# Patient Record
Sex: MALE | Race: BLACK OR AFRICAN AMERICAN | NOT HISPANIC OR LATINO | Employment: OTHER | URBAN - METROPOLITAN AREA
[De-identification: names, ages, dates, MRNs, and addresses within clinical notes are randomized per-mention and may not be internally consistent; named-entity substitution may affect disease eponyms.]

---

## 2017-03-29 ENCOUNTER — OFFICE VISIT (OUTPATIENT)
Dept: FAMILY MEDICINE | Facility: CLINIC | Age: 67
End: 2017-03-29
Payer: COMMERCIAL

## 2017-03-29 ENCOUNTER — LAB VISIT (OUTPATIENT)
Dept: LAB | Facility: HOSPITAL | Age: 67
End: 2017-03-29
Attending: INTERNAL MEDICINE
Payer: COMMERCIAL

## 2017-03-29 VITALS
TEMPERATURE: 98 F | HEIGHT: 70 IN | SYSTOLIC BLOOD PRESSURE: 104 MMHG | DIASTOLIC BLOOD PRESSURE: 71 MMHG | HEART RATE: 57 BPM | OXYGEN SATURATION: 96 % | WEIGHT: 214.94 LBS | BODY MASS INDEX: 30.77 KG/M2

## 2017-03-29 DIAGNOSIS — D50.9 MICROCYTIC ANEMIA: ICD-10-CM

## 2017-03-29 DIAGNOSIS — G47.26 SHIFT WORK SLEEP DISORDER: Primary | Chronic | ICD-10-CM

## 2017-03-29 DIAGNOSIS — N40.0 BENIGN NON-NODULAR PROSTATIC HYPERPLASIA WITHOUT LOWER URINARY TRACT SYMPTOMS: Chronic | ICD-10-CM

## 2017-03-29 DIAGNOSIS — I10 ESSENTIAL HYPERTENSION: Chronic | ICD-10-CM

## 2017-03-29 DIAGNOSIS — Z12.5 SCREENING FOR PROSTATE CANCER: ICD-10-CM

## 2017-03-29 DIAGNOSIS — E78.5 HYPERLIPIDEMIA, UNSPECIFIED HYPERLIPIDEMIA TYPE: Chronic | ICD-10-CM

## 2017-03-29 DIAGNOSIS — N52.9 ERECTILE DYSFUNCTION, UNSPECIFIED ERECTILE DYSFUNCTION TYPE: ICD-10-CM

## 2017-03-29 DIAGNOSIS — G25.81 RESTLESS LEG SYNDROME: ICD-10-CM

## 2017-03-29 LAB
ALBUMIN SERPL BCP-MCNC: 3.6 G/DL
ALP SERPL-CCNC: 39 U/L
ALT SERPL W/O P-5'-P-CCNC: 26 U/L
ANION GAP SERPL CALC-SCNC: 8 MMOL/L
AST SERPL-CCNC: 25 U/L
BASOPHILS # BLD AUTO: 0.02 K/UL
BASOPHILS NFR BLD: 0.3 %
BILIRUB SERPL-MCNC: 0.7 MG/DL
BUN SERPL-MCNC: 14 MG/DL
CALCIUM SERPL-MCNC: 9.3 MG/DL
CHLORIDE SERPL-SCNC: 107 MMOL/L
CHOLEST/HDLC SERPL: 6.2 {RATIO}
CO2 SERPL-SCNC: 27 MMOL/L
CREAT SERPL-MCNC: 1.1 MG/DL
DIFFERENTIAL METHOD: ABNORMAL
EOSINOPHIL # BLD AUTO: 0.1 K/UL
EOSINOPHIL NFR BLD: 0.9 %
ERYTHROCYTE [DISTWIDTH] IN BLOOD BY AUTOMATED COUNT: 14.3 %
EST. GFR  (AFRICAN AMERICAN): >60 ML/MIN/1.73 M^2
EST. GFR  (NON AFRICAN AMERICAN): >60 ML/MIN/1.73 M^2
GLUCOSE SERPL-MCNC: 99 MG/DL
HCT VFR BLD AUTO: 42.6 %
HDL/CHOLESTEROL RATIO: 16.2 %
HDLC SERPL-MCNC: 210 MG/DL
HDLC SERPL-MCNC: 34 MG/DL
HGB BLD-MCNC: 13.4 G/DL
LDLC SERPL CALC-MCNC: 144.8 MG/DL
LYMPHOCYTES # BLD AUTO: 2.3 K/UL
LYMPHOCYTES NFR BLD: 35 %
MCH RBC QN AUTO: 24.3 PG
MCHC RBC AUTO-ENTMCNC: 31.5 %
MCV RBC AUTO: 77 FL
MONOCYTES # BLD AUTO: 0.8 K/UL
MONOCYTES NFR BLD: 12.9 %
NEUTROPHILS # BLD AUTO: 3.3 K/UL
NEUTROPHILS NFR BLD: 50.6 %
NONHDLC SERPL-MCNC: 176 MG/DL
PLATELET # BLD AUTO: 278 K/UL
PMV BLD AUTO: 9.1 FL
POTASSIUM SERPL-SCNC: 3.9 MMOL/L
PROT SERPL-MCNC: 7.3 G/DL
RBC # BLD AUTO: 5.52 M/UL
SODIUM SERPL-SCNC: 142 MMOL/L
TRIGL SERPL-MCNC: 156 MG/DL
WBC # BLD AUTO: 6.45 K/UL

## 2017-03-29 PROCEDURE — 99214 OFFICE O/P EST MOD 30 MIN: CPT | Mod: S$GLB,,, | Performed by: INTERNAL MEDICINE

## 2017-03-29 PROCEDURE — 80061 LIPID PANEL: CPT

## 2017-03-29 PROCEDURE — 83020 HEMOGLOBIN ELECTROPHORESIS: CPT

## 2017-03-29 PROCEDURE — 36415 COLL VENOUS BLD VENIPUNCTURE: CPT | Mod: PO

## 2017-03-29 PROCEDURE — 99999 PR PBB SHADOW E&M-EST. PATIENT-LVL III: CPT | Mod: PBBFAC,,, | Performed by: INTERNAL MEDICINE

## 2017-03-29 PROCEDURE — 3078F DIAST BP <80 MM HG: CPT | Mod: S$GLB,,, | Performed by: INTERNAL MEDICINE

## 2017-03-29 PROCEDURE — 85025 COMPLETE CBC W/AUTO DIFF WBC: CPT

## 2017-03-29 PROCEDURE — 80053 COMPREHEN METABOLIC PANEL: CPT

## 2017-03-29 PROCEDURE — 84153 ASSAY OF PSA TOTAL: CPT

## 2017-03-29 PROCEDURE — 3074F SYST BP LT 130 MM HG: CPT | Mod: S$GLB,,, | Performed by: INTERNAL MEDICINE

## 2017-03-29 PROCEDURE — 1160F RVW MEDS BY RX/DR IN RCRD: CPT | Mod: S$GLB,,, | Performed by: INTERNAL MEDICINE

## 2017-03-29 RX ORDER — SILDENAFIL 50 MG/1
50 TABLET, FILM COATED ORAL DAILY PRN
Qty: 4 TABLET | Refills: 11 | Status: SHIPPED | OUTPATIENT
Start: 2017-03-29 | End: 2018-08-02

## 2017-03-29 RX ORDER — NIACIN 500 MG/1
500 TABLET, EXTENDED RELEASE ORAL DAILY
Qty: 30 TABLET | Refills: 11 | Status: SHIPPED | OUTPATIENT
Start: 2017-03-29 | End: 2017-07-06 | Stop reason: SDUPTHER

## 2017-03-29 RX ORDER — HYDROCHLOROTHIAZIDE 12.5 MG/1
12.5 CAPSULE ORAL DAILY
Qty: 30 CAPSULE | Refills: 11 | Status: SHIPPED | OUTPATIENT
Start: 2017-03-29 | End: 2018-02-01 | Stop reason: SINTOL

## 2017-03-29 RX ORDER — TRAZODONE HYDROCHLORIDE 50 MG/1
50 TABLET ORAL NIGHTLY PRN
Qty: 30 TABLET | Refills: 11 | Status: CANCELLED | OUTPATIENT
Start: 2017-03-29 | End: 2018-03-29

## 2017-03-29 RX ORDER — LISINOPRIL 20 MG/1
20 TABLET ORAL DAILY
Qty: 30 TABLET | Refills: 11 | Status: SHIPPED | OUTPATIENT
Start: 2017-03-29 | End: 2018-04-02 | Stop reason: SDUPTHER

## 2017-03-29 RX ORDER — AMOXICILLIN 500 MG/1
CAPSULE ORAL
Refills: 0 | COMMUNITY
Start: 2017-03-18 | End: 2017-09-12

## 2017-03-29 RX ORDER — ROPINIROLE 0.25 MG/1
0.25 TABLET, FILM COATED ORAL NIGHTLY
Qty: 30 TABLET | Refills: 5 | Status: CANCELLED | OUTPATIENT
Start: 2017-03-29 | End: 2017-09-25

## 2017-03-29 RX ORDER — RAMELTEON 8 MG/1
8 TABLET ORAL DAILY
Qty: 30 TABLET | Refills: 5 | Status: SHIPPED | OUTPATIENT
Start: 2017-03-29 | End: 2017-04-05

## 2017-03-29 RX ORDER — HYDROCODONE BITARTRATE AND ACETAMINOPHEN 5; 325 MG/1; MG/1
TABLET ORAL
Refills: 0 | COMMUNITY
Start: 2017-03-18 | End: 2017-03-29

## 2017-03-29 RX ORDER — METRONIDAZOLE 250 MG/1
TABLET ORAL
Refills: 0 | COMMUNITY
Start: 2017-03-18 | End: 2017-03-29

## 2017-03-29 NOTE — MR AVS SNAPSHOT
[facility]  [address] [facility]  [place] [address]  Phone: [phone]  Fax: [phone]                  Sharee Day Jr.   3/29/2017 10:40 AM   Office Visit    Description:  Male : 1950   Provider:  Navin Charlton MD   Department:  [facility] - Family Medicine           Reason for Visit     Dizziness     Insomnia     Constipation           Diagnoses this Visit        Comments    Hyperlipidemia, unspecified hyperlipidemia type    -  Primary     Essential hypertension         Shift work sleep disorder         Restless leg syndrome         Erectile dysfunction, unspecified erectile dysfunction type         Microcytic anemia         Screening for prostate cancer         Benign non-nodular prostatic hyperplasia without lower urinary tract symptoms                To Do List           Goals (5 Years of Data)              Today    16    Blood Pressure < 140/90   104/71  104/71  104/71    Related Problems    HTN (hypertension)       These Medications        Disp Refills Start End    hydrochlorothiazide (MICROZIDE) 12.5 mg capsule 30 capsule 11 3/29/2017     Take 1 capsule (12.5 mg total) by mouth once daily. - Oral    Pharmacy: [facility] [address] [place] [address] EXPY AT [facility] Ph #: [phone]       lisinopril (PRINIVIL,ZESTRIL) 20 MG tablet 30 tablet 11 3/29/2017 3/29/2018    Take 1 tablet (20 mg total) by mouth once daily. - Oral    Pharmacy: [facility] [address] [place] AT [facility] Ph #: [phone]       niacin (SLO-NIACIN) 500 mg tablet 30 tablet 11 3/29/2017 3/29/2018    Take 1 tablet (500 mg total) by mouth once daily. - Oral    Pharmacy: [facility] [address] EX[place] AT [facility] Ph #: [phone]       sildenafil (VIAGRA) 50 MG tablet 4 tablet 11 3/29/2017     Take 1 tablet (50 mg total) by mouth daily as needed for  Erectile Dysfunction. - Oral    Pharmacy: [facility] [address] [place] [address] [address] [place] [address] EXPY AT [facility] Ph #: [phone]       ramelteon (ROZEREM) 8 mg tablet 30 tablet 5 3/29/2017     Take 1 tablet (8 mg total) by mouth once daily. - Oral    Pharmacy: [facility] [address] [place] [address] [address] [facility] [address] AT [facility] Ph #: [phone]         [facility]     [facility] Care [facility] - 24/7 Assistance  Registered nurses in the [facility] provide clinical advisement, health education, appointment booking, and other advisory services.  Call for this free service at [phone].             Medications           Message regarding Medications     Verify the changes and/or additions to your medication regime listed below are the same as discussed with your clinician today.  If any of these changes or additions are incorrect, please notify your healthcare provider.        START taking these NEW medications        Refills    ramelteon (ROZEREM) 8 mg tablet 5    Sig: Take 1 tablet (8 mg total) by mouth once daily.    Class: Normal    Route: Oral      CHANGE how you are taking these medications     Start Taking Instead of    hydrochlorothiazide (MICROZIDE) 12.5 mg capsule hydrochlorothiazide (MICROZIDE) 12.5 mg capsule    Dosage:  Take 1 capsule (12.5 mg total) by mouth once daily. Dosage:  TAKE ONE CAPSULE BY MOUTH ONCE DAILY    Reason for Change:  Reorder       STOP taking these medications     ropinirole (REQUIP) 0.25 MG tablet Take 1 tablet (0.25 mg total) by mouth every evening.    trazodone (DESYREL) 50 MG tablet Take 1 tablet (50 mg total) by mouth nightly as needed for Insomnia.    hydrOXYzine pamoate (VISTARIL) 25 MG Cap Take 1-2 capsules (25-50 mg total) by mouth nightly as needed.    metronidazole (FLAGYL) 250 MG tablet TK 1 T PO  TID    hydrocodone-acetaminophen 5-325mg (NORCO) 5-325 mg per tablet TK 1 T PO  Q 4 TO 6 H PRN          "  Verify that the below list of medications is an accurate representation of the medications you are currently taking.  If none reported, the list may be blank. If incorrect, please contact your healthcare provider. Carry this list with you in case of emergency.           Current Medications     hydrochlorothiazide (MICROZIDE) 12.5 mg capsule Take 1 capsule (12.5 mg total) by mouth once daily.    lisinopril (PRINIVIL,ZESTRIL) 20 MG tablet Take 1 tablet (20 mg total) by mouth once daily.    niacin (SLO-NIACIN) 500 mg tablet Take 1 tablet (500 mg total) by mouth once daily.    sildenafil (VIAGRA) 50 MG tablet Take 1 tablet (50 mg total) by mouth daily as needed for Erectile Dysfunction.    amoxicillin (AMOXIL) 500 MG capsule TK ONE C PO  TID    ramelteon (ROZEREM) 8 mg tablet Take 1 tablet (8 mg total) by mouth once daily.           Clinical Reference Information           Your Vitals Were     BP Pulse Temp Height Weight SpO2    104/71 57 98.3 °F (36.8 °C) (Oral) 5' 10" (1.778 m) 97.5 kg (214 lb 15.2 oz) 96%    BMI                30.84 kg/m2          Blood Pressure          Most Recent Value    BP  104/71      Allergies as of 3/29/2017     Sulfamethoxazole-trimethoprim    Lipitor [Atorvastatin]      Immunizations Administered on Date of Encounter - 3/29/2017     None      Orders Placed During Today's Visit     Future Labs/Procedures Expected by Expires    CBC auto differential  3/29/2017 3/29/2018    Comprehensive metabolic panel  3/29/2017 3/29/2018    Hemoglobin Electrophoresis,Hgb A2 Vishnu.  3/29/2017 3/29/2018    Lipid panel  3/29/2017 3/29/2018    PSA, Screening  3/29/2017 3/29/2018      [facility] Sign-Up     Activating your [facility] account is as easy as 1-2-3!     1) Visit [email or web address], select Sign Up Now, enter this activation code and your date of birth, then select Next.  [email or web address]  Expires: 5/13/2017 11:16 AM      2) Create a username and password to use when you visit [facility] in the future and " "select a security question in case you lose your password and select Next.    3) Enter your e-mail address and click Sign Up!    Additional Information  If you have questions, please e-mail [email or web address] or call [phone] to talk to our [facility] staff. Remember, [facility] is NOT to be used for urgent needs. For medical emergencies, dial 911.         Instructions    Get the book "Say Iggy to Insomnia"       Smoking Cessation     If you would like to quit smoking:   You may be eligible for free services if you are a [place] resident and started smoking cigarettes before September 1, 1988.  Call the [employer]oking Cessation [employer] (RUST) toll free at (729) 722-9698 or (541) 250-5401.   Call 5-049-QUIT-NOW if you do not meet the above criteria.            Language Assistance Services     ATTENTION: Language assistance services are available, free of charge. Please call [phone].      ATENCIÓN: Si habla español, tiene a mata disposición servicios gratuitos de asistencia lingüística. Llame al [phone].     CHÚ Ý: N?u b?n nói Ti?ng Vi?t, có các d?ch v? h? tr? ngôn ng? mi?n phí dành cho b?n. G?i s? [phone].         [facility] complies with applicable Federal civil rights laws and does not discriminate on the basis of race, color, national origin, age, disability, or sex.        "

## 2017-03-29 NOTE — PROGRESS NOTES
"Assessment & Plan  Shift work sleep disorder-trazodone ineffective, hydroxyzine with side effects.  Trial of ramelteon.  If ineffective or just too expensive, we'll try Ambien low dose.  Risks of Ambien use discussed with the patient.  He's tried all over-the-counter antihistamines and melatonin without relief.  I also gave him the name of the book "say good night insomnia" to read about, for self directed cognitive behavioral modification.  -     ramelteon (ROZEREM) 8 mg tablet; Take 1 tablet (8 mg total) by mouth once daily.  Dispense: 30 tablet; Refill: 5    Essential hypertension-stable, refilled HCTZ and lisinopril.  -     hydrochlorothiazide (MICROZIDE) 12.5 mg capsule; Take 1 capsule (12.5 mg total) by mouth once daily.  Dispense: 30 capsule; Refill: 11  -     lisinopril (PRINIVIL,ZESTRIL) 20 MG tablet; Take 1 tablet (20 mg total) by mouth once daily.  Dispense: 30 tablet; Refill: 11    Hyperlipidemia, unspecified hyperlipidemia type -intolerant of statins in the past.  Fasting today, refilled niacin, check CMP and lipid profile.  -     niacin (SLO-NIACIN) 500 mg tablet; Take 1 tablet (500 mg total) by mouth once daily.  Dispense: 30 tablet; Refill: 11  -     Comprehensive metabolic panel; Future; Expected date: 3/29/17  -     Lipid panel; Future; Expected date: 3/29/17    Restless leg syndrome-did not find Requip helpful.  Discontinued.    Erectile dysfunction, unspecified erectile dysfunction type-refilled sildenafil.  -     sildenafil (VIAGRA) 50 MG tablet; Take 1 tablet (50 mg total) by mouth daily as needed for Erectile Dysfunction.  Dispense: 4 tablet; Refill: 11    Microcytic anemia-does not recall family history of thalassemia or sickle trait.  Normal iron stores.  I suspect hemoglobinopathy.  Check hemoglobin electrophoresis.  -     CBC auto differential; Future; Expected date: 3/29/17  -     Hemoglobin Electrophoresis,Hgb A2 Vishnu.; Future; Expected date: 3/29/17    Screening for prostate " "cancer  Benign non-nodular prostatic hyperplasia without lower urinary tract symptoms-check PSA.  He is monitored by urology.  Recalls having had prostate biopsy and MRI.  -     PSA, Screening; Future; Expected date: 3/29/17    Other orders  -     Cancel: ropinirole (REQUIP) 0.25 MG tablet; Take 1 tablet (0.25 mg total) by mouth every evening.  Dispense: 30 tablet; Refill: 5  -     Cancel: trazodone (DESYREL) 50 MG tablet; Take 1 tablet (50 mg total) by mouth nightly as needed for Insomnia.  Dispense: 30 tablet; Refill: 11        Medications Discontinued During This Encounter   Medication Reason    ropinirole (REQUIP) 0.25 MG tablet Patient no longer taking       Follow-up: No Follow-up on file.If all normal OV 6 months f/u on lipid, HTN, insomnia      =================================================================      Chief Complaint   Patient presents with    Dizziness    Insomnia    Constipation       CHERRY Tolliver is a 66 y.o. male, last appointment with this clinic was Visit date not found.    Former pt of Dr. Manuel  Insomnia; trial of trazodone; Sleep shift syndrome - hard for him to fall asleep at night.  Last night went to bed 9 PM and tossed and turned until 4 AM.  Days he works - he gets up at 4 AM. Works 12 hour shifts alternating day and nights.  Has been doing this x 40 years.   EtOH - rare. no caffeine.  Does smoke. Has had SE of vistaril; trazodone without relief.  Prior to bedtime - does not read, watch TV.  Typically after night shift - comes home, undresses, and goes to bed, and falls asleep after 1 hour.  Sleep duration 4-5 hours.  But working day shift - coming home - can't sleep.  Tried all OTC meds including melatonin, numerous antihistamines, without relief.  Hydroxyzine gave him a "popping" sensation in the head.  Had dental implant - giving him problems - was rx'd amoxicillin.  Could be contributor.  RLS; trial of Requip - did not find it helpful - stopped.  Microcytic anemia; normal " iron stores.   Check electrophoresis today.  No known fam hx of hemoglobinopathy.  Noncardiac chest pain.  Seen by cardiology.  7/13/2016 nu med stress test neg for ischemia  7/13/2016 TTE LVEF 55-60; no diastolic dysfunction  HTN, HCTZ and lisinopril - good BP today.  No outside BP checks.  Hyperlipidemia - statins SE of myalgias. On niacin.  Smoker. precontemplative stage of quitting.    Patient Care Team:  Navin Charlton MD as PCP - General (Internal Medicine)    Patient Active Problem List    Diagnosis Date Noted    Restless leg syndrome 08/02/2016    Hyperlipidemia     Hypertension      7/13/2016 nu med stress test neg for ischemia  7/13/2016 TTE LVEF 55-60; no diastolic dysfunction      Insomnia 11/20/2013       PAST MEDICAL HISTORY:  Past Medical History:   Diagnosis Date    Hyperlipidemia     Hypertension        PAST SURGICAL HISTORY:  Past Surgical History:   Procedure Laterality Date    MOUTH SURGERY  01/2016     Family History   Problem Relation Age of Onset    Breast cancer Mother     Hypertension Sister     Hypertension Brother     Hypertension Brother     Hypertension Sister     Melanoma Neg Hx     Psoriasis Neg Hx     Lupus Neg Hx          SOCIAL HISTORY:  Social History     Social History    Marital status: Single     Spouse name: N/A    Number of children: N/A    Years of education: N/A     Occupational History    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  Review of patient's allergies indicates:   Allergen Reactions    Sulfamethoxazole-trimethoprim Other (See Comments)     Muscle pain    Lipitor  [atorvastatin] Other (See Comments)     Muscle cramps     Current Outpatient Prescriptions   Medication Sig Dispense Refill    hydrochlorothiazide (MICROZIDE) 12.5 mg capsule TAKE ONE CAPSULE BY MOUTH ONCE DAILY 30 capsule 5    lisinopril (PRINIVIL,ZESTRIL) 20 MG tablet Take 1 tablet (20 mg total) by mouth once daily. 30 tablet 11    niacin (SLO-NIACIN) 500 mg tablet Take 1 tablet (500 mg total) by mouth once daily. 30 tablet 11    niacin (SLO-NIACIN) 500 mg tablet TAKE 1 TABLET BY MOUTH EVERY DAY 30 tablet 2    sildenafil (VIAGRA) 50 MG tablet Take 1 tablet (50 mg total) by mouth daily as needed for Erectile Dysfunction. 4 tablet 11    amoxicillin (AMOXIL) 500 MG capsule TK ONE C PO  TID  0    hydrocodone-acetaminophen 5-325mg (NORCO) 5-325 mg per tablet TK 1 T PO  Q 4 TO 6 H PRN  0    hydrOXYzine pamoate (VISTARIL) 25 MG Cap Take 1-2 capsules (25-50 mg total) by mouth nightly as needed. 60 capsule 11    metronidazole (FLAGYL) 250 MG tablet TK 1 T PO  TID  0    trazodone (DESYREL) 50 MG tablet Take 1 tablet (50 mg total) by mouth nightly as needed for Insomnia. 30 tablet 11     No current facility-administered medications for this visit.        Review of Systems   Constitutional: Negative for fever, malaise/fatigue and weight loss.   HENT: Negative for congestion.    Eyes: Negative for blurred vision and pain.   Respiratory: Negative for shortness of breath and wheezing.    Cardiovascular: Negative for chest pain, palpitations and leg swelling.   Gastrointestinal: Negative for abdominal pain, blood in stool, constipation, diarrhea and heartburn.   Genitourinary: Negative for dysuria, hematuria and urgency.   Musculoskeletal: Negative for joint pain.   Neurological: Negative for tingling, focal weakness, weakness and headaches.   Psychiatric/Behavioral: Negative for depression. The patient is not nervous/anxious.         HPI       Physical Exam   Vitals:    03/29/17 1047   BP: 104/71   Pulse: (!) 57  "  Temp: 98.3 °F (36.8 °C)   TempSrc: Oral   SpO2: 96%   Weight: 97.5 kg (214 lb 15.2 oz)   Height: 5' 10" (1.778 m)    Body mass index is 30.84 kg/(m^2).  Weight: 97.5 kg (214 lb 15.2 oz)   Height: 5' 10" (177.8 cm)     Physical Exam   Constitutional: He is oriented to person, place, and time. He appears well-developed and well-nourished. No distress.   Eyes: EOM are normal.   Cardiovascular: Normal rate, regular rhythm and normal heart sounds.    No murmur heard.  Pulmonary/Chest: Effort normal and breath sounds normal.   Musculoskeletal: Normal range of motion.   Neurological: He is alert and oriented to person, place, and time. Coordination normal.   Skin: Skin is warm and dry.   Psychiatric: He has a normal mood and affect. His behavior is normal. Thought content normal.     "

## 2017-03-30 LAB — COMPLEXED PSA SERPL-MCNC: 4.1 NG/ML

## 2017-03-31 LAB
HGB A2 MFR BLD HPLC: 6.4 %
HGB FRACT BLD ELPH-IMP: ABNORMAL
HGB FRACT BLD ELPH-IMP: ABNORMAL

## 2017-04-05 ENCOUNTER — TELEPHONE (OUTPATIENT)
Dept: FAMILY MEDICINE | Facility: CLINIC | Age: 67
End: 2017-04-05

## 2017-04-05 DIAGNOSIS — G47.26 SHIFT WORK SLEEP DISORDER: Primary | Chronic | ICD-10-CM

## 2017-04-05 RX ORDER — ZOLPIDEM TARTRATE 5 MG/1
5 TABLET ORAL NIGHTLY PRN
Qty: 30 TABLET | Refills: 0 | Status: SHIPPED | OUTPATIENT
Start: 2017-04-05 | End: 2017-05-31 | Stop reason: SDUPTHER

## 2017-04-05 NOTE — TELEPHONE ENCOUNTER
Spoke w/patient, requesting a different medication (Ambien), states Rozerem is not working. Please advise.

## 2017-04-05 NOTE — TELEPHONE ENCOUNTER
----- Message from Neda Sla sent at 4/4/2017  2:59 PM CDT -----  Contact: Self  Pt calling to speak to nurse regarding health. Please call [phone]

## 2017-04-05 NOTE — TELEPHONE ENCOUNTER
----- Message from Bernabe Bowman sent at 4/5/2017  9:31 AM CDT -----  Contact: Self/[phone]  Patient states that the medication:  ramelteon (ROZEREM) 8 mg tablet isn't working. He's requesting another medication. Thank you.

## 2017-04-11 ENCOUNTER — TELEPHONE (OUTPATIENT)
Dept: FAMILY MEDICINE | Facility: CLINIC | Age: 67
End: 2017-04-11

## 2017-04-11 PROBLEM — D56.3 BETA THALASSEMIA MINOR: Chronic | Status: ACTIVE | Noted: 2017-04-11

## 2017-04-11 NOTE — TELEPHONE ENCOUNTER
"Left VM on listed cell phone for him to call back.    PSA mild elevated, I believe he is seen by urology with Ricardo Morales but need to confirm this.  If so, will send copy of labs - I believe it's Dr. 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  It's been there all his life and will not cause problems.  But it also means that he does not need iron supplement.    Await call back  "

## 2017-04-11 NOTE — PROGRESS NOTES
PSA elevated.  He reported he is monitored by Urology - I believe urology with REED Morales but need to confirm.  He recalls hx of prostate bx and MRI.  Lipid not ideal but intolerant of statins.  On niaspan.  Hb electrophoresis shows beta thal minor.  CBC shows mild stable anemia.  Left message on pt's listed cell phone to call back - need to confirm he continues to see urology.  No change in regimen.

## 2017-04-11 NOTE — TELEPHONE ENCOUNTER
Spoke with pt notified him of results.  He still sees Dr. Kearns.  Had prostate MRI this year.    Discussed findings of beta thalassemia.    He's still having issues with insomnia - ambien helpful for initiation but still issues with sleep maintenance.  I discussed with him I'm reluctant to increase the dose.  Advised him to try OTC Sea Bands as I've had anecdotal success with this.  He also mentioned CBT as something he might be interested in if that doesn't work.

## 2017-05-31 DIAGNOSIS — G47.26 SHIFT WORK SLEEP DISORDER: Chronic | ICD-10-CM

## 2017-05-31 RX ORDER — ZOLPIDEM TARTRATE 5 MG/1
5 TABLET ORAL NIGHTLY PRN
Qty: 30 TABLET | Refills: 0 | Status: SHIPPED | OUTPATIENT
Start: 2017-05-31 | End: 2017-06-19

## 2017-05-31 NOTE — TELEPHONE ENCOUNTER
----- Message from Angelique Lazo sent at 5/30/2017  4:35 PM CDT -----  Contact: Self   Patient need a refill. Please call patient  at [phone]      zolpidem (AMBIEN) 5 MG Tab

## 2017-06-01 ENCOUNTER — TELEPHONE (OUTPATIENT)
Dept: FAMILY MEDICINE | Facility: CLINIC | Age: 67
End: 2017-06-01

## 2017-06-01 NOTE — TELEPHONE ENCOUNTER
Patients insurance does not cover Zolpidem 5mg.. Do you want to change the medication or due you want me to start a Prior Auth??

## 2017-06-19 DIAGNOSIS — G47.26 SHIFT WORK SLEEP DISORDER: Chronic | ICD-10-CM

## 2017-06-19 RX ORDER — ZOLPIDEM TARTRATE 6.25 MG/1
6.25 TABLET, FILM COATED, EXTENDED RELEASE ORAL NIGHTLY PRN
Qty: 30 TABLET | Refills: 1 | Status: SHIPPED | OUTPATIENT
Start: 2017-06-19 | End: 2017-06-30 | Stop reason: SDUPTHER

## 2017-06-19 RX ORDER — ZOLPIDEM TARTRATE 5 MG/1
5 TABLET ORAL NIGHTLY PRN
Qty: 30 TABLET | Refills: 0 | Status: CANCELLED | OUTPATIENT
Start: 2017-06-19 | End: 2017-12-18

## 2017-06-19 NOTE — TELEPHONE ENCOUNTER
----- Message from Geraldo Michele sent at 6/19/2017  9:29 AM CDT -----  Contact: self  Refill:    zolpidem (AMBIEN) 5 MG Tab   pt is asking for the time release brand to keep him asleep.  Pt is also asking for PA because ins is not paying for medication    Please call pt at .    Thanks

## 2017-06-19 NOTE — TELEPHONE ENCOUNTER
i sent in rx for ambien CR 6.25 mg.  I sent it to [facility] b/c they will try to get the prior authorization.  If authorized, he may need to get it from [facility] or have [facility] transfer the rx to his local pharmacy.

## 2017-06-30 DIAGNOSIS — G47.26 SHIFT WORK SLEEP DISORDER: Chronic | ICD-10-CM

## 2017-06-30 RX ORDER — ZOLPIDEM TARTRATE 6.25 MG/1
6.25 TABLET, FILM COATED, EXTENDED RELEASE ORAL NIGHTLY PRN
Qty: 30 TABLET | Refills: 1 | Status: SHIPPED | OUTPATIENT
Start: 2017-06-30 | End: 2017-08-30 | Stop reason: SDUPTHER

## 2017-06-30 NOTE — TELEPHONE ENCOUNTER
----- Message from Kemi Rubin sent at 6/30/2017  9:57 AM CDT -----  Contact: [phone]  Pt states the pharmacy doesn't have the refill for zolpidem (AMBIEN CR) 6.25 MG CR tablet please send to [facility] [id number] [place] [address] [address] [facility] [address] AT [facility]

## 2017-07-06 DIAGNOSIS — E78.5 HYPERLIPIDEMIA, UNSPECIFIED HYPERLIPIDEMIA TYPE: Chronic | ICD-10-CM

## 2017-07-07 RX ORDER — NIACIN 500 MG/1
500 TABLET, EXTENDED RELEASE ORAL DAILY
Qty: 30 TABLET | Refills: 11 | Status: SHIPPED | OUTPATIENT
Start: 2017-07-07 | End: 2017-08-30 | Stop reason: SDUPTHER

## 2017-08-30 ENCOUNTER — LAB VISIT (OUTPATIENT)
Dept: LAB | Facility: HOSPITAL | Age: 67
End: 2017-08-30
Attending: INTERNAL MEDICINE
Payer: COMMERCIAL

## 2017-08-30 ENCOUNTER — OFFICE VISIT (OUTPATIENT)
Dept: FAMILY MEDICINE | Facility: CLINIC | Age: 67
End: 2017-08-30
Payer: COMMERCIAL

## 2017-08-30 ENCOUNTER — TELEPHONE (OUTPATIENT)
Dept: FAMILY MEDICINE | Facility: CLINIC | Age: 67
End: 2017-08-30

## 2017-08-30 VITALS
RESPIRATION RATE: 18 BRPM | HEIGHT: 70 IN | WEIGHT: 219.56 LBS | HEART RATE: 71 BPM | TEMPERATURE: 98 F | BODY MASS INDEX: 31.43 KG/M2 | DIASTOLIC BLOOD PRESSURE: 81 MMHG | SYSTOLIC BLOOD PRESSURE: 123 MMHG | OXYGEN SATURATION: 97 %

## 2017-08-30 DIAGNOSIS — E55.9 VITAMIN D DEFICIENCY: ICD-10-CM

## 2017-08-30 DIAGNOSIS — F17.200 SMOKER: ICD-10-CM

## 2017-08-30 DIAGNOSIS — E78.5 HYPERLIPIDEMIA, UNSPECIFIED HYPERLIPIDEMIA TYPE: Chronic | ICD-10-CM

## 2017-08-30 DIAGNOSIS — C61 PROSTATE CANCER: Primary | ICD-10-CM

## 2017-08-30 DIAGNOSIS — Z23 NEED FOR PROPHYLACTIC VACCINATION AGAINST STREPTOCOCCUS PNEUMONIAE (PNEUMOCOCCUS): ICD-10-CM

## 2017-08-30 DIAGNOSIS — G47.26 SHIFT WORK SLEEP DISORDER: Chronic | ICD-10-CM

## 2017-08-30 DIAGNOSIS — C61 PROSTATE CANCER: ICD-10-CM

## 2017-08-30 LAB — 25(OH)D3+25(OH)D2 SERPL-MCNC: 18 NG/ML

## 2017-08-30 PROCEDURE — 99214 OFFICE O/P EST MOD 30 MIN: CPT | Mod: 25,S$GLB,, | Performed by: INTERNAL MEDICINE

## 2017-08-30 PROCEDURE — 90670 PCV13 VACCINE IM: CPT | Mod: S$GLB,,, | Performed by: INTERNAL MEDICINE

## 2017-08-30 PROCEDURE — 3074F SYST BP LT 130 MM HG: CPT | Mod: S$GLB,,, | Performed by: INTERNAL MEDICINE

## 2017-08-30 PROCEDURE — 84153 ASSAY OF PSA TOTAL: CPT

## 2017-08-30 PROCEDURE — 3079F DIAST BP 80-89 MM HG: CPT | Mod: S$GLB,,, | Performed by: INTERNAL MEDICINE

## 2017-08-30 PROCEDURE — 82306 VITAMIN D 25 HYDROXY: CPT

## 2017-08-30 PROCEDURE — 36415 COLL VENOUS BLD VENIPUNCTURE: CPT | Mod: PO

## 2017-08-30 PROCEDURE — 90471 IMMUNIZATION ADMIN: CPT | Mod: S$GLB,,, | Performed by: INTERNAL MEDICINE

## 2017-08-30 PROCEDURE — 99999 PR PBB SHADOW E&M-EST. PATIENT-LVL IV: CPT | Mod: PBBFAC,,, | Performed by: INTERNAL MEDICINE

## 2017-08-30 PROCEDURE — 3008F BODY MASS INDEX DOCD: CPT | Mod: S$GLB,,, | Performed by: INTERNAL MEDICINE

## 2017-08-30 RX ORDER — NIACIN 500 MG/1
500 TABLET, EXTENDED RELEASE ORAL DAILY
Qty: 90 TABLET | Refills: 3 | Status: SHIPPED | OUTPATIENT
Start: 2017-08-30 | End: 2018-08-02 | Stop reason: ALTCHOICE

## 2017-08-30 RX ORDER — ZOLPIDEM TARTRATE 6.25 MG/1
6.25 TABLET, FILM COATED, EXTENDED RELEASE ORAL NIGHTLY PRN
Qty: 30 TABLET | Refills: 2 | Status: SHIPPED | OUTPATIENT
Start: 2017-08-30 | End: 2018-01-02

## 2017-08-30 NOTE — TELEPHONE ENCOUNTER
----- Message from Venecia Charlton sent at 8/30/2017  8:21 AM CDT -----  Contact: self  Patient is requesting to speak to Dr Charlton himself . He was recently diagnosed with prostate cancer .     [phone]      LL

## 2017-08-30 NOTE — PROGRESS NOTES
Prevnar-13 vaccine administered, bhumi well. Instructed to wait 15mins for observation, no reaction noted @ time of discharge.

## 2017-08-30 NOTE — TELEPHONE ENCOUNTER
Patient scheduled 08/30/17 4:00pm appt to follow-up with Dr. Charlton, he's newly diagnosed w/prostate cancer.

## 2017-08-30 NOTE — PROGRESS NOTES
This note was created by combination of typed  and Dragon dictation.  Transcription errors may be present.  If there are any questions, please contact me.    Assessment & Plan  Prostate cancer-long discussion with the patient about risks and benefits of prostatectomy and radiation treatment.  I would ultimately defer to urology as this is their area of expertise.  I did discuss with him that in my experience, XRT has less long term SE but ultimately would not contradict urologist's recommendations.  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  Recommended the CBT-I joce that is free.  -     zolpidem (AMBIEN CR) 6.25 MG CR tablet; Take 1 tablet (6.25 mg total) by mouth nightly as needed for Insomnia.  Dispense: 30 tablet; Refill: 2    Need for prophylactic vaccination against Streptococcus pneumoniae (pneumococcus)  -     Pneumococcal Conjugate Vaccine (13 Valent) (IM)    Smoker  -     Ambulatory referral to Smoking Cessation Program    Vitamin D deficiency  -     Vitamin D; Future; Expected date: 08/30/2017        Medications Discontinued During This Encounter   Medication Reason    zolpidem (AMBIEN CR) 6.25 MG CR tablet Reorder       Follow-up: No Follow-up on file.      =================================================================      Chief Complaint   Patient presents with    Prostate Problem       CHERRY Tolliver is a 67 y.o. male, last appointment with this clinic was 3/29/2017.    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  Hydroxyzine with SE. Trazodone ineffective.  Rozerem didn't help. Ambien.  RLS; trial of Requip - did not find it helpful - stopped.  Beta thal minor.  Noncardiac chest pain.  Seen by cardiology.  7/13/2016 nu med stress test neg for ischemia  7/13/2016 TTE LVEF 55-60; no diastolic dysfunction  HTN, HCTZ and lisinopril - good BP today.  No outside BP checks.  Hyperlipidemia - statins SE of myalgias. On  niacin.  Smoker. precontemplative stage of quitting.  New dx of prostate CA, he saw his urologist, underwent biopsies, 2 cores were +3+4 = 7 [place]'s.  He had a discussion with his urologist and his options were mainly prostatectomy versus ideation therapy and he is coming in asking my opinion.  He recalls having had MRI of the prostate done early this year which was normal.  Had previously had a prostate biopsy which was negative.  He is requesting refills on his Ambien CR.  He had previously expressed interest in cognitive behavioral therapy and I have guided him towards a phone joce for this.  He was also diagnosed with vitamin D deficiency.  Wonders if that has any bearing on this.  He continues to smoke and is interested in smoking cessation classes now.    Patient Care Team:  Navin Charlton MD as PCP - General (Internal Medicine)  Alfredo Kearns MD as Consulting Physician (Urology)    Patient Active Problem List    Diagnosis Date Noted    Beta thalassemia minor 04/11/2017    Benign non-nodular prostatic hyperplasia without lower urinary tract symptoms 03/29/2017     Hx of negative prostate Bx and hx of MRI prostate      Restless leg syndrome 08/02/2016    Hyperlipidemia     Hypertension      7/13/2016 nu med stress test neg for ischemia  7/13/2016 TTE LVEF 55-60; no diastolic dysfunction      Shift work sleep disorder 11/20/2013       PAST MEDICAL HISTORY:  Past Medical History:   Diagnosis Date    Hyperlipidemia     Hypertension        PAST SURGICAL HISTORY:  Past Surgical History:   Procedure Laterality Date    MOUTH SURGERY  01/2016       SOCIAL HISTORY:  Social History     Social History    Marital status: Single     Spouse name: N/A    Number of children: N/A    Years of education: N/A     Occupational History    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; 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  Review of patient's allergies indicates:   Allergen Reactions    Sulfamethoxazole-trimethoprim Other (See Comments)     Muscle pain    Lipitor [atorvastatin] Other (See Comments)     Muscle cramps     Current Outpatient Prescriptions   Medication Sig Dispense Refill    hydrochlorothiazide (MICROZIDE) 12.5 mg capsule Take 1 capsule (12.5 mg total) by mouth once daily. 30 capsule 11    lisinopril (PRINIVIL,ZESTRIL) 20 MG tablet Take 1 tablet (20 mg total) by mouth once daily. 30 tablet 11    niacin (SLO-NIACIN) 500 mg tablet Take 1 tablet (500 mg total) by mouth once daily. 90 tablet 3    zolpidem (AMBIEN CR) 6.25 MG CR tablet Take 1 tablet (6.25 mg total) by mouth nightly as needed for Insomnia. 30 tablet 1    amoxicillin (AMOXIL) 500 MG capsule TK ONE C PO  TID  0    sildenafil (VIAGRA) 50 MG tablet Take 1 tablet (50 mg total) by mouth daily as needed for Erectile Dysfunction. 4 tablet 11     No current facility-administered medications for this visit.        Review of Systems   Constitutional: Negative for chills and fever.   Cardiovascular: Negative for chest pain.   Genitourinary: Negative for dysuria, flank pain, frequency, hematuria and urgency.   Psychiatric/Behavioral: The patient has insomnia.        Physical Exam   Vitals:    08/30/17 1611   BP: 123/81   Pulse: 71   Resp: 18   Temp: 98.1 °F (36.7 °C)   TempSrc: Oral   SpO2: 97%   Weight: 99.6 kg (219 lb 9.3 oz)   Height: 5' 10" (1.778 m)    Body mass index is 31.51 kg/m².  Weight: 99.6 kg (219 lb 9.3 oz)   Height: 5' 10" (177.8 cm)     Physical Exam   Constitutional: He is oriented to person, place, and time. He appears well-developed and well-nourished. No distress. "   Neurological: He is alert and oriented to person, place, and time.   Psychiatric: He has a normal mood and affect. His behavior is normal. Thought content normal.

## 2017-08-31 LAB
PROSTATE SPECIFIC ANTIGEN, TOTAL: 12.7 NG/ML
PSA FREE MFR SERPL: 11.97 %
PSA FREE SERPL-MCNC: 1.52 NG/ML

## 2017-09-01 ENCOUNTER — TELEPHONE (OUTPATIENT)
Dept: FAMILY MEDICINE | Facility: CLINIC | Age: 67
End: 2017-09-01

## 2017-09-01 NOTE — TELEPHONE ENCOUNTER
----- Message from Cecily Connor sent at 9/1/2017 11:20 AM CDT -----  Contact: self  Patient called stating that he can't get into portal. Please contact him with test results. Please contact him at [phone].    Thanks!

## 2017-09-12 ENCOUNTER — OFFICE VISIT (OUTPATIENT)
Dept: UROLOGY | Facility: CLINIC | Age: 67
End: 2017-09-12
Payer: COMMERCIAL

## 2017-09-12 ENCOUNTER — TELEPHONE (OUTPATIENT)
Dept: UROLOGY | Facility: CLINIC | Age: 67
End: 2017-09-12

## 2017-09-12 VITALS
HEART RATE: 60 BPM | HEIGHT: 70 IN | WEIGHT: 213 LBS | RESPIRATION RATE: 15 BRPM | SYSTOLIC BLOOD PRESSURE: 130 MMHG | BODY MASS INDEX: 30.49 KG/M2 | DIASTOLIC BLOOD PRESSURE: 80 MMHG

## 2017-09-12 DIAGNOSIS — D56.3 BETA THALASSEMIA MINOR: Chronic | ICD-10-CM

## 2017-09-12 DIAGNOSIS — C61 PROSTATE CANCER: Primary | ICD-10-CM

## 2017-09-12 PROCEDURE — 3075F SYST BP GE 130 - 139MM HG: CPT | Mod: S$GLB,,, | Performed by: UROLOGY

## 2017-09-12 PROCEDURE — 99999 PR PBB SHADOW E&M-EST. PATIENT-LVL III: CPT | Mod: PBBFAC,,, | Performed by: UROLOGY

## 2017-09-12 PROCEDURE — 3079F DIAST BP 80-89 MM HG: CPT | Mod: S$GLB,,, | Performed by: UROLOGY

## 2017-09-12 PROCEDURE — 99205 OFFICE O/P NEW HI 60 MIN: CPT | Mod: S$GLB,,, | Performed by: UROLOGY

## 2017-09-12 PROCEDURE — 3008F BODY MASS INDEX DOCD: CPT | Mod: S$GLB,,, | Performed by: UROLOGY

## 2017-09-12 NOTE — PROGRESS NOTES
Clinic Note  9/12/2017      Subjective:         Chief Complaint:   HPI  Sharee Day Jr. is a 67 y.o. male recently diagnosed with prostate cancer. Has one sheet of path report with other clinical information on it. No other records.  Had negative biopsy 2 years prior. Patient had MRI in January, reportedly normal. Here with his sister and brother.  Works as . [employer].    Stage T1C  PSAi- 5.35  Vol:  Biopsy (8/15/2017, Dr. Kearns)- [place] 4+3 right middle 11%; [place] 3+4 left base 1%. Unable to do core count with information on report but likely ,34%.  GWENDOLYN score- 4  Intermediate risk disease.      Lab Results   Component Value Date    PSA 4.1 (H) 03/29/2017    PSA 3.3 11/20/2013    PSATOTAL 12.7 (H) 08/30/2017    PSAFREE 1.52 (H) 08/30/2017    PSAFREEPCT 11.97 08/30/2017      Past Medical History:   Diagnosis Date    Hyperlipidemia     Hypertension      Family History   Problem Relation Age of Onset    Breast cancer Mother     Hypertension Sister     Hypertension Brother     Hypertension Brother     Hypertension Sister     Melanoma Neg Hx     Psoriasis Neg Hx     Lupus Neg Hx      Social History     Social History    Marital status: Single     Spouse name: N/A    Number of children: N/A    Years of education: N/A     Occupational History    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; 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; 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 [employer]     Social History Main Topics    Smoking status: Current Every Day Smoker     Types: Cigarettes    Smokeless tobacco: Former User     Quit date: 12/20/2012    Alcohol use No    Drug use: No    Sexual activity: Not on file     Other Topics Concern    Not on file     Social History Narrative    No narrative on file     Past Surgical History:   Procedure Laterality Date    MOUTH SURGERY  01/2016     Patient Active Problem List   Diagnosis    Shift work sleep disorder    Hyperlipidemia    Hypertension    Restless leg syndrome    Benign non-nodular  "prostatic hyperplasia without lower urinary tract symptoms    Beta thalassemia minor    Prostate cancer    Smoker    Vitamin D deficiency     Review of Systems   Genitourinary: Negative for dysuria, hematuria and nocturia.         Objective:      /80   Pulse 60   Resp 15   Ht 5' 10" (1.778 m)   Wt 96.6 kg (213 lb)   BMI 30.56 kg/m²   Estimated body mass index is 30.56 kg/m² as calculated from the following:    Height as of this encounter: 5' 10" (1.778 m).    Weight as of this encounter: 96.6 kg (213 lb).  Physical Exam   Constitutional: He is oriented to person, place, and time. He appears well-developed and well-nourished. No distress.   HENT:   Head: Atraumatic.   Neck: No tracheal deviation present.   Cardiovascular: Normal rate.    Pulmonary/Chest: Effort normal. No respiratory distress. He has no wheezes.   Abdominal: Soft. Bowel sounds are normal. He exhibits no distension and no mass. There is no tenderness. There is no rebound and no guarding.   Neurological: He is alert and oriented to person, place, and time.   Skin: Skin is warm and dry. He is not diaphoretic.     Psychiatric: He has a normal mood and affect. His behavior is normal. Judgment and thought content normal.         Assessment and Plan:           Problem List Items Addressed This Visit     Beta thalassemia minor (Chronic)    Prostate cancer - Primary    Overview     2 cores positive kary 3+4=7 on biopsy 8/15/2017           Other Visit Diagnoses    None.         Follow up:   Today's visit was spent almost entirely on counseling. We reviewed his diagnosis, stage, grade, risk group, and prognosis. We discussed [place] and GWENDOLYN risk stratification We reviewed the [facility] nomogram. We discussed the concept of low risk, moderate risk, and high risk disease. We discussed the different treatment options including active surveillance (as well as the surveillance protocol), prostate brachytherapy, IMRT, IGRT, cryotherapy, HIFU " and both open and robotic prostatectomy.We also discussed the advantages, disadvantages, risks and benefits, as well as complications of each option. Regarding radiation therapy we discussed treatment planning, the different techniques, short and long term complications. These included radiation cystitis, radiation proctitis, and impotence. We discussed success, failure, and salvage therapeutic options. We discussed surgical therapy in depth including preoperative preparation, surgical technique (including bladder neck and nerve-sparing techniques), postoperative recuperation and recovery, and short and long term complications including UTI, bleeding, blood clots,catheter dislodgement, etc. We discussed the risks of reoperation, incontinence, impotence, and recurrence. We discussed preop and postop Kegels, post op penile rehab, and treatment options for incontinence and impotence. We discussed rates of cancer free survival and recurrence, as well as salvage therapeutic options. We discussed the possible  indications for adjuvant radiation therapy. I answered questions and addressed concerns.   My nurse will instruct the patient on Kegel exercises today and give them the Kegel exercise instruction sheet.   Need outside MRI images.  The patient will meet with our  Shae today to find a date for surgery and begin preparation for surgery. Will also receive our handout on NPO guidelines and preop hydration. Patient will also receive information and education on preoperative skin preparation and postop wound care.   I spent over 45 minutes with the patient. Over 50% of the visit was spent in counseling.  Letter to Dr. Charlton. Given card with office, cell, and email if they have any additional questions.     Ron Caputo

## 2017-09-12 NOTE — LETTER
September 12, 2017        Navin Charlton MD  [address]  [place] [address]             [facility]  [address]  [place] [address]  Phone: [phone]   Patient: Sharee Day Jr.   MR Number: [id number]   YOB: 1950   Date of Visit: 9/12/2017       Dear Dr. Charlton:    Thank you for referring Sharee Day to me for evaluation. Attached you will find relevant portions of my assessment and plan of care.    If you have questions, please do not hesitate to call me. I look forward to following Sharee Day along with you.    Sincerely,      Ron Caputo MD            CC  No Recipients    Enclosure

## 2017-09-13 ENCOUNTER — TELEPHONE (OUTPATIENT)
Dept: UROLOGY | Facility: CLINIC | Age: 67
End: 2017-09-13

## 2017-09-13 NOTE — TELEPHONE ENCOUNTER
Patient dropped of his MRI of his prostate and wanted  to review it and call him back. I advised patient that I will forward  his message.

## 2017-09-19 ENCOUNTER — TELEPHONE (OUTPATIENT)
Dept: UROLOGY | Facility: CLINIC | Age: 67
End: 2017-09-19

## 2017-09-19 DIAGNOSIS — C61 PROSTATE CANCER: Primary | ICD-10-CM

## 2017-09-19 NOTE — TELEPHONE ENCOUNTER
I spoke with patient, who stated he wanted to know what the radiologist read on his mri that was brought in, and wants to know if he can get another mri or bonescan to see if he has cancer anywhere else in his body. I advised patient that I will send message to . Patient agreed.

## 2017-09-27 DIAGNOSIS — Z01.818 PREOP TESTING: Primary | ICD-10-CM

## 2017-09-28 ENCOUNTER — HOSPITAL ENCOUNTER (OUTPATIENT)
Dept: RADIOLOGY | Facility: HOSPITAL | Age: 67
Discharge: HOME | End: 2017-09-28
Attending: UROLOGY
Payer: COMMERCIAL

## 2017-09-28 ENCOUNTER — OFFICE VISIT (OUTPATIENT)
Dept: UROLOGY | Facility: CLINIC | Age: 67
End: 2017-09-28
Payer: COMMERCIAL

## 2017-09-28 ENCOUNTER — TELEPHONE (OUTPATIENT)
Dept: FAMILY MEDICINE | Facility: CLINIC | Age: 67
End: 2017-09-28

## 2017-09-28 VITALS
HEIGHT: 70 IN | BODY MASS INDEX: 30.92 KG/M2 | HEART RATE: 67 BPM | TEMPERATURE: 97 F | WEIGHT: 216 LBS | DIASTOLIC BLOOD PRESSURE: 77 MMHG | SYSTOLIC BLOOD PRESSURE: 120 MMHG

## 2017-09-28 DIAGNOSIS — C61 PROSTATE CANCER: ICD-10-CM

## 2017-09-28 DIAGNOSIS — M62.58 PELVIC FLOOR MUSCLE WASTING IN MALE: Primary | ICD-10-CM

## 2017-09-28 PROBLEM — M62.89 PELVIC FLOOR DYSFUNCTION: Status: ACTIVE | Noted: 2017-09-28

## 2017-09-28 LAB
CREAT SERPL-MCNC: 1.1 MG/DL (ref 0.5–1.4)
SAMPLE: NORMAL

## 2017-09-28 PROCEDURE — 25500020 PHARM REV CODE 255: Performed by: UROLOGY

## 2017-09-28 PROCEDURE — 72197 MRI PELVIS W/O & W/DYE: CPT | Mod: 26,,, | Performed by: RADIOLOGY

## 2017-09-28 PROCEDURE — 3078F DIAST BP <80 MM HG: CPT | Mod: S$GLB,,, | Performed by: UROLOGY

## 2017-09-28 PROCEDURE — 72197 MRI PELVIS W/O & W/DYE: CPT | Mod: TC

## 2017-09-28 PROCEDURE — 99213 OFFICE O/P EST LOW 20 MIN: CPT | Mod: S$GLB,,, | Performed by: UROLOGY

## 2017-09-28 PROCEDURE — A9585 GADOBUTROL INJECTION: HCPCS | Performed by: UROLOGY

## 2017-09-28 PROCEDURE — 99999 PR PBB SHADOW E&M-EST. PATIENT-LVL IV: CPT | Mod: PBBFAC,,, | Performed by: UROLOGY

## 2017-09-28 PROCEDURE — 3008F BODY MASS INDEX DOCD: CPT | Mod: S$GLB,,, | Performed by: UROLOGY

## 2017-09-28 PROCEDURE — 3074F SYST BP LT 130 MM HG: CPT | Mod: S$GLB,,, | Performed by: UROLOGY

## 2017-09-28 RX ORDER — GADOBUTROL 604.72 MG/ML
10 INJECTION INTRAVENOUS
Status: COMPLETED | OUTPATIENT
Start: 2017-09-28 | End: 2017-09-28

## 2017-09-28 RX ADMIN — GADOBUTROL 10 ML: 604.72 INJECTION INTRAVENOUS at 01:09

## 2017-09-28 NOTE — PROGRESS NOTES
Clinic Note  9/28/2017      Subjective:         Chief Complaint:   HPI  Sharee Day Jr. is a 67 y.o. male recently diagnosed with prostate cancer. Has one sheet of path report with other clinical information on it. No other records.  Had negative biopsy 2 years prior. Patient had MRI in January, reportedly normal. Here with his sister and brother.  Works as . [employer].     Stage T1C  PSAi- 5.35  Vol:  Biopsy (8/15/2017, Dr. Kearns)- [place] 4+3 right middle 11%; [place] 3+4 left base 1%. Unable to do core count with information on report but likely ,34%.  GWENDOLYN score- 4  Intermediate risk disease.      Lab Results   Component Value Date    PSA 4.1 (H) 03/29/2017    PSA 3.3 11/20/2013    PSATOTAL 12.7 (H) 08/30/2017    PSAFREE 1.52 (H) 08/30/2017    PSAFREEPCT 11.97 08/30/2017      Past Medical History:   Diagnosis Date    Hyperlipidemia     Hypertension      Family History   Problem Relation Age of Onset    Breast cancer Mother     Hypertension Sister     Hypertension Brother     Hypertension Brother     Hypertension Sister     Melanoma Neg Hx     Psoriasis Neg Hx     Lupus Neg Hx      Social History     Social History    Marital status: Single     Spouse name: N/A    Number of children: N/A    Years of education: N/A     Occupational History    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; 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; 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 [employer]     Social History Main Topics    Smoking status: Current Every Day Smoker     Types: Cigarettes    Smokeless tobacco: Former User     Quit date: 12/20/2012    Alcohol use No    Drug use: No    Sexual activity: Not on file     Other Topics Concern    Not on file     Social History Narrative    No narrative on file     Past Surgical History:   Procedure Laterality Date    MOUTH SURGERY  01/2016     Patient Active Problem List   Diagnosis    Shift work sleep disorder    Hyperlipidemia    Hypertension    Restless leg syndrome    Benign non-nodular  "prostatic hyperplasia without lower urinary tract symptoms    Beta thalassemia minor    Prostate cancer    Smoker    Vitamin D deficiency     Review of Systems      Objective:      /77   Pulse 67   Temp 97.2 °F (36.2 °C)   Ht 5' 10" (1.778 m)   Wt 98 kg (216 lb)   BMI 30.99 kg/m²   Estimated body mass index is 30.99 kg/m² as calculated from the following:    Height as of this encounter: 5' 10" (1.778 m).    Weight as of this encounter: 98 kg (216 lb).  Physical Exam      Assessment and Plan:           Problem List Items Addressed This Visit     Prostate cancer - Primary    Overview     2 cores positive kary 3+4=7 on biopsy 8/15/2017           Other Visit Diagnoses    None.         Follow up:   Today's visit was spent almost entirely on counseling. We reviewed his diagnosis, stage, grade, risk group, and prognosis. We discussed [place] and GWENDOLYN risk stratification We reviewed the [facility] nomogram. We discussed the concept of low risk, moderate risk, and high risk disease. We discussed the different treatment options including active surveillance (as well as the surveillance protocol), prostate brachytherapy, IMRT, IGRT, cryotherapy, HIFU and both open and robotic prostatectomy.We also discussed the advantages, disadvantages, risks and benefits, as well as complications of each option. Regarding radiation therapy we discussed treatment planning, the different techniques, short and long term complications. These included radiation cystitis, radiation proctitis, and impotence. We discussed success, failure, and salvage therapeutic options. We discussed surgical therapy in depth including preoperative preparation, surgical technique (including bladder neck and nerve-sparing techniques), postoperative recuperation and recovery, and short and long term complications including UTI, bleeding, blood clots,catheter dislodgement, etc. We discussed the risks of reoperation, incontinence, impotence, and " recurrence. We discussed preop and postop Kegels, post op penile rehab, and treatment options for incontinence and impotence. We discussed rates of cancer free survival and recurrence, as well as salvage therapeutic options. We discussed the possible  indications for adjuvant radiation therapy. I answered questions and addressed concerns.   Struggling with Kegels. Will have him see Janeth preop for pelvic floor dysfunction.  Janeth will see him October 6th at 1300.  Surgery scheduled for October 9th.    Ron Caputo

## 2017-09-28 NOTE — TELEPHONE ENCOUNTER
----- Message from Edwina Long RN sent at 9/27/2017  8:50 AM CDT -----  Pt scheduled for robotic assisted laparoscopic prostatectomy by Dr Caputo 10/9. Requesting medical clearance please. We have scheduled labs/ ekg for 10/3     D. Ethan RN BC  Pre-op anesthesia

## 2017-09-28 NOTE — TELEPHONE ENCOUNTER
Spoke with pt - no chest pain, no dyspnea, no pedal edema, no palpitations.  Had stress testing done 2016 negative for ischemia.  May proceed to surgery without further cardiac testing.      Has upcoming preop.  Pt will keep appt with urology - he's interested in reviewing his options again including possibility of non-surgical options.

## 2017-10-09 ENCOUNTER — TELEPHONE (OUTPATIENT)
Dept: FAMILY MEDICINE | Facility: CLINIC | Age: 67
End: 2017-10-09

## 2017-10-09 NOTE — TELEPHONE ENCOUNTER
Left VM on listed # asking him to call back.  Need more details - is he trying to get FMLA for upcoming surgery?  A few days off? Need details on duration of time off and if his work requires any specific paperwork like FMLA or doctor's note or what.

## 2017-10-09 NOTE — TELEPHONE ENCOUNTER
----- Message from Rama Lazo sent at 9/29/2017  8:27 AM CDT -----  Patient is requesting to speak with nurse. He needs to discuss getting an excuse for work. He just lost a son and diagnosed with cancer. Please call at [phone] thank you!

## 2017-10-10 NOTE — TELEPHONE ENCOUNTER
Patient states he will make an appointment to discuss what he need in terms of days off. 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

## 2017-10-16 ENCOUNTER — TELEPHONE (OUTPATIENT)
Dept: FAMILY MEDICINE | Facility: CLINIC | Age: 67
End: 2017-10-16

## 2017-10-16 NOTE — TELEPHONE ENCOUNTER
----- Message from Neda Sal sent at 10/16/2017  3:36 PM CDT -----  Contact: Self  Pt states he faxed some paper work to office. Please call pt to confirm that nurse received paper work. Please call [phone]

## 2017-10-16 NOTE — TELEPHONE ENCOUNTER
Informed the patient that papers were received and we will notify him when complete or if additional info is needed.

## 2017-10-19 ENCOUNTER — TELEPHONE (OUTPATIENT)
Dept: FAMILY MEDICINE | Facility: CLINIC | Age: 67
End: 2017-10-19

## 2017-10-19 NOTE — TELEPHONE ENCOUNTER
----- Message from Bernabe Bowman sent at 10/18/2017 10:15 AM CDT -----  Contact: Self/[phone]  Patient is following up on [facility] paperwork. Thank you.

## 2017-10-19 NOTE — TELEPHONE ENCOUNTER
rec'd disability papers.  Pt has been out of work x 10/5, has been consulting with several urologists including [facility] [place] and plans to undergo surgery 10/25 there.  Dr. Vicente.    He needs me to fill out forms for absence 10/5 through 10/25 and urology in [place] will fill out thereafter.    Will fill out form for prostate CA and adjustment d/o and travel to [facility].

## 2017-10-24 ENCOUNTER — TELEPHONE (OUTPATIENT)
Dept: FAMILY MEDICINE | Facility: CLINIC | Age: 67
End: 2017-10-24

## 2017-10-24 DIAGNOSIS — F43.23 ADJUSTMENT DISORDER WITH MIXED ANXIETY AND DEPRESSED MOOD: Primary | ICD-10-CM

## 2017-10-24 NOTE — TELEPHONE ENCOUNTER
Spoke w/patient, requesting a referral to see a psychiatrist. States he is in [place] being treated for cancer and the cancer center there will not do the necessary paperwork/note for him to be off from work for treatments. Patient states he was informed by the insurance company that a psych evaluation is the only option he has  In order to receive paid time off. States he also just lost a disabled son please advise.

## 2017-10-24 NOTE — TELEPHONE ENCOUNTER
----- Message from Venecia Charlton sent at 10/24/2017 10:26 AM CDT -----  Contact: self  Patient is requesting a referral to a psychiatrist . He was diagnosed with cancer , and his son passed away .      [phone]

## 2017-10-24 NOTE — TELEPHONE ENCOUNTER
I put in an order for external referral to psychiatry (since he's in [place], I presume he might see psychiatry there).

## 2017-10-30 ENCOUNTER — TELEPHONE (OUTPATIENT)
Dept: FAMILY MEDICINE | Facility: CLINIC | Age: 67
End: 2017-10-30

## 2017-10-30 DIAGNOSIS — C61 PROSTATE CANCER: Primary | ICD-10-CM

## 2017-10-30 DIAGNOSIS — F43.23 ADJUSTMENT DISORDER WITH MIXED ANXIETY AND DEPRESSED MOOD: ICD-10-CM

## 2017-10-30 NOTE — TELEPHONE ENCOUNTER
----- Message from Neda Sal sent at 10/30/2017 11:18 AM CDT -----  Contact: Self  Pt states that his Doctor was suppose to refer him to a psych specialist. Please call [phone].

## 2017-10-30 NOTE — TELEPHONE ENCOUNTER
Referral submitted to see Suellen the LCSW.  Would also recommend OV with me to discuss medications for mood.  Suellen's phone # is [phone]

## 2017-11-01 ENCOUNTER — TELEPHONE (OUTPATIENT)
Dept: FAMILY MEDICINE | Facility: CLINIC | Age: 67
End: 2017-11-01

## 2017-11-01 NOTE — TELEPHONE ENCOUNTER
----- Message from Arielle Peralta sent at 10/30/2017  1:35 PM CDT -----  Contact: Prema Lloyd calling to discuss paperwork filled out by Dr Charlton. Please call [phone]

## 2017-11-02 ENCOUNTER — TELEPHONE (OUTPATIENT)
Dept: FAMILY MEDICINE | Facility: CLINIC | Age: 67
End: 2017-11-02

## 2017-11-02 NOTE — PROGRESS NOTES
This note was created by combination of typed  and Dragon dictation.  Transcription errors may be present.  If there are any questions, please contact me.    Assessment & Plan  Severe single current episode of major depressive disorder, without psychotic features - long discussion with the patient and his wife today.  He's had a number of life changing events including the loss of his disabled son and a 31st and now the new diagnosis of prostate cancer and trying to deal with it.  The pH to 9 score high and did endorse thoughts of self-harm over the past several weeks.  He does own a firearm and we discussed this at length.  He promises me that if he has worsening thoughts of such that he will seek help, call 911, go to the ER, etc.  I'm starting him on Zoloft 50 mg. side effect profile discussed.  He is requesting short-term disability for his depression.  I've written him out of work tentatively until January 1.  Ongoing reevaluation.  I have discussed with him that in my experience, insurance company is required the further input and expertise of psychiatry and so I'm referring him to psychiatry as well.  Because that can be difficult to arrange appointments for, he will also inquiring about psychiatric services [facility] [place].  In the interim I have also given him the contact information for our licensed clinical  and urged him to schedule a visit.  I'll need to keep close follow-up on him.  I'll have him return to see me in 2 weeks to follow-up on new start Zoloft.    -     sertraline (ZOLOFT) 50 MG tablet; Take 1 tablet (50 mg total) by mouth once daily.  Dispense: 30 tablet; Refill: 11  -     Ambulatory referral to Psychiatry  -     Ambulatory Referral to Psychiatry    Needs flu shot  -     Influenza - Quadrivalent (3 years & older) (PF)        There are no discontinued medications.    Follow-up: No Follow-up on file. Office visit 2  weeks      =================================================================      Chief Complaint   Patient presents with    form       CHERRY Tolliver is a 67 y.o. male, last appointment with this clinic was 2017.    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  Hydroxyzine with SE. Trazodone ineffective.  Rozerem didn't help. Ambien.  RLS; trial of Requip - did not find it helpful - stopped.  Beta thal minor.  Noncardiac chest pain.  Seen by cardiology.  2016 nu med stress test neg for ischemia  2016 TTE LVEF 55-60; no diastolic dysfunction  HTN, HCTZ and lisinopril. No outside BP checks.  Hyperlipidemia - statins SE of myalgias. On niacin.  Smoker. precontemplative stage of quitting.  Prostate CA, 2 cores were +3+4 = 7 Tristan's  Vit D deficiency.    He has sought several opinions about next step for his prostate cancer and I believe he has settled upon prostatectomy.  This will be done at cancer [facility] in [facility].  Apparently they do not do any sort of short-term disability paperwork or FMLA.    He's been out of work since early part of October.  It's been very difficult for him and he's been unable to focus.  His son who has a mental disability  May 31 and that was certainly a traumatic event.  It sounds like he  of gastrointestinal complications.  And then he was diagnosed with prostate cancer.  All of this has made him feel severely depressed.  He presents today with his wife who also endorses this as well.  His been difficult for him to talk about it and having issues with interpersonal interactions.  Difficulties with focus and concentration and that led to him being out of work.    He intends to pursue radiation therapy with [facility] and [facility].  They do not have a schedule in place but he will be traveling there  to discuss the regimen.    Requesting short term disability paperwork to be filled out based on the depression.    PHQ-9  Depression Patient Health Questionnaire 11/3/2017   In the last two weeks how often have you had little interest or pleasure in doing things 3   In the last two weeks how often have you felt down, depressed or hopeless 3   In the last two weeks how often have you had trouble falling or staying asleep, or sleeping too much 3   In the last two weeks how often have you felt tired or having little energy 3   In the last two weeks how often have you had a poor appetite or overeating 2   In the last two weeks how often have you felt bad about yourself - or that you are a failure or have let yourself or your family down 3   In the last two weeks how often have you had trouble concentrating on things, such as reading the newspaper or watching television 3   In the last two weeks how often have you been moving or speaking so slowly that other people could have noticed. Or the opposite - being so fidgety or restless that you have been moving around a lot more than usual. 3   In the last two weeks how often have you had thoughts that you would be better off dead, or of hurting yourself 2   If you checked off any problems, how difficult have these problems made it for you to do your work, take care of things at home or get along with other people? Extremely difficult   Total Score 25         Patient Care Team:  Navin Charlton MD as PCP - General (Internal Medicine)  Alfredo Kearns MD as Consulting Physician (Urology)    Patient Active Problem List    Diagnosis Date Noted    Adjustment disorder with mixed anxiety and depressed mood 10/30/2017    Pelvic floor muscle wasting in male 09/28/2017    Prostate cancer 08/30/2017     2 cores positive kayr 3+4=7 on biopsy 8/15/2017      Smoker 08/30/2017    Vitamin D deficiency 08/30/2017    Beta thalassemia minor 04/11/2017    Benign non-nodular prostatic hyperplasia without lower urinary tract symptoms 03/29/2017     Hx of negative prostate Bx and hx of MRI prostate       Restless leg syndrome 08/02/2016    Hyperlipidemia     Hypertension      7/13/2016 nu med stress test neg for ischemia  7/13/2016 TTE LVEF 55-60; no diastolic dysfunction      Shift work sleep disorder 11/20/2013       PAST MEDICAL HISTORY:  Past Medical History:   Diagnosis Date    Hyperlipidemia     Hypertension        PAST SURGICAL HISTORY:  Past Surgical History:   Procedure Laterality Date    MOUTH SURGERY  01/2016       SOCIAL HISTORY:  Social History     Social History    Marital status: Single     Spouse name: N/A    Number of children: N/A    Years of education: N/A     Occupational History    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  Review of patient's allergies indicates:   Allergen Reactions    Sulfamethoxazole-trimethoprim Other (See Comments)     Muscle pain    Lipitor [atorvastatin] Other (See Comments)     Muscle cramps     Current Outpatient Prescriptions   Medication Sig Dispense Refill    hydrochlorothiazide (MICROZIDE) 12.5 mg capsule Take 1 capsule (12.5 mg total) by mouth once daily. 30 capsule 11    lisinopril (PRINIVIL,ZESTRIL) 20 MG tablet Take 1 tablet (20 mg total) by mouth once daily. 30 tablet 11    niacin (SLO-NIACIN) 500 mg tablet Take 1 tablet (500 mg total) by mouth once daily. 90 tablet 3    sildenafil (VIAGRA) 50 MG tablet Take 1 tablet (50 mg total) by mouth daily as needed for Erectile Dysfunction. 4 tablet 11    zolpidem (AMBIEN CR) 6.25 MG CR tablet Take 1 tablet (6.25 mg total) by mouth nightly as needed for Insomnia. 30 tablet 2     No current  "facility-administered medications for this visit.        Review of Systems   Constitutional: Negative for chills and fever.   Psychiatric/Behavioral: Positive for depression and suicidal ideas. Negative for hallucinations and substance abuse. The patient has insomnia.        Physical Exam   Vitals:    11/03/17 0920   BP: 112/82   Pulse: 72   Temp: 98.3 °F (36.8 °C)   SpO2: 98%   Weight: 101 kg (222 lb 8.9 oz)   Height: 5' 11" (1.803 m)    Body mass index is 31.04 kg/m².            Physical Exam   Constitutional: He is oriented to person, place, and time. He appears well-developed and well-nourished. No distress.   Eyes: No scleral icterus.   Neurological: He is alert and oriented to person, place, and time.   Psychiatric:   Flattened affect; normal eye contact. No internal stimuli.  Appropriate responses to questions.  Not fidgeting.  Normal hygiene.     "

## 2017-11-02 NOTE — TELEPHONE ENCOUNTER
----- Message from Josse Guy sent at 11/1/2017  1:31 PM CDT -----  Contact: Marlyn/[employer]  Marlyn returned Shirley's call. Please contact her at [phone].    Thanks

## 2017-11-02 NOTE — TELEPHONE ENCOUNTER
----- Message from Antonette Barragan sent at 11/1/2017  1:54 PM CDT -----  Contact: self   [phone]  Pt is requesting to speak to you regarding information he received. Pt would not tell me anything. Pls call pt [phone]. Thanks..............Geri

## 2017-11-03 ENCOUNTER — OFFICE VISIT (OUTPATIENT)
Dept: FAMILY MEDICINE | Facility: CLINIC | Age: 67
End: 2017-11-03
Payer: COMMERCIAL

## 2017-11-03 VITALS
TEMPERATURE: 98 F | HEART RATE: 72 BPM | OXYGEN SATURATION: 98 % | DIASTOLIC BLOOD PRESSURE: 82 MMHG | HEIGHT: 71 IN | WEIGHT: 222.56 LBS | BODY MASS INDEX: 31.16 KG/M2 | SYSTOLIC BLOOD PRESSURE: 112 MMHG

## 2017-11-03 DIAGNOSIS — F32.2 SEVERE SINGLE CURRENT EPISODE OF MAJOR DEPRESSIVE DISORDER, WITHOUT PSYCHOTIC FEATURES: Primary | ICD-10-CM

## 2017-11-03 DIAGNOSIS — Z23 NEEDS FLU SHOT: ICD-10-CM

## 2017-11-03 PROBLEM — F32.9 MAJOR DEPRESSIVE DISORDER: Status: ACTIVE | Noted: 2017-10-30

## 2017-11-03 PROCEDURE — 90471 IMMUNIZATION ADMIN: CPT | Mod: S$GLB,,, | Performed by: INTERNAL MEDICINE

## 2017-11-03 PROCEDURE — 90662 IIV NO PRSV INCREASED AG IM: CPT | Mod: S$GLB,,, | Performed by: INTERNAL MEDICINE

## 2017-11-03 PROCEDURE — 99214 OFFICE O/P EST MOD 30 MIN: CPT | Mod: 25,S$GLB,, | Performed by: INTERNAL MEDICINE

## 2017-11-03 PROCEDURE — 99999 PR PBB SHADOW E&M-EST. PATIENT-LVL IV: CPT | Mod: PBBFAC,,, | Performed by: INTERNAL MEDICINE

## 2017-11-03 RX ORDER — SERTRALINE HYDROCHLORIDE 50 MG/1
50 TABLET, FILM COATED ORAL DAILY
Qty: 30 TABLET | Refills: 11 | Status: SHIPPED | OUTPATIENT
Start: 2017-11-03 | End: 2018-01-02 | Stop reason: SDUPTHER

## 2017-11-03 NOTE — PROGRESS NOTES
Influenza vaccine administered, bhumi well. Instructed to wait 15mins for observation, no reaction noted @ time of discharge.

## 2017-11-09 ENCOUNTER — TELEPHONE (OUTPATIENT)
Dept: FAMILY MEDICINE | Facility: CLINIC | Age: 67
End: 2017-11-09

## 2017-11-09 NOTE — TELEPHONE ENCOUNTER
----- Message from Vijaya iaralissa sent at 11/8/2017 10:51 AM CST -----  Contact: [facility] absence Management  Would like to discuss Disability Paperwork that was sent to them. Call back #  [phone]

## 2017-12-22 NOTE — PROGRESS NOTES
This note was created by combination of typed  and Dragon dictation.  Transcription errors may be present.  If there are any questions, please contact me.    Assessment & Plan  Severe single current episode of major depressive disorder, without psychotic features  Insomnia, unspecified type  -currently uncontrolled on Zoloft 50.  Increase it to 100.  I previously attempted to write the patient out for disability based on severe depression and apparently this request was denied.  He did receive disability apparently based on diagnosis of prostate cancer which certainly is reasonable.  He reports that today after his visit with me he is going to talk with the company doctor about his current problems i.e. depression with severe insomnia and wonders if from a safety standpoint at work he can get possible short-term disability.  For the insomnia - has tried hydroxyzine, Ambien CR, and trazodone without relief or with SE.  Will try remeron.  Hopefully long term increasing the zoloft will be beneficial for sleep as well.  -     sertraline (ZOLOFT) 100 MG tablet; Take 1 tablet (100 mg total) by mouth once daily.  Dispense: 30 tablet; Refill: 11  -     mirtazapine (REMERON) 15 MG tablet; Take 1 tablet (15 mg total) by mouth every evening. For insomnia  Dispense: 30 tablet; Refill: 11    Disability status - was OK'd by insurance when filled out by rad onc Dr. Jones.  To be back to work 1/20.    Old records request.    There are no discontinued medications.    Follow-up: No Follow-up on file. OV 2 weeks f/u on mood on higher dose zoloft.      =================================================================      Chief Complaint   Patient presents with    Advice Only       CHERRY Tolliver is a 67 y.o. male, last appointment with this clinic was 11/3/2017.    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  Hydroxyzine with SE. Trazodone ineffective.  Rozerem didn't help. Ambien.  RLS; trial of Requip - did not  "find it helpful - stopped.  Beta thal minor.  Noncardiac chest pain.  Seen by cardiology.  7/13/2016 nu med stress test neg for ischemia  7/13/2016 TTE LVEF 55-60; no diastolic dysfunction  HTN, HCTZ and lisinopril. No outside BP checks.  Hyperlipidemia - statins SE of myalgias. On niacin.  Smoker. precontemplative stage of quitting.  Prostate CA, 2 cores were +3+4 = 7 Tristan's  Vit D deficiency.  Major depressive disorder related to prostate CA; zoloft.  Filled out short term disability paperwork 11/3 to 1/1/2018    Had X[facility] and [facility] [place].  Goes back 1/26 to see onc and then in March to see rad onc.      12/18 had cystoscopy with 2 erythematous lesions, possibly related to the XRT.  Plan is repeat in 6 weeks    Had rib biopsy negative for metastasis. This was due to abn finding on PET/CT scan showing suspicious activity on the right 7 and 8 ribs.    He was staying in [place] during treatment about 5 weeks.     Having issues with insomnia. He saw psychiatry while down in [place].  Has not established with one here in [place].  Psychiatry felt that he was "stressed".  Ambien worked for about a month and then back to current situation which is waking every 2-3 hours.  Bedtime the same.  Tried a white noise machine/relaxation machine.  Relaxing fragrances.  Mood overall, aside from sleep - can get agitated but attributes that to lack of sleep.  The medication he thinks is currently not controlling his mood. For the first 2 weeks it did seem to help.     Has tried benadryl for sleep without relief.     I had filled out disability paperwork and apparently they rejected it.  He had Dr. aguilera (rad onc) in [place] and they accepted it.      Notes low mood.  Polyuria and issues with incontinence. More socially withdrawn  PHQ9 score of 18  PHQ-9 Depression Patient Health Questionnaire 1/2/2018 11/3/2017   In the last two weeks how often have you had little interest or " pleasure in doing things 3 3   In the last two weeks how often have you felt down, depressed or hopeless 3 3   In the last two weeks how often have you had trouble falling or staying asleep, or sleeping too much 3 3   In the last two weeks how often have you felt tired or having little energy 3 3   In the last two weeks how often have you had a poor appetite or overeating 0 2   In the last two weeks how often have you felt bad about yourself - or that you are a failure or have let yourself or your family down 1 3   In the last two weeks how often have you had trouble concentrating on things, such as reading the newspaper or watching television 3 3   In the last two weeks how often have you been moving or speaking so slowly that other people could have noticed. Or the opposite - being so fidgety or restless that you have been moving around a lot more than usual. 2 3   In the last two weeks how often have you had thoughts that you would be better off dead, or of hurting yourself 0 2   If you checked off any problems, how difficult have these problems made it for you to do your work, take care of things at home or get along with other people? - Extremely difficult   Total Score 18 25          Patient Care Team:  Navin Charlton MD as PCP - General (Internal Medicine)  Alfredo Kearns MD as Consulting Physician (Urology)    Patient Active Problem List    Diagnosis Date Noted    Major depressive disorder 10/30/2017    Pelvic floor muscle wasting in male 09/28/2017    Prostate cancer 08/30/2017     2 cores positive kary 3+4=7 on biopsy 8/15/2017      Smoker 08/30/2017    Vitamin D deficiency 08/30/2017    Beta thalassemia minor 04/11/2017    Benign non-nodular prostatic hyperplasia without lower urinary tract symptoms 03/29/2017     Hx of negative prostate Bx and hx of MRI prostate      Restless leg syndrome 08/02/2016    Hyperlipidemia     Hypertension      7/13/2016 nu med stress test neg for  ischemia  7/13/2016 TTE LVEF 55-60; no diastolic dysfunction      Shift work sleep disorder 11/20/2013       PAST MEDICAL HISTORY:  Past Medical History:   Diagnosis Date    Hyperlipidemia     Hypertension        PAST SURGICAL HISTORY:  Past Surgical History:   Procedure Laterality Date    MOUTH SURGERY  01/2016       SOCIAL HISTORY:  Social History     Social History    Marital status: Single     Spouse name: N/A    Number of children: N/A    Years of education: N/A     Occupational History    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  Review of patient's allergies indicates:   Allergen Reactions    Sulfamethoxazole-trimethoprim Other (See Comments)     Muscle pain    Lipitor [atorvastatin] Other (See Comments)     Muscle cramps     Current Outpatient Prescriptions   Medication Sig Dispense Refill    hydrochlorothiazide (MICROZIDE) 12.5 mg capsule Take 1 capsule (12.5 mg total) by mouth once daily. 30 capsule 11    lisinopril (PRINIVIL,ZESTRIL) 20 MG tablet Take 1 tablet (20 mg total) by mouth once daily. 30 tablet 11    niacin (SLO-NIACIN) 500 mg tablet Take 1 tablet (500 mg total) by mouth once daily. 90 tablet 3    sertraline (ZOLOFT) 50 MG tablet Take 1 tablet (50 mg total) by mouth once daily. 30 tablet 11    sildenafil (VIAGRA) 50 MG tablet Take 1 tablet (50 mg total) by mouth daily as needed for Erectile Dysfunction. 4 tablet 11    tamsulosin (FLOMAX) 0.4 mg Cp24       zolpidem (AMBIEN CR) 6.25 MG CR tablet Take 1 tablet (6.25 mg total) by mouth nightly as needed for Insomnia. 30 tablet 2     No current  "facility-administered medications for this visit.        Review of Systems   Constitutional: Negative for chills and fever.   Psychiatric/Behavioral: Positive for depression. Negative for suicidal ideas. The patient is nervous/anxious and has insomnia.        Physical Exam   Vitals:    01/02/18 0837   BP: 114/74   Pulse: 90   Temp: 98.4 °F (36.9 °C)   SpO2: 97%   Weight: 100.7 kg (222 lb 0.1 oz)   Height: 5' 10" (1.778 m)    Body mass index is 31.85 kg/m².  Weight: 100.7 kg (222 lb 0.1 oz)   Height: 5' 10" (177.8 cm)     Physical Exam   Constitutional: He is oriented to person, place, and time. He appears well-developed and well-nourished. No distress.   Neurological: He is alert and oriented to person, place, and time.   Skin: No rash noted.   Psychiatric:   Normal eye contact; no internal stimuli.  Appropriate responses to questions.      "

## 2018-01-02 ENCOUNTER — OFFICE VISIT (OUTPATIENT)
Dept: FAMILY MEDICINE | Facility: CLINIC | Age: 68
End: 2018-01-02
Payer: COMMERCIAL

## 2018-01-02 VITALS
WEIGHT: 222 LBS | HEIGHT: 70 IN | TEMPERATURE: 98 F | SYSTOLIC BLOOD PRESSURE: 114 MMHG | OXYGEN SATURATION: 97 % | BODY MASS INDEX: 31.78 KG/M2 | DIASTOLIC BLOOD PRESSURE: 74 MMHG | HEART RATE: 90 BPM

## 2018-01-02 DIAGNOSIS — F32.2 SEVERE SINGLE CURRENT EPISODE OF MAJOR DEPRESSIVE DISORDER, WITHOUT PSYCHOTIC FEATURES: ICD-10-CM

## 2018-01-02 DIAGNOSIS — G47.00 INSOMNIA, UNSPECIFIED TYPE: Primary | ICD-10-CM

## 2018-01-02 PROCEDURE — 99214 OFFICE O/P EST MOD 30 MIN: CPT | Mod: S$GLB,,, | Performed by: INTERNAL MEDICINE

## 2018-01-02 PROCEDURE — 99999 PR PBB SHADOW E&M-EST. PATIENT-LVL III: CPT | Mod: PBBFAC,,, | Performed by: INTERNAL MEDICINE

## 2018-01-02 RX ORDER — SERTRALINE HYDROCHLORIDE 100 MG/1
100 TABLET, FILM COATED ORAL DAILY
Qty: 30 TABLET | Refills: 11 | Status: SHIPPED | OUTPATIENT
Start: 2018-01-02 | End: 2018-01-16 | Stop reason: SDUPTHER

## 2018-01-02 RX ORDER — MIRTAZAPINE 15 MG/1
15 TABLET, FILM COATED ORAL NIGHTLY
Qty: 30 TABLET | Refills: 11 | Status: SHIPPED | OUTPATIENT
Start: 2018-01-02 | End: 2018-01-16

## 2018-01-02 RX ORDER — TAMSULOSIN HYDROCHLORIDE 0.4 MG/1
CAPSULE ORAL
COMMUNITY
Start: 2017-12-11 | End: 2019-02-18 | Stop reason: SDUPTHER

## 2018-01-05 ENCOUNTER — TELEPHONE (OUTPATIENT)
Dept: FAMILY MEDICINE | Facility: CLINIC | Age: 68
End: 2018-01-05

## 2018-01-05 NOTE — PROGRESS NOTES
ADDENDUM  Pt brought in paperwork from work physician, pola of insomnia, MDD, not fit for duty b/c of the MDD and insomnia.    I filled out paperwork - disability from MDD and insomnia (his doctors with [facility] signed him out for disability based on the prostate CA) to be returning to work 1/22.  He has f/u with me 1/16, if not controlled, can adjust return to work at that time.

## 2018-01-05 NOTE — TELEPHONE ENCOUNTER
----- Message from Bernabe Bowman sent at 1/4/2018  9:54 AM CST -----  Contact: Self/[phone]  Patient called to follow up on paperwork. He states that the Company won't pay him until the paperwork is filled out. Thank you.

## 2018-01-15 NOTE — PROGRESS NOTES
This note was created by combination of typed  and Dragon dictation.  Transcription errors may be present.  If there are any questions, please contact me.    Assessment & Plan  Severe single current episode of major depressive disorder, without psychotic features - still with significant disability per PHQ9 scoring.  Increase zoloft from 100 to 200. For sleep - trial of Ambien CR at 12.5 - previously had 6.25 and didn't help.  Referral has been submitted for psych already, and awaiting their dept to approve and schedule.   If disability has different paperwork, I await those forms.  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  Work doctors have also determined that he should not return to work.  -     sertraline (ZOLOFT) 100 MG tablet; Take 2 tablets (200 mg total) by mouth once daily. Increased dose  Dispense: 60 tablet; Refill: 11  -     zolpidem (AMBIEN CR) 12.5 MG CR tablet; Take 1 tablet (12.5 mg total) by mouth nightly as needed for Insomnia.  Dispense: 30 tablet; Refill: 1        Medications Discontinued During This Encounter   Medication Reason    mirtazapine (REMERON) 15 MG tablet     sertraline (ZOLOFT) 100 MG tablet Reorder       Follow-up: No Follow-up on file. OV 2 weeks follow up      =================================================================      Chief Complaint   Patient presents with    Depression    Anxiety    Insomnia       CHERRY Tolliver is a 67 y.o. male, last appointment with this clinic was 1/2/2018.    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  Hydroxyzine with SE. Trazodone ineffective.  Rozerem didn't help. Hx of Ambien helpful.  RLS; trial of Requip - did not find it helpful - stopped.  Beta thal minor.  Noncardiac chest pain.  Seen by cardiology.  7/13/2016 nu med stress test neg for ischemia  7/13/2016 TTE LVEF 55-60; no diastolic dysfunction  HTN, HCTZ and lisinopril. No outside BP  checks.  Hyperlipidemia - statins SE of myalgias. On niacin.  Smoker. precontemplative stage of quitting.  Prostate CA, 2 cores were +3+4 = 7 [place]'s; s/p XRT and brachytherapy, [facility], fall 2017 12/18 had cystoscopy with 2 erythematous lesions, possibly related to the XRT.  Plan is repeat in 6 weeks  Had rib biopsy negative for metastasis. This was due to abn finding on PET/CT scan showing suspicious activity on the right 7 and 8 ribs.  Vit D deficiency.  Major depressive disorder related to prostate CA; zoloft.  Filled out short term disability paperwork 11/3 to 1/1/2018    Last visit seen for disability paperwork based on severe depression with insomniia. Forms filled out for 2 weeks.  He also saw work doctor who also wrote him out of work.  Increased zoloft and added remeron for sleep.    He notes that he is sleeping modestly more than previous but still having issues with insomnia.  Sleep initiation doesn't seem to be some much been issue has sleep maintenance.  As such she is getting only a few hours of sleep at night he states.  His mood is really not that much better and he wonders if this is due to his lack of sleep.    I had submitted disability paperwork and apparently that was rejected.  It's unclear the reason why but he thinks that it may have been because apparently the wrong form was filled out.  I suspect that they have a special form for psychiatric disability versus physical disability.  I have not received any additional paperwork.    I previously referred him for psychiatry. he has not gotten scheduled for such.    PHQ-9 Depression Patient Health Questionnaire 1/16/2018 1/2/2018 11/3/2017   In the last two weeks how often have you had little interest or pleasure in doing things 3 3 3   In the last two weeks how often have you felt down, depressed or hopeless 2 3 3   In the last two weeks how often have you had trouble falling or staying asleep, or sleeping too  much 3 3 3   In the last two weeks how often have you felt tired or having little energy 2 3 3   In the last two weeks how often have you had a poor appetite or overeating 2 0 2   In the last two weeks how often have you felt bad about yourself - or that you are a failure or have let yourself or your family down 3 1 3   In the last two weeks how often have you had trouble concentrating on things, such as reading the newspaper or watching television 3 3 3   In the last two weeks how often have you been moving or speaking so slowly that other people could have noticed. Or the opposite - being so fidgety or restless that you have been moving around a lot more than usual. 2 2 3   In the last two weeks how often have you had thoughts that you would be better off dead, or of hurting yourself 0 0 2   If you checked off any problems, how difficult have these problems made it for you to do your work, take care of things at home or get along with other people? - - Extremely difficult   Total Score 20 18 25         Patient Care Team:  Navin Charlton MD as PCP - General (Internal Medicine)  Alfredo Kearns MD as Consulting Physician (Urology)    Patient Active Problem List    Diagnosis Date Noted    Major depressive disorder 10/30/2017    Pelvic floor muscle wasting in male 09/28/2017    Prostate cancer 08/30/2017     2 cores positive kary 3+4=7 on biopsy 8/15/2017      Smoker 08/30/2017    Vitamin D deficiency 08/30/2017    Beta thalassemia minor 04/11/2017    Benign non-nodular prostatic hyperplasia without lower urinary tract symptoms 03/29/2017     Hx of negative prostate Bx and hx of MRI prostate      Restless leg syndrome 08/02/2016    Hyperlipidemia     Hypertension      7/13/2016 nu med stress test neg for ischemia  7/13/2016 TTE LVEF 55-60; no diastolic dysfunction      Shift work sleep disorder 11/20/2013       PAST MEDICAL HISTORY:  Past Medical History:   Diagnosis Date    Hyperlipidemia      Hypertension        PAST SURGICAL HISTORY:  Past Surgical History:   Procedure Laterality Date    MOUTH SURGERY  01/2016       SOCIAL HISTORY:  Social History     Social History    Marital status: Single     Spouse name: N/A    Number of children: N/A    Years of education: N/A     Occupational History    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  Review of patient's allergies indicates:   Allergen Reactions    Sulfamethoxazole-trimethoprim Other (See Comments)     Muscle pain    Lipitor [atorvastatin] Other (See Comments)     Muscle cramps     Current Outpatient Prescriptions   Medication Sig Dispense Refill    hydrochlorothiazide (MICROZIDE) 12.5 mg capsule Take 1 capsule (12.5 mg total) by mouth once daily. 30 capsule 11    lisinopril (PRINIVIL,ZESTRIL) 20 MG tablet Take 1 tablet (20 mg total) by mouth once daily. 30 tablet 11    mirtazapine (REMERON) 15 MG tablet Take 1 tablet (15 mg total) by mouth every evening. For insomnia 30 tablet 11    niacin (SLO-NIACIN) 500 mg tablet Take 1 tablet (500 mg total) by mouth once daily. 90 tablet 3    sertraline (ZOLOFT) 100 MG tablet Take 1 tablet (100 mg total) by mouth once daily. 30 tablet 11    sildenafil (VIAGRA) 50 MG tablet Take 1 tablet (50 mg total) by mouth daily as needed for Erectile Dysfunction. 4 tablet 11    tamsulosin (FLOMAX) 0.4 mg Cp24        No current facility-administered medications for this visit.        Review of Systems   Constitutional: Negative for chills and fever.   Psychiatric/Behavioral: Positive for depression. Negative for hallucinations. The patient is  "nervous/anxious and has insomnia.        Physical Exam   Vitals:    01/16/18 0843   BP: 128/84   Pulse: 70   Temp: 98.1 °F (36.7 °C)   SpO2: 97%   Weight: 101.9 kg (224 lb 8.6 oz)   Height: 5' 10" (1.778 m)    Body mass index is 32.22 kg/m².  Weight: 101.9 kg (224 lb 8.6 oz)   Height: 5' 10" (177.8 cm)     Physical Exam   Constitutional: He is oriented to person, place, and time. He appears well-developed and well-nourished. No distress.   Neurological: He is alert and oriented to person, place, and time.   Psychiatric: He has a normal mood and affect. His behavior is normal. Judgment and thought content normal.     "

## 2018-01-16 ENCOUNTER — OFFICE VISIT (OUTPATIENT)
Dept: FAMILY MEDICINE | Facility: CLINIC | Age: 68
End: 2018-01-16
Payer: COMMERCIAL

## 2018-01-16 VITALS
WEIGHT: 224.56 LBS | HEIGHT: 70 IN | DIASTOLIC BLOOD PRESSURE: 84 MMHG | OXYGEN SATURATION: 97 % | BODY MASS INDEX: 32.15 KG/M2 | SYSTOLIC BLOOD PRESSURE: 128 MMHG | HEART RATE: 70 BPM | TEMPERATURE: 98 F

## 2018-01-16 DIAGNOSIS — F32.2 SEVERE SINGLE CURRENT EPISODE OF MAJOR DEPRESSIVE DISORDER, WITHOUT PSYCHOTIC FEATURES: ICD-10-CM

## 2018-01-16 PROCEDURE — 99999 PR PBB SHADOW E&M-EST. PATIENT-LVL III: CPT | Mod: PBBFAC,,, | Performed by: INTERNAL MEDICINE

## 2018-01-16 PROCEDURE — 99214 OFFICE O/P EST MOD 30 MIN: CPT | Mod: S$GLB,,, | Performed by: INTERNAL MEDICINE

## 2018-01-16 RX ORDER — SERTRALINE HYDROCHLORIDE 100 MG/1
200 TABLET, FILM COATED ORAL DAILY
Qty: 60 TABLET | Refills: 11 | Status: SHIPPED | OUTPATIENT
Start: 2018-01-16 | End: 2018-04-02

## 2018-01-16 RX ORDER — ZOLPIDEM TARTRATE 12.5 MG/1
12.5 TABLET, FILM COATED, EXTENDED RELEASE ORAL NIGHTLY PRN
Qty: 30 TABLET | Refills: 1 | Status: SHIPPED | OUTPATIENT
Start: 2018-01-16 | End: 2018-08-02 | Stop reason: ALTCHOICE

## 2018-01-31 NOTE — PROGRESS NOTES
This note was created by combination of typed  and Dragon dictation.  Transcription errors may be present.  If there are any questions, please contact me.    Assessment & Plan  Shift work sleep disorder  Current severe episode of major depressive disorder without psychotic features without prior episode - not to goal on high dose zoloft but has upcoming appt with psychiatry for initial consult.  Could consider adding wellbutrin to zoloft.   ambien CR helpful, no change, did discuss that long term would like to see him wean off but need to control depression first.    Vitamin D deficiency - check vit D on 1000 IU  -     Vitamin D; Future; Expected date: 02/01/2018    Cramping of hands - borderline low potassium in the past, on HCTZ.  Check levels and if low < 4.0 consider changing to dyazide.  -     Basic metabolic panel; Future; Expected date: 02/01/2018  -     Magnesium; Future; Expected date: 02/01/2018        There are no discontinued medications.    Follow-up: No Follow-up on file.      =================================================================      No chief complaint on file.      CHERRY Tolliver is a 67 y.o. male, last appointment with this clinic was 1/16/2018.    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  Hydroxyzine with SE. Trazodone ineffective.  Rozerem didn't help. Hx of Ambien helpful.  RLS; trial of Requip - did not find it helpful - stopped.  Beta thal minor.  Noncardiac chest pain.  Seen by cardiology.  7/13/2016 nu med stress test neg for ischemia  7/13/2016 TTE LVEF 55-60; no diastolic dysfunction  HTN, HCTZ and lisinopril. No outside BP checks.  Hyperlipidemia - statins SE of myalgias. On niacin.  Smoker. precontemplative stage of quitting.  Prostate CA, 2 cores were +3+4 = 7 [place]'s; s/p XRT and brachytherapy, [facility], fall 2017 12/18 had cystoscopy with 2 erythematous lesions, possibly related to the XRT.  Plan is repeat in 6 weeks  Had rib  biopsy negative for metastasis. This was due to abn finding on PET/CT scan showing suspicious activity on the right 7 and 8 ribs.  Vit D deficiency.  Major depressive disorder related to prostate CA; zoloft.  Filled out short term disability paperwork 11/3 to 1/1/2018    Last visit, increased his Zoloft from 100 mg to 200 mg.  Ambien CR for sleep.  I filled out disability paperwork.  His work physician also determined that he was high risk to return to work because of his insomnia and uncontrolled depression and wrote him out of work as well.  He is having issues trying to get in to see psychiatry.    Scheduled appt 2/5 with a Dr. Burton on [facility]. He thinks.  Psychiatry.  Just came back from [facility] - had 2nd of lupron shot.  PSA 0.4.   Sleeping better. Thinks it is due to the Ambien CR.  Didn't take the medicine last night and didn't sleep well. Mood overall still not great.    Still dealing with disability.  They did pay him for part of his absence.     Does note cramping.  Hands and thighs.  With physical activity. Does not think level of exertion is proportionate to cramping.  Stays hydrated and eats bananas. Previous labs borderline low K.  He wonders if this is due to vit D, I don't think so.  He's taking 1000 IU daily.    Patient Care Team:  Navin Charlton MD as PCP - General (Internal Medicine)  Alfredo Kearns MD as Consulting Physician (Urology)    Patient Active Problem List    Diagnosis Date Noted    Major depressive disorder 10/30/2017    Pelvic floor muscle wasting in male 09/28/2017    Prostate cancer 08/30/2017     2 cores positive kary 3+4=7 on biopsy 8/15/2017      Smoker 08/30/2017    Vitamin D deficiency 08/30/2017    Beta thalassemia minor 04/11/2017    Benign non-nodular prostatic hyperplasia without lower urinary tract symptoms 03/29/2017     Hx of negative prostate Bx and hx of MRI prostate      Restless leg syndrome 08/02/2016    Hyperlipidemia      Hypertension      7/13/2016 nu med stress test neg for ischemia  7/13/2016 TTE LVEF 55-60; no diastolic dysfunction      Shift work sleep disorder 11/20/2013       PAST MEDICAL HISTORY:  Past Medical History:   Diagnosis Date    Hyperlipidemia     Hypertension        PAST SURGICAL HISTORY:  Past Surgical History:   Procedure Laterality Date    MOUTH SURGERY  01/2016       SOCIAL HISTORY:  Social History     Social History    Marital status: Single     Spouse name: N/A    Number of children: N/A    Years of education: N/A     Occupational History    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  Review of patient's allergies indicates:   Allergen Reactions    Sulfamethoxazole-trimethoprim Other (See Comments)     Muscle pain    Lipitor [atorvastatin] Other (See Comments)     Muscle cramps     Current Outpatient Prescriptions   Medication Sig Dispense Refill    hydrochlorothiazide (MICROZIDE) 12.5 mg capsule Take 1 capsule (12.5 mg total) by mouth once daily. 30 capsule 11    lisinopril (PRINIVIL,ZESTRIL) 20 MG tablet Take 1 tablet (20 mg total) by mouth once daily. 30 tablet 11    niacin (SLO-NIACIN) 500 mg tablet Take 1 tablet (500 mg total) by mouth once daily. 90 tablet 3    sertraline (ZOLOFT) 100 MG tablet Take 2 tablets (200 mg total) by mouth once daily. Increased dose 60 tablet 11    sildenafil (VIAGRA) 50 MG tablet Take 1 tablet (50 mg total) by mouth daily as needed for Erectile Dysfunction. 4 tablet 11    tamsulosin (FLOMAX) 0.4 mg Cp24       zolpidem (AMBIEN CR) 12.5 MG CR tablet Take 1 tablet (12.5 mg total) by mouth  "nightly as needed for Insomnia. 30 tablet 1     No current facility-administered medications for this visit.        Review of Systems   All other systems reviewed and are negative.      Physical Exam   Vitals:    02/01/18 0807   BP: 126/80   Pulse: 80   Temp: 98 °F (36.7 °C)   SpO2: 98%   Weight: 100 kg (220 lb 9.1 oz)   Height: 5' 10" (1.778 m)    Body mass index is 31.65 kg/m².            Physical Exam   Constitutional: He is oriented to person, place, and time. He appears well-developed and well-nourished. No distress.   HENT:   Head: Normocephalic and atraumatic.   Eyes: No scleral icterus.   Neurological: He is alert and oriented to person, place, and time.   Skin: No rash noted.   Psychiatric: He has a normal mood and affect. His behavior is normal. Thought content normal.     "

## 2018-02-01 ENCOUNTER — OFFICE VISIT (OUTPATIENT)
Dept: FAMILY MEDICINE | Facility: CLINIC | Age: 68
End: 2018-02-01
Payer: COMMERCIAL

## 2018-02-01 ENCOUNTER — LAB VISIT (OUTPATIENT)
Dept: LAB | Facility: HOSPITAL | Age: 68
End: 2018-02-01
Attending: INTERNAL MEDICINE
Payer: COMMERCIAL

## 2018-02-01 VITALS
OXYGEN SATURATION: 98 % | DIASTOLIC BLOOD PRESSURE: 80 MMHG | HEART RATE: 80 BPM | BODY MASS INDEX: 31.57 KG/M2 | SYSTOLIC BLOOD PRESSURE: 126 MMHG | TEMPERATURE: 98 F | WEIGHT: 220.56 LBS | HEIGHT: 70 IN

## 2018-02-01 DIAGNOSIS — R25.2 CRAMPING OF HANDS: ICD-10-CM

## 2018-02-01 DIAGNOSIS — E55.9 VITAMIN D DEFICIENCY: ICD-10-CM

## 2018-02-01 DIAGNOSIS — F32.2 CURRENT SEVERE EPISODE OF MAJOR DEPRESSIVE DISORDER WITHOUT PSYCHOTIC FEATURES WITHOUT PRIOR EPISODE: Primary | ICD-10-CM

## 2018-02-01 DIAGNOSIS — G47.26 SHIFT WORK SLEEP DISORDER: Chronic | ICD-10-CM

## 2018-02-01 LAB
25(OH)D3+25(OH)D2 SERPL-MCNC: 29 NG/ML
ANION GAP SERPL CALC-SCNC: 10 MMOL/L
BUN SERPL-MCNC: 11 MG/DL
CALCIUM SERPL-MCNC: 9.4 MG/DL
CHLORIDE SERPL-SCNC: 104 MMOL/L
CO2 SERPL-SCNC: 27 MMOL/L
CREAT SERPL-MCNC: 1 MG/DL
EST. GFR  (AFRICAN AMERICAN): >60 ML/MIN/1.73 M^2
EST. GFR  (NON AFRICAN AMERICAN): >60 ML/MIN/1.73 M^2
GLUCOSE SERPL-MCNC: 146 MG/DL
MAGNESIUM SERPL-MCNC: 2.3 MG/DL
POTASSIUM SERPL-SCNC: 3.4 MMOL/L
SODIUM SERPL-SCNC: 141 MMOL/L

## 2018-02-01 PROCEDURE — 82306 VITAMIN D 25 HYDROXY: CPT

## 2018-02-01 PROCEDURE — 83735 ASSAY OF MAGNESIUM: CPT

## 2018-02-01 PROCEDURE — 80048 BASIC METABOLIC PNL TOTAL CA: CPT

## 2018-02-01 PROCEDURE — 99214 OFFICE O/P EST MOD 30 MIN: CPT | Mod: S$GLB,,, | Performed by: INTERNAL MEDICINE

## 2018-02-01 PROCEDURE — 36415 COLL VENOUS BLD VENIPUNCTURE: CPT | Mod: PO

## 2018-02-01 PROCEDURE — 3008F BODY MASS INDEX DOCD: CPT | Mod: S$GLB,,, | Performed by: INTERNAL MEDICINE

## 2018-02-01 PROCEDURE — 99999 PR PBB SHADOW E&M-EST. PATIENT-LVL III: CPT | Mod: PBBFAC,,, | Performed by: INTERNAL MEDICINE

## 2018-02-01 NOTE — PROGRESS NOTES
BMP with low K, stop the HCTZ. If necessary can titrate up the lisinopril.  Vit D OK on 1000 IU daily. Results to pt via [facility]

## 2018-02-07 ENCOUNTER — TELEPHONE (OUTPATIENT)
Dept: FAMILY MEDICINE | Facility: CLINIC | Age: 68
End: 2018-02-07

## 2018-02-07 NOTE — TELEPHONE ENCOUNTER
Please call pt - I don't think he read the [facility] message - his potassium was low and I think this is from the HCTZ,.  Stop the HCTZ,  Stay on all other meds including the lisinopril for BP.

## 2018-03-16 DIAGNOSIS — Z13.6 SCREENING FOR AAA (AORTIC ABDOMINAL ANEURYSM): ICD-10-CM

## 2018-03-28 ENCOUNTER — TELEPHONE (OUTPATIENT)
Dept: FAMILY MEDICINE | Facility: CLINIC | Age: 68
End: 2018-03-28

## 2018-03-28 NOTE — TELEPHONE ENCOUNTER
Spoke w/[employer] , need to verify patient's OV from 1/1/18 to 2/28/18 and if PCP is awae that patient has been out of work during this time for disability benefits. Please advise.

## 2018-03-28 NOTE — TELEPHONE ENCOUNTER
I was aware that he was out of work 1/1 through 2/5 - that was when he was supposed to see psychiatry in consultation.  I have not had contact with him since his office visit with me 2/1/2018.

## 2018-04-02 ENCOUNTER — OFFICE VISIT (OUTPATIENT)
Dept: FAMILY MEDICINE | Facility: CLINIC | Age: 68
End: 2018-04-02
Payer: COMMERCIAL

## 2018-04-02 VITALS
BODY MASS INDEX: 31.14 KG/M2 | WEIGHT: 222.44 LBS | TEMPERATURE: 98 F | HEART RATE: 72 BPM | HEIGHT: 71 IN | DIASTOLIC BLOOD PRESSURE: 72 MMHG | SYSTOLIC BLOOD PRESSURE: 115 MMHG | OXYGEN SATURATION: 97 %

## 2018-04-02 DIAGNOSIS — I10 ESSENTIAL HYPERTENSION: Chronic | ICD-10-CM

## 2018-04-02 DIAGNOSIS — E87.6 DIURETIC-INDUCED HYPOKALEMIA: ICD-10-CM

## 2018-04-02 DIAGNOSIS — L60.0 INGROWN TOENAIL OF RIGHT FOOT WITH INFECTION: ICD-10-CM

## 2018-04-02 DIAGNOSIS — F32.2 CURRENT SEVERE EPISODE OF MAJOR DEPRESSIVE DISORDER WITHOUT PSYCHOTIC FEATURES WITHOUT PRIOR EPISODE: Primary | ICD-10-CM

## 2018-04-02 DIAGNOSIS — T50.2X5A DIURETIC-INDUCED HYPOKALEMIA: ICD-10-CM

## 2018-04-02 PROCEDURE — 99214 OFFICE O/P EST MOD 30 MIN: CPT | Mod: S$GLB,,, | Performed by: INTERNAL MEDICINE

## 2018-04-02 PROCEDURE — 3074F SYST BP LT 130 MM HG: CPT | Mod: CPTII,S$GLB,, | Performed by: INTERNAL MEDICINE

## 2018-04-02 PROCEDURE — 99999 PR PBB SHADOW E&M-EST. PATIENT-LVL III: CPT | Mod: PBBFAC,,, | Performed by: INTERNAL MEDICINE

## 2018-04-02 PROCEDURE — 3078F DIAST BP <80 MM HG: CPT | Mod: CPTII,S$GLB,, | Performed by: INTERNAL MEDICINE

## 2018-04-02 RX ORDER — DULOXETIN HYDROCHLORIDE 60 MG/1
CAPSULE, DELAYED RELEASE ORAL
COMMUNITY
Start: 2018-03-01 | End: 2020-08-24 | Stop reason: DRUGHIGH

## 2018-04-02 RX ORDER — DULOXETIN HYDROCHLORIDE 30 MG/1
CAPSULE, DELAYED RELEASE ORAL
COMMUNITY
Start: 2018-02-05 | End: 2018-04-02 | Stop reason: ALTCHOICE

## 2018-04-02 RX ORDER — HYDROCHLOROTHIAZIDE 12.5 MG/1
CAPSULE ORAL
COMMUNITY
Start: 2018-03-13 | End: 2018-06-06 | Stop reason: SDUPTHER

## 2018-04-02 RX ORDER — POTASSIUM CHLORIDE 20 MEQ/1
20 TABLET, EXTENDED RELEASE ORAL 2 TIMES DAILY
Qty: 60 TABLET | Refills: 5 | Status: SHIPPED | OUTPATIENT
Start: 2018-04-02 | End: 2018-12-12 | Stop reason: SDUPTHER

## 2018-04-02 RX ORDER — LORAZEPAM 1 MG/1
2 TABLET ORAL NIGHTLY
COMMUNITY
Start: 2018-03-13 | End: 2022-05-10

## 2018-04-02 RX ORDER — LISINOPRIL 20 MG/1
20 TABLET ORAL DAILY
Qty: 90 TABLET | Refills: 3 | Status: SHIPPED | OUTPATIENT
Start: 2018-04-02 | End: 2019-04-11 | Stop reason: SDUPTHER

## 2018-04-02 NOTE — PROGRESS NOTES
This note was created by combination of typed  and Dragon dictation.  Transcription errors may be present.  If there are any questions, please contact me.    Assessment & Plan:   Current severe episode of major depressive disorder without psychotic features without prior episode - still out of work, managed by Dr. Coleman.  His insurance disability needs to verify that he was written out of work since 1/2/2018 for MDD not just for prostate CA.    Spoke with Aaliyah with [employer] claims, [phone]. Confirmed that he was under my care for depression and sleep shift disorder/insomnia and that I had indeed written him out of work based on this.    Essential hypertension - stable, refilled lisinopril.  Did not tolerate trial off of HCTZ (return of edema).   -     lisinopril (PRINIVIL,ZESTRIL) 20 MG tablet; Take 1 tablet (20 mg total) by mouth once daily.  Dispense: 90 tablet; Refill: 3    Diuretic-induced hypokalemia - start K Dur 20 BID.  Repeat BMP and Mg in 2 weeks on med.  -     potassium chloride SA (K-DUR,KLOR-CON) 20 MEQ tablet; Take 1 tablet (20 mEq total) by mouth 2 (two) times daily.  Dispense: 60 tablet; Refill: 5  -     Basic metabolic panel; Future; Expected date: 04/16/2018  -     Magnesium; Future; Expected date: 04/16/2018    Ingrown toenail of right foot without infection - no pain no paronychia.  Recommended soaking daily and raising the edge with cotton ball or dental floss or similar.  Hold on podiatry consult.       Medications Discontinued During This Encounter   Medication Reason    sertraline (ZOLOFT) 100 MG tablet Therapy not effective    DULoxetine (CYMBALTA) 30 MG capsule Therapy completed    lisinopril (PRINIVIL,ZESTRIL) 20 MG tablet Reorder     Modified Medications    Modified Medication Previous Medication    LISINOPRIL (PRINIVIL,ZESTRIL) 20 MG TABLET lisinopril (PRINIVIL,ZESTRIL) 20 MG tablet       Take 1 tablet (20 mg total) by mouth once daily.    Take 1 tablet (20 mg total) by  mouth once daily.     New Prescriptions    POTASSIUM CHLORIDE SA (K-DUR,KLOR-CON) 20 MEQ TABLET    Take 1 tablet (20 mEq total) by mouth 2 (two) times daily.       Follow Up: No Follow-up on file.        Subjective:     Chief Complaint   Patient presents with    paperwork       HPI  Sharee is a 67 y.o. male, last appointment with this clinic was 2/1/2018.    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  Hydroxyzine with SE. Trazodone ineffective.  Rozerem didn't help. Hx of Ambien helpful.  RLS; trial of Requip - did not find it helpful - stopped.  Beta thal minor.  Noncardiac chest pain.  Seen by cardiology.  7/13/2016 nu med stress test neg for ischemia  7/13/2016 TTE LVEF 55-60; no diastolic dysfunction  HTN, HCTZ and lisinopril. No outside BP checks.  Hyperlipidemia - statins SE of myalgias. On niacin.  Smoker. precontemplative stage of quitting.  Prostate CA, 2 cores were +3+4 = 7 [place]; s/p XRT and brachytherapy, [facility], fall 2017 12/18 had cystoscopy with 2 erythematous lesions, possibly related to the XRT.  Plan is repeat in 6 weeks  Had rib biopsy negative for metastasis. This was due to abn finding on PET/CT scan showing suspicious activity on the right 7 and 8 ribs.  Vit D deficiency.  Major depressive disorder related to prostate CA; zoloft.  Filled out short term disability paperwork 11/3 to 1/1/2018    Last seen early February.  For the depression - was going to see psych. On zoloft.  Ambien CR for sleep shift work disorder.   Cramping of hands - K was low on HCTZ. Stop the HCTZ.  Vit D was good on 1000    Is seeing Dr. Adeel Coleman with psychiatry. Is being treated with ativan and another rx for mood. Cymbalta possibly.  Is taking 60 mg he thinks.  Still taking Ambien CR, more PRN.  Does not think he is taking the zoloft.    He had stopped the HCTZ but restarted b/c of swelling.    His insurance called our office. Apparently he was out of work from 1/1/2018 through  2/28.  I last saw him 2/1.  I was aware that he was out of work from 1/1 through 2/1 but was unclear about his ALIZE after that as he was going to see psychiatry. Needs clearance 1/2 through 2/4.     Still not back to work yet. Went back to cancer treatment center last week - stable however urology had seen erythematous lesion on cystoscopy.      Needs supportive documentation from 1/2/18 ongoing that he was on disability due to mental condition not physical condition. See documentation 1/2/2018.    Notes he's getting ingrown toenails, big toe. Painless. Right great toe.     Patient Care Team:  Navin Charlton MD as PCP - General (Internal Medicine)  Alfredo Kearns MD as Consulting Physician (Urology)  Adeel Coleman MD as Consulting Physician (Psychiatry)    Patient Active Problem List    Diagnosis Date Noted    Major depressive disorder 10/30/2017    Pelvic floor muscle wasting in male 09/28/2017    Prostate cancer 08/30/2017     2 cores positive kary 3+4=7 on biopsy 8/15/2017      Smoker 08/30/2017    Vitamin D deficiency 08/30/2017    Beta thalassemia minor 04/11/2017    Benign non-nodular prostatic hyperplasia without lower urinary tract symptoms 03/29/2017     Hx of negative prostate Bx and hx of MRI prostate      Restless leg syndrome 08/02/2016    Hyperlipidemia     Hypertension      7/13/2016 nu med stress test neg for ischemia  7/13/2016 TTE LVEF 55-60; no diastolic dysfunction      Shift work sleep disorder 11/20/2013       PAST MEDICAL HISTORY:  Past Medical History:   Diagnosis Date    Hyperlipidemia     Hypertension        PAST SURGICAL HISTORY:  Past Surgical History:   Procedure Laterality Date    MOUTH SURGERY  01/2016       SOCIAL HISTORY:  Social History     Social History    Marital status: Single     Spouse name: N/A    Number of children: N/A    Years of education: N/A     Occupational History    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; 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  Review of patient's allergies indicates:   Allergen Reactions    Sulfamethoxazole-trimethoprim Other (See Comments)     Muscle pain    Lipitor [atorvastatin] Other (See Comments)     Muscle cramps     Current Outpatient Prescriptions   Medication Sig Dispense Refill    DULoxetine (CYMBALTA) 30 MG capsule       DULoxetine (CYMBALTA) 60 MG capsule       hydroCHLOROthiazide (MICROZIDE) 12.5 mg capsule       niacin (SLO-NIACIN) 500 mg tablet Take 1 tablet (500 mg total) by mouth once daily. 90 tablet 3    sildenafil (VIAGRA) 50 MG tablet Take 1 tablet (50 mg total) by mouth daily as needed for Erectile Dysfunction. 4 tablet 11    tamsulosin (FLOMAX) 0.4 mg Cp24       lisinopril (PRINIVIL,ZESTRIL) 20 MG tablet Take 1 tablet (20 mg total) by mouth once daily. 30 tablet 11    LORazepam (ATIVAN) 1 MG tablet       sertraline (ZOLOFT) 100 MG tablet Take 2 tablets (200 mg total) by mouth once daily. Increased dose 60 tablet 11    zolpidem (AMBIEN CR) 12.5 MG CR tablet Take 1 tablet (12.5 mg total) by mouth nightly as needed for Insomnia. 30 tablet 1     No current facility-administered medications for this visit.        Review of Systems   Constitutional: Negative for chills and fever.   Respiratory: Negative for shortness of breath.    Cardiovascular: Negative for chest pain and palpitations.       Objective:   Physical Exam   Vitals:    04/02/18 1027   BP: 115/72   Pulse: 72   Temp: 97.6 °F (36.4 °C)   TempSrc: Oral   SpO2: 97%   Weight: 100.9 kg (222 lb 7.1 oz)   Height: 5' 11" (1.803 m)    Body mass index is 31.02 kg/m².  Weight: 100.9 kg (222 lb 7.1 oz)   Height: 5' 11" " (180.3 cm)     Physical Exam   Constitutional: He is oriented to person, place, and time. He appears well-developed and well-nourished. No distress.   Musculoskeletal: He exhibits no edema.   Neurological: He is alert and oriented to person, place, and time.   Skin: No rash noted.   Right great toenail medial edge ingrown but no erythema, tenderness, fluctuance.   Psychiatric: He has a normal mood and affect. His behavior is normal. Thought content normal.

## 2018-04-02 NOTE — TELEPHONE ENCOUNTER
----- Message from Navin Charlton MD sent at 4/2/2018  1:06 PM CDT -----  Regarding: please call pt - i spoke with aaliyah at [facility] and clarified  I spoke with Aaliyah at [facility], clarified his disability claim - I supported it from 1/2 through last OV with me based on depression.

## 2018-04-09 ENCOUNTER — OFFICE VISIT (OUTPATIENT)
Dept: FAMILY MEDICINE | Facility: CLINIC | Age: 68
End: 2018-04-09
Payer: COMMERCIAL

## 2018-04-09 ENCOUNTER — HOSPITAL ENCOUNTER (OUTPATIENT)
Dept: RADIOLOGY | Facility: HOSPITAL | Age: 68
Discharge: HOME | End: 2018-04-09
Attending: FAMILY MEDICINE
Payer: COMMERCIAL

## 2018-04-09 ENCOUNTER — TELEPHONE (OUTPATIENT)
Dept: FAMILY MEDICINE | Facility: CLINIC | Age: 68
End: 2018-04-09

## 2018-04-09 ENCOUNTER — HOSPITAL ENCOUNTER (OUTPATIENT)
Dept: RADIOLOGY | Facility: HOSPITAL | Age: 68
Discharge: HOME | End: 2018-04-09
Attending: INTERNAL MEDICINE
Payer: COMMERCIAL

## 2018-04-09 VITALS
HEART RATE: 75 BPM | WEIGHT: 220.25 LBS | TEMPERATURE: 98 F | BODY MASS INDEX: 30.83 KG/M2 | OXYGEN SATURATION: 99 % | HEIGHT: 71 IN | SYSTOLIC BLOOD PRESSURE: 110 MMHG | DIASTOLIC BLOOD PRESSURE: 82 MMHG

## 2018-04-09 DIAGNOSIS — M79.604 BILATERAL LEG PAIN: ICD-10-CM

## 2018-04-09 DIAGNOSIS — I73.9 CLAUDICATION: ICD-10-CM

## 2018-04-09 DIAGNOSIS — I10 ESSENTIAL HYPERTENSION: Chronic | ICD-10-CM

## 2018-04-09 DIAGNOSIS — I10 ESSENTIAL HYPERTENSION: ICD-10-CM

## 2018-04-09 DIAGNOSIS — M79.604 BILATERAL LEG PAIN: Primary | ICD-10-CM

## 2018-04-09 DIAGNOSIS — Z13.6 SCREENING FOR AAA (AORTIC ABDOMINAL ANEURYSM): ICD-10-CM

## 2018-04-09 DIAGNOSIS — M79.605 BILATERAL LEG PAIN: ICD-10-CM

## 2018-04-09 DIAGNOSIS — Z13.6 SCREENING FOR AAA (ABDOMINAL AORTIC ANEURYSM): Primary | ICD-10-CM

## 2018-04-09 DIAGNOSIS — M79.605 BILATERAL LEG PAIN: Primary | ICD-10-CM

## 2018-04-09 DIAGNOSIS — E78.5 HYPERLIPIDEMIA, UNSPECIFIED HYPERLIPIDEMIA TYPE: ICD-10-CM

## 2018-04-09 DIAGNOSIS — Z13.6 SCREENING FOR AAA (ABDOMINAL AORTIC ANEURYSM): ICD-10-CM

## 2018-04-09 PROCEDURE — 93970 EXTREMITY STUDY: CPT | Mod: 26,,, | Performed by: RADIOLOGY

## 2018-04-09 PROCEDURE — 76775 US EXAM ABDO BACK WALL LIM: CPT | Mod: 26,,, | Performed by: RADIOLOGY

## 2018-04-09 PROCEDURE — 76775 US EXAM ABDO BACK WALL LIM: CPT | Mod: TC

## 2018-04-09 PROCEDURE — 3079F DIAST BP 80-89 MM HG: CPT | Mod: CPTII,S$GLB,, | Performed by: FAMILY MEDICINE

## 2018-04-09 PROCEDURE — 93970 EXTREMITY STUDY: CPT | Mod: TC

## 2018-04-09 PROCEDURE — 99999 PR PBB SHADOW E&M-EST. PATIENT-LVL III: CPT | Mod: PBBFAC,,, | Performed by: FAMILY MEDICINE

## 2018-04-09 PROCEDURE — 99214 OFFICE O/P EST MOD 30 MIN: CPT | Mod: S$GLB,,, | Performed by: FAMILY MEDICINE

## 2018-04-09 PROCEDURE — 3074F SYST BP LT 130 MM HG: CPT | Mod: CPTII,S$GLB,, | Performed by: FAMILY MEDICINE

## 2018-04-09 RX ORDER — LISINOPRIL 20 MG/1
TABLET ORAL
Qty: 30 TABLET | Refills: 5 | Status: SHIPPED | OUTPATIENT
Start: 2018-04-09 | End: 2018-06-06 | Stop reason: SDUPTHER

## 2018-04-09 RX ORDER — DULOXETIN HYDROCHLORIDE 30 MG/1
CAPSULE, DELAYED RELEASE ORAL
COMMUNITY
Start: 2018-04-05 | End: 2019-05-20

## 2018-04-09 RX ORDER — HYDROCHLOROTHIAZIDE 12.5 MG/1
CAPSULE ORAL
Qty: 30 CAPSULE | Refills: 5 | Status: SHIPPED | OUTPATIENT
Start: 2018-04-09 | End: 2018-10-04 | Stop reason: SDUPTHER

## 2018-04-09 NOTE — TELEPHONE ENCOUNTER
-Please inform pt that there was no evidence of blood clot on the Ultrasound per the radiologist.  Advised pt to look out for phone call for referral to set up appt with Vascular specialist as discussed during OV earlier today, and to call us back with any concerns.

## 2018-04-09 NOTE — PROGRESS NOTES
" Office Visit    Patient Name: Sharee Day Jr.    : 1950  MRN: [id number]      Assessment/Plan:  Sharee Day Jr. is a 67 y.o. male who presents today for :    Bilateral leg pain  -     US Lower Extremity Veins Bilateral; Future; Expected date: 2018  -     Ambulatory referral to Vascular Surgery  Claudication  -chronic issue for him, normal exam today  -given patient's increased anxiety from his online research, will obtain US and have pt see vascular per his preference.   -Advised patient to call clinic if pt has any concerns.  ER precautions provided, especially if patient develops worsening pain, or patient develops systemic Sx such as F/C/malaise/N/V. Pt voiced understanding    Hyperlipidemia, unspecified hyperlipidemia type  Essential hypertension  - continue current medications   - ?advised DASH diet, portion control, regular cardiovascular exercises          Follow-up for worsening Sx. Urgent care/ED precautions provided.     This note was created by combination of typed  and Dragon dictation.  Transcription errors may be present.  If there are any questions, please contact me.        ----------------------------------------------------------------------------------------------------------------------      HPI:  Sharee is a 67 y.o. male who presents today for:    Leg Pain        This patient has multiple medical diagnoses as noted below.    This patient is new to me - patient last saw PCP last week  for routine exam.    Patient is here today for chronic b/l Leg Pain  that has been ongoing for the past 2 years - mostly in bilateral thighs and "deep" in his legs. States that the pain is crampy and achy in nature, comes and goes about once a every few weeks the past year, and can last for a whole week - he has had two episodes this past week - last time was two days ago while he was walking. He reports having discussed this issue with his PCP and has had his HTN meds adjusted, suspecting " that it was a potassium abnormality, but pt states that those adjustments have not relieved the pain. Pain is worse with activities, walking for a long period of time. He does have a history of controlled HTN and HLD, for which he takes Niacin due to intolerance to statin.  He has Googled online is very concerned that he may have a serious blood vessel disease, would like to get referral to specialist.      Additional ROS    No F/C/wt changes/fatigue  No dysphagia/sore throat/rhinorrhea  No CP/SOB/palpitations/swelling  No cough/wheezing/SOB  No nausea/vomiting/abd pain/no diarrhea, no constipation, blood in stool  No muscle aches/joint pain   No rashes  No weakness/HA/tingling/numbness  No anxiety/depression        Patient Care Team:  Navin Charlton MD as PCP - General (Internal Medicine)  Alfredo Kearns MD as Consulting Physician (Urology)  Adeel Coleman MD as Consulting Physician (Psychiatry)        Patient Active Problem List   Diagnosis    Shift work sleep disorder    Hyperlipidemia    Hypertension    Restless leg syndrome    Benign non-nodular prostatic hyperplasia without lower urinary tract symptoms    Beta thalassemia minor    Prostate cancer    Smoker    Vitamin D deficiency    Pelvic floor muscle wasting in male    Major depressive disorder       Current Medications  Medications reviewed and updated.       Current Outpatient Prescriptions:     DULoxetine (CYMBALTA) 30 MG capsule, , Disp: , Rfl:     DULoxetine (CYMBALTA) 60 MG capsule, , Disp: , Rfl:     hydroCHLOROthiazide (MICROZIDE) 12.5 mg capsule, , Disp: , Rfl:     hydroCHLOROthiazide (MICROZIDE) 12.5 mg capsule, TAKE 1 CAPSULE(12.5 MG) BY MOUTH EVERY DAY, Disp: 30 capsule, Rfl: 5    lisinopril (PRINIVIL,ZESTRIL) 20 MG tablet, Take 1 tablet (20 mg total) by mouth once daily., Disp: 90 tablet, Rfl: 3    lisinopril (PRINIVIL,ZESTRIL) 20 MG tablet, TAKE 1 TABLET(20 MG) BY MOUTH EVERY DAY, Disp: 30 tablet, Rfl: 5    LORazepam (ATIVAN)  "1 MG tablet, , Disp: , Rfl:     niacin (SLO-NIACIN) 500 mg tablet, Take 1 tablet (500 mg total) by mouth once daily., Disp: 90 tablet, Rfl: 3    potassium chloride SA (K-DUR,KLOR-CON) 20 MEQ tablet, Take 1 tablet (20 mEq total) by mouth 2 (two) times daily., Disp: 60 tablet, Rfl: 5    sildenafil (VIAGRA) 50 MG tablet, Take 1 tablet (50 mg total) by mouth daily as needed for Erectile Dysfunction., Disp: 4 tablet, Rfl: 11    tamsulosin (FLOMAX) 0.4 mg Cp24, , Disp: , Rfl:     zolpidem (AMBIEN CR) 12.5 MG CR tablet, Take 1 tablet (12.5 mg total) by mouth nightly as needed for Insomnia., Disp: 30 tablet, Rfl: 1    Past Surgical History:   Procedure Laterality Date    MOUTH SURGERY  01/2016       Family History   Problem Relation Age of Onset    Breast cancer Mother     Hypertension Sister     Hypertension Brother     Hypertension Brother     Hypertension Sister     Mental retardation Son     Melanoma Neg Hx     Psoriasis Neg Hx     Lupus Neg Hx        Social History     Social History    Marital status: Single     Spouse name: N/A    Number of children: N/A    Years of education: N/A     Occupational History    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; 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; 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 [employer]     Social History Main Topics    Smoking status: Current Every Day Smoker     Types: Cigarettes    Smokeless tobacco: Never Used    Alcohol use No    Drug use: No    Sexual activity: Yes     Other Topics Concern    Not on file     Social History Narrative    No narrative on file           Allergies   Review of patient's allergies indicates:   Allergen Reactions    Statins-hmg-coa reductase inhibitors      myalgias    Sulfamethoxazole-trimethoprim Other (See Comments)     Muscle pain    Lipitor [atorvastatin] Other (See Comments)     Muscle cramps             Review of Systems  See HPI      Physical Exam  /82   Pulse 75   Temp 98.3 °F (36.8 °C) (Oral)   Ht 5' 11" (1.803 m)   Wt 99.9 kg (220 lb 3.8 oz)  "  SpO2 99%   BMI 30.72 kg/m²     GEN: NAD, well developed, pleasant, well nourished  HEENT: NCAT, PERRLA, EOMI, sclera clear, anicteric, O/P clear, MMM with no lesions  NECK: normal, supple with midline trachea, no LAD, no thyromegaly  LUNGS: CTAB, no w/r/r, no increased work of breathing   HEART: RRR, normal S1 and S2, no m/r/g, no edema  ABD: s/nt/nd, NABS  SKIN: normal turgor, no rashes  PSYCH: AOx3, appropriate mood and affect  MSK: warm/well perfused, normal ROM in all 4 extremities, no c/c/e. Normal 2+ DP/PT pulse on exam. No leg swelling, no mass palpated      Labs  Lab Results   Component Value Date    HGBA1C 5.4 06/16/2016     Lab Results   Component Value Date     02/01/2018    K 3.4 (L) 02/01/2018     02/01/2018    CO2 27 02/01/2018    BUN 11 02/01/2018    CREATININE 1.0 02/01/2018    CALCIUM 9.4 02/01/2018    ANIONGAP 10 02/01/2018    ESTGFRAFRICA >60.0 02/01/2018    EGFRNONAA >60.0 02/01/2018     Lab Results   Component Value Date    CHOL 210 (H) 03/29/2017    CHOL 196 06/16/2016    CHOL 211 (H) 01/17/2012     Lab Results   Component Value Date    HDL 34 (L) 03/29/2017    HDL 37 (L) 06/16/2016    HDL 39 (L) 01/17/2012     Lab Results   Component Value Date    LDLCALC 144.8 03/29/2017    LDLCALC 136.2 06/16/2016    LDLCALC 154.0 01/17/2012     Lab Results   Component Value Date    TRIG 156 (H) 03/29/2017    TRIG 114 06/16/2016    TRIG 88 01/17/2012     Lab Results   Component Value Date    CHOLHDL 16.2 (L) 03/29/2017    CHOLHDL 18.9 (L) 06/16/2016    CHOLHDL 18.5 (L) 01/17/2012     Last set of blood work has been reviewed as noted above.      Health Maintenance  Health Maintenance       Date Due Completion Date    Abdominal Aortic Aneurysm Screening 07/03/2017 ---    Lipid Panel 03/29/2018 3/29/2017    Pneumococcal (65+) (2 of 2 - PPSV23) 08/30/2018 8/30/2017    Colonoscopy 11/04/2021 11/4/2011    TETANUS VACCINE 06/16/2026 6/16/2016

## 2018-04-10 ENCOUNTER — HOSPITAL ENCOUNTER (OUTPATIENT)
Dept: CARDIOLOGY | Facility: HOSPITAL | Age: 68
Discharge: HOME | End: 2018-04-10
Attending: SURGERY
Payer: COMMERCIAL

## 2018-04-10 DIAGNOSIS — I73.9 PVD (PERIPHERAL VASCULAR DISEASE): ICD-10-CM

## 2018-04-10 DIAGNOSIS — I73.9 PVD (PERIPHERAL VASCULAR DISEASE): Primary | ICD-10-CM

## 2018-04-10 PROCEDURE — 93922 UPR/L XTREMITY ART 2 LEVELS: CPT

## 2018-04-10 PROCEDURE — 93922 UPR/L XTREMITY ART 2 LEVELS: CPT | Mod: 26,,, | Performed by: SURGERY

## 2018-04-13 LAB — VASCULAR ANKLE BRACHIAL INDEX (ABI) RIGHT: 1.13 (ref 0.9–1.2)

## 2018-04-16 ENCOUNTER — LAB VISIT (OUTPATIENT)
Dept: LAB | Facility: HOSPITAL | Age: 68
End: 2018-04-16
Attending: INTERNAL MEDICINE
Payer: COMMERCIAL

## 2018-04-16 ENCOUNTER — OFFICE VISIT (OUTPATIENT)
Dept: VASCULAR SURGERY | Facility: CLINIC | Age: 68
End: 2018-04-16
Payer: COMMERCIAL

## 2018-04-16 VITALS
WEIGHT: 221.56 LBS | BODY MASS INDEX: 31.72 KG/M2 | SYSTOLIC BLOOD PRESSURE: 124 MMHG | HEIGHT: 70 IN | DIASTOLIC BLOOD PRESSURE: 80 MMHG | HEART RATE: 68 BPM

## 2018-04-16 DIAGNOSIS — T50.2X5A DIURETIC-INDUCED HYPOKALEMIA: ICD-10-CM

## 2018-04-16 DIAGNOSIS — E87.6 DIURETIC-INDUCED HYPOKALEMIA: ICD-10-CM

## 2018-04-16 DIAGNOSIS — I73.9 ASYMPTOMATIC PVD (PERIPHERAL VASCULAR DISEASE): Primary | ICD-10-CM

## 2018-04-16 LAB
ANION GAP SERPL CALC-SCNC: 7 MMOL/L
BUN SERPL-MCNC: 14 MG/DL
CALCIUM SERPL-MCNC: 9.2 MG/DL
CHLORIDE SERPL-SCNC: 104 MMOL/L
CO2 SERPL-SCNC: 29 MMOL/L
CREAT SERPL-MCNC: 0.9 MG/DL
EST. GFR  (AFRICAN AMERICAN): >60 ML/MIN/1.73 M^2
EST. GFR  (NON AFRICAN AMERICAN): >60 ML/MIN/1.73 M^2
GLUCOSE SERPL-MCNC: 126 MG/DL
MAGNESIUM SERPL-MCNC: 2 MG/DL
POTASSIUM SERPL-SCNC: 3.8 MMOL/L
SODIUM SERPL-SCNC: 140 MMOL/L

## 2018-04-16 PROCEDURE — 80048 BASIC METABOLIC PNL TOTAL CA: CPT

## 2018-04-16 PROCEDURE — 99999 PR PBB SHADOW E&M-EST. PATIENT-LVL III: CPT | Mod: PBBFAC,,, | Performed by: SURGERY

## 2018-04-16 PROCEDURE — 36415 COLL VENOUS BLD VENIPUNCTURE: CPT | Mod: PO

## 2018-04-16 PROCEDURE — 99244 OFF/OP CNSLTJ NEW/EST MOD 40: CPT | Mod: S$GLB,,, | Performed by: SURGERY

## 2018-04-16 PROCEDURE — 83735 ASSAY OF MAGNESIUM: CPT

## 2018-04-16 NOTE — LETTER
April 16, 2018      Chance Zuniga MD  [address] [facility]  [place] [address]           [facility]  [address] [facility] [address]  [place] [address]  Phone: [phone]  Fax: [phone]          Patient: Sharee Day Jr.   MR Number: [id number]   YOB: 1950   Date of Visit: 4/16/2018       Dear Dr. Chance Maxwell Dair:    Thank you for referring Sharee Day to me for evaluation. I do not think his cramping is related to his lower extremity perfusion, which was within normal limits on his vascular studies.      Could you order a repeat AAA ultrasound in 3 years for routine surveillance and please refer back to me if he develops claudication or his aortic diameter increases?    Please let me know if there is anything else that I can do to help you.    Attached you will find relevant portions of my assessment and plan of care.    If you have questions, please do not hesitate to call me. I look forward to following Sharee Day along with you.    Sincerely,    Boubacar Jaramillo MD    Enclosure  CC:  No Recipients    If you would like to receive this communication electronically, please contact [email or web address] or [phone] to request more information on [place] [employer] access.    For providers and/or their staff who would like to refer a patient to [facility], please contact us through our one-stop-shop provider referral line, [facility], at [phone].    If you feel you have received this communication in error or would no longer like to receive these types of communications, please e-mail [email or web address]

## 2018-04-16 NOTE — PATIENT INSTRUCTIONS
Smoking and Peripheral Arterial Disease (PAD)  Smoking is the greatest single danger to the health of your arteries. It puts you at higher risk for peripheral arterial disease (PAD). PAD is a disease of the arteries in the legs. If you have PAD, its likely that other parts of your body are diseased, too. That puts you at high risk for heart attack or stroke. Read on to learn how smoking can lead to PAD and affect your health.  How can smoking lead to PAD?  Smoking causes swelling and redness (inflammation) that leads to plaque forming. Plaque is a waxy material made up of cholesterol and other particles. It can build up in your artery walls. When there is too much plaque, your arteries can become narrowed and restrict blood flow. This then raises your risk for PAD and blood clots. It also worsens other risk factors, such as high blood pressure and high cholesterol. These are things that make you more likely to have artery disease.  What happens if you dont quit smoking?  · You have 2 to 4 times the risk of dying from a heart attack or stroke as a nonsmoker.  · You have a greater risk of getting severe PAD, pain in your legs when walking (claudication), dead body tissue due to lack of blood flow (gangrene), or having a leg or foot amputated.  · You are at greater risk for abdominal aortic aneurysm (AAA). This is a bulge in the aorta, a major artery. It can burst suddenly and be fatal.  What happens if you quit smoking?  · Your risk for heart attack and stroke drops as soon as you quit smoking.  · After 1 year of not smoking, your risk for heart attack falls by 50%.  · In 5 to 15 years after you quit, your risk for heart attack or stroke is the same as someone who never smoked.  · Your risk for amputation and other complications of PAD is reduced.  · Your risk of developing AAA decreases.     For more information  · Smokefree.gov/epzm-ym-sj-expert  · National [employer] [place] Smoking Quitline:3-290-94O-QUIT  (1-626.645.5275)      Date Last Reviewed: 6/1/2016  © 8109-0293 [employer]. [address] [place] [address], [place] [address]. All rights reserved. This information is not intended as a substitute for professional medical care. Always follow your healthcare professional's instructions.        Understanding Peripheral Arterial Disease    Peripheral arteries deliver oxygen-rich blood to the tissues outside the heart. As you age, your arteries become stiffer and thicker. In addition, risk factors, such as smoking and high cholesterol, can damage the artery lining. This allows a buildup of fat and other materials (plaque) to form within the artery walls. The buildup of plaque narrows the space inside the artery and sometimes blocks blood flow. Peripheral arterial disease (PAD) happens when blood flow through the arteries is reduced because of plaque buildup. It often happens in the legs and feet, but can also happen elsewhere in the body. If this buildup happens in the a large artery in the neck (carotid artery), it can be a major contributor to stroke.  A healthy artery  An artery is a muscular tube that carries oxgen rich blood and nutrients from the heart to the rest of the body. It has a smooth lining and flexible walls that allow blood to pass freely. When active, muscles need more oxygen. This increases blood flow. Healthy arteries can adapt to meet this need.  A damaged artery    PAD begins when the lining of an artery is damaged. This is often because of risk factors, such as smoking, older age, or diabetes. Plaque then starts to form within the artery wall. At this stage, blood flows normally, so youre not likely to have symptoms.  A narrowed artery    If plaque continues to build up, the space inside the artery narrows. The artery walls become less able to expand. The artery still provides enough blood and oxygen to your muscles during rest. But when youre active, the increased demand for blood cant  be met. As a result, your leg may cramp or ache when you walk.  A blocked artery    An artery can become blocked by plaque or by a blood clot lodged in a narrowed section. When this happens, oxygen cant reach the muscle below the blockage. Then you may feel pain when lying down or when you are not active (rest pain). This type of pain is especially common at night when youre lying flat. In time, the affected tissue can die. This can lead to the loss of a toe or foot.  Date Last Reviewed: 5/1/2016 © 2000-2017 [employer]. [address] [place] [address]. All rights reserved. This information is not intended as a substitute for professional medical care. Always follow your healthcare professional's instructions.        A Walking Program for Peripheral Arterial Disease (PAD)  Peripheral arterial disease (PAD) is a condition where the arteries in the legs are severely damaged. When you have PAD, walking can be painful. So you may start to walk less. Walking less makes your leg muscles weaker. It then becomes harder and more painful to walk. A walking program can break this cycle. This sheet gives you tips on how to get started.     Walking farther without pain  Exercise strengthens leg muscles that are out of shape. It also trains these muscles to work with less oxygen. This helps your leg muscles work better even with reduced blood flow to your legs. When you have PAD, a walking program can be helpful. Your program can:  · Let you walk longer and farther without claudication. This is an ache in your legs during exercise that goes away with rest.  · Let you do more and be more active  · Add to your overall health and well-being  · Help you control your blood sugar and blood pressure  · Help you become healthier with no risk and at little or no cost  Getting started  Your local hospital, vascular center, or cardiac rehab center may have a special walking program for people with PAD. If so, this  is your best option. But if you cant find a program, or its not covered by insurance, you can still walk on your own. Follow these steps at each session:  · Step 1. Start at a pace that lets you walk for 5 to 10 minutes before you start to feel claudication. This feeling is unpleasant, but it doesnt hurt you. Keep going until the pain makes you feel that you need to stop.  · Step 2. Stop and rest for 3 to 5 minutes, just long enough for the pain to go away. You can rest standing or sitting. (Some people like to bring along a cane or a lightweight folding chair.)  · Step 3. Again walk at a pace that lets you walk for 5 to 10 minutes more before you feel pain. This may be slower than your starting pace in step 1. Then rest again.  · Step 4. Repeat this process until youve walked for 45 minutes. This should be about 60 to 80 minutes total, including resting time. You may not be able to do a full 45 minutes at first. Do as much as you can, and increase your time as you improve.  Making the most of your program  · Walk at least 3 times a week. Take no more than 2 days off between sessions. If you stop walking, even for a week or two, you can lose the health benefits of your program.  · Find a good place to walk. A treadmill or a track may be better at first. That way, you wont run the risk of going too far and finding that you cant walk back. Be sure to have a place to walk indoors in bad weather, such as a gym or a mall.  · Wear shoes with sturdy, flexible soles. The heel should fit without slipping. You should have room to wiggle your toes.  · Keep track of how long you walk. A pedometer will show your daily progress. It can also show how much farther you can walk as time goes by.  · Ask a friend to keep you company. You may enjoy walking with someone else. Or you may want to make your walking sessions a time to relax by yourself.  · Make it fun. Listen to music while you walk and rest. Walk in a favorite park.  Get to know the people at the gym, or the folks that you pass on your route. While you rest, you can window-shop, read, or feed the birds. Do whatever will help you enjoy your exercise sessions.  Date Last Reviewed: 6/1/2016 © 2000-2017 [employer]. [address] [place] [address]. All rights reserved. This information is not intended as a substitute for professional medical care. Always follow your healthcare professional's instructions.        Low-Salt Diet  This diet removes foods that are high in salt. It also limits the amount of salt you use when cooking. It is most often used for people with high blood pressure, edema (fluid retention), and kidney, liver, or heart disease.  Table salt contains the mineral sodium. Your body needs sodium to work normally. But too much sodium can make your health problems worse. Your healthcare provider is recommending a low-salt (also called low-sodium) diet for you. Your total daily allowance of salt is 1,500 to 2,300 milligrams (mg). It is less than 1 teaspoon of table salt. This means you can have only about 500 to 700 mg of sodium at each meal. People with certain health problems should limit salt intake to the lower end of the recommended range.    When you cook, dont add much salt. If you can cook without using salt, even better. Dont add salt to your food at the table.  When shopping, read food labels. Salt is often called sodium on the label. Choose foods that are salt-free, low salt, or very low salt. Note that foods with reduced salt may not lower your salt intake enough.    Beans, potatoes, and pasta  Ok: Dry beans, split peas, lentils, potatoes, rice, macaroni, pasta, spaghetti without added salt  Avoid: Potato chips, tortilla chips, and similar products  Breads and cereals  Ok: Low-sodium breads, rolls, cereals, and cakes; low-salt crackers, matzo crackers  Avoid: Salted crackers, pretzels, popcorn, [place]ian toast, pancakes,  muffins  Dairy  Ok: Milk, chocolate milk, hot chocolate mix, low-salt cheeses, and yogurt  Avoid: Processed cheese and cheese spreads; Roquefort, Camembert, and cottage cheese; buttermilk, instant breakfast drink  Desserts  Ok: Ice cream, frozen yogurt, juice bars, gelatin, cookies and pies, sugar, honey, jelly, hard candy  Avoid: Most pies, cakes and cookies prepared or processed with salt; instant pudding  Drinks  Ok: Tea, coffee, fizzy (carbonated) drinks, juices  Avoid: Flavored coffees, electrolyte replacement drinks, sports drinks  Meats  Ok: All fresh meat, fish, poultry, low-salt tuna, eggs, egg substitute  Avoid: Smoked, pickled, brine-cured, or salted meats and fish. This includes vila, chipped beef, corned beef, hot dogs, deli meats, ham, kosher meats, salt pork, sausage, canned tuna, salted codfish, smoked salmon, herring, sardines, or anchovies.  Seasonings and spices  Ok: Most seasonings are okay. Good substitutes for salt include: fresh herb blends, hot sauce, lemon, garlic, schwarz, vinegar, dry mustard, parsley, cilantro, horseradish, tomato paste, regular margarine, mayonnaise, unsalted butter, cream cheese, vegetable oil, cream, low-salt salad dressing and gravy.  Avoid: Regular ketchup, relishes, pickles, soy sauce, teriyaki sauce, Worcestershire sauce, BBQ sauce, tartar sauce, meat tenderizer, chili sauce, regular gravy, regular salad dressing, salted butter  Soups  Ok: Low-salt soups and broths made with allowed foods  Avoid: Bouillon cubes, soups with smoked or salted meats, regular soup and broth  Vegetables  Ok: Most vegetables are okay; also low-salt tomato and vegetable juices  Avoid: Sauerkraut and other brine-soaked vegetables; pickles and other pickled vegetables; tomato juice, olives  Date Last Reviewed: 8/1/2016  © 2223-1213 [employer]. [address] [place] [address], [place] [address]. All rights reserved. This information is not intended as a substitute for professional  medical care. Always follow your healthcare professional's instructions.        Low-Salt Choices  Eating salt (sodium) can make your body retain too much water. Excess water makes your heart work harder. Canned, packaged, and frozen foods are easy to prepare, but they are often high in sodium. Here are some ideas for low-salt foods you can easily prepare yourself.    For breakfast  · Fruit or 100% fruit juice  · Whole-wheat bread or an English muffin. Compare sodium content on labels.  · Low-fat milk or yogurt  · Unsalted eggs  · Shredded wheat  · Corn tortillas  · Unsalted steamed rice  · Regular (not instant) hot cereal, made without salt  Stay away from:  · Sausage, vila, and ham  · Flour tortillas  · Packaged muffins, pancakes, and biscuits  · Instant hot cereals  · Cottage cheese  For lunch and dinner  · Fresh fish, chicken, turkey, or meat--baked, broiled, or roasted without salt  · Dry beans, cooked without salt  · Tofu, stir-fried without salt  · Unsalted fresh fruit and vegetables, or frozen or canned fruit and vegetables with no added salt  Stay away from:  · Lunch or deli meat that is cured or smoked  · Cheese  · Tomato juice and catsup  · Canned vegetables, soups, and fish not labeled as no-salt-added or reduced sodium  · Packaged gravies and sauces  · Olives, pickles, and relish  · Bottled salad dressings  For snacks and desserts  · Yogurt  · Unsalted, air popped popcorn  · Unsalted nuts or seeds  Stay away from:  · Pies and cakes  · Packaged dessert mixes  · Pizza  · Canned and packaged puddings  · Pretzels, chips, crackers, and nuts--unless the label says unsalted  Date Last Reviewed: 6/17/2015  © 4774-0993 [employer]. [address] [place] [address], [place] [address]. All rights reserved. This information is not intended as a substitute for professional medical care. Always follow your healthcare professional's instructions.        Tips for Using Less Salt    Most people with heart problems  need to eat less salt (sodium). Reducing the amount of salt you eat may help control your blood pressure. The higher your blood pressure, the greater your risk for heart disease, stroke, blindness, and kidney problems.  At the store  · Make low-salt choices by reading labels carefully. Look for the total amount of sodium per serving.  · Use more fresh food. Buy more fruits and vegetables. Select lean meats, fish, and poultry.  · Use fewer frozen, canned, and packaged foods which often contain a lot of sodium.  · Use plain frozen vegetables without sauces or toppings. These products are often low- or no-sodium.  · Opt for reduced-sodium or no-salt-added versions of canned vegetables and soups.  In the kitchen  · Don't add salt to food when you're cooking. Season with flavorings such as onion, garlic, pepper, salt-free herbal blends, and lemon or lime juice.  · Use a cookbook containing low-salt recipes. It can give you ideas for tasty meals that are healthy for your heart.  · Sprinkle salt-free herbal blends on vegetables and meat.  · Drain and rinse canned foods, such as canned beans and vegetables, before cooking or eating.  Eating out  · Tell the  you're on a low-salt diet. Ask questions about the menu.  · Order fish, chicken, and meat broiled, baked, poached, or grilled without salt, butter, or breading.  · Use lemon, pepper, and salt-free herb mixes to add flavor.  · Choose plain steamed rice, boiled noodles, and baked or boiled potatoes. Top potatoes with chives and a little sour cream.     Beware! Salt goes by many other names. Limit foods with these words listed as ingredients: salt, sodium, soy sauce, baking soda, baking powder, MSG, monosodium, Na (the chemical symbol for sodium). Some antacids are also high in salt.   Date Last Reviewed: 6/19/2015  © 5534-1658 [employer]. [address] [place] [address], [place] [address]. All rights reserved. This information is not intended as a substitute  for professional medical care. Always follow your healthcare professional's instructions.

## 2018-04-16 NOTE — PROGRESS NOTES
Boubacar Jaramillo MD VI                       [facility]                         04/16/2018    HPI:  Sharee Day Jr. is a 67 y.o. male with   Patient Active Problem List   Diagnosis    Shift work sleep disorder    Hyperlipidemia    Hypertension    Restless leg syndrome    Benign non-nodular prostatic hyperplasia without lower urinary tract symptoms    Beta thalassemia minor    Prostate cancer    Smoker    Vitamin D deficiency    Pelvic floor muscle wasting in male    Major depressive disorder    being managed by PCP and specialists who is here today for evaluation of BLE pain.  Patient states location is bilateral thigh and calf occurring for 2 years.  Occurs at rest, mainly at night waking him up with bilateral medial thigh pain.  Associated signs and symptoms include edema.  Quality is aching and severity is 9/10.  Symptoms began 2 years ago.  Alleviating factors include massage.  Worsening factors include high Na diet.  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    no MI  no Stroke  Tobacco use: 3 cig/ day    Past Medical History:   Diagnosis Date    Hyperlipidemia     Hypertension      Past Surgical History:   Procedure Laterality Date    MOUTH SURGERY  01/2016     Family History   Problem Relation Age of Onset    Breast cancer Mother     Hypertension Sister     Hypertension Brother     Hypertension Brother     Hypertension Sister     Mental retardation Son     Melanoma Neg Hx     Psoriasis Neg Hx     Lupus Neg Hx      Social History     Social History    Marital status: Single     Spouse name: N/A    Number of children: N/A    Years of education: N/A     Occupational History    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; 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; 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 [employer]     Social History Main Topics    Smoking status: Current Every  Day Smoker     Types: Cigarettes    Smokeless tobacco: Never Used    Alcohol use No    Drug use: No    Sexual activity: Yes     Other Topics Concern    Not on file     Social History Narrative    No narrative on file       Current Outpatient Prescriptions:     DULoxetine (CYMBALTA) 30 MG capsule, , Disp: , Rfl:     DULoxetine (CYMBALTA) 60 MG capsule, , Disp: , Rfl:     hydroCHLOROthiazide (MICROZIDE) 12.5 mg capsule, , Disp: , Rfl:     hydroCHLOROthiazide (MICROZIDE) 12.5 mg capsule, TAKE 1 CAPSULE(12.5 MG) BY MOUTH EVERY DAY, Disp: 30 capsule, Rfl: 5    lisinopril (PRINIVIL,ZESTRIL) 20 MG tablet, Take 1 tablet (20 mg total) by mouth once daily., Disp: 90 tablet, Rfl: 3    lisinopril (PRINIVIL,ZESTRIL) 20 MG tablet, TAKE 1 TABLET(20 MG) BY MOUTH EVERY DAY, Disp: 30 tablet, Rfl: 5    LORazepam (ATIVAN) 1 MG tablet, , Disp: , Rfl:     niacin (SLO-NIACIN) 500 mg tablet, Take 1 tablet (500 mg total) by mouth once daily., Disp: 90 tablet, Rfl: 3    potassium chloride SA (K-DUR,KLOR-CON) 20 MEQ tablet, Take 1 tablet (20 mEq total) by mouth 2 (two) times daily., Disp: 60 tablet, Rfl: 5    sildenafil (VIAGRA) 50 MG tablet, Take 1 tablet (50 mg total) by mouth daily as needed for Erectile Dysfunction., Disp: 4 tablet, Rfl: 11    tamsulosin (FLOMAX) 0.4 mg Cp24, , Disp: , Rfl:     zolpidem (AMBIEN CR) 12.5 MG CR tablet, Take 1 tablet (12.5 mg total) by mouth nightly as needed for Insomnia., Disp: 30 tablet, Rfl: 1    REVIEW OF SYSTEMS:  General: No fevers or chills; ENT: No sore throat; Allergy and Immunology: no persistent infections; Hematological and Lymphatic: No history of bleeding or easy bruising; Endocrine: negative; Respiratory: no cough, shortness of breath, or wheezing; Cardiovascular: no chest pain or dyspnea on exertion; Gastrointestinal: no abdominal pain/back, change in bowel habits, or bloody stools; Genito-Urinary: no dysuria, trouble voiding, or hematuria; Musculoskeletal: negative;  Neurological: no TIA or stroke symptoms; Psychiatric: no nervousness, anxiety or depression.    PHYSICAL EXAM:   Right Arm BP - Sittin/90 (18 1017)  Left Arm BP - Sittin/80 (18 1017)  Pulse: 68         General appearance:  Alert, well-appearing, and in no distress.  Oriented to person, place, and time                    Neurological: Normal speech, no focal findings noted; CN II - XII grossly intact. RLE with sensation to light touch, LLE with sensation to light touch.            Musculoskeletal: Digits/nail without cyanosis/clubbing.  Strength 5/5 BLE.                    Neck: Supple, no significant adenopathy, no carotid bruit can be auscultated                  Chest:  Clear to auscultation, no wheezes, rales or rhonchi, symmetric air entry. No use of accessory muscles               Cardiac: Normal rate and regular rhythm, S1 and S2 normal            Abdomen: Soft, nontender, nondistended, no masses or organomegaly, no hernia     No rebound tenderness noted; bowel sounds normal     No groin adenopathy      Extremities:   2+ R femoral pulse, 2+ L femoral pulse     1+ R popliteal pulse, 1+ L popliteal pulse     2+ R PT pulse, 2+ L PT pulse     2+ R DP pulse, 2+ L DP pulse     no RLE edema, no LLE edema    Skin: RLE without tissue loss; LLE without tissue loss    LAB RESULTS:  No results found for: CBC  No results found for: LABPROT, INR  Lab Results   Component Value Date     2018    K 3.4 (L) 2018     2018    CO2 27 2018     (H) 2018    BUN 11 2018    CREATININE 1.0 2018    CALCIUM 9.4 2018    ANIONGAP 10 2018    EGFRNONAA >60.0 2018     Lab Results   Component Value Date    WBC 6.45 2017    RBC 5.52 2017    HGB 13.4 (L) 2017    HCT 42.6 2017    MCV 77 (L) 2017    MCH 24.3 (L) 2017    MCHC 31.5 (L) 2017    RDW 14.3 2017     2017    MPV 9.1 (L)  03/29/2017    GRAN 3.3 03/29/2017    GRAN 50.6 03/29/2017    LYMPH 2.3 03/29/2017    LYMPH 35.0 03/29/2017    MONO 0.8 03/29/2017    MONO 12.9 03/29/2017    EOS 0.1 03/29/2017    BASO 0.02 03/29/2017    EOSINOPHIL 0.9 03/29/2017    BASOPHIL 0.3 03/29/2017    DIFFMETHOD Automated 03/29/2017     .  Lab Results   Component Value Date    HGBA1C 5.4 06/16/2016       IMAGING:  All pertinent imaging has been reviewed and interpreted independently.    AILYN/TBI with mild LLE arterial occlusive disease.    IMP/PLAN:  67 y.o. male with   Patient Active Problem List   Diagnosis    Shift work sleep disorder    Hyperlipidemia    Hypertension    Restless leg syndrome    Benign non-nodular prostatic hyperplasia without lower urinary tract symptoms    Beta thalassemia minor    Prostate cancer    Smoker    Vitamin D deficiency    Pelvic floor muscle wasting in male    Major depressive disorder    being managed by PCP and specialists who is here today for evaluation of BLE pain.    -Bilateral leg cramps to inner thighs mainly at night do not appear to be related to lower extremity perfusion, no claudication / rest pain / tissue loss - agree with optimization of electrolytes and evaluation of possible etiology related to medications.  No vascular surgical intervention indicated at this time  -Pt would benefit from repeat abdominal US for AAA surveillance in 3 years  -Discussed with pt natural history of AAA and PVD  -I discussed smoking cessation at length with this patient, including treatment options of nicotine replacement therapy and management of the addiction with assistance by [facility].  The patient states understanding that continued smoking will increase the chances of stenosis progression and other cardiovascular morbidity.  -RTC prn or sooner if PAD symptoms develop     I spent 30 minutes evaluating this patient and greater than 50% of the time was spent counseling, coordinator care and  discussing the plan of care.  All questions were answered and patient stated understanding with agreement with the above treatment plan.    Boubacar Jaramillo MD [facility]  Vascular and Endovascular Surgery

## 2018-06-05 NOTE — PROGRESS NOTES
This note was created by combination of typed  and Dragon dictation.  Transcription errors may be present.  If there are any questions, please contact me.    Assessment & Plan:   Allergic rhinitis due to other allergic trigger, unspecified seasonality  -I suspect this may be related to sinuses.  Viral versus rhinosinusitis.  Trial of over-the-counter Flonase.  If no improvement at all after we can call and I would try Augmentin or similar at that time.  if the sinuses improve and his sleep improved but he continues to have the sensation of head pounding/ dizziness at that point I would have him see Neurology.  So for now defer on Neurology referral.  -     fluticasone (FLONASE) 50 mcg/actuation nasal spray; 1 spray (50 mcg total) by Each Nare route once daily.  Dispense: 16 g; Refill: 1    Myalgia due to statin-I would consider trial of Zetia in the future.    Snoring-he is agreeable for home sleep study. if negative he will need in lab sleep study.  -     Home Sleep Studies; Future    Medications Discontinued During This Encounter   Medication Reason    hydroCHLOROthiazide (MICROZIDE) 12.5 mg capsule Duplicate Order    lisinopril (PRINIVIL,ZESTRIL) 20 MG tablet Duplicate Order     Modified Medications    No medications on file     New Prescriptions    No medications on file       Follow Up: No Follow-up on file.        Subjective:     Chief Complaint   Patient presents with    Headache    Dizziness       CHERRY Tolliver is a 67 y.o. male, last appointment with this clinic was 4/9/2018.    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  Hydroxyzine with SE. Trazodone ineffective.  Rozerem didn't help. Hx of Ambien helpful.  RLS; trial of Requip - did not find it helpful - stopped.  Beta thal minor.  Noncardiac chest pain.  Seen by cardiology.  7/13/2016 nu med stress test neg for ischemia  7/13/2016 TTE LVEF 55-60; no diastolic dysfunction  HTN, HCTZ and lisinopril. No outside BP checks.  Hyperlipidemia  - statins SE of myalgias. On niacin.  Smoker. precontemplative stage of quitting.  Prostate CA, 2 cores were +3+4 = 7 Tristan's; s/p XRT and brachytherapy, [facility], fall 2017.  lupron shots as well to finish smmer 2018.  12/18 had cystoscopy with 2 erythematous lesions, possibly related to the XRT.  Plan is repeat in 6 weeks  Had rib biopsy negative for metastasis. This was due to abn finding on PET/CT scan showing suspicious activity on the right 7 and 8 ribs.  Vit D deficiency.  Major depressive disorder related to prostate CA; zoloft.  Filled out short term disability paperwork 11/3 to 1/1/2018. Psychiatry Dr. Coleman. BZD and SNRI?   4/9/2018 AAA screening negative.  Vascular surgery recommended repeat screening 2021  Mild PAD  4/10/2018 LE dopplers: 1. Right lower extremity pressures and waveforms indicate no hemodynamically significant arterial occlusive disease.  2. Left lower extremity pressures and waveforms indicate mild arterial occlusive disease.    He saw my colleague for complaints of bilateral leg pain.  Seen by vascular surgery for leg cramping, felt that this was not due to perfusion dysfunction. LE US no DVT.  Head recommended repeat abdominal ultrasound for AAA surveillance in 3 years.    On review, he has taken Niaspan long standing.  History of statin induced myalgias.  As far as he can recall never Zetia.    His legs overall have improved.  They are no longer an issue for him now.    His complaint today is headaches and dizziness.  Has been going on for about 8 days by his estimate.  She is still has issues with sleeping.  She the pain is typically in the frontal area bilaterally. During the course of the day it will resolve but it takes several hours.  He still feels a headache today here in the office for example and it is almost noon.  No chest congestion. No fevers or chills.  Does have some sinus congestion.    He has snoring and possibly witnessed apnea has never  had a sleep evaluation.    Along with this he feels like he has a popping sensation in the ears and a sensation that he can hear his heartbeat.  In retrospect he thinks that this sensation has occurred for over a year.  More pronounced when he is fatigued and not sleeping well.  He feels like this makes him feel dizzy.  He is interested in seeing a neurologist.    4/16/18 K borderline 3.8    Patient Care Team:  Navin Charlton MD as PCP - General (Internal Medicine)  Alfredo Kearns MD as Consulting Physician (Urology)  Adeel Coleman MD as Consulting Physician (Psychiatry)    Patient Active Problem List    Diagnosis Date Noted    Major depressive disorder 10/30/2017    Pelvic floor muscle wasting in male 09/28/2017    Prostate cancer 08/30/2017     2 cores positive kary 3+4=7 on biopsy 8/15/2017      Smoker 08/30/2017    Vitamin D deficiency 08/30/2017    Beta thalassemia minor 04/11/2017    Benign non-nodular prostatic hyperplasia without lower urinary tract symptoms 03/29/2017     Hx of negative prostate Bx and hx of MRI prostate      Restless leg syndrome 08/02/2016    Pure hypercholesterolemia     Essential hypertension      7/13/2016 nu med stress test neg for ischemia  7/13/2016 TTE LVEF 55-60; no diastolic dysfunction      Shift work sleep disorder 11/20/2013       PAST MEDICAL HISTORY:  Past Medical History:   Diagnosis Date    Hyperlipidemia     Hypertension        PAST SURGICAL HISTORY:  Past Surgical History:   Procedure Laterality Date    MOUTH SURGERY  01/2016       SOCIAL HISTORY:  Social History     Social History    Marital status: Single     Spouse name: N/A    Number of children: N/A    Years of education: N/A     Occupational History    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; 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  Review of patient's allergies indicates:   Allergen Reactions    Statins-hmg-coa reductase inhibitors      myalgias    Sulfamethoxazole-trimethoprim Other (See Comments)     Muscle pain    Lipitor [atorvastatin] Other (See Comments)     Muscle cramps     Current Outpatient Prescriptions   Medication Sig Dispense Refill    DULoxetine (CYMBALTA) 30 MG capsule       DULoxetine (CYMBALTA) 60 MG capsule       hydroCHLOROthiazide (MICROZIDE) 12.5 mg capsule TAKE 1 CAPSULE(12.5 MG) BY MOUTH EVERY DAY 30 capsule 5    lisinopril (PRINIVIL,ZESTRIL) 20 MG tablet Take 1 tablet (20 mg total) by mouth once daily. 90 tablet 3    LORazepam (ATIVAN) 1 MG tablet       niacin (SLO-NIACIN) 500 mg tablet Take 1 tablet (500 mg total) by mouth once daily. 90 tablet 3    potassium chloride SA (K-DUR,KLOR-CON) 20 MEQ tablet Take 1 tablet (20 mEq total) by mouth 2 (two) times daily. 60 tablet 5    sildenafil (VIAGRA) 50 MG tablet Take 1 tablet (50 mg total) by mouth daily as needed for Erectile Dysfunction. 4 tablet 11    tamsulosin (FLOMAX) 0.4 mg Cp24       zolpidem (AMBIEN CR) 12.5 MG CR tablet Take 1 tablet (12.5 mg total) by mouth nightly as needed for Insomnia. 30 tablet 1     No current facility-administered medications for this visit.        Review of Systems   Constitutional: Negative for chills and fever.   Eyes: Negative for blurred vision and double vision.   Respiratory: Negative for cough and shortness of breath.    Cardiovascular: Negative for chest pain.   Neurological: Positive for dizziness and headaches. Negative for sensory change, speech change and focal weakness.       Objective:   Physical Exam   Vitals:    06/06/18 1110   BP: 122/76   Pulse: 60   Temp: 98.3 °F (36.8 °C)   SpO2: 97%   Weight: 102.7 kg (226 lb 4.8 oz)   Height: 5' 10" " "(1.778 m)    Body mass index is 32.47 kg/m².  Weight: 102.7 kg (226 lb 4.8 oz)   Height: 5' 10" (177.8 cm)     Physical Exam   Constitutional: He is oriented to person, place, and time. He appears well-developed and well-nourished.   HENT:   TMs grey/clear bilaterally.  OP no erythema no exudates   Eyes: EOM are normal.   Neck: Neck supple.   Cardiovascular: Normal rate, regular rhythm and normal heart sounds.    Carotids no bruits bilaterally   Pulmonary/Chest: Effort normal and breath sounds normal. He has no wheezes.   Lymphadenopathy:     He has no cervical adenopathy.   Neurological: He is alert and oriented to person, place, and time.   Skin: Skin is warm and dry.   Psychiatric: He has a normal mood and affect. His behavior is normal.     "

## 2018-06-06 ENCOUNTER — OFFICE VISIT (OUTPATIENT)
Dept: FAMILY MEDICINE | Facility: CLINIC | Age: 68
End: 2018-06-06
Payer: COMMERCIAL

## 2018-06-06 VITALS
SYSTOLIC BLOOD PRESSURE: 122 MMHG | OXYGEN SATURATION: 97 % | HEART RATE: 60 BPM | TEMPERATURE: 98 F | BODY MASS INDEX: 32.4 KG/M2 | WEIGHT: 226.31 LBS | DIASTOLIC BLOOD PRESSURE: 76 MMHG | HEIGHT: 70 IN

## 2018-06-06 DIAGNOSIS — T46.6X5A MYALGIA DUE TO STATIN: ICD-10-CM

## 2018-06-06 DIAGNOSIS — J30.89 ALLERGIC RHINITIS DUE TO OTHER ALLERGIC TRIGGER, UNSPECIFIED SEASONALITY: Primary | ICD-10-CM

## 2018-06-06 DIAGNOSIS — R06.83 SNORING: ICD-10-CM

## 2018-06-06 DIAGNOSIS — M79.10 MYALGIA DUE TO STATIN: ICD-10-CM

## 2018-06-06 PROCEDURE — 99999 PR PBB SHADOW E&M-EST. PATIENT-LVL III: CPT | Mod: PBBFAC,,, | Performed by: INTERNAL MEDICINE

## 2018-06-06 PROCEDURE — 3078F DIAST BP <80 MM HG: CPT | Mod: CPTII,S$GLB,, | Performed by: INTERNAL MEDICINE

## 2018-06-06 PROCEDURE — 3074F SYST BP LT 130 MM HG: CPT | Mod: CPTII,S$GLB,, | Performed by: INTERNAL MEDICINE

## 2018-06-06 PROCEDURE — 3008F BODY MASS INDEX DOCD: CPT | Mod: CPTII,S$GLB,, | Performed by: INTERNAL MEDICINE

## 2018-06-06 PROCEDURE — 99214 OFFICE O/P EST MOD 30 MIN: CPT | Mod: S$GLB,,, | Performed by: INTERNAL MEDICINE

## 2018-06-06 RX ORDER — FLUTICASONE PROPIONATE 50 MCG
1 SPRAY, SUSPENSION (ML) NASAL DAILY
Qty: 16 G | Refills: 1 | Status: SHIPPED | OUTPATIENT
Start: 2018-06-06 | End: 2018-08-02

## 2018-06-15 ENCOUNTER — TELEPHONE (OUTPATIENT)
Dept: SLEEP MEDICINE | Facility: HOSPITAL | Age: 68
End: 2018-06-15

## 2018-06-19 ENCOUNTER — TELEPHONE (OUTPATIENT)
Dept: SLEEP MEDICINE | Facility: HOSPITAL | Age: 68
End: 2018-06-19

## 2018-07-10 ENCOUNTER — TELEPHONE (OUTPATIENT)
Dept: SLEEP MEDICINE | Facility: HOSPITAL | Age: 68
End: 2018-07-10

## 2018-07-12 ENCOUNTER — HOSPITAL ENCOUNTER (OUTPATIENT)
Dept: SLEEP MEDICINE | Facility: HOSPITAL | Age: 68
Discharge: HOME | End: 2018-07-12
Attending: INTERNAL MEDICINE
Payer: MEDICARE

## 2018-07-12 DIAGNOSIS — R06.83 SNORING: ICD-10-CM

## 2018-07-12 PROCEDURE — 95800 SLP STDY UNATTENDED: CPT

## 2018-07-12 PROCEDURE — 95800 SLP STDY UNATTENDED: CPT | Mod: 26,,, | Performed by: INTERNAL MEDICINE

## 2018-07-12 PROCEDURE — 95800 PR SLEEP STUDY, UNATTENDED, RECORD HEART RATE/O2 SAT/RESP ANAL/SLEEP TIME: ICD-10-PCS | Mod: 26,,, | Performed by: INTERNAL MEDICINE

## 2018-07-12 NOTE — PROGRESS NOTES
The patient ID was verified. He  was instructed on how to turn the Home Sleep Testing device on and off, how to apply the sensors. He  was encouraged to sleep on supine position and must have 6 hours of sleep. The patient was instructed not to get the device wet and return it to a  at the hospital. All questions were answered prior to patient leaving. He  was provided the  after visit summary.

## 2018-07-27 DIAGNOSIS — G47.33 OBSTRUCTIVE SLEEP APNEA: Primary | ICD-10-CM

## 2018-07-27 NOTE — PROGRESS NOTES
Sleep study with severe DAKOTA AHI 74. Accompanied by severe desat.   TREATMENT CONSIDERATIONS: Consider nasal continuous positive airway pressure (CPAP) as the initial treatment  choice for severe obstructive sleep apnea. Consider an attended in-lab CPAP titration study, given the possibility of co-morbid  sleep related hypoventilation.    Results to pt via my [facility].  Referral for in-lab CPAP titration.

## 2018-08-01 NOTE — PROGRESS NOTES
This note was created by combination of typed  and Dragon dictation.  Transcription errors may be present.  If there are any questions, please contact me.    Assessment & Plan:   Obstructive sleep apnea severe with AHI 64 on study 7/2018  -in lab CPAP titration already ordered.  Contact me if he has not been scheduled.    History of colonic polyps; 2011 colonoscopy normal  -due for repeat colonoscopy.  Last colonoscopy 2011 was normal.  Personal history of colon polyps.  Thinks he has had 3 in his lifetime.  -     Case request GI: COLONOSCOPY    Statin myopathy  Pure hypercholesterolemia  -stop the Niaspan.  Trial of Zetia.  -     ezetimibe (ZETIA) 10 mg tablet; Take 1 tablet (10 mg total) by mouth once daily.  Dispense: 30 tablet; Refill: 5        Medications Discontinued During This Encounter   Medication Reason    sildenafil (VIAGRA) 50 MG tablet     fluticasone (FLONASE) 50 mcg/actuation nasal spray     zolpidem (AMBIEN CR) 12.5 MG CR tablet Therapy completed    niacin (SLO-NIACIN) 500 mg tablet Therapy completed     Modified Medications    No medications on file     New Prescriptions    EZETIMIBE (ZETIA) 10 MG TABLET    Take 1 tablet (10 mg total) by mouth once daily.       Follow Up: No Follow-up on file.  Fasting follow up in 4 months.        Subjective:     Chief Complaint   Patient presents with    Results       HPI  Sharee is a 68 y.o. male, last appointment with this clinic was 6/6/2018.    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  Hydroxyzine with SE. Trazodone ineffective.  Rozerem didn't help. Hx of Ambien helpful.  RLS; trial of Requip - did not find it helpful - stopped.  Beta thal minor.  Noncardiac chest pain.  Seen by cardiology.  7/13/2016 nu med stress test neg for ischemia  7/13/2016 TTE LVEF 55-60; no diastolic dysfunction  HTN, HCTZ and lisinopril. No outside BP checks.  Hyperlipidemia - statins SE of myalgias. On niacin.  Smoker. precontemplative stage of  quitting.  Prostate CA, 2 cores were +3+4 = 7 Tristan's; s/p XRT and brachytherapy, [facility], fall 2017.  lupron shots as well to finish smmer 2018.  12/18 had cystoscopy with 2 erythematous lesions, possibly related to the XRT.  Plan is repeat in 6 weeks  Had rib biopsy negative for metastasis. This was due to abn finding on PET/CT scan showing suspicious activity on the right 7 and 8 ribs.  Vit D deficiency.  Major depressive disorder related to prostate CA; zoloft.  Filled out short term disability paperwork 11/3 to 1/1/2018. Psychiatry Dr. Coleman. BZD and SNRI?   4/9/2018 AAA screening negative.  Vascular surgery recommended repeat screening 2021  Mild PAD  4/10/2018 LE dopplers: 1. Right lower extremity pressures and waveforms indicate no hemodynamically significant arterial occlusive disease.  2. Left lower extremity pressures and waveforms indicate mild arterial occlusive disease.  DAKOTA on sleep study  Hx of colon polyps.  2011 colonoscopy normal repeat 3 years for personal hx    He is here to review his sleep study results.  I sent them to him on line but he did not review it online.  It does show sleep apnea with severe AHI and recommendation was in lab titration given that there could be hypoventilation.    [facility] [place] - had lupron shot, cystoscopy, scans and was told he was doing well and urology doesn't want to see him for a year but will have to return for routine f/u with oncology for lupron    Not currently taking the ambien.  Is taking ativan and melatonin.  Dr. Coleman.  Ativan 2 mg at night.     He is taking Niaspan.  Has never taken Zetia to his knowledge.  I would change him over to Zetia.  He is agreeable for this.    Patient Care Team:  Navin Charlton MD as PCP - General (Internal Medicine)  Alfredo Kearns MD as Consulting Physician (Urology)  Adeel Coleman MD as Consulting Physician (Psychiatry)    Patient Active  Problem List    Diagnosis Date Noted    Obstructive sleep apnea severe with AHI 64 on study 7/2018 07/27/2018     Very severe sleep disordered  breathing (AHI=64) is noted based on a 4% hypopnea desaturation criteria. The patient slept supine 64.2% of the night based  on valid recording time of 6.43 hours. The apneas/hypopneas are accompanied by severe oxygen desaturation (percent time  below 90% SpO2: 15.9%, Min SpO2: 69.6%). The average desaturation across all sleep disordered breathing events is 5.7%.  Snoring occurs for 45.4% (30 dB) of the study, 17.3% is extremely loud. The mean pulse rate is 63 BPM, with frequent pulse  rate variability (52 events with >= 6 BPM increase/decrease per hour).  TREATMENT CONSIDERATIONS: Consider nasal continuous positive airway pressure (CPAP) as the initial treatment  choice for severe obstructive sleep apnea. Consider an attended in-lab CPAP titration study, given the possibility of co-morbid  sleep related hypoventilation.  DISEASE MANAGEMENT CONSIDERATIONS: Insomnia is a symptom that can be associated with untreated DAKOTA.  Sleeping pills may increase the severity of untreated DAKOTA and their use should be reevaluated once the DAKOTA is treated.      Snoring     Myalgia due to statin 06/06/2018    Major depressive disorder 10/30/2017    Pelvic floor muscle wasting in male 09/28/2017    Prostate cancer 08/30/2017     2 cores positive kary 3+4=7 on biopsy 8/15/2017      Smoker 08/30/2017    Vitamin D deficiency 08/30/2017    Beta thalassemia minor 04/11/2017    Benign non-nodular prostatic hyperplasia without lower urinary tract symptoms 03/29/2017     Hx of negative prostate Bx and hx of MRI prostate      Restless leg syndrome 08/02/2016    Pure hypercholesterolemia     Essential hypertension      7/13/2016 nu med stress test neg for ischemia  7/13/2016 TTE LVEF 55-60; no diastolic dysfunction      Shift work sleep disorder 11/20/2013       PAST MEDICAL  HISTORY:  Past Medical History:   Diagnosis Date    Hyperlipidemia     Hypertension        PAST SURGICAL HISTORY:  Past Surgical History:   Procedure Laterality Date    MOUTH SURGERY  01/2016       SOCIAL HISTORY:  Social History     Social History    Marital status: Single     Spouse name: N/A    Number of children: N/A    Years of education: N/A     Occupational History    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  Review of patient's allergies indicates:   Allergen Reactions    Statins-hmg-coa reductase inhibitors      myalgias    Sulfamethoxazole-trimethoprim Other (See Comments)     Muscle pain    Lipitor [atorvastatin] Other (See Comments)     Muscle cramps     Current Outpatient Prescriptions   Medication Sig Dispense Refill    DULoxetine (CYMBALTA) 30 MG capsule       DULoxetine (CYMBALTA) 60 MG capsule       hydroCHLOROthiazide (MICROZIDE) 12.5 mg capsule TAKE 1 CAPSULE(12.5 MG) BY MOUTH EVERY DAY 30 capsule 5    LEVITRA 20 mg tablet   4    lisinopril (PRINIVIL,ZESTRIL) 20 MG tablet Take 1 tablet (20 mg total) by mouth once daily. 90 tablet 3    LORazepam (ATIVAN) 1 MG tablet       niacin (SLO-NIACIN) 500 mg tablet Take 1 tablet (500 mg total) by mouth once daily. 90 tablet 3    potassium chloride SA (K-DUR,KLOR-CON) 20 MEQ tablet Take 1 tablet (20 mEq total) by mouth 2 (two) times daily. 60 tablet 5    tamsulosin (FLOMAX) 0.4 mg Cp24       zolpidem (AMBIEN CR) 12.5 MG CR tablet Take 1 tablet (12.5 mg total) by mouth nightly as needed for Insomnia. 30 tablet 1     No current facility-administered medications for this  "visit.        Review of Systems   Constitutional: Negative for chills and fever.   Respiratory: Negative for shortness of breath.    Cardiovascular: Negative for chest pain and palpitations.       Objective:   Physical Exam   Vitals:    08/02/18 1607   BP: 120/76   Pulse: 74   SpO2: 98%   Weight: 104 kg (229 lb 4.5 oz)   Height: 5' 10" (1.778 m)    Body mass index is 32.9 kg/m².  Weight: 104 kg (229 lb 4.5 oz)   Height: 5' 10" (177.8 cm)     Physical Exam   Constitutional: He is oriented to person, place, and time. He appears well-developed and well-nourished. No distress.   Eyes: No scleral icterus.   Musculoskeletal: Normal range of motion.   Neurological: He is alert and oriented to person, place, and time.   Skin: Skin is warm and dry. No erythema. No pallor.   Psychiatric: He has a normal mood and affect. His behavior is normal. Thought content normal.     "

## 2018-08-02 ENCOUNTER — OFFICE VISIT (OUTPATIENT)
Dept: FAMILY MEDICINE | Facility: CLINIC | Age: 68
End: 2018-08-02
Payer: COMMERCIAL

## 2018-08-02 VITALS
OXYGEN SATURATION: 98 % | DIASTOLIC BLOOD PRESSURE: 76 MMHG | WEIGHT: 229.25 LBS | BODY MASS INDEX: 32.82 KG/M2 | SYSTOLIC BLOOD PRESSURE: 120 MMHG | HEART RATE: 74 BPM | HEIGHT: 70 IN

## 2018-08-02 DIAGNOSIS — Z86.010 HISTORY OF COLONIC POLYPS: ICD-10-CM

## 2018-08-02 DIAGNOSIS — E78.00 PURE HYPERCHOLESTEROLEMIA: ICD-10-CM

## 2018-08-02 DIAGNOSIS — G47.33 OBSTRUCTIVE SLEEP APNEA: Primary | ICD-10-CM

## 2018-08-02 DIAGNOSIS — G72.0 STATIN MYOPATHY: ICD-10-CM

## 2018-08-02 DIAGNOSIS — T46.6X5A STATIN MYOPATHY: ICD-10-CM

## 2018-08-02 PROBLEM — Z86.0100 HISTORY OF COLONIC POLYPS: Status: ACTIVE | Noted: 2018-08-02

## 2018-08-02 PROCEDURE — 99999 PR PBB SHADOW E&M-EST. PATIENT-LVL III: CPT | Mod: PBBFAC,,, | Performed by: INTERNAL MEDICINE

## 2018-08-02 PROCEDURE — 99214 OFFICE O/P EST MOD 30 MIN: CPT | Mod: S$GLB,,, | Performed by: INTERNAL MEDICINE

## 2018-08-02 PROCEDURE — 3078F DIAST BP <80 MM HG: CPT | Mod: CPTII,S$GLB,, | Performed by: INTERNAL MEDICINE

## 2018-08-02 PROCEDURE — 3074F SYST BP LT 130 MM HG: CPT | Mod: CPTII,S$GLB,, | Performed by: INTERNAL MEDICINE

## 2018-08-02 RX ORDER — VARDENAFIL HYDROCHLORIDE 20 MG/1
TABLET, FILM COATED ORAL
Refills: 4 | COMMUNITY
Start: 2018-07-31 | End: 2018-12-03 | Stop reason: ALTCHOICE

## 2018-08-02 RX ORDER — EZETIMIBE 10 MG/1
10 TABLET ORAL DAILY
Qty: 30 TABLET | Refills: 5 | Status: SHIPPED | OUTPATIENT
Start: 2018-08-02 | End: 2019-04-24 | Stop reason: SDUPTHER

## 2018-08-14 ENCOUNTER — TELEPHONE (OUTPATIENT)
Dept: SLEEP MEDICINE | Facility: CLINIC | Age: 68
End: 2018-08-14

## 2018-08-18 ENCOUNTER — HOSPITAL ENCOUNTER (OUTPATIENT)
Dept: SLEEP MEDICINE | Facility: OTHER | Age: 68
Discharge: HOME | End: 2018-08-18
Attending: INTERNAL MEDICINE
Payer: MEDICARE

## 2018-08-18 DIAGNOSIS — G47.33 OBSTRUCTIVE SLEEP APNEA: ICD-10-CM

## 2018-08-18 PROCEDURE — 95811 POLYSOM 6/>YRS CPAP 4/> PARM: CPT | Mod: 26,,, | Performed by: INTERNAL MEDICINE

## 2018-08-18 PROCEDURE — 95811 POLYSOM 6/>YRS CPAP 4/> PARM: CPT

## 2018-08-19 NOTE — PROGRESS NOTES
"Sharee Day to [facility] for an overnight sleep study on 08/18/2018.  Pt education, setup and cpap explanation given prior to study.       Cpap started with heated humidity, cflex,chinstrap and a large Easylife nasal mask.  Pressures observed from 4 to 12 cm H2O.  ? optimal setting . Occasional mouthleak noted.       0130  Pt request to try a full mask to aid with occasional mouthleak.   Large Simplus full mask used with pressures of 8 to 15  cm H2O.  Supine REM observed.   ? optimal 15 cm.      EKG- Lead 2 appears with rare pac and pvc.   Low saturation observed 92%.         In AM- Pt reports: "The fullmask was easier".  Pt reports "better " sleep with cpap.         Post sudy information given in AM.  "

## 2018-08-20 ENCOUNTER — CLINICAL SUPPORT (OUTPATIENT)
Dept: SMOKING CESSATION | Facility: CLINIC | Age: 68
End: 2018-08-20
Payer: COMMERCIAL

## 2018-08-20 DIAGNOSIS — F17.200 NICOTINE DEPENDENCE: Primary | ICD-10-CM

## 2018-08-20 PROCEDURE — 99404 PREV MED CNSL INDIV APPRX 60: CPT | Mod: S$GLB,,,

## 2018-08-20 PROCEDURE — 99999 PR PBB SHADOW E&M-EST. PATIENT-LVL I: CPT | Mod: PBBFAC,,,

## 2018-08-20 RX ORDER — IBUPROFEN 200 MG
1 TABLET ORAL DAILY
Qty: 14 PATCH | Refills: 0 | Status: SHIPPED | OUTPATIENT
Start: 2018-08-20 | End: 2018-09-20

## 2018-08-20 NOTE — Clinical Note
Patient presents for intake smoking 4-10 cigarettes per day, after assessment and discussion recommend the 14mg nicotine patch daily, recommend an abrupt quit, discussed strategies to help cut back and to help break the routine and habit associated with smoking, intake handout provided to the patient and discussed, all prescribed NRT discussed in depth as well as tobacco cessation medication s/e as well as usage discussed, contact card provided to the patient. Will continue to follow every two weeks while in the program.

## 2018-08-28 ENCOUNTER — ANESTHESIA (OUTPATIENT)
Dept: ENDOSCOPY | Facility: HOSPITAL | Age: 68
End: 2018-08-28
Payer: MEDICARE

## 2018-08-28 ENCOUNTER — ANESTHESIA EVENT (OUTPATIENT)
Dept: ENDOSCOPY | Facility: HOSPITAL | Age: 68
End: 2018-08-28
Payer: MEDICARE

## 2018-08-28 ENCOUNTER — HOSPITAL ENCOUNTER (OUTPATIENT)
Facility: HOSPITAL | Age: 68
Discharge: HOME | End: 2018-08-28
Attending: INTERNAL MEDICINE | Admitting: INTERNAL MEDICINE
Payer: MEDICARE

## 2018-08-28 VITALS
SYSTOLIC BLOOD PRESSURE: 104 MMHG | HEART RATE: 68 BPM | RESPIRATION RATE: 18 BRPM | DIASTOLIC BLOOD PRESSURE: 64 MMHG | HEIGHT: 71 IN | WEIGHT: 227 LBS | BODY MASS INDEX: 31.78 KG/M2 | TEMPERATURE: 98 F | OXYGEN SATURATION: 98 %

## 2018-08-28 DIAGNOSIS — Z86.010 HISTORY OF COLONIC POLYPS: Primary | ICD-10-CM

## 2018-08-28 DIAGNOSIS — Z12.11 SCREEN FOR COLON CANCER: ICD-10-CM

## 2018-08-28 PROCEDURE — 37000009 HC ANESTHESIA EA ADD 15 MINS: Performed by: INTERNAL MEDICINE

## 2018-08-28 PROCEDURE — 25000003 PHARM REV CODE 250: Performed by: ANESTHESIOLOGY

## 2018-08-28 PROCEDURE — D9220A PRA ANESTHESIA: Mod: PT,CRNA,, | Performed by: NURSE ANESTHETIST, CERTIFIED REGISTERED

## 2018-08-28 PROCEDURE — 63600175 PHARM REV CODE 636 W HCPCS: Performed by: NURSE ANESTHETIST, CERTIFIED REGISTERED

## 2018-08-28 PROCEDURE — 27201012 HC FORCEPS, HOT/COLD, DISP: Performed by: INTERNAL MEDICINE

## 2018-08-28 PROCEDURE — 45385 COLONOSCOPY W/LESION REMOVAL: CPT | Performed by: INTERNAL MEDICINE

## 2018-08-28 PROCEDURE — D9220A PRA ANESTHESIA: Mod: PT,ANES,, | Performed by: ANESTHESIOLOGY

## 2018-08-28 PROCEDURE — 88305 TISSUE EXAM BY PATHOLOGIST: CPT | Mod: 59 | Performed by: PATHOLOGY

## 2018-08-28 PROCEDURE — 88305 TISSUE EXAM BY PATHOLOGIST: CPT | Mod: 26,,, | Performed by: PATHOLOGY

## 2018-08-28 PROCEDURE — 37000008 HC ANESTHESIA 1ST 15 MINUTES: Performed by: INTERNAL MEDICINE

## 2018-08-28 PROCEDURE — 27201089 HC SNARE, DISP (ANY): Performed by: INTERNAL MEDICINE

## 2018-08-28 PROCEDURE — 45380 COLONOSCOPY AND BIOPSY: CPT | Performed by: INTERNAL MEDICINE

## 2018-08-28 RX ORDER — PROPOFOL 10 MG/ML
VIAL (ML) INTRAVENOUS
Status: COMPLETED
Start: 2018-08-28 | End: 2018-08-28

## 2018-08-28 RX ORDER — LIDOCAINE HCL/PF 100 MG/5ML
SYRINGE (ML) INTRAVENOUS
Status: COMPLETED
Start: 2018-08-28 | End: 2018-08-28

## 2018-08-28 RX ORDER — SODIUM CHLORIDE 9 MG/ML
INJECTION, SOLUTION INTRAVENOUS CONTINUOUS
Status: DISCONTINUED | OUTPATIENT
Start: 2018-08-28 | End: 2018-08-28 | Stop reason: HOSPADM

## 2018-08-28 RX ORDER — PROPOFOL 10 MG/ML
VIAL (ML) INTRAVENOUS
Status: DISCONTINUED | OUTPATIENT
Start: 2018-08-28 | End: 2018-08-28

## 2018-08-28 RX ORDER — LIDOCAINE HCL/PF 100 MG/5ML
SYRINGE (ML) INTRAVENOUS
Status: DISCONTINUED | OUTPATIENT
Start: 2018-08-28 | End: 2018-08-28

## 2018-08-28 RX ADMIN — PROPOFOL 20 MG: 10 INJECTION, EMULSION INTRAVENOUS at 10:08

## 2018-08-28 RX ADMIN — PROPOFOL 100 MG: 10 INJECTION, EMULSION INTRAVENOUS at 10:08

## 2018-08-28 RX ADMIN — LIDOCAINE HYDROCHLORIDE 80 MG: 20 INJECTION, SOLUTION INTRAVENOUS at 10:08

## 2018-08-28 RX ADMIN — SODIUM CHLORIDE: 0.9 INJECTION, SOLUTION INTRAVENOUS at 09:08

## 2018-08-28 RX ADMIN — PROPOFOL 40 MG: 10 INJECTION, EMULSION INTRAVENOUS at 10:08

## 2018-08-28 RX ADMIN — PROPOFOL 30 MG: 10 INJECTION, EMULSION INTRAVENOUS at 10:08

## 2018-08-28 NOTE — TRANSFER OF CARE
"Anesthesia Transfer of Care Note    Patient: Sharee Day Jr.    Procedure(s) Performed: Procedure(s) (LRB):  COLONOSCOPY (N/A)    Patient location: GI    Anesthesia Type: general    Transport from OR: Transported from OR on room air with adequate spontaneous ventilation    Post pain: adequate analgesia    Post assessment: no apparent anesthetic complications and tolerated procedure well    Post vital signs: stable    Level of consciousness: awake, alert and oriented    Nausea/Vomiting: no nausea/vomiting    Complications: none    Transfer of care protocol was followed      Last vitals:   Visit Vitals  BP (!) 92/54 (BP Location: Left arm, Patient Position: Lying)   Pulse 68   Temp 36.6 °C (97.9 °F) (Oral)   Resp 14   Ht 5' 11" (1.803 m)   Wt 103 kg (227 lb)   SpO2 98%   BMI 31.66 kg/m²     "

## 2018-08-28 NOTE — ANESTHESIA PREPROCEDURE EVALUATION
08/28/2018  Sharee Day Jr. is a 68 y.o., male.    Anesthesia Evaluation    I have reviewed the Patient Summary Reports.    I have reviewed the Nursing Notes.   I have reviewed the Medications.     Review of Systems  Anesthesia Hx:  No problems with previous Anesthesia Denies Hx of Anesthetic complications  Neg history of prior surgery. Denies Family Hx of Anesthesia complications.   Denies Personal Hx of Anesthesia complications.   Social:  No Alcohol Use, Smoker    Hematology/Oncology:  Hematology Normal   Oncology Normal     EENT/Dental:EENT/Dental Normal   Cardiovascular:   Exercise tolerance: good Hypertension hyperlipidemia    Pulmonary:   Sleep Apnea    Renal/:  Renal/ Normal     Hepatic/GI:  Hepatic/GI Normal    Musculoskeletal:  Musculoskeletal Normal    Neurological:   Neuromuscular Disease,    Endocrine:  Endocrine Normal    Dermatological:  Skin Normal    Psych:   Psychiatric History anxiety depression          Physical Exam  General:  Well nourished    Airway/Jaw/Neck:  Airway Findings: Mouth Opening: Normal General Airway Assessment: Adult  Mallampati: II  TM Distance: Normal, at least 6 cm         Dental:  DENTAL FINDINGS: Normal   Chest/Lungs:  Chest/Lungs Clear    Heart/Vascular:  Heart Findings: Normal Heart murmur: negative       Mental Status:  Mental Status Findings:  Cooperative, Alert and Oriented         Anesthesia Plan  Type of Anesthesia, risks & benefits discussed:  Anesthesia Type:  spinal, general  Patient's Preference:   Intra-op Monitoring Plan:   Intra-op Monitoring Plan Comments:   Post Op Pain Control Plan:   Post Op Pain Control Plan Comments:   Induction:   IV  Beta Blocker:  Patient is not currently on a Beta-Blocker (No further documentation required).       Informed Consent: Patient understands risks and agrees with Anesthesia plan.  Questions answered. Anesthesia  consent signed with patient.  ASA Score: 2     Day of Surgery Review of History & Physical:            Ready For Surgery From Anesthesia Perspective.

## 2018-08-28 NOTE — H&P
"Chief Complaint:  "I need a colonoscopy."    HPI:  The patient is a 68 year old man presenting for a surveillance colonoscopy.  He had polyps in the past, but he had a normal colonoscopy in 2011. The patient denies any abdominal pain, weight loss, nausea, emesis, diarrhea, constipation, melena, or hematochezia.  The patient also denies a family history of colon cancer.    Past Medical History:   Diagnosis Date    Hyperlipidemia     Hypertension      Past Surgical History:   Procedure Laterality Date    MOUTH SURGERY  01/2016     Family History   Problem Relation Age of Onset    Breast cancer Mother     Hypertension Sister     Hypertension Brother     Hypertension Brother     Hypertension Sister     Mental retardation Son     Melanoma Neg Hx     Psoriasis Neg Hx     Lupus Neg Hx      Social History     Socioeconomic History    Marital status: Single     Spouse name: Not on file    Number of children: Not on file    Years of education: Not on file    Highest education level: Not on file   Social Needs    Financial resource strain: Not on file    Food insecurity - worry: Not on file    Food insecurity - inability: Not on file    Transportation needs - medical: Not on file    Transportation needs - non-medical: Not on file   Occupational History    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; 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; 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     Employer: [employer]   Tobacco Use    Smoking status: Current Every Day Smoker     Types: Cigarettes    Smokeless tobacco: Never Used   Substance and Sexual Activity    Alcohol use: No    Drug use: No    Sexual activity: Yes   Other Topics Concern    Not on file   Social History Narrative    Not on file        lidocaine (cardiac)        propofol         ROS:  No chest pain or dyspnea.  No dysuria.  No heartburn or dysphagia.  Otherwise as stated above.  Ten other systems negative.    Vitals:    08/28/18 0935   BP: 119/77   BP Location: Left arm   Patient Position: " "Lying   Pulse: 62   Resp: 18   Temp: 98 °F (36.7 °C)   TempSrc: Oral   SpO2: 95%   Weight: 103 kg (227 lb)   Height: 5' 11" (1.803 m)     P.E.:  GEN: A x O x 3, NAD  SKIN: No jaundice  HEENT: EOMI, PERRL, anicteric sclera  CV: RRR, no M/R/G  Chest: CTA B  Abdomen: soft, NTND, normoactive BS  Ext: No C/C/E.  2+ dorsalis pedis pulses B  Neuro: No asterixes or tremors.  CN II-XII intact  Musculoskeletal: 5/5 strength bilaterally    Labs:  Lab Results   Component Value Date    WBC 6.45 03/29/2017    HGB 13.4 (L) 03/29/2017    HCT 42.6 03/29/2017    MCV 77 (L) 03/29/2017     03/29/2017     CMP  Sodium   Date Value Ref Range Status   04/16/2018 140 136 - 145 mmol/L Final     Potassium   Date Value Ref Range Status   04/16/2018 3.8 3.5 - 5.1 mmol/L Final     Chloride   Date Value Ref Range Status   04/16/2018 104 95 - 110 mmol/L Final     CO2   Date Value Ref Range Status   04/16/2018 29 23 - 29 mmol/L Final     Glucose   Date Value Ref Range Status   04/16/2018 126 (H) 70 - 110 mg/dL Final     BUN, Bld   Date Value Ref Range Status   04/16/2018 14 8 - 23 mg/dL Final     Creatinine   Date Value Ref Range Status   04/16/2018 0.9 0.5 - 1.4 mg/dL Final     Calcium   Date Value Ref Range Status   04/16/2018 9.2 8.7 - 10.5 mg/dL Final     Total Protein   Date Value Ref Range Status   03/29/2017 7.3 6.0 - 8.4 g/dL Final     Albumin   Date Value Ref Range Status   03/29/2017 3.6 3.5 - 5.2 g/dL Final     Total Bilirubin   Date Value Ref Range Status   03/29/2017 0.7 0.1 - 1.0 mg/dL Final     Comment:     For infants and newborns, interpretation of results should be based  on gestational age, weight and in agreement with clinical  observations.  Premature Infant recommended reference ranges:  Up to 24 hours.............<8.0 mg/dL  Up to 48 hours............<12.0 mg/dL  3-5 days..................<15.0 mg/dL  6-29 days.................<15.0 mg/dL       Alkaline Phosphatase   Date Value Ref Range Status   03/29/2017 39 (L) 55 - " 135 U/L Final     AST   Date Value Ref Range Status   03/29/2017 25 10 - 40 U/L Final     ALT   Date Value Ref Range Status   03/29/2017 26 10 - 44 U/L Final     Anion Gap   Date Value Ref Range Status   04/16/2018 7 (L) 8 - 16 mmol/L Final     eGFR if    Date Value Ref Range Status   04/16/2018 >60.0 >60 mL/min/1.73 m^2 Final     eGFR if non    Date Value Ref Range Status   04/16/2018 >60.0 >60 mL/min/1.73 m^2 Final     Comment:     Calculation used to obtain the estimated glomerular filtration  rate (eGFR) is the CKD-EPI equation.          No results for input(s): PT, INR, APTT in the last 24 hours.    A/P:  The patient is a 68 year old man presenting for a colonoscopy.  1.  Colonoscopy - he can undergo a colonoscopy.  I have explained the risks, benefits, and alternatives of the procedure in detail.  The patient voices understanding and all questions have been answered.  The patient agrees to proceed as planned.

## 2018-08-28 NOTE — DISCHARGE SUMMARY
[facility]  Discharge Summary      Admit Date: 8/28/2018    Discharge Date and Time:  08/28/2018 10:51 AM    Attending Physician: Davis Hernandez MD     Reason for Admission: Surveillance colonoscopy    Procedures Performed: Procedure(s) (LRB):  COLONOSCOPY (N/A)    Hospital Course (synopsis of major diagnoses, care, treatment, and services provided during the course of the hospital stay): Outpatient colonoscopy     Consults: none    Significant Diagnostic Studies: Colonoscopy    Final Diagnoses:    Principal Problem: <principal problem not specified>   Secondary Diagnoses: Colonoscopy    Discharged Condition: good    Disposition: Home or Self Care    Follow Up/Patient Instructions: Follow-up with referring physician             Resume previous diet and activity.    Medications:  Reconciled Home Medications:      Medication List      ASK your doctor about these medications    * DULoxetine 60 MG capsule  Commonly known as:  CYMBALTA     * DULoxetine 30 MG capsule  Commonly known as:  CYMBALTA     ezetimibe 10 mg tablet  Commonly known as:  ZETIA  Take 1 tablet (10 mg total) by mouth once daily.     hydroCHLOROthiazide 12.5 mg capsule  Commonly known as:  MICROZIDE  TAKE 1 CAPSULE(12.5 MG) BY MOUTH EVERY DAY     LEVITRA 20 MG tablet  Generic drug:  vardenafil     lisinopril 20 MG tablet  Commonly known as:  PRINIVIL,ZESTRIL  Take 1 tablet (20 mg total) by mouth once daily.     LORazepam 1 MG tablet  Commonly known as:  ATIVAN  Take 2 mg by mouth every evening.     nicotine 14 mg/24 hr  Commonly known as:  NICODERM CQ  Place 1 patch onto the skin once daily.     potassium chloride SA 20 MEQ tablet  Commonly known as:  K-DUR,KLOR-CON  Take 1 tablet (20 mEq total) by mouth 2 (two) times daily.     tamsulosin 0.4 mg Cap  Commonly known as:  FLOMAX         * This list has 2 medication(s) that are the same as other medications prescribed for you. Read the directions carefully, and ask your doctor or  other care provider to review them with you.              No discharge procedures on file.

## 2018-08-28 NOTE — [employer] PATIENT INSTRUCTIONS
Discharge Summary/Instructions after an Endoscopic Procedure  Patient Name: Sharee Day  Patient MRN: [id number]  Patient YOB: 1950  Tuesday, August 28, 2018  Davis Hernandez MD  RESTRICTIONS:  During your procedure today, you received medications for sedation.  These   medications may affect your judgment, balance and coordination.  Therefore,   for 24 hours, you have the following restrictions:   - DO NOT drive a car, operate machinery, make legal/financial decisions,   sign important papers or drink alcohol.    ACTIVITY:  Today: no heavy lifting, straining or running due to procedural   sedation/anesthesia.  The following day: return to full activity including work.  DIET:  Eat and drink normally unless instructed otherwise.     TREATMENT FOR COMMON SIDE EFFECTS:  - Mild abdominal pain, nausea, belching, bloating or excessive gas:  rest,   eat lightly and use a heating pad.  - Sore Throat: treat with throat lozenges and/or gargle with warm salt   water.  - Because air was used during the procedure, expelling large amounts of air   from your rectum or belching is normal.  - If a bowel prep was taken, you may not have a bowel movement for 1-3 days.    This is normal.  SYMPTOMS TO WATCH FOR AND REPORT TO YOUR PHYSICIAN:  1. Abdominal pain or bloating, other than gas cramps.  2. Chest pain.  3. Back pain.  4. Signs of infection such as: chills or fever occurring within 24 hours   after the procedure.  5. Rectal bleeding, which would show as bright red, maroon, or black stools.   (A tablespoon of blood from the rectum is not serious, especially if   hemorrhoids are present.)  6. Vomiting.  7. Weakness or dizziness.  GO DIRECTLY TO THE NEAREST EMERGENCY ROOM IF YOU HAVE ANY OF THE FOLLOWING:      Difficulty breathing              Chills and/or fever over 101 F   Persistent vomiting and/or vomiting blood   Severe abdominal pain   Severe chest pain   Black, tarry stools   Bleeding- more than one  tablespoon   Any other symptom or condition that you feel may need urgent attention  Your doctor recommends these additional instructions:  If any biopsies were taken, your doctors clinic will contact you in 1 to 2   weeks with any results.  - Await pathology results.   - Repeat colonoscopy in 3 years for surveillance based on pathology results.     - Return to referring physician as previously scheduled.   - Discharge patient to home (via wheelchair).  For questions, problems or results please call your physician - Davis Hernandez MD at Work:  [phone].  [facility] can be reached at [phone]     IF A COMPLICATION OR EMERGENCY SITUATION ARISES AND YOU ARE UNABLE TO REACH   YOUR PHYSICIAN - GO DIRECTLY TO THE EMERGENCY ROOM.  Davis Hernandez MD  8/28/2018 10:55:48 AM  This report has been verified and signed electronically.  [employer]

## 2018-08-29 NOTE — ANESTHESIA POSTPROCEDURE EVALUATION
"Anesthesia Post Evaluation    Patient: Sharee Day Jr.    Procedure(s) Performed: Procedure(s) (LRB):  COLONOSCOPY (N/A)    Final Anesthesia Type: general  Patient location during evaluation: PACU  Patient participation: Yes- Able to Participate  Level of consciousness: awake and alert and oriented  Post-procedure vital signs: reviewed and stable  Pain management: adequate  Airway patency: patent  PONV status at discharge: No PONV  Anesthetic complications: no      Cardiovascular status: blood pressure returned to baseline and hemodynamically stable  Respiratory status: unassisted, spontaneous ventilation and room air  Hydration status: euvolemic  Follow-up not needed.        Visit Vitals  /64 (BP Location: Left arm, Patient Position: Lying)   Pulse 68   Temp 36.7 °C (98 °F)   Resp 18   Ht 5' 11" (1.803 m)   Wt 103 kg (227 lb)   SpO2 98%   BMI 31.66 kg/m²       Pain/Shweta Score: Pain Assessment Performed: Yes (8/28/2018 10:54 AM)  Presence of Pain: denies (8/28/2018 11:24 AM)  Shweta Score: 10 (8/28/2018 11:24 AM)        "

## 2018-08-30 DIAGNOSIS — G47.33 OBSTRUCTIVE SLEEP APNEA: Primary | ICD-10-CM

## 2018-08-30 PROBLEM — D12.6 TUBULOVILLOUS ADENOMA OF COLON: Status: ACTIVE | Noted: 2018-08-30

## 2018-08-30 NOTE — PROGRESS NOTES
CPAP titration study recommends setting of 12, no supplemental O2 needed.  Will order CPAP machine and full face mask

## 2018-09-04 ENCOUNTER — CLINICAL SUPPORT (OUTPATIENT)
Dept: SMOKING CESSATION | Facility: CLINIC | Age: 68
End: 2018-09-04
Payer: COMMERCIAL

## 2018-09-04 DIAGNOSIS — F17.200 NICOTINE DEPENDENCE: Primary | ICD-10-CM

## 2018-09-04 PROCEDURE — 99402 PREV MED CNSL INDIV APPRX 30: CPT | Mod: S$GLB,,,

## 2018-09-04 RX ORDER — DM/P-EPHED/ACETAMINOPH/DOXYLAM 30-7.5/3
2 LIQUID (ML) ORAL
Qty: 100 LOZENGE | Refills: 0 | Status: SHIPPED | OUTPATIENT
Start: 2018-09-04 | End: 2018-10-04

## 2018-09-04 NOTE — PROGRESS NOTES
Individual Follow-Up Form    9/4/2018    Quit Date: not selected at this time     Clinical Status of Patient: Outpatient    Length of Service: 30 minutes    Continuing Medication: yes  Patches    Other Medications: n/a     Target Symptoms: Withdrawal and medication side effects. The following were  rated moderate (3) to severe (4) on TCRS:  · Moderate (3): 0  · Severe (4): 0    Comments: patient presents for follow up smoking 3 cigarettes per day usually after meals and is finding these 3 cigarettes difficult to put down, he is currently not on any NRT, it was recommended that he wear the 14mg nicotine patch however patient states that he did not feel that it was working so he double patches the 14mg and this also did not help therefore he dc use, recommend he not use the nicotine patch and informed the patient that this is not a nicotine problem but a habit and comfort problem, he is holding on to a few cigarettes per day, recommend 2mg nicotine lozenge in place of his 3 cigarettes daily, session handout provided and discussed, will follow     Diagnosis: F17.210    Next Visit: 2 weeks

## 2018-09-07 DIAGNOSIS — E78.5 HYPERLIPIDEMIA, UNSPECIFIED HYPERLIPIDEMIA TYPE: Chronic | ICD-10-CM

## 2018-09-07 RX ORDER — NIACIN 500 MG/1
TABLET, EXTENDED RELEASE ORAL
Qty: 90 TABLET | Refills: 0 | OUTPATIENT
Start: 2018-09-07

## 2018-09-11 ENCOUNTER — TELEPHONE (OUTPATIENT)
Dept: FAMILY MEDICINE | Facility: CLINIC | Age: 68
End: 2018-09-11

## 2018-09-11 NOTE — TELEPHONE ENCOUNTER
----- Message from Britt Felix sent at 9/10/2018 11:13 AM CDT -----  Contact: Sharee [phone]  Patient is calling to check on the status of CPAP machines/supplies.

## 2018-09-11 NOTE — TELEPHONE ENCOUNTER
Spoke with pt requesting Coloscopy results and Status of Cpap orders. Pt states 2 weeks have past with no results.  Please advise.     Informed pt that orders have been sent to the DME.

## 2018-09-11 NOTE — TELEPHONE ENCOUNTER
Colonoscopy showed tubulovillous adenoma - this is a precancerous polyp but IT IS NOT CANCER.  But he needs another colonoscopy in 3 years to monitor for any new ones.    CPAP machine as stated previously - sent to DME

## 2018-10-04 DIAGNOSIS — I10 ESSENTIAL HYPERTENSION: Chronic | ICD-10-CM

## 2018-10-04 RX ORDER — HYDROCHLOROTHIAZIDE 12.5 MG/1
CAPSULE ORAL
Qty: 90 CAPSULE | Refills: 1 | Status: SHIPPED | OUTPATIENT
Start: 2018-10-04 | End: 2019-04-11 | Stop reason: SDUPTHER

## 2018-11-08 ENCOUNTER — TELEPHONE (OUTPATIENT)
Dept: FAMILY MEDICINE | Facility: CLINIC | Age: 68
End: 2018-11-08

## 2018-11-08 DIAGNOSIS — G47.33 OBSTRUCTIVE SLEEP APNEA: Primary | ICD-10-CM

## 2018-11-08 NOTE — TELEPHONE ENCOUNTER
Will change him to autopap and see if he can tolerate the variable setting on the auto-adjusting machine.

## 2018-11-08 NOTE — TELEPHONE ENCOUNTER
Spoke with patient and he wanted to have labs done before office visit on 12/3/18.      Patient also concern with setting for CPAP machine. He said that it is currently set on 12 and with the air is pushing in sheth it blow his jaws outward making it hard for him to exhale. Patient said that he contacted the sleep lab at [facility] about reducing the setting and they told him that he will have to contact his provider. Patient said that his machine goes from 4-20 and he would like to start between 4-6 and increase as he goes. He said that he is scared to go to sleep with machine on because he might not wake up.

## 2018-11-08 NOTE — TELEPHONE ENCOUNTER
----- Message from Patricia Garcia sent at 11/8/2018 12:16 PM CST -----  Contact: Self`  Pt is calling to get lab orders put into the system. Please call pt at [phone].    Also needs to speak regarding c pap machine.

## 2018-11-09 ENCOUNTER — TELEPHONE (OUTPATIENT)
Dept: FAMILY MEDICINE | Facility: CLINIC | Age: 68
End: 2018-11-09

## 2018-11-09 NOTE — TELEPHONE ENCOUNTER
Informed [facility] that script was faxed over yesterday for auto cpap. Refax today at [phone].

## 2018-11-09 NOTE — TELEPHONE ENCOUNTER
----- Message from Joan Heard sent at 11/9/2018 11:14 AM CST -----  Contact: [facility]   [facility] called because they provided patient with a CPAP but patient is not tolerationg the pressure. [facility] also stated that she sent over a request to change CPAP to autoPAP. Please call to advise at [phone]

## 2018-11-30 RX ORDER — ESZOPICLONE 2 MG/1
2 TABLET, FILM COATED ORAL NIGHTLY PRN
Refills: 1 | COMMUNITY
Start: 2018-10-16 | End: 2020-11-03

## 2018-11-30 RX ORDER — BUPROPION HYDROCHLORIDE 150 MG/1
150 TABLET, EXTENDED RELEASE ORAL DAILY
Refills: 0 | COMMUNITY
Start: 2018-10-26 | End: 2020-06-11

## 2018-11-30 NOTE — PROGRESS NOTES
This note was created by combination of typed  and Dragon dictation.  Transcription errors may be present.  If there are any questions, please contact me.    Assessment & Plan:   Essential hypertension  -stable on lisinopril and HCT with potassium supplementation.  Check metabolic panel today.  -     Comprehensive metabolic panel; Future; Expected date: 12/03/2018    Pure hypercholesterolemia; statin intolerance. 8/2018 zetia  Statin myopathy  -check labs on new start Zetia.  History of statin intolerance.  -     Comprehensive metabolic panel; Future; Expected date: 12/03/2018  -     Lipid panel; Future; Expected date: 12/03/2018    Prostate cancer s/p XRT currently hormone deprivation  -check future PSA, results to be sent to his urologist/oncologist in [place].  Fax results to [facility] [phone] Adeel Vicente MD.  Call for critical to [phone]  -     PROSTATE SPECIFIC ANTIGEN, DIAGNOSTIC; Future; Expected date: 12/03/2018    Screening for diabetes mellitus  -     Hemoglobin A1c; Standing    Need for vaccination for Strep pneumoniae  -     (In Office Administered) Pneumococcal Polysaccharide Vaccine (23 Valent) (SQ/IM)    Needs flu shot  -     Influenza - High Dose (65+) (PF) (IM)    Numbness and tingling  Need for hepatitis C screening test  -preceding his dx of prostate CA and prior to bupropion and cymbalta. nonfocal exam today.  Check labs - TSH, B12, iron. Check HIV, HCV (normal 2013), syphilis. Referral to neurology.  -     TSH; Future; Expected date: 12/03/2018  -     Ferritin; Future; Expected date: 12/03/2018  -     Vitamin B12; Future; Expected date: 12/03/2018  -     Iron and TIBC; Future; Expected date: 12/03/2018  -     Ambulatory referral to Neurology  -     RPR; Future; Expected date: 12/03/2018  -     HIV 1/2 Ag/Ab (4th Gen); Future; Expected date: 12/03/2018    Male erectile disorder  -rx for viagra 100  -     sildenafil (VIAGRA) 100 MG tablet; Take 1 tablet (100 mg total) by mouth  daily as needed for Erectile Dysfunction.  Dispense: 10 tablet; Refill: 5        Medications Discontinued During This Encounter   Medication Reason    LEVITRA 20 mg tablet Therapy completed       meds sent this encounter:  Medications Ordered This Encounter   Medications    sildenafil (VIAGRA) 100 MG tablet     Sig: Take 1 tablet (100 mg total) by mouth daily as needed for Erectile Dysfunction.     Dispense:  10 tablet     Refill:  5       Follow Up: No Follow-up on file.    Subjective:     Chief Complaint   Patient presents with    Hyperlipidemia    Hypertension    Numbness     left leg       CHERRY Tolliver is a 68 y.o. male, last appointment with this clinic was 8/2/2018.    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  Hydroxyzine with SE. Trazodone ineffective.  Rozerem didn't help. Hx of Ambien helpful. Lunesta. Filled by psychiatry.  RLS; trial of Requip - did not find it helpful - stopped.  Beta thal minor.  Noncardiac chest pain.  Seen by cardiology.  7/13/2016 nu med stress test neg for ischemia  7/13/2016 TTE LVEF 55-60; no diastolic dysfunction  HTN, HCTZ and lisinopril. No outside BP checks.  Hyperlipidemia - statins SE of myalgias. niaspan changed to zetia.  Smoker.  Prostate CA, 2 cores were +3+4 = 7 [place]'s; s/p XRT and brachytherapy, [facility], fall 2017.  lupron shots as well to finish smmer 2018.  12/18 had cystoscopy with 2 erythematous lesions, possibly related to the XRT.  Plan is repeat in 6 weeks  Had rib biopsy negative for metastasis. This was due to abn finding on PET/CT scan showing suspicious activity on the right 7 and 8 ribs.  Vit D deficiency.  Major depressive disorder related to prostate CA;  Filled out short term disability paperwork 11/3 to 1/1/2018. Psychiatry Dr. Coleman. Hx of zoloft; hx of cymbalta; hx of bupropion  4/9/2018 AAA screening negative.  Vascular surgery recommended repeat screening 2021  Mild PAD  4/10/2018 LE dopplers: 1. Right  lower extremity pressures and waveforms indicate no hemodynamically significant arterial occlusive disease.  2. Left lower extremity pressures and waveforms indicate mild arterial occlusive disease.  DAKOTA on sleep study; autoPAP  Hx of colon polyps.  2011 colonoscopy normal repeat 3 years for personal hx    zetia started 8/2018    [facility] [place]  They sent over a slip requesting PSAs every 3 months and the results be faxed to them.  They want this for 1 year.    He continues to smoke.  Has decreased but he continues to smoke nonetheless.  Uses nicotine lozenges and does find them helpful.    He reports he is taking all of his medications as directed.    On review of systems-he feels a sensation of some popping in his head, it is not headache but it sounds like a popping noise.  It is intermittent.  And he also feels a sensation of numbness in his legs, particularly his left leg.  Denies any low back pain. He reports that this has been ongoing for quite some time, even prior to his diagnosis of prostate cancer and prior to his initiation of bupropion and Cymbalta.  Does find it troublesome.  No bowel incontinence.  Some bladder incontinence but that is due to the radiation therapy he states.    He is requesting sildenafil 100.      Fax results to [facility] [phone] Adeel Vicente MD.  Call for critical to [phone]    11/24 PSA 0.136    Physical activity inadequate - due to mood  Diet - occasional red meat.    Answers for HPI/ROS submitted by the patient on 12/2/2018   activity change: Yes  unexpected weight change: No  rhinorrhea: No  trouble swallowing: No  visual disturbance: No  chest tightness: No  polyuria: Yes  difficulty urinating: Yes  joint swelling: No  arthralgias: No  confusion: Yes  dysphoric mood: Yes      Patient Care Team:  Navin Charlton MD as PCP - General (Internal Medicine)  Alfredo Kearns MD as Consulting Physician (Urology)  Adeel Coleman MD as  Consulting Physician (Psychiatry)    Patient Active Problem List    Diagnosis Date Noted    Tubulovillous adenoma of colon on colonoscopy 8/2018 08/30/2018    Screen for colon cancer 08/28/2018    History of colonic polyps; 2011 colonoscopy normal 08/02/2018    Statin myopathy 08/02/2018    Obstructive sleep apnea severe with AHI 64 on study 7/2018 07/27/2018     Very severe sleep disordered  breathing (AHI=64) is noted based on a 4% hypopnea desaturation criteria. The patient slept supine 64.2% of the night based  on valid recording time of 6.43 hours. The apneas/hypopneas are accompanied by severe oxygen desaturation (percent time  below 90% SpO2: 15.9%, Min SpO2: 69.6%). The average desaturation across all sleep disordered breathing events is 5.7%.  Snoring occurs for 45.4% (30 dB) of the study, 17.3% is extremely loud. The mean pulse rate is 63 BPM, with frequent pulse  rate variability (52 events with >= 6 BPM increase/decrease per hour).  TREATMENT CONSIDERATIONS: Consider nasal continuous positive airway pressure (CPAP) as the initial treatment  choice for severe obstructive sleep apnea. Consider an attended in-lab CPAP titration study, given the possibility of co-morbid  sleep related hypoventilation.  DISEASE MANAGEMENT CONSIDERATIONS: Insomnia is a symptom that can be associated with untreated DAKOTA.  Sleeping pills may increase the severity of untreated DAKOTA and their use should be reevaluated once the DAKOTA is treated.      Snoring     Myalgia due to statin 06/06/2018    Major depressive disorder 10/30/2017    Pelvic floor muscle wasting in male 09/28/2017    Prostate cancer s/p XRT currently hormone deprivation 08/30/2017     2 cores positive kary 3+4=7 on biopsy 8/15/2017      Smoker 08/30/2017    Vitamin D deficiency 08/30/2017    Beta thalassemia minor 04/11/2017    Benign non-nodular prostatic hyperplasia without lower urinary tract symptoms 03/29/2017     Hx of negative prostate Bx and  hx of MRI prostate      Restless leg syndrome 08/02/2016    Pure hypercholesterolemia; statin intolerance. 8/2018 zetia     Essential hypertension      7/13/2016 nu med stress test neg for ischemia  7/13/2016 TTE LVEF 55-60; no diastolic dysfunction      Shift work sleep disorder 11/20/2013       PAST MEDICAL HISTORY:  Past Medical History:   Diagnosis Date    Hyperlipidemia     Hypertension     Obstructive sleep apnea severe with AHI 64 on study 7/2018 7/27/2018    Very severe sleep disordered breathing (AHI=64) is noted based on a 4% hypopnea desaturation criteria. The patient slept supine 64.2% of the night based on valid recording time of 6.43 hours. The apneas/hypopneas are accompanied by severe oxygen desaturation (percent time below 90% SpO2: 15.9%, Min SpO2: 69.6%). The average desaturation across all sleep disordered breathing events is 5.7%. Snoring       PAST SURGICAL HISTORY:  Past Surgical History:   Procedure Laterality Date    COLONOSCOPY N/A 8/28/2018    Procedure: COLONOSCOPY;  Surgeon: Davis Hernandez MD;  Location: [facility] ENDO;  Service: Endoscopy;  Laterality: N/A;    COLONOSCOPY N/A 8/28/2018    Performed by Davis Hernandez MD at [facility] ENDO    MOUTH SURGERY  01/2016    RELEASE DUPUYTREN'S CONTRACTURE Left 3/6/2013    Performed by [facility] at [facility] 1ST FLR    REPAIR, TENDON, EXTENSOR Left 3/6/2013    Performed by Al[facility]ola MD at [facility] OR 1ST FLR       SOCIAL HISTORY:  Social History     Socioeconomic History    Marital status: Single     Spouse name: Not on file    Number of children: Not on file    Years of education: Not on file    Highest education level: Not on file   Social Needs    Financial resource strain: Not on file    Food insecurity - worry: Not on file    Food insecurity - inability: Not on file    Transportation needs - medical: Not on file    Transportation needs - non-medical: Not on file   Occupational History    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  Review of patient's allergies indicates:   Allergen Reactions    Statins-hmg-coa reductase inhibitors      myalgias    Sulfamethoxazole-trimethoprim Other (See Comments)     Muscle pain    Lipitor [atorvastatin] Other (See Comments)     Muscle cramps     Current Outpatient Medications   Medication Sig Dispense Refill    buPROPion (WELLBUTRIN SR) 150 MG TBSR 12 hr tablet Take 150 mg by mouth once daily.  0    DULoxetine (CYMBALTA) 30 MG capsule       DULoxetine (CYMBALTA) 60 MG capsule       eszopiclone (LUNESTA) 2 MG Tab Take 2 mg by mouth nightly as needed.  1    ezetimibe (ZETIA) 10 mg tablet Take 1 tablet (10 mg total) by mouth once daily. 30 tablet 5    hydroCHLOROthiazide (MICROZIDE) 12.5 mg capsule TAKE 1 CAPSULE(12.5 MG) BY MOUTH EVERY DAY 90 capsule 1    LEVITRA 20 mg tablet   4    lisinopril (PRINIVIL,ZESTRIL) 20 MG tablet Take 1 tablet (20 mg total) by mouth once daily. 90 tablet 3    LORazepam (ATIVAN) 1 MG tablet Take 2 mg by mouth every evening.       potassium chloride SA (K-DUR,KLOR-CON) 20 MEQ tablet Take 1 tablet (20 mEq total) by mouth 2 (two) times daily. 60 tablet 5    tamsulosin (FLOMAX) 0.4 mg Cp24        No current facility-administered medications for this visit.        Review of Systems   HENT: Negative for hearing loss.    Eyes: Negative for discharge.   Respiratory: Negative for wheezing.    Cardiovascular: Negative for chest pain and palpitations.   Gastrointestinal: Positive for constipation. Negative for blood in stool, diarrhea and vomiting.  "  Genitourinary: Positive for urgency. Negative for hematuria.   Musculoskeletal: Negative for neck pain.   Neurological: Positive for weakness. Negative for headaches.   Endo/Heme/Allergies: Negative for polydipsia.       Objective:   Physical Exam   Vitals:    12/03/18 0856   BP: 106/70   Pulse: 70   Weight: 102.4 kg (225 lb 13.8 oz)   Height: 5' 11" (1.803 m)    Body mass index is 31.5 kg/m².  Weight: 102.4 kg (225 lb 13.8 oz)   Height: 5' 11" (180.3 cm)     Physical Exam   Constitutional: He is oriented to person, place, and time. He appears well-developed and well-nourished. No distress.   Eyes: EOM are normal.   Cardiovascular: Normal rate, regular rhythm and normal heart sounds.   No murmur heard.  Pulmonary/Chest: Effort normal and breath sounds normal.   Musculoskeletal: Normal range of motion. He exhibits no edema.   Neurological: He is alert and oriented to person, place, and time. Coordination normal.   Skin: Skin is warm and dry.   Psychiatric: He has a normal mood and affect. His behavior is normal. Thought content normal.     "

## 2018-12-03 ENCOUNTER — LAB VISIT (OUTPATIENT)
Dept: LAB | Facility: HOSPITAL | Age: 68
End: 2018-12-03
Attending: INTERNAL MEDICINE
Payer: MEDICARE

## 2018-12-03 ENCOUNTER — OFFICE VISIT (OUTPATIENT)
Dept: FAMILY MEDICINE | Facility: CLINIC | Age: 68
End: 2018-12-03
Payer: MEDICARE

## 2018-12-03 VITALS
DIASTOLIC BLOOD PRESSURE: 70 MMHG | HEART RATE: 70 BPM | HEIGHT: 71 IN | BODY MASS INDEX: 31.62 KG/M2 | SYSTOLIC BLOOD PRESSURE: 106 MMHG | WEIGHT: 225.88 LBS

## 2018-12-03 DIAGNOSIS — Z23 NEEDS FLU SHOT: ICD-10-CM

## 2018-12-03 DIAGNOSIS — Z11.59 NEED FOR HEPATITIS C SCREENING TEST: ICD-10-CM

## 2018-12-03 DIAGNOSIS — R20.2 NUMBNESS AND TINGLING: ICD-10-CM

## 2018-12-03 DIAGNOSIS — Z13.1 SCREENING FOR DIABETES MELLITUS: ICD-10-CM

## 2018-12-03 DIAGNOSIS — G72.0 STATIN MYOPATHY: ICD-10-CM

## 2018-12-03 DIAGNOSIS — I10 ESSENTIAL HYPERTENSION: ICD-10-CM

## 2018-12-03 DIAGNOSIS — T46.6X5A STATIN MYOPATHY: ICD-10-CM

## 2018-12-03 DIAGNOSIS — Z23 NEED FOR VACCINATION FOR STREP PNEUMONIAE: ICD-10-CM

## 2018-12-03 DIAGNOSIS — R20.0 NUMBNESS AND TINGLING: ICD-10-CM

## 2018-12-03 DIAGNOSIS — C61 PROSTATE CANCER: ICD-10-CM

## 2018-12-03 DIAGNOSIS — I10 ESSENTIAL HYPERTENSION: Primary | ICD-10-CM

## 2018-12-03 DIAGNOSIS — E78.00 PURE HYPERCHOLESTEROLEMIA: ICD-10-CM

## 2018-12-03 DIAGNOSIS — N52.9 MALE ERECTILE DISORDER: ICD-10-CM

## 2018-12-03 LAB
ALBUMIN SERPL BCP-MCNC: 3.6 G/DL
ALP SERPL-CCNC: 64 U/L
ALT SERPL W/O P-5'-P-CCNC: 17 U/L
ANION GAP SERPL CALC-SCNC: 6 MMOL/L
AST SERPL-CCNC: 21 U/L
BILIRUB SERPL-MCNC: 0.5 MG/DL
BUN SERPL-MCNC: 11 MG/DL
CALCIUM SERPL-MCNC: 9.8 MG/DL
CHLORIDE SERPL-SCNC: 104 MMOL/L
CHOLEST SERPL-MCNC: 224 MG/DL
CHOLEST/HDLC SERPL: 6.1 {RATIO}
CO2 SERPL-SCNC: 30 MMOL/L
CREAT SERPL-MCNC: 1 MG/DL
EST. GFR  (AFRICAN AMERICAN): >60 ML/MIN/1.73 M^2
EST. GFR  (NON AFRICAN AMERICAN): >60 ML/MIN/1.73 M^2
ESTIMATED AVG GLUCOSE: 111 MG/DL
FERRITIN SERPL-MCNC: 499 NG/ML
GLUCOSE SERPL-MCNC: 120 MG/DL
HBA1C MFR BLD HPLC: 5.5 %
HCV AB SERPL QL IA: NEGATIVE
HDLC SERPL-MCNC: 37 MG/DL
HDLC SERPL: 16.5 %
HIV 1+2 AB+HIV1 P24 AG SERPL QL IA: NEGATIVE
IRON SERPL-MCNC: 92 UG/DL
LDLC SERPL CALC-MCNC: 155.6 MG/DL
NONHDLC SERPL-MCNC: 187 MG/DL
POTASSIUM SERPL-SCNC: 4 MMOL/L
PROT SERPL-MCNC: 7.5 G/DL
SATURATED IRON: 27 %
SODIUM SERPL-SCNC: 140 MMOL/L
TOTAL IRON BINDING CAPACITY: 345 UG/DL
TRANSFERRIN SERPL-MCNC: 233 MG/DL
TRIGL SERPL-MCNC: 157 MG/DL
TSH SERPL DL<=0.005 MIU/L-ACNC: 1.25 UIU/ML
VIT B12 SERPL-MCNC: 558 PG/ML

## 2018-12-03 PROCEDURE — 99213 OFFICE O/P EST LOW 20 MIN: CPT | Mod: PBBFAC,PO,25 | Performed by: INTERNAL MEDICINE

## 2018-12-03 PROCEDURE — 84443 ASSAY THYROID STIM HORMONE: CPT

## 2018-12-03 PROCEDURE — 86703 HIV-1/HIV-2 1 RESULT ANTBDY: CPT

## 2018-12-03 PROCEDURE — 83540 ASSAY OF IRON: CPT

## 2018-12-03 PROCEDURE — 80053 COMPREHEN METABOLIC PANEL: CPT

## 2018-12-03 PROCEDURE — 90732 PPSV23 VACC 2 YRS+ SUBQ/IM: CPT | Mod: PBBFAC,PO

## 2018-12-03 PROCEDURE — 99999 PR PBB SHADOW E&M-EST. PATIENT-LVL III: CPT | Mod: PBBFAC,,, | Performed by: INTERNAL MEDICINE

## 2018-12-03 PROCEDURE — 90662 IIV NO PRSV INCREASED AG IM: CPT | Mod: PBBFAC,PO

## 2018-12-03 PROCEDURE — 86592 SYPHILIS TEST NON-TREP QUAL: CPT

## 2018-12-03 PROCEDURE — 86803 HEPATITIS C AB TEST: CPT

## 2018-12-03 PROCEDURE — 36415 COLL VENOUS BLD VENIPUNCTURE: CPT | Mod: PO

## 2018-12-03 PROCEDURE — 82728 ASSAY OF FERRITIN: CPT

## 2018-12-03 PROCEDURE — 80061 LIPID PANEL: CPT

## 2018-12-03 PROCEDURE — 83036 HEMOGLOBIN GLYCOSYLATED A1C: CPT

## 2018-12-03 PROCEDURE — 82607 VITAMIN B-12: CPT

## 2018-12-03 PROCEDURE — 99214 OFFICE O/P EST MOD 30 MIN: CPT | Mod: S$PBB,,, | Performed by: INTERNAL MEDICINE

## 2018-12-03 RX ORDER — SILDENAFIL 100 MG/1
100 TABLET, FILM COATED ORAL DAILY PRN
Qty: 10 TABLET | Refills: 5 | Status: SHIPPED | OUTPATIENT
Start: 2018-12-03 | End: 2019-12-04 | Stop reason: SDUPTHER

## 2018-12-04 ENCOUNTER — TELEPHONE (OUTPATIENT)
Dept: FAMILY MEDICINE | Facility: CLINIC | Age: 68
End: 2018-12-04

## 2018-12-04 LAB — RPR SER QL: NORMAL

## 2018-12-04 NOTE — TELEPHONE ENCOUNTER
----- Message from Neda Sal sent at 12/4/2018  1:03 PM CST -----  Contact: Self   Pt calling to speak to a nurse regarding recall on med. [phone]

## 2018-12-04 NOTE — TELEPHONE ENCOUNTER
Patient instructed to call pharmacy to check to see if his meds are recalled, return call to clinic for any concerns.

## 2018-12-04 NOTE — PROGRESS NOTES
Lipid high is he taking zetia?  CMP WNL  Ferritin high iron normal - inflammation currently?  B12, TSH WNL  HIV, RPR, HCV negative.  A1c WNL  Results to pt via my [facility].  If not taking zetia - take it.  If he is taking it - with hx of statin intolerance - no changes.

## 2018-12-12 ENCOUNTER — OFFICE VISIT (OUTPATIENT)
Dept: NEUROLOGY | Facility: CLINIC | Age: 68
End: 2018-12-12
Payer: MEDICARE

## 2018-12-12 ENCOUNTER — LAB VISIT (OUTPATIENT)
Dept: LAB | Facility: HOSPITAL | Age: 68
End: 2018-12-12
Attending: PSYCHIATRY & NEUROLOGY
Payer: MEDICARE

## 2018-12-12 VITALS
HEIGHT: 71 IN | HEART RATE: 73 BPM | DIASTOLIC BLOOD PRESSURE: 71 MMHG | SYSTOLIC BLOOD PRESSURE: 114 MMHG | BODY MASS INDEX: 31.79 KG/M2 | WEIGHT: 227.06 LBS

## 2018-12-12 DIAGNOSIS — R25.2 CRAMP AND SPASM: ICD-10-CM

## 2018-12-12 DIAGNOSIS — T50.2X5A DIURETIC-INDUCED HYPOKALEMIA: ICD-10-CM

## 2018-12-12 DIAGNOSIS — E87.6 DIURETIC-INDUCED HYPOKALEMIA: ICD-10-CM

## 2018-12-12 DIAGNOSIS — R20.2 TINGLING: ICD-10-CM

## 2018-12-12 DIAGNOSIS — R25.2 CRAMP AND SPASM: Primary | ICD-10-CM

## 2018-12-12 LAB — CK SERPL-CCNC: 324 U/L

## 2018-12-12 PROCEDURE — 99999 PR PBB SHADOW E&M-EST. PATIENT-LVL IV: ICD-10-PCS | Mod: PBBFAC,,, | Performed by: PSYCHIATRY & NEUROLOGY

## 2018-12-12 PROCEDURE — 99203 PR OFFICE/OUTPT VISIT, NEW, LEVL III, 30-44 MIN: ICD-10-PCS | Mod: S$PBB,,, | Performed by: PSYCHIATRY & NEUROLOGY

## 2018-12-12 PROCEDURE — 99999 PR PBB SHADOW E&M-EST. PATIENT-LVL IV: CPT | Mod: PBBFAC,,, | Performed by: PSYCHIATRY & NEUROLOGY

## 2018-12-12 PROCEDURE — 99203 OFFICE O/P NEW LOW 30 MIN: CPT | Mod: S$PBB,,, | Performed by: PSYCHIATRY & NEUROLOGY

## 2018-12-12 PROCEDURE — 36415 COLL VENOUS BLD VENIPUNCTURE: CPT

## 2018-12-12 PROCEDURE — 99214 OFFICE O/P EST MOD 30 MIN: CPT | Mod: PBBFAC | Performed by: PSYCHIATRY & NEUROLOGY

## 2018-12-12 PROCEDURE — 82550 ASSAY OF CK (CPK): CPT

## 2018-12-12 RX ORDER — POTASSIUM CHLORIDE 20 MEQ/1
TABLET, EXTENDED RELEASE ORAL
Qty: 180 TABLET | Refills: 1 | Status: SHIPPED | OUTPATIENT
Start: 2018-12-12 | End: 2019-05-20 | Stop reason: SDUPTHER

## 2018-12-12 RX ORDER — GABAPENTIN 100 MG/1
100 CAPSULE ORAL NIGHTLY
Qty: 30 CAPSULE | Refills: 3 | Status: SHIPPED | OUTPATIENT
Start: 2018-12-12 | End: 2019-12-20 | Stop reason: ALTCHOICE

## 2018-12-12 NOTE — LETTER
January 20, 2019      Navin Charlton MD  [address]  [place] [address]           [facility] [address] [facility] [address]  [facility] [address]  Phone: [phone]  Fax: [phone]          Patient: Sharee Day Jr.   MR Number: [id number]   YOB: 1950   Date of Visit: 12/12/2018       Dear Dr. Navin Charlton:    Thank you for referring Sharee Day to me for evaluation. Attached you will find relevant portions of my assessment and plan of care.    If you have questions, please do not hesitate to call me. I look forward to following Sharee Day along with you.    Sincerely,    Alisa Serna,     Enclosure  CC:  No Recipients    If you would like to receive this communication electronically, please contact [email or web address] or [phone] to request more information on [facility] Link access.    For providers and/or their staff who would like to refer a patient to [facility], please contact us through our one-stop-shop provider referral line, [facility], at [phone].    If you feel you have received this communication in error or would no longer like to receive these types of communications, please e-mail [email or web address]

## 2018-12-12 NOTE — PROGRESS NOTES
Neurology Consult Note    Chief Complaint: tingling in head and intermittent left leg numbness    HPI:   Shraee Day Jr. is a 68 y.o. who presents for evaluation of paresthesias. Patient feels numbness and tingling in his head but denies headache, he admits to increased stress at home but denies suicidal/homicidal ideation. He states at times he will get left thigh cramping as well as numbness in the whole left leg and he will be unable to walk properly. He denies altered awareness with this. He states it self resolves. He denies leg weakness. He denies back pain. Denies neck pain. He has no further complaints.    Past Medical History:  Past Medical History:   Diagnosis Date    Hyperlipidemia     Hypertension     Obstructive sleep apnea severe with AHI 64 on study 7/2018 7/27/2018    Very severe sleep disordered breathing (AHI=64) is noted based on a 4% hypopnea desaturation criteria. The patient slept supine 64.2% of the night based on valid recording time of 6.43 hours. The apneas/hypopneas are accompanied by severe oxygen desaturation (percent time below 90% SpO2: 15.9%, Min SpO2: 69.6%). The average desaturation across all sleep disordered breathing events is 5.7%. Snoring       Past Surgical History:  Past Surgical History:   Procedure Laterality Date    COLONOSCOPY N/A 8/28/2018    Procedure: COLONOSCOPY;  Surgeon: Davis Hernandez MD;  Location: [facility];  Service: Endoscopy;  Laterality: N/A;    COLONOSCOPY N/A 8/28/2018    [facility] ENDO    MOUTH SURGERY  01/2016    RELEASE DUPUYTREN'S CONTRACTURE Left 3/6/2013    Performed by [facility] at [facility] OR 1ST FLR    REPAIR, TENDON, EXTENSOR Left 3/6/2013    Performed by [facility] at [facility] OR 1ST FLR       Social History:  Social History     Socioeconomic History    Marital status: Single     Spouse name: Not on file    Number of children: Not on file    Years of education: Not on file     Highest education level: Not on file   Social Needs    Financial resource strain: Not on file    Food insecurity - worry: Not on file    Food insecurity - inability: Not on file    Transportation needs - medical: Not on file    Transportation needs - non-medical: Not on file   Occupational History    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; 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; 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     Employer: [employer]   Tobacco Use    Smoking status: Current Every Day Smoker     Types: Cigarettes    Smokeless tobacco: Never Used   Substance and Sexual Activity    Alcohol use: No    Drug use: No    Sexual activity: Yes   Other Topics Concern    Not on file   Social History Narrative    Not on file       Family History:  Family History   Problem Relation Age of Onset    Breast cancer Mother     Hypertension Sister     Hypertension Brother     Hypertension Brother     Hypertension Sister     Mental retardation Son     Melanoma Neg Hx     Psoriasis Neg Hx     Lupus Neg Hx        Medications:  Current Outpatient Medications   Medication Sig Dispense Refill    buPROPion (WELLBUTRIN SR) 150 MG TBSR 12 hr tablet Take 150 mg by mouth once daily.  0    DULoxetine (CYMBALTA) 30 MG capsule       DULoxetine (CYMBALTA) 60 MG capsule       eszopiclone (LUNESTA) 2 MG Tab Take 2 mg by mouth nightly as needed.  1    ezetimibe (ZETIA) 10 mg tablet Take 1 tablet (10 mg total) by mouth once daily. 30 tablet 5    hydroCHLOROthiazide (MICROZIDE) 12.5 mg capsule TAKE 1 CAPSULE(12.5 MG) BY MOUTH EVERY DAY 90 capsule 1    lisinopril (PRINIVIL,ZESTRIL) 20 MG tablet Take 1 tablet (20 mg total) by mouth once daily. 90 tablet 3    LORazepam (ATIVAN) 1 MG tablet Take 2 mg by mouth every evening.       potassium chloride SA (K-DUR,KLOR-CON) 20 MEQ tablet TAKE 1 TABLET(20 MEQ) BY MOUTH TWICE DAILY 180 tablet 1    sildenafil (VIAGRA) 100 MG tablet Take 1 tablet (100 mg total) by mouth daily as needed for Erectile Dysfunction. 10 tablet 5     tamsulosin (FLOMAX) 0.4 mg Cp24        No current facility-administered medications for this visit.        Allergies:  Review of patient's allergies indicates:   Allergen Reactions    Statins-hmg-coa reductase inhibitors      myalgias    Sulfamethoxazole-trimethoprim Other (See Comments)     Muscle pain    Lipitor [atorvastatin] Other (See Comments)     Muscle cramps       ROS:  A 12 point review of system was negative aside from pertinent positives and negatives as outlined above.     Physical Exam  Vitals:    12/12/18 1417   BP: 114/71   Pulse: 73       General: well nourished, well developed  Eyes: no scleral icterus   Nose: nasal turbinates intact  Neck: supple, ROM intact  Skin: no rash or ecchymosis  Joints: no swelling or erythema  Cardiac: regular rate and rhythm  Lungs: clear to auscultation bilaterally    Neuro:  Mental status: AAO x 3, no dysarthria, no aphasia, communicating appropriately  CN: PERRL, EOMI, VFF, V1-V3 sensation intact, no facial asymmetry, hearing grossly intact, tongue midline  Motor:   RUE 5/5  RLE 5/5  LUE 5/5  LLE 5/5    Normal bulk and tone    Reflexes: 2+ throughout, toes equivocal bilaterally  Sensory: intact to light touch throughout  Coordination: no dysmetria on FTN  Gait: steady    Prior Imaging/Labs:  No prior neuroimaging available for review.      Assessment and Plan:  71 yo man with paresthesias in his head and left leg associated with left thigh cramping of unclear etiology. Head paresthesias could be 2/2 occipital neuralgia.    1) Left leg and head paresthesias of unclear etiology  Check CK for left thigh cramping  EMG/NCS to evaluate for neuropathy vs myopathy  Start gabapentin 100mg qhs, patient was made aware of side effects and was advised to notify me if he experiences any.  MRI Brain w/wo contrast    He was advised to notify me for worsening symptoms. I will see him back for EMG/NCS or sooner if necessary.      Thank you for this consultation.    Alisa  Kareem SALINAS  [facility]  [address] [facility] [address]  [place] [address]  [phone]

## 2018-12-19 ENCOUNTER — HOSPITAL ENCOUNTER (OUTPATIENT)
Dept: RADIOLOGY | Facility: HOSPITAL | Age: 68
Discharge: HOME | End: 2018-12-19
Attending: PSYCHIATRY & NEUROLOGY
Payer: MEDICARE

## 2018-12-19 DIAGNOSIS — R20.2 TINGLING: ICD-10-CM

## 2018-12-19 PROCEDURE — 25500020 PHARM REV CODE 255: Performed by: PSYCHIATRY & NEUROLOGY

## 2018-12-19 PROCEDURE — 70553 MRI BRAIN STEM W/O & W/DYE: CPT | Mod: TC

## 2018-12-19 PROCEDURE — A9585 GADOBUTROL INJECTION: HCPCS | Performed by: PSYCHIATRY & NEUROLOGY

## 2018-12-19 PROCEDURE — 70553 MRI BRAIN STEM W/O & W/DYE: CPT | Mod: 26,,, | Performed by: RADIOLOGY

## 2018-12-19 RX ORDER — GADOBUTROL 604.72 MG/ML
10 INJECTION INTRAVENOUS
Status: COMPLETED | OUTPATIENT
Start: 2018-12-19 | End: 2018-12-19

## 2018-12-19 RX ADMIN — GADOBUTROL 10 ML: 604.72 INJECTION INTRAVENOUS at 08:12

## 2019-01-16 ENCOUNTER — CLINICAL SUPPORT (OUTPATIENT)
Dept: SMOKING CESSATION | Facility: CLINIC | Age: 69
End: 2019-01-16
Payer: COMMERCIAL

## 2019-01-16 DIAGNOSIS — F17.200 NICOTINE DEPENDENCE: Primary | ICD-10-CM

## 2019-01-16 PROCEDURE — 99407 PR TOBACCO USE CESSATION INTENSIVE >10 MINUTES: ICD-10-PCS | Mod: S$GLB,,,

## 2019-01-16 PROCEDURE — 99407 BEHAV CHNG SMOKING > 10 MIN: CPT | Mod: S$GLB,,,

## 2019-01-16 NOTE — PROGRESS NOTES
Successful contact with patient regarding tobacco cessation quit #1. Pt states, he was unable to remain tobacco free, he currently smoke 10-20 cigarettes per day, and he is ready to return to the program. Pt is scheduled for quit #2 on 1/17/2019.  Pt informed of his benefit status, future telephone follow ups, and contact information. Will update the tobacco cessation smart form for 3 months on quit #1.

## 2019-01-17 ENCOUNTER — CLINICAL SUPPORT (OUTPATIENT)
Dept: SMOKING CESSATION | Facility: CLINIC | Age: 69
End: 2019-01-17
Payer: COMMERCIAL

## 2019-01-17 DIAGNOSIS — F17.200 NICOTINE DEPENDENCE: Primary | ICD-10-CM

## 2019-01-17 PROCEDURE — 99404 PR PREVENT COUNSEL,INDIV,60 MIN: ICD-10-PCS | Mod: S$GLB,,,

## 2019-01-17 PROCEDURE — 99999 PR PBB SHADOW E&M-EST. PATIENT-LVL I: CPT | Mod: PBBFAC,,,

## 2019-01-17 PROCEDURE — 99999 PR PBB SHADOW E&M-EST. PATIENT-LVL I: ICD-10-PCS | Mod: PBBFAC,,,

## 2019-01-17 PROCEDURE — 99404 PREV MED CNSL INDIV APPRX 60: CPT | Mod: S$GLB,,,

## 2019-01-17 RX ORDER — DM/P-EPHED/ACETAMINOPH/DOXYLAM 30-7.5/3
2 LIQUID (ML) ORAL
Qty: 100 LOZENGE | Refills: 0 | Status: SHIPPED | OUTPATIENT
Start: 2019-01-17 | End: 2019-02-17

## 2019-01-17 NOTE — Clinical Note
Pt presents for intake lapsed, he quit smoking in August 2018 with the program however during the holidays he slipped back up and started smoking 5 cigarettes per day, he thinks this is related to grieving and the holiday taking a toll on him, he lost his son about a year ago and he has taken this rather hard. He struggles with it. After assessment and discussion recommend the 2mg nicotine lozenge in place of the 5 cigarettes he is smoking therefore CTTS advised for him to do an abrupt quit, discussed strategies to help cut back and to help break the routine and habit associated with smoking, intake handout provided to the patient and discussed, all prescribed NRT discussed in depth as well as tobacco cessation medication s/e as well as usage discussed, contact card provided to the patient. Will continue to follow every two weeks while in the program.

## 2019-01-23 DIAGNOSIS — M79.10 MUSCLE ACHE: Primary | ICD-10-CM

## 2019-01-23 NOTE — PROGRESS NOTES
Asked patient to get CK level checked prior to EMG tomorrow as last level was mildly elevated. Advised him to get it drawn prior to EMG as EMG may cause a falsely elevated CK level. He is in agreement with this plan.    Alisa Serna DO

## 2019-01-24 ENCOUNTER — LAB VISIT (OUTPATIENT)
Dept: LAB | Facility: HOSPITAL | Age: 69
End: 2019-01-24
Attending: PSYCHIATRY & NEUROLOGY
Payer: MEDICARE

## 2019-01-24 ENCOUNTER — PROCEDURE VISIT (OUTPATIENT)
Dept: NEUROLOGY | Facility: CLINIC | Age: 69
End: 2019-01-24
Payer: MEDICARE

## 2019-01-24 VITALS — HEIGHT: 71 IN | BODY MASS INDEX: 31.78 KG/M2 | WEIGHT: 227 LBS

## 2019-01-24 DIAGNOSIS — M79.2 RADICULAR PAIN IN LEFT ARM: ICD-10-CM

## 2019-01-24 DIAGNOSIS — M79.10 MUSCLE ACHE: ICD-10-CM

## 2019-01-24 DIAGNOSIS — R25.2 CRAMP AND SPASM: ICD-10-CM

## 2019-01-24 DIAGNOSIS — R20.2 NUMBNESS AND TINGLING OF LEFT SIDE OF FACE: Primary | ICD-10-CM

## 2019-01-24 DIAGNOSIS — R20.0 NUMBNESS AND TINGLING OF LEFT SIDE OF FACE: Primary | ICD-10-CM

## 2019-01-24 LAB — CK SERPL-CCNC: 158 U/L

## 2019-01-24 PROCEDURE — 95910 PR NERVE CONDUCTION STUDY; 7-8 STUDIES: ICD-10-PCS | Mod: 26,S$PBB,, | Performed by: PSYCHIATRY & NEUROLOGY

## 2019-01-24 PROCEDURE — 95910 NRV CNDJ TEST 7-8 STUDIES: CPT | Mod: PBBFAC | Performed by: PSYCHIATRY & NEUROLOGY

## 2019-01-24 PROCEDURE — 95886 MUSC TEST DONE W/N TEST COMP: CPT | Mod: 26,S$PBB,, | Performed by: PSYCHIATRY & NEUROLOGY

## 2019-01-24 PROCEDURE — 99212 PR OFFICE/OUTPT VISIT, EST, LEVL II, 10-19 MIN: ICD-10-PCS | Mod: S$PBB,,, | Performed by: PSYCHIATRY & NEUROLOGY

## 2019-01-24 PROCEDURE — 36415 COLL VENOUS BLD VENIPUNCTURE: CPT

## 2019-01-24 PROCEDURE — 99212 OFFICE O/P EST SF 10 MIN: CPT | Mod: S$PBB,,, | Performed by: PSYCHIATRY & NEUROLOGY

## 2019-01-24 PROCEDURE — 82550 ASSAY OF CK (CPK): CPT

## 2019-01-24 PROCEDURE — 95886 MUSC TEST DONE W/N TEST COMP: CPT | Mod: PBBFAC | Performed by: PSYCHIATRY & NEUROLOGY

## 2019-01-24 PROCEDURE — 95910 NRV CNDJ TEST 7-8 STUDIES: CPT | Mod: 26,S$PBB,, | Performed by: PSYCHIATRY & NEUROLOGY

## 2019-01-24 PROCEDURE — 95886 PR EMG COMPLETE, W/ NERVE CONDUCTION STUDIES, 5+ MUSCLES: ICD-10-PCS | Mod: 26,S$PBB,, | Performed by: PSYCHIATRY & NEUROLOGY

## 2019-02-11 NOTE — PROCEDURES
"Neurology EMG/NCS Note    67 yo man with paresthesias in his left arm and leg. He states on rare occasions, his left leg stops moving for a few seconds. He states he also gets numbness and tingling in his left leg. He denies back pain or neck pain. He denies altered awareness when he cannot move his left leg. He denies a prior history of seizures. His CK was elevated in the past, but repeat level today is within normal limits.He also reports a rare "vibrating" sensation in his head.  He had an MRI at last visit which is clinically most consistent with chronic microvascular ischemic change. It also showed an intraventricular opacity thought to be artifact. He presents today for EMG/NCS.    Physical Exam  Mental status: AAO x 3, no dysarthria, no aphasia, communicating appropriately  CN: PERRL, EOMI, VFF, V1-V3 sensation intact, no facial asymmetry, hearing grossly intact, tongue midline  Motor:   RUE 5/5  RLE 5/5  LUE 5/5  LLE 5/5     Normal bulk and tone     Reflexes: 2+ throughout, toes equivocal bilaterally  Sensory: intact to light touch throughout  Coordination: no dysmetria on FTN  Gait: steady    EMG/NCS was performed today. Please see EMG report for further details. This is a borderline normal EMG/NCS. There was an absent left sural response of unclear clinical significance. There were no myopathic units seen to suggest a myopathy.     EMG/NCS results were discussed with patient in detail. MRI results were also discussed with patient in detail    We discussed repeating an MRI brain to reevaluate intraventricular opacity to ensure this was an artifact and an MRI C spine given paresthesias in left arm and leg. He is in agreement with this plan.    He was advised to notify me for worsening symptoms.     I will see him back in 1 month or sooner if necessary.    Alisa Serna DO  "

## 2019-02-18 ENCOUNTER — LAB VISIT (OUTPATIENT)
Dept: LAB | Facility: HOSPITAL | Age: 69
End: 2019-02-18
Attending: INTERNAL MEDICINE
Payer: MEDICARE

## 2019-02-18 DIAGNOSIS — C61 PROSTATE CANCER: Primary | ICD-10-CM

## 2019-02-18 LAB
PROSTATE SPECIFIC ANTIGEN, TOTAL: 0.08 NG/ML
PSA FREE MFR SERPL: 12.5 %
PSA FREE SERPL-MCNC: 0.01 NG/ML

## 2019-02-18 PROCEDURE — 84154 ASSAY OF PSA FREE: CPT

## 2019-02-18 PROCEDURE — 36415 COLL VENOUS BLD VENIPUNCTURE: CPT | Mod: PO

## 2019-02-18 RX ORDER — TAMSULOSIN HYDROCHLORIDE 0.4 MG/1
0.4 CAPSULE ORAL DAILY
Qty: 90 CAPSULE | Refills: 1 | Status: SHIPPED | OUTPATIENT
Start: 2019-02-18 | End: 2019-05-20 | Stop reason: SDUPTHER

## 2019-03-01 ENCOUNTER — OFFICE VISIT (OUTPATIENT)
Dept: NEUROLOGY | Facility: CLINIC | Age: 69
End: 2019-03-01
Payer: MEDICARE

## 2019-03-01 VITALS
WEIGHT: 217.38 LBS | SYSTOLIC BLOOD PRESSURE: 109 MMHG | HEIGHT: 71 IN | HEART RATE: 67 BPM | DIASTOLIC BLOOD PRESSURE: 72 MMHG | BODY MASS INDEX: 30.43 KG/M2

## 2019-03-01 DIAGNOSIS — R20.0 NUMBNESS AND TINGLING OF LEFT ARM AND LEG: Primary | ICD-10-CM

## 2019-03-01 DIAGNOSIS — R20.2 NUMBNESS AND TINGLING OF LEFT ARM AND LEG: Primary | ICD-10-CM

## 2019-03-01 PROCEDURE — 99999 PR PBB SHADOW E&M-EST. PATIENT-LVL III: ICD-10-PCS | Mod: PBBFAC,,, | Performed by: PSYCHIATRY & NEUROLOGY

## 2019-03-01 PROCEDURE — 99213 OFFICE O/P EST LOW 20 MIN: CPT | Mod: PBBFAC | Performed by: PSYCHIATRY & NEUROLOGY

## 2019-03-01 PROCEDURE — 99214 OFFICE O/P EST MOD 30 MIN: CPT | Mod: S$PBB,,, | Performed by: PSYCHIATRY & NEUROLOGY

## 2019-03-01 PROCEDURE — 99999 PR PBB SHADOW E&M-EST. PATIENT-LVL III: CPT | Mod: PBBFAC,,, | Performed by: PSYCHIATRY & NEUROLOGY

## 2019-03-01 PROCEDURE — 99214 PR OFFICE/OUTPT VISIT, EST, LEVL IV, 30-39 MIN: ICD-10-PCS | Mod: S$PBB,,, | Performed by: PSYCHIATRY & NEUROLOGY

## 2019-03-01 RX ORDER — ASPIRIN/CALCIUM CARB/MAGNESIUM 325 MG
TABLET ORAL
Refills: 0 | COMMUNITY
Start: 2019-01-18 | End: 2019-05-20

## 2019-03-01 RX ORDER — METRONIDAZOLE 500 MG/1
TABLET ORAL
Refills: 0 | COMMUNITY
Start: 2019-01-18 | End: 2019-05-20

## 2019-03-01 NOTE — PROGRESS NOTES
"Neurology Follow Up Note    Chief Complaint: left sided numbness    Interval History:  Since last visit, patient states his symptoms have remained stable. He states approximately 5 times a week he will get numbness in his face arm and leg and this will last up to 6 hours. He denies weakness, or altered awareness with these episodes and feels he can function normally. He states this has been occurring for the past year. He had an MRI brain which showed intraventricular artifact and chronic microvascular ischemic change. I spoke with radiology today and they confirmed that ventricular body is pusle artifact. He denies any episodes of changes in speech, focal weakness, or changes in vision now or in the past. He states his son had seizures, but he denies a personal history. He feels he is unable to focus at times and lightheaded which I feel could be related to anxiety. He is seeing his psychiatrist next week. He denies suicidal or homicidal ideation. He does not feel a difference in symptoms on gabapentin. He denies pain or burning sensation. He has had low back pain in the past but denies current back pain or radicular symptoms. He is able to feel when he has to urinate. He denies bowel problems other than occasional constipation. He denies headaches. He has no further complaints.    Last Visit 1/24/19:  67 yo man with paresthesias in his left arm and leg. He states on rare occasions, his left leg stops moving for a few seconds. He states he also gets numbness and tingling in his left leg. He denies back pain or neck pain. He denies altered awareness when he cannot move his left leg. He denies a prior history of seizures. His CK was elevated in the past, but repeat level today is within normal limits.He also reports a rare "vibrating" sensation in his head.  He had an MRI at last visit which is clinically most consistent with chronic microvascular ischemic change. It also showed an intraventricular opacity thought to " be artifact. He presents today for EMG/NCS.        Past Medical History:  Past Medical History:   Diagnosis Date    Hyperlipidemia     Hypertension     Obstructive sleep apnea severe with AHI 64 on study 7/2018 7/27/2018    Very severe sleep disordered breathing (AHI=64) is noted based on a 4% hypopnea desaturation criteria. The patient slept supine 64.2% of the night based on valid recording time of 6.43 hours. The apneas/hypopneas are accompanied by severe oxygen desaturation (percent time below 90% SpO2: 15.9%, Min SpO2: 69.6%). The average desaturation across all sleep disordered breathing events is 5.7%. Snoring       Past Surgical History:  Past Surgical History:   Procedure Laterality Date    COLONOSCOPY N/A 8/28/2018    [facility]    MOUTH SURGERY  01/2016    RELEASE DUPUYTREN'S CONTRACTURE Left 3/6/2013    Performed [facility] at [facility] 1ST FLR    REPAIR, TENDON, EXTENSOR Left 3/6/2013    Performed by [facility] at [facility] OR 1ST FLR       Social History:  Social History     Socioeconomic History    Marital status: Single     Spouse name: Not on file    Number of children: Not on file    Years of education: Not on file    Highest education level: Not on file   Social Needs    Financial resource strain: Not on file    Food insecurity - worry: Not on file    Food insecurity - inability: Not on file    Transportation needs - medical: Not on file    Transportation needs - non-medical: Not on file   Occupational History    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; 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; 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     Employer: [employer]   Tobacco Use    Smoking status: Current Every Day Smoker     Types: Cigarettes    Smokeless tobacco: Never Used   Substance and Sexual Activity    Alcohol use: No    Drug use: No    Sexual activity: Yes   Other Topics Concern    Not on file   Social History Narrative    Not on file       Family  History:  Family History   Problem Relation Age of Onset    Breast cancer Mother     Hypertension Sister     Hypertension Brother     Hypertension Brother     Hypertension Sister     Mental retardation Son     Melanoma Neg Hx     Psoriasis Neg Hx     Lupus Neg Hx        Medications:  Current Outpatient Medications   Medication Sig Dispense Refill    buPROPion (WELLBUTRIN SR) 150 MG TBSR 12 hr tablet Take 150 mg by mouth once daily.  0    DULoxetine (CYMBALTA) 30 MG capsule       DULoxetine (CYMBALTA) 60 MG capsule       eszopiclone (LUNESTA) 2 MG Tab Take 2 mg by mouth nightly as needed.  1    ezetimibe (ZETIA) 10 mg tablet Take 1 tablet (10 mg total) by mouth once daily. 30 tablet 5    gabapentin (NEURONTIN) 100 MG capsule Take 1 capsule (100 mg total) by mouth every evening. 30 capsule 3    hydroCHLOROthiazide (MICROZIDE) 12.5 mg capsule TAKE 1 CAPSULE(12.5 MG) BY MOUTH EVERY DAY 90 capsule 1    lisinopril (PRINIVIL,ZESTRIL) 20 MG tablet Take 1 tablet (20 mg total) by mouth once daily. 90 tablet 3    LORazepam (ATIVAN) 1 MG tablet Take 2 mg by mouth every evening.       metroNIDAZOLE (FLAGYL) 500 MG tablet TK 4 TS PO TODAY  0    nicotine polacrilex 4 MG Lozg   0    potassium chloride SA (K-DUR,KLOR-CON) 20 MEQ tablet TAKE 1 TABLET(20 MEQ) BY MOUTH TWICE DAILY 180 tablet 1    sildenafil (VIAGRA) 100 MG tablet Take 1 tablet (100 mg total) by mouth daily as needed for Erectile Dysfunction. 10 tablet 5    tamsulosin (FLOMAX) 0.4 mg Cap Take 1 capsule (0.4 mg total) by mouth once daily. 90 capsule 1     No current facility-administered medications for this visit.        Allergies:  Review of patient's allergies indicates:   Allergen Reactions    Statins-hmg-coa reductase inhibitors      myalgias    Sulfamethoxazole-trimethoprim Other (See Comments)     Muscle pain    Lipitor [atorvastatin] Other (See Comments)     Muscle cramps       ROS:  A 12 point review of system was negative aside from  pertinent positives and negatives as outlined above.    Physical Exam  Vitals:    03/01/19 1104   BP: 109/72   Pulse: 67       General: well nourished, well developed  Eyes: no scleral icterus   Nose: nasal turbinates intact  Neck: supple, ROM intact  Skin: no rash or ecchymosis  Joints: no swelling or erythema  Cardiac: regular rate and rhythm  Lungs: clear to auscultation bilaterally    Neuro:  Mental status: AAO x 3, no dysarthria, no aphasia, communicating appropriately  CN: PERRL, EOMI, VFF, V1-V3 sensation intact, no facial asymmetry, hearing grossly intact, tongue midline  Motor:   RUE 5/5  RLE 5/5  LUE 5/5  LLE 5/5    Normal bulk and tone    Reflexes: 2+ throughout, toes equivocal bilaterally  Sensory: intact to light touch throughout  Coordination: no dysmetria on FTN  Gait: steady    Prior Imaging/Labs:  Reviewed      Assessment and Plan:    68 y.o. male with numbness and tingling on left side of body lasting for ours without altered awareness of unclear etiology.    1. Numbness and tingling of left arm and leg   - MRI C spine w/o contrast  - EEG,w/awake & drowsy record to evaluate for interictal abnormalities, however, I have low clinical suspicion for seizures at this time given long duration of events and as patient describes more loss of sensation than paresthesias        Patient was advised to notify me for worsening symptoms. I will see patient back in 3 weeks to review results of above tests or sooner if necessary.         Alisa Serna DO  [facility]  [address] [facility] [address]  [place] [address]  [phone]

## 2019-03-11 ENCOUNTER — HOSPITAL ENCOUNTER (OUTPATIENT)
Dept: NEUROLOGY | Facility: CLINIC | Age: 69
Discharge: HOME | End: 2019-03-11
Attending: PSYCHIATRY & NEUROLOGY
Payer: MEDICARE

## 2019-03-11 DIAGNOSIS — R20.2 NUMBNESS AND TINGLING OF LEFT ARM AND LEG: ICD-10-CM

## 2019-03-11 DIAGNOSIS — R20.0 NUMBNESS AND TINGLING OF LEFT ARM AND LEG: ICD-10-CM

## 2019-03-11 PROCEDURE — 95816 EEG AWAKE AND DROWSY: CPT | Mod: PBBFAC | Performed by: PSYCHIATRY & NEUROLOGY

## 2019-03-11 PROCEDURE — 95816 EEG AWAKE AND DROWSY: CPT | Mod: 26,S$PBB,, | Performed by: PSYCHIATRY & NEUROLOGY

## 2019-03-11 PROCEDURE — 95816 PR EEG,W/AWAKE & DROWSY RECORD: ICD-10-PCS | Mod: 26,S$PBB,, | Performed by: PSYCHIATRY & NEUROLOGY

## 2019-03-20 ENCOUNTER — HOSPITAL ENCOUNTER (OUTPATIENT)
Dept: RADIOLOGY | Facility: HOSPITAL | Age: 69
Discharge: HOME | End: 2019-03-20
Attending: PSYCHIATRY & NEUROLOGY
Payer: COMMERCIAL

## 2019-03-20 DIAGNOSIS — M79.2 RADICULAR PAIN IN LEFT ARM: ICD-10-CM

## 2019-03-20 PROCEDURE — 72141 MRI CERVICAL SPINE WITHOUT CONTRAST: ICD-10-PCS | Mod: 26,,, | Performed by: RADIOLOGY

## 2019-03-20 PROCEDURE — 72141 MRI NECK SPINE W/O DYE: CPT | Mod: 26,,, | Performed by: RADIOLOGY

## 2019-03-20 PROCEDURE — 72141 MRI NECK SPINE W/O DYE: CPT | Mod: TC

## 2019-03-21 NOTE — PROCEDURES
ELECTROENCEPHALOGRAM  REPORT    Sharee Day Jr.  [id number]  1950    DATE OF SERVICE: 3/11/19    REQUESTING PROVIDER: Dr. Serna  REASON FOR CONSULT: right sided paresthesia    METHODOLOGY   Electroencephalographic (EEG) recording is with electrodes placed according to the International 10-20 placement system.  Thirty two (32) channels of digital signal (sampling rate of 512/sec) including T1 and T2 was simultaneously recorded from the scalp and may include  EKG, EMG, and/or eye monitors.  Recording band pass was 0.1 to 512 hz.  Digital video recording of the patient is simultaneously recorded with the EEG.  The patient is instructed report clinical symptoms which may occur during the recording session.  EEG and video recording is stored and archived in digital format.  Activation procedures which include photic stimulation, hyperventilation and instructing patients to perform simple task are done in selected patients.   The EEG is displayed on a monitor screen and can be reviewed using different montages.  Computer assisted analysis is employed to detect spike and electrographic seizure activity.   The entire record is submitted for computer analysis.  The entire recording is visually reviewed and the times identified by computer analysis as being spikes or seizures are reviewed again.  Compresses spectral analysis (CSA) is also performed on the activity recorded from each individual channel.  This is displayed as a power display of frequencies from 0 to 30 Hz over time.   The CSA is reviewed looking for asymmetries in power between homologous areas of the scalp and then compared with the original EEG recording.     [employer] software was also utilized in the review of this study.  This software suite analyzes the EEG recording in multiple domains.  Coherence and rhythmicity is computed to identify EEG sections which may contain organized seizures.  Each channel undergoes analysis to detect presence of spike  and sharp waves which have special and morphological characteristic of epileptic activity.  The routine EEG recording is converted from spacial into frequency domain.  This is then displayed comparing homologous areas to identify areas of significant asymmetry.  Algorithm to identify non-cortically generated artifact is used to separate eye movement, EMG and other artifact from the EEG.      RECORDING TIMES  Start on 3/11/19 at 09:39  Stop on 3/11/19 at 10:16    A total of 0 hrs 31 minutes and 4 seconds of EEG recording was obtained.    EEG FINDINGS  Background activity:   The background rhythm was characterized by 9-10 Hz activity with a 10 Hz posterior dominant alpha rhythm at 30-70 microvolts.   Symmetry and continuity: The background was continuous and symmetric    Sleep:    Drowsy state was seen.    Activation procedures:   Hyperventilation was performed with no abnormalities seen  Photic stimulation was performed with no abnormalities seen    Abnormal activity:   No epileptiform discharges, periodic discharges, lateralized rhythmic delta activity or electrographic seizures were seen.    Irregular EKG was noted    IMPRESSION:   This is a normal routine awake and drowsy EEG.     No typical events were captured during this recording.     A normal EEG does not rule out seizures/epilepsy.    CLINICAL CORRELATION IS RECOMMENDED    I have personally reviewed this EEG and agree with the resident's findings.  Appropriate changes were made by me to reach the final impression.    Amie Parham MD, MBA(), VALENTIN MARIN.  Neurology-Epilepsy.  [facility].      Amie Parham MD, MBA(), VALENTIN MARIN.  Neurology-Epilepsy.  [facility].

## 2019-03-21 NOTE — ADDENDUM NOTE
Encounter addended by: Amie Parham MD on: 3/21/2019 5:46 PM   Actions taken: Charge Capture section accepted

## 2019-03-22 ENCOUNTER — OFFICE VISIT (OUTPATIENT)
Dept: NEUROLOGY | Facility: CLINIC | Age: 69
End: 2019-03-22
Payer: MEDICARE

## 2019-03-22 VITALS
DIASTOLIC BLOOD PRESSURE: 76 MMHG | SYSTOLIC BLOOD PRESSURE: 116 MMHG | HEIGHT: 71 IN | BODY MASS INDEX: 30.38 KG/M2 | WEIGHT: 217 LBS | HEART RATE: 59 BPM

## 2019-03-22 DIAGNOSIS — R20.0 NUMBNESS AND TINGLING: Primary | ICD-10-CM

## 2019-03-22 DIAGNOSIS — R20.2 NUMBNESS AND TINGLING: Primary | ICD-10-CM

## 2019-03-22 PROCEDURE — 99999 PR PBB SHADOW E&M-EST. PATIENT-LVL III: CPT | Mod: PBBFAC,,, | Performed by: PSYCHIATRY & NEUROLOGY

## 2019-03-22 PROCEDURE — 99999 PR PBB SHADOW E&M-EST. PATIENT-LVL III: ICD-10-PCS | Mod: PBBFAC,,, | Performed by: PSYCHIATRY & NEUROLOGY

## 2019-03-22 PROCEDURE — 99213 OFFICE O/P EST LOW 20 MIN: CPT | Mod: S$PBB,,, | Performed by: PSYCHIATRY & NEUROLOGY

## 2019-03-22 PROCEDURE — 99213 PR OFFICE/OUTPT VISIT, EST, LEVL III, 20-29 MIN: ICD-10-PCS | Mod: S$PBB,,, | Performed by: PSYCHIATRY & NEUROLOGY

## 2019-03-22 PROCEDURE — 99213 OFFICE O/P EST LOW 20 MIN: CPT | Mod: PBBFAC | Performed by: PSYCHIATRY & NEUROLOGY

## 2019-03-22 NOTE — PROGRESS NOTES
Neurology Follow Up Note    Chief Complaint: left sided numbness    Interval History:  Since last visit, patient feels his episodes of left sided numbness are improving. He had a routine EEG which was normal, he did not experience the numbness while on routine EEG. He does not feel gabapentin has been helping. He had an MRI C spine which revealed neural foraminal narrowing on the left more than right at various levels, but patient denies feeling radicular neck pain or pain associated with numbness. He denies altered awareness or headaches with left sided numbness. He has no further complaints.      Last Visit 3/1/19:  Since last visit, patient states his symptoms have remained stable. He states approximately 5 times a week he will get numbness in his face arm and leg and this will last up to 6 hours. He denies weakness, or altered awareness with these episodes and feels he can function normally. He states this has been occurring for the past year. He had an MRI brain which showed intraventricular artifact and chronic microvascular ischemic change. I spoke with radiology today and they confirmed that ventricular body is pusle artifact. He denies any episodes of changes in speech, focal weakness, or changes in vision now or in the past. He states his son had seizures, but he denies a personal history. He feels he is unable to focus at times and lightheaded which I feel could be related to anxiety. He is seeing his psychiatrist next week. He denies suicidal or homicidal ideation. He does not feel a difference in symptoms on gabapentin. He denies pain or burning sensation. He has had low back pain in the past but denies current back pain or radicular symptoms. He is able to feel when he has to urinate. He denies bowel problems other than occasional constipation. He denies headaches. He has no further complaints.     Last Visit 1/24/19:  69 yo man with paresthesias in his left arm and leg. He states on rare occasions,  "his left leg stops moving for a few seconds. He states he also gets numbness and tingling in his left leg. He denies back pain or neck pain. He denies altered awareness when he cannot move his left leg. He denies a prior history of seizures. His CK was elevated in the past, but repeat level today is within normal limits.He also reports a rare "vibrating" sensation in his head.  He had an MRI at last visit which is clinically most consistent with chronic microvascular ischemic change. It also showed an intraventricular opacity thought to be artifact. He presents today for EMG/NCS.        Past Medical History:  Past Medical History:   Diagnosis Date    Hyperlipidemia     Hypertension     Obstructive sleep apnea severe with AHI 64 on study 7/2018 7/27/2018    Very severe sleep disordered breathing (AHI=64) is noted based on a 4% hypopnea desaturation criteria. The patient slept supine 64.2% of the night based on valid recording time of 6.43 hours. The apneas/hypopneas are accompanied by severe oxygen desaturation (percent time below 90% SpO2: 15.9%, Min SpO2: 69.6%). The average desaturation across all sleep disordered breathing events is 5.7%. Snoring       Past Surgical History:  Past Surgical History:   Procedure Laterality Date    COLONOSCOPY N/A 8/28/2018    [facility]O    MOUTH SURGERY  01/2016    RELEASE DUPUYTREN'S CONTRACTURE Left 3/6/2013    [facility] 1ST FLR    REPAIR, TENDON, EXTENSOR Left 3/6/2013    Performed by [facility] at [facility] OR 1ST FLR       Social History:  Social History     Socioeconomic History    Marital status: Single     Spouse name: Not on file    Number of children: Not on file    Years of education: Not on file    Highest education level: Not on file   Social Needs    Financial resource strain: Not on file    Food insecurity - worry: Not on file    Food insecurity - inability: Not on file "    Transportation needs - medical: Not on file    Transportation needs - non-medical: Not on file   Occupational History    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; 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; 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     Employer: [employer]   Tobacco Use    Smoking status: Current Every Day Smoker     Types: Cigarettes    Smokeless tobacco: Never Used   Substance and Sexual Activity    Alcohol use: No    Drug use: No    Sexual activity: Yes   Other Topics Concern    Not on file   Social History Narrative    Not on file       Family History:  Family History   Problem Relation Age of Onset    Breast cancer Mother     Hypertension Sister     Hypertension Brother     Hypertension Brother     Hypertension Sister     Mental retardation Son     Melanoma Neg Hx     Psoriasis Neg Hx     Lupus Neg Hx        Medications:  Current Outpatient Medications   Medication Sig Dispense Refill    buPROPion (WELLBUTRIN SR) 150 MG TBSR 12 hr tablet Take 150 mg by mouth once daily.  0    DULoxetine (CYMBALTA) 30 MG capsule       DULoxetine (CYMBALTA) 60 MG capsule       eszopiclone (LUNESTA) 2 MG Tab Take 2 mg by mouth nightly as needed.  1    ezetimibe (ZETIA) 10 mg tablet Take 1 tablet (10 mg total) by mouth once daily. 30 tablet 5    gabapentin (NEURONTIN) 100 MG capsule Take 1 capsule (100 mg total) by mouth every evening. 30 capsule 3    hydroCHLOROthiazide (MICROZIDE) 12.5 mg capsule TAKE 1 CAPSULE(12.5 MG) BY MOUTH EVERY DAY 90 capsule 1    lisinopril (PRINIVIL,ZESTRIL) 20 MG tablet Take 1 tablet (20 mg total) by mouth once daily. 90 tablet 3    LORazepam (ATIVAN) 1 MG tablet Take 2 mg by mouth every evening.       metroNIDAZOLE (FLAGYL) 500 MG tablet TK 4 TS PO TODAY  0    nicotine polacrilex 4 MG Lozg   0    potassium chloride SA (K-DUR,KLOR-CON) 20 MEQ tablet TAKE 1 TABLET(20 MEQ) BY MOUTH TWICE DAILY 180 tablet 1    sildenafil (VIAGRA) 100 MG tablet Take 1 tablet (100 mg total) by mouth daily as needed for  Erectile Dysfunction. 10 tablet 5    tamsulosin (FLOMAX) 0.4 mg Cap Take 1 capsule (0.4 mg total) by mouth once daily. 90 capsule 1     No current facility-administered medications for this visit.        Allergies:  Review of patient's allergies indicates:   Allergen Reactions    Statins-hmg-coa reductase inhibitors      myalgias    Sulfamethoxazole-trimethoprim Other (See Comments)     Muscle pain    Lipitor [atorvastatin] Other (See Comments)     Muscle cramps       ROS:  A 12 point review of system was negative aside from pertinent positives and negatives as outlined above.    Physical Exam  There were no vitals filed for this visit.    General: well nourished, well developed  Eyes: no scleral icterus   Nose: nasal turbinates intact  Neck: supple, ROM intact  Skin: no rash or ecchymosis  Joints: no swelling or erythema  Cardiac: regular rate and rhythm  Lungs: clear to auscultation bilaterally    Neuro:  Mental status: AAO x 3, no dysarthria, no aphasia, communicating appropriately  CN: PERRL, EOMI, VFF, V1-V3 sensation intact, no facial asymmetry, hearing grossly intact, tongue midline  Motor:   RUE 5/5  RLE 5/5  LUE 5/5  LLE 5/5    Normal bulk and tone    Reflexes: 2+ throughout, toes equivocal bilaterally  Sensory: intact to light touch throughout  Coordination: no dysmetria on FTN  Gait: steady    Prior Imaging/Labs:  Reviewed      Assessment and Plan:    68 y.o. male with numbness and tingling on left side of body of unclear etiology. Since last visit, he feels his symptoms have been improving. We discussed obtaining a prolonged ambulatory EEG in hopes of capturing episode, but patient would like to hold off at this time as symptoms are improving.    1. Numbness and tingling  Improving  Will reconsider ambulatory EEG at next visit if symptoms not resolved         Patient was advised to notify me for worsening symptoms. I will see patient back in 3 months or sooner if necessary.       Alisa Serna  DO  [facility]  [address] [facility]  [place] [address]  [phone]

## 2019-04-11 DIAGNOSIS — R20.2 TINGLING: ICD-10-CM

## 2019-04-11 DIAGNOSIS — I10 ESSENTIAL HYPERTENSION: Chronic | ICD-10-CM

## 2019-04-11 RX ORDER — HYDROCHLOROTHIAZIDE 12.5 MG/1
CAPSULE ORAL
Qty: 90 CAPSULE | Refills: 1 | Status: SHIPPED | OUTPATIENT
Start: 2019-04-11 | End: 2019-05-20 | Stop reason: SDUPTHER

## 2019-04-11 RX ORDER — GABAPENTIN 100 MG/1
CAPSULE ORAL
Qty: 30 CAPSULE | Refills: 0 | OUTPATIENT
Start: 2019-04-11

## 2019-04-11 RX ORDER — LISINOPRIL 20 MG/1
TABLET ORAL
Qty: 90 TABLET | Refills: 1 | Status: SHIPPED | OUTPATIENT
Start: 2019-04-11 | End: 2019-05-20 | Stop reason: SDUPTHER

## 2019-04-24 DIAGNOSIS — E78.00 PURE HYPERCHOLESTEROLEMIA: ICD-10-CM

## 2019-04-24 RX ORDER — EZETIMIBE 10 MG/1
TABLET ORAL
Qty: 90 TABLET | Refills: 0 | Status: SHIPPED | OUTPATIENT
Start: 2019-04-24 | End: 2019-05-20 | Stop reason: SDUPTHER

## 2019-05-20 ENCOUNTER — OFFICE VISIT (OUTPATIENT)
Dept: FAMILY MEDICINE | Facility: CLINIC | Age: 69
End: 2019-05-20
Payer: MEDICARE

## 2019-05-20 VITALS
WEIGHT: 213 LBS | OXYGEN SATURATION: 96 % | BODY MASS INDEX: 29.82 KG/M2 | TEMPERATURE: 99 F | HEIGHT: 71 IN | HEART RATE: 72 BPM | SYSTOLIC BLOOD PRESSURE: 92 MMHG | DIASTOLIC BLOOD PRESSURE: 66 MMHG

## 2019-05-20 DIAGNOSIS — N40.0 BENIGN NON-NODULAR PROSTATIC HYPERPLASIA WITHOUT LOWER URINARY TRACT SYMPTOMS: Chronic | ICD-10-CM

## 2019-05-20 DIAGNOSIS — R20.0 NUMBNESS OR TINGLING: ICD-10-CM

## 2019-05-20 DIAGNOSIS — T50.2X5A DIURETIC-INDUCED HYPOKALEMIA: ICD-10-CM

## 2019-05-20 DIAGNOSIS — I10 ESSENTIAL HYPERTENSION: Chronic | ICD-10-CM

## 2019-05-20 DIAGNOSIS — E87.6 DIURETIC-INDUCED HYPOKALEMIA: ICD-10-CM

## 2019-05-20 DIAGNOSIS — T46.6X5A STATIN MYOPATHY: ICD-10-CM

## 2019-05-20 DIAGNOSIS — I73.9 PERIPHERAL ARTERY DISEASE: ICD-10-CM

## 2019-05-20 DIAGNOSIS — G72.0 STATIN MYOPATHY: ICD-10-CM

## 2019-05-20 DIAGNOSIS — R30.0 DYSURIA: Primary | ICD-10-CM

## 2019-05-20 DIAGNOSIS — R20.2 TINGLING: ICD-10-CM

## 2019-05-20 DIAGNOSIS — E78.00 PURE HYPERCHOLESTEROLEMIA: ICD-10-CM

## 2019-05-20 DIAGNOSIS — R20.2 NUMBNESS OR TINGLING: ICD-10-CM

## 2019-05-20 DIAGNOSIS — R50.9 FEVER, UNSPECIFIED FEVER CAUSE: ICD-10-CM

## 2019-05-20 PROBLEM — M79.10 MYALGIA DUE TO STATIN: Status: RESOLVED | Noted: 2018-06-06 | Resolved: 2019-05-20

## 2019-05-20 PROBLEM — Z86.010 HISTORY OF COLONIC POLYPS: Status: RESOLVED | Noted: 2018-08-02 | Resolved: 2019-05-20

## 2019-05-20 PROBLEM — Z86.0100 HISTORY OF COLONIC POLYPS: Status: RESOLVED | Noted: 2018-08-02 | Resolved: 2019-05-20

## 2019-05-20 PROBLEM — Z12.11 SCREEN FOR COLON CANCER: Status: RESOLVED | Noted: 2018-08-28 | Resolved: 2019-05-20

## 2019-05-20 LAB
BACTERIA #/AREA URNS AUTO: ABNORMAL /HPF
BILIRUB UR QL STRIP: NEGATIVE
CLARITY UR REFRACT.AUTO: ABNORMAL
COLOR UR AUTO: ABNORMAL
GLUCOSE UR QL STRIP: NEGATIVE
HGB UR QL STRIP: ABNORMAL
HYALINE CASTS UR QL AUTO: 0 /LPF
KETONES UR QL STRIP: NEGATIVE
LEUKOCYTE ESTERASE UR QL STRIP: ABNORMAL
MICROSCOPIC COMMENT: ABNORMAL
NITRITE UR QL STRIP: NEGATIVE
PH UR STRIP: 6 [PH] (ref 5–8)
PROT UR QL STRIP: ABNORMAL
RBC #/AREA URNS AUTO: 15 /HPF (ref 0–4)
SP GR UR STRIP: 1.03 (ref 1–1.03)
SQUAMOUS #/AREA URNS AUTO: 2 /HPF
URN SPEC COLLECT METH UR: ABNORMAL
WBC #/AREA URNS AUTO: >100 /HPF (ref 0–5)

## 2019-05-20 PROCEDURE — 99999 PR PBB SHADOW E&M-EST. PATIENT-LVL III: ICD-10-PCS | Mod: PBBFAC,,, | Performed by: INTERNAL MEDICINE

## 2019-05-20 PROCEDURE — 87086 URINE CULTURE/COLONY COUNT: CPT

## 2019-05-20 PROCEDURE — 99213 OFFICE O/P EST LOW 20 MIN: CPT | Mod: PBBFAC,PO | Performed by: INTERNAL MEDICINE

## 2019-05-20 PROCEDURE — 99999 PR PBB SHADOW E&M-EST. PATIENT-LVL III: CPT | Mod: PBBFAC,,, | Performed by: INTERNAL MEDICINE

## 2019-05-20 PROCEDURE — 99214 OFFICE O/P EST MOD 30 MIN: CPT | Mod: S$PBB,,, | Performed by: INTERNAL MEDICINE

## 2019-05-20 PROCEDURE — 81001 URINALYSIS AUTO W/SCOPE: CPT

## 2019-05-20 PROCEDURE — 99214 PR OFFICE/OUTPT VISIT, EST, LEVL IV, 30-39 MIN: ICD-10-PCS | Mod: S$PBB,,, | Performed by: INTERNAL MEDICINE

## 2019-05-20 RX ORDER — LISINOPRIL 20 MG/1
TABLET ORAL
Qty: 90 TABLET | Refills: 1 | Status: SHIPPED | OUTPATIENT
Start: 2019-05-20 | End: 2019-12-20 | Stop reason: ALTCHOICE

## 2019-05-20 RX ORDER — BUPROPION HYDROCHLORIDE 150 MG/1
150 TABLET, EXTENDED RELEASE ORAL DAILY
Qty: 90 TABLET | Refills: 0 | Status: CANCELLED | OUTPATIENT
Start: 2019-05-20

## 2019-05-20 RX ORDER — TAMSULOSIN HYDROCHLORIDE 0.4 MG/1
0.4 CAPSULE ORAL DAILY
Qty: 90 CAPSULE | Refills: 1 | Status: SHIPPED | OUTPATIENT
Start: 2019-05-20 | End: 2019-06-10 | Stop reason: SDUPTHER

## 2019-05-20 RX ORDER — EZETIMIBE 10 MG/1
TABLET ORAL
Qty: 90 TABLET | Refills: 3 | Status: SHIPPED | OUTPATIENT
Start: 2019-05-20 | End: 2020-06-10

## 2019-05-20 RX ORDER — POTASSIUM CHLORIDE 20 MEQ/1
TABLET, EXTENDED RELEASE ORAL
Qty: 180 TABLET | Refills: 1 | Status: SHIPPED | OUTPATIENT
Start: 2019-05-20 | End: 2019-12-20 | Stop reason: ALTCHOICE

## 2019-05-20 RX ORDER — LORAZEPAM 1 MG/1
2 TABLET ORAL NIGHTLY
Status: CANCELLED | OUTPATIENT
Start: 2019-05-20

## 2019-05-20 RX ORDER — METHYLPHENIDATE HYDROCHLORIDE 10 MG/1
10 TABLET ORAL 2 TIMES DAILY
Refills: 0 | COMMUNITY
Start: 2019-04-03 | End: 2020-08-24 | Stop reason: ALTCHOICE

## 2019-05-20 RX ORDER — HYDROCHLOROTHIAZIDE 12.5 MG/1
CAPSULE ORAL
Qty: 90 CAPSULE | Refills: 1 | Status: SHIPPED | OUTPATIENT
Start: 2019-05-20 | End: 2019-10-03 | Stop reason: SDUPTHER

## 2019-05-20 RX ORDER — LEVOFLOXACIN 500 MG/1
500 TABLET, FILM COATED ORAL DAILY
Qty: 14 TABLET | Refills: 0 | Status: SHIPPED | OUTPATIENT
Start: 2019-05-20 | End: 2019-06-21

## 2019-05-20 RX ORDER — GABAPENTIN 100 MG/1
100 CAPSULE ORAL NIGHTLY
Qty: 30 CAPSULE | Refills: 3 | Status: CANCELLED | OUTPATIENT
Start: 2019-05-20

## 2019-05-20 RX ORDER — ESZOPICLONE 2 MG/1
2 TABLET, FILM COATED ORAL NIGHTLY PRN
Qty: 90 TABLET | Refills: 1 | Status: CANCELLED | OUTPATIENT
Start: 2019-05-20

## 2019-05-20 NOTE — PROGRESS NOTES
This note was created by combination of typed  and Dragon dictation.  Transcription errors may be present.  If there are any questions, please contact me.    Assessment & Plan:   Dysuria  Fever, unspecified fever cause  -his urinary symptoms seem to have preceded his GI constipation symptoms.  With a known history of prostate cancer.  And enlargement.  Check urine culture.  Check blood culture as well given the low blood pressure.  Start empiric treatment with Levaquin 500 for 14 days.  If blood cultures are positive he will need to have further evaluation and I warned him of the possibility of possible hospitalization.  -     Urine culture  -     Urinalysis  -     levoFLOXacin (LEVAQUIN) 500 MG tablet; Take 1 tablet (500 mg total) by mouth once daily.  Dispense: 14 tablet; Refill: 0  -     Blood culture; Future; Expected date: 05/20/2019  -     Blood culture; Future; Expected date: 05/20/2019  -     CBC auto differential; Future; Expected date: 05/20/2019  -     Basic metabolic panel; Future; Expected date: 05/20/2019    Peripheral artery disease mild on doppler  Pure hypercholesterolemia  -intolerant of statin, on Zetia    Tingling  -workup with neurology pending.    Essential hypertension  Diuretic-induced hypokalemia  -refilled lisinopril hctz and k.  Holding these meds for low BP currently.  -     lisinopril (PRINIVIL,ZESTRIL) 20 MG tablet; TAKE 1 TABLET(20 MG) BY MOUTH EVERY DAY  Dispense: 90 tablet; Refill: 1  -     potassium chloride SA (K-DUR,KLOR-CON) 20 MEQ tablet; TAKE 1 TABLET(20 MEQ) BY MOUTH TWICE DAILY  Dispense: 180 tablet; Refill: 1  -     hydroCHLOROthiazide (MICROZIDE) 12.5 mg capsule; TAKE 1 CAPSULE(12.5 MG) BY MOUTH EVERY DAY  Dispense: 90 capsule; Refill: 1    Benign non-nodular prostatic hyperplasia without lower urinary tract symptoms  -refilled flomax  -     tamsulosin (FLOMAX) 0.4 mg Cap; Take 1 capsule (0.4 mg total) by mouth once daily.  Dispense: 90 capsule; Refill: 1    Other  orders  -     ezetimibe (ZETIA) 10 mg tablet; TAKE 1 TABLET(10 MG) BY MOUTH EVERY DAY  Dispense: 90 tablet; Refill: 3        Medications Discontinued During This Encounter   Medication Reason    metroNIDAZOLE (FLAGYL) 500 MG tablet Patient no longer taking    nicotine polacrilex 4 MG Lozg Patient no longer taking    DULoxetine (CYMBALTA) 30 MG capsule     ezetimibe (ZETIA) 10 mg tablet Reorder    hydroCHLOROthiazide (MICROZIDE) 12.5 mg capsule Reorder    lisinopril (PRINIVIL,ZESTRIL) 20 MG tablet Reorder    potassium chloride SA (K-DUR,KLOR-CON) 20 MEQ tablet Reorder    tamsulosin (FLOMAX) 0.4 mg Cap Reorder       meds sent this encounter:  Medications Ordered This Encounter   Medications    ezetimibe (ZETIA) 10 mg tablet     Sig: TAKE 1 TABLET(10 MG) BY MOUTH EVERY DAY     Dispense:  90 tablet     Refill:  3    hydroCHLOROthiazide (MICROZIDE) 12.5 mg capsule     Sig: TAKE 1 CAPSULE(12.5 MG) BY MOUTH EVERY DAY     Dispense:  90 capsule     Refill:  1    levoFLOXacin (LEVAQUIN) 500 MG tablet     Sig: Take 1 tablet (500 mg total) by mouth once daily.     Dispense:  14 tablet     Refill:  0    lisinopril (PRINIVIL,ZESTRIL) 20 MG tablet     Sig: TAKE 1 TABLET(20 MG) BY MOUTH EVERY DAY     Dispense:  90 tablet     Refill:  1    potassium chloride SA (K-DUR,KLOR-CON) 20 MEQ tablet     Sig: TAKE 1 TABLET(20 MEQ) BY MOUTH TWICE DAILY     Dispense:  180 tablet     Refill:  1    tamsulosin (FLOMAX) 0.4 mg Cap     Sig: Take 1 capsule (0.4 mg total) by mouth once daily.     Dispense:  90 capsule     Refill:  1       Follow Up: No follow-ups on file.    Subjective:     Chief Complaint   Patient presents with    Fever     over the weekend    Abdominal Pain     soreness lower stomach    Nausea    Urinary Retention       CHERRY Tolliver is a 68 y.o. male, last appointment with this clinic was 12/3/2018.    He saw Neurology for tingling of the head as well as the left arm and leg.  12/19/18 MRI brain: 1. Numerous tiny  focal areas of remote gliosis posterior frontal parietal and occipital and upper basal ganglia areas, differential diagnosis includes microvascular ischemic change and inactive demyelinating process.  2. Intraventricular artifact opacity body left lateral ventricle discussed above.  12/2018 MRI C spine: 1. Multilevel spondylotic changes as discussed above.  2. Multilevel foraminal stenotic changes more pronounced on the left as compared to the right as discussed above.  See further details above.  1/24/19 EMG: borderline normal EMG/NCS. There was an absent   left sural response of unclear clinical significance. There were   no myopathic units seen to suggest a myopathy.     MRI brain demyelinating protocol ordered    Notes dysuria.  Started taking flomax.  But started getting burning sensation. And soreness pointing to the lower abd.  Took OTC Azo which changed the urine color.  Then got constipation.  And then fever- on Friday fever to 101.3.  Took tylenol.      Took a laxative and self administered an enema.  Feels rectal soreness as well. Urinary frequency.  Had been rx'd tamsulosin but had been taking it every other day so increased it to daily with onset of symptoms. [facility] [place]     cymbalta taking 30 mg 2 tablets = 60 mg not 90 mg. And started on ritalin BID    BP low on intake.  Did not take BP meds this AM. Did not take lisinopril or HCTZ this AM.     Patient Care Team:  Navin Charlton MD as PCP - General (Internal Medicine)  Alfredo Kearns MD as Consulting Physician (Urology)  Adeel Coleman MD as Consulting Physician (Psychiatry)  Adeel Vicente MD (Hematology and Oncology)    Patient Active Problem List    Diagnosis Date Noted    Peripheral artery disease mild on doppler 05/20/2019     4/10/2018 LE dopplers: 1. Right lower extremity pressures and waveforms indicate no hemodynamically significant arterial occlusive disease.  2. Left lower extremity pressures and  waveforms indicate mild arterial occlusive disease.     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 The patient slept supine 64.2% of the night based  on valid recording time of 6.43 hours. The apneas/hypopneas are accompanied by severe oxygen desaturation (percent time  below 90% SpO2: 15.9%, Min SpO2: 69.6%). The average desaturation across all sleep disordered breathing events is 5.7%.  Snoring occurs for 45.4% (30 dB) of the study, 17.3% is extremely loud. The mean pulse rate is 63 BPM, with frequent pulse  rate variability (52 events with >= 6 BPM increase/decrease per hour).  TREATMENT CONSIDERATIONS: Consider nasal continuous positive airway pressure (CPAP) as the initial treatment  choice for severe obstructive sleep apnea. Consider an attended in-lab CPAP titration study, given the possibility of co-morbid  sleep related hypoventilation.  DISEASE MANAGEMENT CONSIDERATIONS: Insomnia is a symptom that can be associated with untreated DAKOTA.  Sleeping pills may increase the severity of untreated DAKOTA and their use should be reevaluated once the DAKOTA is treated.      Major depressive disorder 10/30/2017    Pelvic floor muscle wasting in male 09/28/2017    Prostate cancer s/p XRT currently hormone deprivation 08/30/2017     2 cores positive kary 3+4=7 on biopsy 8/15/2017      Smoker 08/30/2017    Vitamin D deficiency 08/30/2017    Beta thalassemia minor 04/11/2017    Benign non-nodular prostatic hyperplasia without lower urinary tract symptoms 03/29/2017     Hx of negative prostate Bx and hx of MRI prostate      Restless leg syndrome Requip ineffective 08/02/2016    Pure hypercholesterolemia; statin intolerance. 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 Trazodone ineffective.  Rozerem didn't help 11/20/2013       PAST MEDICAL HISTORY:  Past Medical History:   Diagnosis Date    Hyperlipidemia     Hypertension     Obstructive sleep apnea severe with AHI 64 on study 7/2018 7/27/2018    Very severe sleep disordered breathing (AHI=64) is noted based on a 4% hypopnea desaturation criteria. The patient slept supine 64.2% of the night based on valid recording time of 6.43 hours. The apneas/hypopneas are accompanied by severe oxygen desaturation (percent time below 90% SpO2: 15.9%, Min SpO2: 69.6%). The average desaturation across all sleep disordered breathing events is 5.7%. Snoring       PAST SURGICAL HISTORY:  Past Surgical History:   Procedure Laterality Date    COLONOSCOPY N/A 8/28/2018    [facility]    MOUTH SURGERY  01/2016    RELEASE DUPUYTREN'S CONTRACTURE Left 3/6/2013    [facility]    REPAIR, TENDON, EXTENSOR Left 3/6/2013    [facility] at [facility]       SOCIAL HISTORY:  Social History     Socioeconomic History    Marital status: Single     Spouse name: Not on file    Number of children: Not on file    Years of education: Not on file    Highest education level: Not on file   Occupational History    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; 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; 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     Employer: [employer]   Social Needs    Financial resource strain: Not on file    Food insecurity:     Worry: Not on file     Inability: Not on file    Transportation needs:     Medical: Not on file     Non-medical: Not on file   Tobacco Use    Smoking status: Current Every Day Smoker     Types: Cigarettes    Smokeless tobacco: Never Used   Substance and Sexual Activity    Alcohol use:  No    Drug use: No    Sexual activity: Yes   Lifestyle    Physical activity:     Days per week: Not on file     Minutes per session: Not on file    Stress: Not on file   Relationships    Social connections:     Talks on phone: Not on file     Gets together: Not on file     Attends Shinto service: Not on file     Active member of club or organization: Not on file     Attends meetings of clubs or organizations: Not on file     Relationship status: Not on file   Other Topics Concern    Not on file   Social History Narrative    Not on file       ALLERGIES AND MEDICATIONS: updated and reviewed.  Review of patient's allergies indicates:   Allergen Reactions    Crestor  [rosuvastatin]      Other reaction(s): Muscle pain    Statins-hmg-coa reductase inhibitors      myalgias    Sulfamethoxazole-trimethoprim Other (See Comments)     Muscle pain    Atorvastatin Other (See Comments)     Muscle cramps  Other reaction(s): Muscle pain     Current Outpatient Medications   Medication Sig Dispense Refill    buPROPion (WELLBUTRIN SR) 150 MG TBSR 12 hr tablet Take 150 mg by mouth once daily.  0    DULoxetine (CYMBALTA) 30 MG capsule       DULoxetine (CYMBALTA) 60 MG capsule       eszopiclone (LUNESTA) 2 MG Tab Take 2 mg by mouth nightly as needed.  1    ezetimibe (ZETIA) 10 mg tablet TAKE 1 TABLET(10 MG) BY MOUTH EVERY DAY 90 tablet 0    gabapentin (NEURONTIN) 100 MG capsule Take 1 capsule (100 mg total) by mouth every evening. 30 capsule 3    hydroCHLOROthiazide (MICROZIDE) 12.5 mg capsule TAKE 1 CAPSULE(12.5 MG) BY MOUTH EVERY DAY 90 capsule 1    lisinopril (PRINIVIL,ZESTRIL) 20 MG tablet TAKE 1 TABLET(20 MG) BY MOUTH EVERY DAY 90 tablet 1    LORazepam (ATIVAN) 1 MG tablet Take 2 mg by mouth every evening.       potassium chloride SA (K-DUR,KLOR-CON) 20 MEQ tablet TAKE 1 TABLET(20 MEQ) BY MOUTH TWICE DAILY 180 tablet 1    sildenafil (VIAGRA) 100 MG tablet Take 1 tablet (100 mg total) by mouth daily as needed  "for Erectile Dysfunction. 10 tablet 5    tamsulosin (FLOMAX) 0.4 mg Cap Take 1 capsule (0.4 mg total) by mouth once daily. 90 capsule 1     No current facility-administered medications for this visit.        Review of Systems   Constitutional: Positive for chills and fever.   Respiratory: Negative for cough.    Cardiovascular: Negative for chest pain and palpitations.   Gastrointestinal: Positive for constipation. Negative for blood in stool and melena.   Genitourinary: Positive for dysuria.   All other systems reviewed and are negative.      Objective:   Physical Exam   Vitals:    05/20/19 0829   BP: 92/66   BP Location: Right arm   Patient Position: Sitting   BP Method: Medium (Automatic)   Pulse: 72   Temp: 99 °F (37.2 °C)   TempSrc: Oral   SpO2: 96%   Weight: 96.6 kg (213 lb)   Height: 5' 11" (1.803 m)    Body mass index is 29.71 kg/m².  Weight: 96.6 kg (213 lb)   Height: 5' 11" (180.3 cm)     Physical Exam   Constitutional: He is oriented to person, place, and time. He appears well-developed and well-nourished. No distress.   Lower energy compared to previous   Eyes: EOM are normal.   Cardiovascular: Normal rate, regular rhythm and normal heart sounds.   No murmur heard.  Pulmonary/Chest: Effort normal and breath sounds normal.   Abdominal:   Notes suprapubic area tenderness   Musculoskeletal: Normal range of motion.   Neurological: He is alert and oriented to person, place, and time. Coordination normal.   Skin: Skin is warm and dry.   Psychiatric: He has a normal mood and affect. His behavior is normal. Thought content normal.     "

## 2019-05-21 LAB — BACTERIA UR CULT: NO GROWTH

## 2019-05-26 NOTE — PROGRESS NOTES
Blood cultures negative  CBC with leukocytosis  BMP glc elevated otherwise normal.  UCx negative  Treated with LQ at [facility].  Results to pt via [facility]. Follow up if no improvement.

## 2019-06-10 ENCOUNTER — TELEPHONE (OUTPATIENT)
Dept: FAMILY MEDICINE | Facility: CLINIC | Age: 69
End: 2019-06-10

## 2019-06-10 DIAGNOSIS — N40.0 BENIGN NON-NODULAR PROSTATIC HYPERPLASIA WITHOUT LOWER URINARY TRACT SYMPTOMS: Primary | Chronic | ICD-10-CM

## 2019-06-10 DIAGNOSIS — R39.15 URINARY URGENCY: ICD-10-CM

## 2019-06-10 DIAGNOSIS — E87.6 DIURETIC-INDUCED HYPOKALEMIA: ICD-10-CM

## 2019-06-10 DIAGNOSIS — T50.2X5A DIURETIC-INDUCED HYPOKALEMIA: ICD-10-CM

## 2019-06-10 RX ORDER — TAMSULOSIN HYDROCHLORIDE 0.4 MG/1
0.8 CAPSULE ORAL DAILY
Qty: 180 CAPSULE | Refills: 1 | Status: SHIPPED | OUTPATIENT
Start: 2019-06-10 | End: 2020-04-13

## 2019-06-10 NOTE — TELEPHONE ENCOUNTER
Spoke with pt states he would like to get back on the flomax x2 a day pt states he has been having urinary flow problems with the way he is taking it now, states when taken twice a day he has no issues, pt states he would need a new RX if going back to twice a day, he does not have enough to last till next refill..      Please advise

## 2019-06-10 NOTE — TELEPHONE ENCOUNTER
----- Message from Vijaya Cerda sent at 6/10/2019  9:11 AM CDT -----  ..Type: Patient Call Back    Who called: pt     What is the request in detail: pt requesting to have instructions on --tamsulosin (FLOMAX) 0.4 mg Cap-- to be back to 2x a day as it was previously b/c he is having urinary problems.     Can the clinic reply by [facility]? No     Would the patient rather a call back or a response via [facility]? Call back     Best call back number: [phone]    Additional Information:..  [facility] [address] [place] [address] [address] [id number]  Phone: [phone] Fax: [phone]

## 2019-06-21 ENCOUNTER — OFFICE VISIT (OUTPATIENT)
Dept: NEUROLOGY | Facility: CLINIC | Age: 69
End: 2019-06-21
Payer: MEDICARE

## 2019-06-21 VITALS
BODY MASS INDEX: 30 KG/M2 | WEIGHT: 214.31 LBS | HEIGHT: 71 IN | DIASTOLIC BLOOD PRESSURE: 66 MMHG | HEART RATE: 56 BPM | SYSTOLIC BLOOD PRESSURE: 111 MMHG

## 2019-06-21 DIAGNOSIS — R20.0 NUMBNESS AND TINGLING: Primary | ICD-10-CM

## 2019-06-21 DIAGNOSIS — R20.2 NUMBNESS AND TINGLING: Primary | ICD-10-CM

## 2019-06-21 PROCEDURE — 99213 OFFICE O/P EST LOW 20 MIN: CPT | Mod: S$PBB,,, | Performed by: PSYCHIATRY & NEUROLOGY

## 2019-06-21 PROCEDURE — 99213 PR OFFICE/OUTPT VISIT, EST, LEVL III, 20-29 MIN: ICD-10-PCS | Mod: S$PBB,,, | Performed by: PSYCHIATRY & NEUROLOGY

## 2019-06-21 PROCEDURE — 99213 OFFICE O/P EST LOW 20 MIN: CPT | Mod: PBBFAC | Performed by: PSYCHIATRY & NEUROLOGY

## 2019-06-21 PROCEDURE — 99999 PR PBB SHADOW E&M-EST. PATIENT-LVL III: ICD-10-PCS | Mod: PBBFAC,,, | Performed by: PSYCHIATRY & NEUROLOGY

## 2019-06-21 PROCEDURE — 99999 PR PBB SHADOW E&M-EST. PATIENT-LVL III: CPT | Mod: PBBFAC,,, | Performed by: PSYCHIATRY & NEUROLOGY

## 2019-06-21 RX ORDER — MIRTAZAPINE 15 MG/1
TABLET, FILM COATED ORAL
Refills: 2 | COMMUNITY
Start: 2019-06-17 | End: 2020-08-24 | Stop reason: ALTCHOICE

## 2019-06-21 NOTE — PROGRESS NOTES
Neurology Follow Up Note    Chief Complaint: follow up for episodes of left sided numbness    Interval History:  Since last visit, patient states his symptoms have improved. He states on rare occasions he will get left sided numbness for about an hour, but this usually happens when he does not sleep well at night. He denies altered awareness or shaking with these episodes. He states his PCP started him on ativan for sleep and this seems to be helping him tremendously. He has no further complaints.       Last Visit 3/22/19 :  Since last visit, patient feels his episodes of left sided numbness are improving. He had a routine EEG which was normal, he did not experience the numbness while on routine EEG. He does not feel gabapentin has been helping. He had an MRI C spine which revealed neural foraminal narrowing on the left more than right at various levels, but patient denies feeling radicular neck pain or pain associated with numbness. He denies altered awareness or headaches with left sided numbness. He has no further complaints.        Visit 3/1/19:  Since last visit, patient states his symptoms have remained stable. He states approximately 5 times a week he will get numbness in his face arm and leg and this will last up to 6 hours. He denies weakness, or altered awareness with these episodes and feels he can function normally. He states this has been occurring for the past year. He had an MRI brain which showed intraventricular artifact and chronic microvascular ischemic change. I spoke with radiology today and they confirmed that ventricular body is pusle artifact. He denies any episodes of changes in speech, focal weakness, or changes in vision now or in the past. He states his son had seizures, but he denies a personal history. He feels he is unable to focus at times and lightheaded which I feel could be related to anxiety. He is seeing his psychiatrist next week. He denies suicidal or homicidal ideation. He  "does not feel a difference in symptoms on gabapentin. He denies pain or burning sensation. He has had low back pain in the past but denies current back pain or radicular symptoms. He is able to feel when he has to urinate. He denies bowel problems other than occasional constipation. He denies headaches. He has no further complaints.     Last Visit 1/24/19:  69 yo man with paresthesias in his left arm and leg. He states on rare occasions, his left leg stops moving for a few seconds. He states he also gets numbness and tingling in his left leg. He denies back pain or neck pain. He denies altered awareness when he cannot move his left leg. He denies a prior history of seizures. His CK was elevated in the past, but repeat level today is within normal limits.He also reports a rare "vibrating" sensation in his head.  He had an MRI at last visit which is clinically most consistent with chronic microvascular ischemic change. It also showed an intraventricular opacity thought to be artifact. He presents today for EMG/NCS.       Past Medical History:  Past Medical History:   Diagnosis Date    Hyperlipidemia     Hypertension     Obstructive sleep apnea severe with AHI 64 on study 7/2018 7/27/2018    Very severe sleep disordered breathing (AHI=64) is noted based on a 4% hypopnea desaturation criteria. The patient slept supine 64.2% of the night based on valid recording time of 6.43 hours. The apneas/hypopneas are accompanied by severe oxygen desaturation (percent time below 90% SpO2: 15.9%, Min SpO2: 69.6%). The average desaturation across all sleep disordered breathing events is 5.7%. Snoring       Past Surgical History:  Past Surgical History:   Procedure Laterality Date    COLONOSCOPY N/A 8/28/2018    [facility]    MOUTH SURGERY  01/2016    RELEASE DUPUYTREN'S CONTRACTURE Left 3/6/2013    [facility]    REPAIR, TENDON, EXTENSOR Left " 3/6/2013    [facility] OR [address] [place] [address]       Social History:  Social History     Socioeconomic History    Marital status: Single     Spouse name: Not on file    Number of children: Not on file    Years of education: Not on file    Highest education level: Not on file   Occupational History    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; 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; 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     Employer: [employer]   Social Needs    Financial resource strain: Not on file    Food insecurity:     Worry: Not on file     Inability: Not on file    Transportation needs:     Medical: Not on file     Non-medical: Not on file   Tobacco Use    Smoking status: Current Every Day Smoker     Types: Cigarettes    Smokeless tobacco: Never Used   Substance and Sexual Activity    Alcohol use: No    Drug use: No    Sexual activity: Yes   Lifestyle    Physical activity:     Days per week: Not on file     Minutes per session: Not on file    Stress: Not on file   Relationships    Social connections:     Talks on phone: Not on file     Gets together: Not on file     Attends Judaism service: Not on file     Active member of club or organization: Not on file     Attends meetings of clubs or organizations: Not on file     Relationship status: Not on file   Other Topics Concern    Not on file   Social History Narrative    Not on file       Family History:  Family History   Problem Relation Age of Onset    Breast cancer Mother     Hypertension Sister     Hypertension Brother     Hypertension Brother     Hypertension Sister     Mental retardation Son     Melanoma Neg Hx     Psoriasis Neg Hx     Lupus Neg Hx        Medications:  Current Outpatient Medications   Medication Sig Dispense Refill    buPROPion (WELLBUTRIN SR) 150 MG TBSR 12 hr tablet Take 150 mg by mouth once daily.  0    DULoxetine (CYMBALTA) 60 MG capsule       eszopiclone (LUNESTA) 2 MG Tab Take 2 mg by mouth nightly as needed.  1    ezetimibe  (ZETIA) 10 mg tablet TAKE 1 TABLET(10 MG) BY MOUTH EVERY DAY 90 tablet 3    gabapentin (NEURONTIN) 100 MG capsule Take 1 capsule (100 mg total) by mouth every evening. 30 capsule 3    hydroCHLOROthiazide (MICROZIDE) 12.5 mg capsule TAKE 1 CAPSULE(12.5 MG) BY MOUTH EVERY DAY 90 capsule 1    lisinopril (PRINIVIL,ZESTRIL) 20 MG tablet TAKE 1 TABLET(20 MG) BY MOUTH EVERY DAY 90 tablet 1    LORazepam (ATIVAN) 1 MG tablet Take 2 mg by mouth every evening.       methylphenidate HCl (RITALIN) 10 MG tablet Take 10 mg by mouth 2 (two) times daily.  0    mirtazapine (REMERON) 15 MG tablet   2    potassium chloride SA (K-DUR,KLOR-CON) 20 MEQ tablet TAKE 1 TABLET(20 MEQ) BY MOUTH TWICE DAILY 180 tablet 1    sildenafil (VIAGRA) 100 MG tablet Take 1 tablet (100 mg total) by mouth daily as needed for Erectile Dysfunction. 10 tablet 5    tamsulosin (FLOMAX) 0.4 mg Cap Take 2 capsules (0.8 mg total) by mouth once daily. 180 capsule 1     No current facility-administered medications for this visit.        Allergies:  Review of patient's allergies indicates:   Allergen Reactions    Crestor  [rosuvastatin]      Other reaction(s): Muscle pain    Statins-hmg-coa reductase inhibitors      myalgias    Sulfamethoxazole-trimethoprim Other (See Comments)     Muscle pain    Atorvastatin Other (See Comments)     Muscle cramps  Other reaction(s): Muscle pain       ROS:  A 12 point review of system was negative aside from pertinent positives and negatives as outlined above.    Physical Exam  Vitals:    06/21/19 1302   BP: 111/66   Pulse: (!) 56       General: well nourished, well developed  Eyes: no scleral icterus   Nose: nasal turbinates intact  Neck: supple, ROM intact  Skin: no rash or ecchymosis  Joints: no swelling or erythema  Cardiac: regular rate and rhythm  Lungs: clear to auscultation bilaterally    Neuro:  Mental status: AAO x 3, no dysarthria, no aphasia, communicating appropriately  CN: PERRL, EOMI, VFF, V1-V3 sensation  intact, no facial asymmetry, hearing grossly intact, tongue midline  Motor:   RUE 5/5  RLE 5/5  LUE 5/5  LLE 5/5    Normal bulk and tone    Reflexes: 2+ throughout, toes equivocal bilaterally  Sensory: intact to light touch throughout  Coordination: no dysmetria on FTN  Gait: steady    Prior Imaging/Labs:  Reviewed      Assessment and Plan:       68 y.o. male with numbness and tingling on left side of body of unclear etiology which has improved greatly since last visit after obtaining better sleep.      1. Numbness and tingling  Improved  Continue to follow with PCP                Patient was advised to notify me for worsening or recurrent symptoms. I will see patient back in 1 year or sooner if necessary.       Alisa Serna DO  [facility]  [address] [facility] [address]  [place] [address] [phone]

## 2019-08-22 ENCOUNTER — CLINICAL SUPPORT (OUTPATIENT)
Dept: SMOKING CESSATION | Facility: CLINIC | Age: 69
End: 2019-08-22
Payer: COMMERCIAL

## 2019-08-22 DIAGNOSIS — F17.200 NICOTINE DEPENDENCE: Primary | ICD-10-CM

## 2019-08-22 PROCEDURE — 99407 BEHAV CHNG SMOKING > 10 MIN: CPT | Mod: S$GLB,,, | Performed by: INTERNAL MEDICINE

## 2019-08-22 PROCEDURE — 99407 PR TOBACCO USE CESSATION INTENSIVE >10 MINUTES: ICD-10-PCS | Mod: S$GLB,,, | Performed by: INTERNAL MEDICINE

## 2019-08-22 NOTE — PROGRESS NOTES
Spoke with patient today in regard to smoking cessation progress for 3/6 month telephone follow up, he states not tobacco free at this time. Patient states having a break up and started smoking again, he is interested in returning to the program but will call next week to schedule an appointment. Informed patient of benefit period, future follow up, and contact information if any further help or support is needed. Will complete smart form for 3 and 6 month follow up on Quit attempt #2.

## 2019-10-03 DIAGNOSIS — I10 ESSENTIAL HYPERTENSION: Chronic | ICD-10-CM

## 2019-10-03 RX ORDER — HYDROCHLOROTHIAZIDE 12.5 MG/1
CAPSULE ORAL
Qty: 90 CAPSULE | Refills: 1 | Status: SHIPPED | OUTPATIENT
Start: 2019-10-03 | End: 2019-12-20 | Stop reason: ALTCHOICE

## 2019-10-03 NOTE — TELEPHONE ENCOUNTER
----- Message from Mack Sethi sent at 10/3/2019  4:49 PM CDT -----  Contact: pt  Name of Who is Calling: pt    What is the request in detail: is requesting a refill on Rx (hydroCHLOROthiazide (MICROZIDE) 12.5 mg capsule). Please contact to further discuss and advise      Can the clinic reply by [facility]: no    What Number to Call Back [facility] not [facility]: [phone]

## 2019-12-04 DIAGNOSIS — N52.9 MALE ERECTILE DISORDER: ICD-10-CM

## 2019-12-05 RX ORDER — SILDENAFIL 100 MG/1
TABLET, FILM COATED ORAL
Qty: 10 TABLET | Refills: 2 | Status: SHIPPED | OUTPATIENT
Start: 2019-12-05 | End: 2019-12-20 | Stop reason: SDUPTHER

## 2019-12-20 ENCOUNTER — OFFICE VISIT (OUTPATIENT)
Dept: FAMILY MEDICINE | Facility: CLINIC | Age: 69
End: 2019-12-20
Payer: COMMERCIAL

## 2019-12-20 VITALS
DIASTOLIC BLOOD PRESSURE: 74 MMHG | HEIGHT: 71 IN | TEMPERATURE: 98 F | WEIGHT: 218.5 LBS | HEART RATE: 60 BPM | OXYGEN SATURATION: 97 % | RESPIRATION RATE: 16 BRPM | SYSTOLIC BLOOD PRESSURE: 100 MMHG | BODY MASS INDEX: 30.59 KG/M2

## 2019-12-20 DIAGNOSIS — N40.0 BENIGN NON-NODULAR PROSTATIC HYPERPLASIA WITHOUT LOWER URINARY TRACT SYMPTOMS: Chronic | ICD-10-CM

## 2019-12-20 DIAGNOSIS — I73.9 PERIPHERAL ARTERY DISEASE: ICD-10-CM

## 2019-12-20 DIAGNOSIS — C61 PROSTATE CANCER: Primary | ICD-10-CM

## 2019-12-20 DIAGNOSIS — F17.200 SMOKER: ICD-10-CM

## 2019-12-20 DIAGNOSIS — G72.0 STATIN MYOPATHY: ICD-10-CM

## 2019-12-20 DIAGNOSIS — T46.6X5A STATIN MYOPATHY: ICD-10-CM

## 2019-12-20 DIAGNOSIS — Z23 NEED FOR SHINGLES VACCINE: ICD-10-CM

## 2019-12-20 DIAGNOSIS — E78.5 DYSLIPIDEMIA: ICD-10-CM

## 2019-12-20 DIAGNOSIS — I10 ESSENTIAL HYPERTENSION: ICD-10-CM

## 2019-12-20 DIAGNOSIS — R73.09 ABNORMAL GLUCOSE: ICD-10-CM

## 2019-12-20 DIAGNOSIS — N52.9 MALE ERECTILE DISORDER: ICD-10-CM

## 2019-12-20 PROCEDURE — 99999 PR PBB SHADOW E&M-EST. PATIENT-LVL IV: CPT | Mod: PBBFAC,,, | Performed by: INTERNAL MEDICINE

## 2019-12-20 PROCEDURE — 1126F AMNT PAIN NOTED NONE PRSNT: CPT | Mod: ,,, | Performed by: INTERNAL MEDICINE

## 2019-12-20 PROCEDURE — 1159F PR MEDICATION LIST DOCUMENTED IN MEDICAL RECORD: ICD-10-PCS | Mod: ,,, | Performed by: INTERNAL MEDICINE

## 2019-12-20 PROCEDURE — 99214 PR OFFICE/OUTPT VISIT, EST, LEVL IV, 30-39 MIN: ICD-10-PCS | Mod: S$PBB,,, | Performed by: INTERNAL MEDICINE

## 2019-12-20 PROCEDURE — 99214 OFFICE O/P EST MOD 30 MIN: CPT | Mod: S$PBB,,, | Performed by: INTERNAL MEDICINE

## 2019-12-20 PROCEDURE — 99999 PR PBB SHADOW E&M-EST. PATIENT-LVL IV: ICD-10-PCS | Mod: PBBFAC,,, | Performed by: INTERNAL MEDICINE

## 2019-12-20 PROCEDURE — 1126F PR PAIN SEVERITY QUANTIFIED, NO PAIN PRESENT: ICD-10-PCS | Mod: ,,, | Performed by: INTERNAL MEDICINE

## 2019-12-20 PROCEDURE — 1159F MED LIST DOCD IN RCRD: CPT | Mod: ,,, | Performed by: INTERNAL MEDICINE

## 2019-12-20 PROCEDURE — 99214 OFFICE O/P EST MOD 30 MIN: CPT | Mod: PBBFAC,PO | Performed by: INTERNAL MEDICINE

## 2019-12-20 RX ORDER — METHYLPHENIDATE HYDROCHLORIDE 10 MG/1
10 TABLET ORAL 2 TIMES DAILY
Refills: 0 | Status: CANCELLED | OUTPATIENT
Start: 2019-12-20

## 2019-12-20 RX ORDER — SILDENAFIL 100 MG/1
TABLET, FILM COATED ORAL
Qty: 10 TABLET | Refills: 2 | Status: SHIPPED | OUTPATIENT
Start: 2019-12-20 | End: 2020-05-14

## 2019-12-20 NOTE — PROGRESS NOTES
This note was created by combination of typed  and M-Modal dictation.  Transcription errors may be present.  If there are any questions, please contact me.    Assessment & Plan:   Prostate cancer s/p XRT currently hormone deprivation  Benign non-nodular prostatic hyperplasia without lower urinary tract symptoms  -followed by cancer specialist in [place].  Reports detectable PSA.  Stable.  Has a follow-up with them late January.  On flomax  -     Comprehensive metabolic panel; Future; Expected date: 12/20/2019  -     CBC auto differential; Future; Expected date: 12/20/2019    Peripheral artery disease mild on doppler  Dyslipidemia statin intolerance. 8/2018 zetia  Statin myopathy  -return for fasting labs. On zetia  -     Lipid panel; Future; Expected date: 12/20/2019      Essential hypertension 2016 nu med stress test neg 2016 TTE WNL  -BP low today. On hctz and K.  Not on lisinopril.  Could his low BP be the cause of his sensation of dizziness?  Challenge off of hctz and K.  He notes cramping off of K supplement.  Check for hypokalemia off of medication.  He will return in a week for BP check off of medication and labs at that time.  -     Comprehensive metabolic panel; Future; Expected date: 12/20/2019    Smoker    Abnormal glucose  -check a1c.  Last a1c < 5.6  -     Hemoglobin A1c; Future; Expected date: 12/20/2019    Male erectile disorder  -refilled viagra  -     sildenafil (VIAGRA) 100 MG tablet; TAKE 1 TABLET(100 MG) BY MOUTH DAILY AS NEEDED FOR ERECTILE DYSFUNCTION  Dispense: 10 tablet; Refill: 2    Need for shingles vaccine  -rx sent to pharmacy  -     varicella-zoster gE-AS01B, PF, (SHINGRIX, PF,) 50 mcg/0.5 mL injection; Inject 0.5 mLs into the muscle once. Repeat in 2 months for 1 dose  Dispense: 0.5 mL; Refill: 1        Medications Discontinued During This Encounter   Medication Reason    sildenafil (VIAGRA) 100 MG tablet Reorder    hydroCHLOROthiazide (MICROZIDE) 12.5 mg capsule Therapy  completed    lisinopril (PRINIVIL,ZESTRIL) 20 MG tablet Therapy completed    potassium chloride SA (K-DUR,KLOR-CON) 20 MEQ tablet Therapy completed    gabapentin (NEURONTIN) 100 MG capsule Therapy completed       meds sent this encounter:  Medications Ordered This Encounter   Medications    sildenafil (VIAGRA) 100 MG tablet     Sig: TAKE 1 TABLET(100 MG) BY MOUTH DAILY AS NEEDED FOR ERECTILE DYSFUNCTION     Dispense:  10 tablet     Refill:  2    varicella-zoster gE-AS01B, PF, (SHINGRIX, PF,) 50 mcg/0.5 mL injection     Sig: Inject 0.5 mLs into the muscle once. Repeat in 2 months for 1 dose     Dispense:  0.5 mL     Refill:  1       Follow Up: No follow-ups on file.  If all normal plan for follow-up 6 months.  Do not plan for labs at that time if labs are good this time.    Subjective:     Chief Complaint   Patient presents with    Follow-up     6 months     Dizziness     Started a few months ago        HPI  Sharee is a 69 y.o. male, last appointment with this clinic was Visit date not found.    I previously saw him in May  Fever. Dysuria. Blood cx's were negative.  CBC with leukocytosis. BMP elevated glc but otherwise normal.  Was treated with LQ  BP at that time was low, holding lisinopril hctz and K at that time.  Saw neuro in follow up for numbness/tingling, improved with better sleep.     Taking lunesta; previously lorazepam  Not on ritalin     12/4 lunesta Dr. Coleman  10/20 lorazepam    Going to [place] for follow up every 3 months. Last was in October. Due for follow up Jan 30.  By his recollection, PSA is around 0.3.  Oh of 0.18 by his recollection. No imaging.     Notes some dizziness in the past month or 2. Not severe/not stumbling but when he looks at things, feels woozy. It's constant. Sometimes relieved with large BM. No chest pain no arrhythmia, no headaches. No lightheaded. No hearing changes. No tinnitus.     Has been taking hctz but not taking lisinopril.  Ever since last visit.  BP today is  good. Home readings 120s/70s. Taking hctz and K.  Notes cramping without K supplement.     Not taking mirtazepine he thinks but he did just fill it 12/3. Thinks it was on auto refill, and he picked it up but does not think he is taking that.     Is taking lunesta and ativan.     Patient Care Team:  Navin Charlton MD as PCP - General (Internal Medicine)  Alfredo Kearns MD as Consulting Physician (Urology)  Adeel Coleman MD as Consulting Physician (Psychiatry)  Adeel Vicente MD (Hematology and Oncology)  Juventino Meeks MA as Care Coordinator    Patient Active Problem List    Diagnosis Date Noted    Peripheral artery disease mild on doppler 05/20/2019     4/10/2018 LE dopplers: 1. Right lower extremity pressures and waveforms indicate no hemodynamically significant arterial occlusive disease.  2. Left lower extremity pressures and waveforms indicate mild arterial occlusive disease.      Numbness or tingling workup with neuro for demyelinating 05/20/2019 12/19/18 MRI brain: 1. Numerous tiny focal areas of remote gliosis posterior frontal parietal and occipital and upper basal ganglia areas, differential diagnosis includes microvascular ischemic change and inactive demyelinating process.  2. Intraventricular artifact opacity body left lateral ventricle discussed above.  12/2018 MRI C spine: 1. Multilevel spondylotic changes as discussed above.  2. Multilevel foraminal stenotic changes more pronounced on the left as compared to the right as discussed above.  See further details above.  1/24/19 EMG: borderline normal EMG/NCS. There was an absent   left sural response of unclear clinical significance. There were   no myopathic units seen to suggest a myopathy.      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 The patient slept supine 64.2% of the night based  on valid recording time of 6.43 hours. The apneas/hypopneas are accompanied by severe oxygen desaturation (percent time  below 90% SpO2: 15.9%, Min SpO2: 69.6%). The average desaturation across all sleep disordered breathing events is 5.7%.  Snoring occurs for 45.4% (30 dB) of the study, 17.3% is extremely loud. The mean pulse rate is 63 BPM, with frequent pulse  rate variability (52 events with >= 6 BPM increase/decrease per hour).  TREATMENT CONSIDERATIONS: Consider nasal continuous positive airway pressure (CPAP) as the initial treatment  choice for severe obstructive sleep apnea. Consider an attended in-lab CPAP titration study, given the possibility of co-morbid  sleep related hypoventilation.  DISEASE MANAGEMENT CONSIDERATIONS: Insomnia is a symptom that can be associated with untreated DAKOTA.  Sleeping pills may increase the severity of untreated DAKOTA and their use should be reevaluated once the DAKOTA is treated.      Major depressive disorder 10/30/2017    Pelvic floor muscle wasting in male 09/28/2017    Prostate cancer s/p XRT currently hormone deprivation 08/30/2017     2 cores positive kary 3+4=7 on biopsy 8/15/2017      Smoker 08/30/2017    Vitamin D deficiency 08/30/2017    Beta thalassemia minor 04/11/2017    Benign non-nodular prostatic hyperplasia without lower urinary tract symptoms 03/29/2017     Hx of negative prostate Bx and hx of MRI prostate      Restless leg syndrome Requip ineffective 08/02/2016    Dyslipidemia statin intolerance. 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  Rozerem didn't help 11/20/2013       PAST MEDICAL HISTORY:  Past Medical History:   Diagnosis Date    Hyperlipidemia      Hypertension     Obstructive sleep apnea severe with AHI 64 on study 7/2018 7/27/2018    Very severe sleep disordered breathing (AHI=64) is noted based on a 4% hypopnea desaturation criteria. The patient slept supine 64.2% of the night based on valid recording time of 6.43 hours. The apneas/hypopneas are accompanied by severe oxygen desaturation (percent time below 90% SpO2: 15.9%, Min SpO2: 69.6%). The average desaturation across all sleep disordered breathing events is 5.7%. Snoring       PAST SURGICAL HISTORY:  Past Surgical History:   Procedure Laterality Date    COLONOSCOPY N/A 8/28/2018    Procedure: COLONOSCOPY;  Surgeon: Davis Hernandez MD;  Location: [facility];  Service: Endoscopy;  Laterality: N/A;    MOUTH SURGERY  01/2016       SOCIAL HISTORY:  Social History     Socioeconomic History    Marital status: Single     Spouse name: Not on file    Number of children: Not on file    Years of education: Not on file    Highest education level: Not on file   Occupational History    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; 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; 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     Employer: [employer]   Social Needs    Financial resource strain: Not on file    Food insecurity:     Worry: Not on file     Inability: Not on file    Transportation needs:     Medical: Not on file     Non-medical: Not on file   Tobacco Use    Smoking status: Current Every Day Smoker     Types: Cigarettes    Smokeless tobacco: Never Used   Substance and Sexual Activity    Alcohol use: No    Drug use: No    Sexual activity: Yes   Lifestyle    Physical activity:     Days per week: Not on file     Minutes per session: Not on file    Stress: Not on file   Relationships    Social connections:     Talks on phone: Not on file     Gets together: Not on file     Attends Worship service: Not on file     Active member of club or organization: Not on file     Attends meetings of clubs or organizations: Not on file     Relationship status: Not  on file   Other Topics Concern    Not on file   Social History Narrative    Not on file       ALLERGIES AND MEDICATIONS: updated and reviewed.  Review of patient's allergies indicates:   Allergen Reactions    Crestor  [rosuvastatin]      Other reaction(s): Muscle pain    Statins-hmg-coa reductase inhibitors      myalgias    Sulfamethoxazole-trimethoprim Other (See Comments)     Muscle pain    Atorvastatin Other (See Comments)     Muscle cramps  Other reaction(s): Muscle pain     Current Outpatient Medications   Medication Sig Dispense Refill    buPROPion (WELLBUTRIN SR) 150 MG TBSR 12 hr tablet Take 150 mg by mouth once daily.  0    DULoxetine (CYMBALTA) 60 MG capsule       eszopiclone (LUNESTA) 2 MG Tab Take 2 mg by mouth nightly as needed.  1    hydroCHLOROthiazide (MICROZIDE) 12.5 mg capsule TAKE 1 CAPSULE(12.5 MG) BY MOUTH EVERY DAY 90 capsule 1    lisinopril (PRINIVIL,ZESTRIL) 20 MG tablet TAKE 1 TABLET(20 MG) BY MOUTH EVERY DAY 90 tablet 1    LORazepam (ATIVAN) 1 MG tablet Take 2 mg by mouth every evening.       potassium chloride SA (K-DUR,KLOR-CON) 20 MEQ tablet TAKE 1 TABLET(20 MEQ) BY MOUTH TWICE DAILY 180 tablet 1    sildenafil (VIAGRA) 100 MG tablet TAKE 1 TABLET(100 MG) BY MOUTH DAILY AS NEEDED FOR ERECTILE DYSFUNCTION 10 tablet 2    tamsulosin (FLOMAX) 0.4 mg Cap Take 2 capsules (0.8 mg total) by mouth once daily. 180 capsule 1    ezetimibe (ZETIA) 10 mg tablet TAKE 1 TABLET(10 MG) BY MOUTH EVERY DAY (Patient not taking: Reported on 12/20/2019) 90 tablet 3    FLUZONE HIGH-DOSE 2019-20, PF, 180 mcg/0.5 mL Syrg ADM 0.5ML IM UTD  0    gabapentin (NEURONTIN) 100 MG capsule Take 1 capsule (100 mg total) by mouth every evening. (Patient not taking: Reported on 12/20/2019) 30 capsule 3    methylphenidate HCl (RITALIN) 10 MG tablet Take 10 mg by mouth 2 (two) times daily.  0    mirtazapine (REMERON) 15 MG tablet   2     No current facility-administered medications for this visit.   "      Review of Systems   All other systems reviewed and are negative.      Objective:   Physical Exam   Vitals:    12/20/19 1032 12/20/19 1054   BP: 112/74 100/74   BP Location: Left arm Left arm   Patient Position: Sitting Sitting   BP Method: Small (Manual) Large (Manual)   Pulse: 60    Resp: 16    Temp: 97.8 °F (36.6 °C)    TempSrc: Oral    SpO2: 97%    Weight: 99.1 kg (218 lb 7.6 oz)    Height: 5' 11" (1.803 m)     Body mass index is 30.47 kg/m².  Weight: 99.1 kg (218 lb 7.6 oz)   Height: 5' 11" (180.3 cm)     Physical Exam   Constitutional: He is oriented to person, place, and time. He appears well-developed and well-nourished. No distress.   Eyes: EOM are normal.   Cardiovascular: Normal rate, regular rhythm and normal heart sounds.   No murmur heard.  Pulmonary/Chest: Effort normal and breath sounds normal.   Musculoskeletal: Normal range of motion.   Neurological: He is alert and oriented to person, place, and time. Coordination normal.   Cranial nerves grossly intact. Normal gait.  No nystagmus.   Skin: Skin is warm and dry.   Psychiatric: He has a normal mood and affect. His behavior is normal. Thought content normal.     "

## 2019-12-27 ENCOUNTER — CLINICAL SUPPORT (OUTPATIENT)
Dept: FAMILY MEDICINE | Facility: CLINIC | Age: 69
End: 2019-12-27
Payer: COMMERCIAL

## 2019-12-27 ENCOUNTER — LAB VISIT (OUTPATIENT)
Dept: LAB | Facility: HOSPITAL | Age: 69
End: 2019-12-27
Attending: INTERNAL MEDICINE
Payer: MEDICARE

## 2019-12-27 VITALS — HEART RATE: 57 BPM | OXYGEN SATURATION: 97 % | DIASTOLIC BLOOD PRESSURE: 68 MMHG | SYSTOLIC BLOOD PRESSURE: 108 MMHG

## 2019-12-27 DIAGNOSIS — I10 ESSENTIAL HYPERTENSION: Primary | ICD-10-CM

## 2019-12-27 DIAGNOSIS — I10 ESSENTIAL HYPERTENSION: ICD-10-CM

## 2019-12-27 DIAGNOSIS — E78.5 DYSLIPIDEMIA: ICD-10-CM

## 2019-12-27 DIAGNOSIS — C61 PROSTATE CANCER: ICD-10-CM

## 2019-12-27 DIAGNOSIS — R73.09 ABNORMAL GLUCOSE: ICD-10-CM

## 2019-12-27 LAB
ALBUMIN SERPL BCP-MCNC: 3.8 G/DL (ref 3.5–5.2)
ALP SERPL-CCNC: 48 U/L (ref 55–135)
ALT SERPL W/O P-5'-P-CCNC: 17 U/L (ref 10–44)
ANION GAP SERPL CALC-SCNC: 7 MMOL/L (ref 8–16)
AST SERPL-CCNC: 20 U/L (ref 10–40)
BASOPHILS # BLD AUTO: 0.02 K/UL (ref 0–0.2)
BASOPHILS NFR BLD: 0.4 % (ref 0–1.9)
BILIRUB SERPL-MCNC: 0.6 MG/DL (ref 0.1–1)
BUN SERPL-MCNC: 10 MG/DL (ref 8–23)
CALCIUM SERPL-MCNC: 9.1 MG/DL (ref 8.7–10.5)
CHLORIDE SERPL-SCNC: 108 MMOL/L (ref 95–110)
CHOLEST SERPL-MCNC: 183 MG/DL (ref 120–199)
CHOLEST/HDLC SERPL: 4.4 {RATIO} (ref 2–5)
CO2 SERPL-SCNC: 25 MMOL/L (ref 23–29)
COMPLEXED PSA SERPL-MCNC: 0.25 NG/ML (ref 0–4)
CREAT SERPL-MCNC: 0.9 MG/DL (ref 0.5–1.4)
DIFFERENTIAL METHOD: ABNORMAL
EOSINOPHIL # BLD AUTO: 0 K/UL (ref 0–0.5)
EOSINOPHIL NFR BLD: 0.4 % (ref 0–8)
ERYTHROCYTE [DISTWIDTH] IN BLOOD BY AUTOMATED COUNT: 14.1 % (ref 11.5–14.5)
EST. GFR  (AFRICAN AMERICAN): >60 ML/MIN/1.73 M^2
EST. GFR  (NON AFRICAN AMERICAN): >60 ML/MIN/1.73 M^2
ESTIMATED AVG GLUCOSE: 91 MG/DL (ref 68–131)
GLUCOSE SERPL-MCNC: 89 MG/DL (ref 70–110)
HBA1C MFR BLD HPLC: 4.8 % (ref 4–5.6)
HCT VFR BLD AUTO: 42.9 % (ref 40–54)
HDLC SERPL-MCNC: 42 MG/DL (ref 40–75)
HDLC SERPL: 23 % (ref 20–50)
HGB BLD-MCNC: 13.1 G/DL (ref 14–18)
IMM GRANULOCYTES # BLD AUTO: 0.02 K/UL (ref 0–0.04)
IMM GRANULOCYTES NFR BLD AUTO: 0.4 % (ref 0–0.5)
LDLC SERPL CALC-MCNC: 126 MG/DL (ref 63–159)
LYMPHOCYTES # BLD AUTO: 1.6 K/UL (ref 1–4.8)
LYMPHOCYTES NFR BLD: 30.4 % (ref 18–48)
MCH RBC QN AUTO: 24.8 PG (ref 27–31)
MCHC RBC AUTO-ENTMCNC: 30.5 G/DL (ref 32–36)
MCV RBC AUTO: 81 FL (ref 82–98)
MONOCYTES # BLD AUTO: 0.5 K/UL (ref 0.3–1)
MONOCYTES NFR BLD: 10.2 % (ref 4–15)
NEUTROPHILS # BLD AUTO: 3 K/UL (ref 1.8–7.7)
NEUTROPHILS NFR BLD: 58.2 % (ref 38–73)
NONHDLC SERPL-MCNC: 141 MG/DL
NRBC BLD-RTO: 0 /100 WBC
PLATELET # BLD AUTO: 257 K/UL (ref 150–350)
PMV BLD AUTO: 9.5 FL (ref 9.2–12.9)
POTASSIUM SERPL-SCNC: 3.8 MMOL/L (ref 3.5–5.1)
PROT SERPL-MCNC: 7.4 G/DL (ref 6–8.4)
RBC # BLD AUTO: 5.29 M/UL (ref 4.6–6.2)
SODIUM SERPL-SCNC: 140 MMOL/L (ref 136–145)
TRIGL SERPL-MCNC: 75 MG/DL (ref 30–150)
WBC # BLD AUTO: 5.2 K/UL (ref 3.9–12.7)

## 2019-12-27 PROCEDURE — 80061 LIPID PANEL: CPT

## 2019-12-27 PROCEDURE — 85025 COMPLETE CBC W/AUTO DIFF WBC: CPT

## 2019-12-27 PROCEDURE — 83036 HEMOGLOBIN GLYCOSYLATED A1C: CPT

## 2019-12-27 PROCEDURE — 36415 COLL VENOUS BLD VENIPUNCTURE: CPT | Mod: PO

## 2019-12-27 PROCEDURE — 99999 PR PBB SHADOW E&M-EST. PATIENT-LVL II: ICD-10-PCS | Mod: PBBFAC,,,

## 2019-12-27 PROCEDURE — 80053 COMPREHEN METABOLIC PANEL: CPT

## 2019-12-27 PROCEDURE — 84153 ASSAY OF PSA TOTAL: CPT

## 2019-12-27 PROCEDURE — 99499 UNLISTED E&M SERVICE: CPT | Mod: S$GLB,,, | Performed by: INTERNAL MEDICINE

## 2019-12-27 PROCEDURE — 99999 PR PBB SHADOW E&M-EST. PATIENT-LVL II: CPT | Mod: PBBFAC,,,

## 2019-12-27 PROCEDURE — 99499 NO LOS: ICD-10-PCS | Mod: S$GLB,,, | Performed by: INTERNAL MEDICINE

## 2019-12-27 NOTE — PROGRESS NOTES
Sharee Day . 69 y.o. male is here today for Blood Pressure check.   History of HTN yes.    Review of patient's allergies indicates:   Allergen Reactions    Crestor  [rosuvastatin]      Other reaction(s): Muscle pain    Statins-hmg-coa reductase inhibitors      myalgias    Sulfamethoxazole-trimethoprim Other (See Comments)     Muscle pain    Atorvastatin Other (See Comments)     Muscle cramps  Other reaction(s): Muscle pain     Creatinine   Date Value Ref Range Status   05/20/2019 1.0 0.5 - 1.4 mg/dL Final     Sodium   Date Value Ref Range Status   05/20/2019 136 136 - 145 mmol/L Final     Potassium   Date Value Ref Range Status   05/20/2019 3.7 3.5 - 5.1 mmol/L Final   ]  Patient denies taking blood pressure medications on a regular basis at the same time of the day.     Current Outpatient Medications:     buPROPion (WELLBUTRIN SR) 150 MG TBSR 12 hr tablet, Take 150 mg by mouth once daily., Disp: , Rfl: 0    DULoxetine (CYMBALTA) 60 MG capsule, , Disp: , Rfl:     eszopiclone (LUNESTA) 2 MG Tab, Take 2 mg by mouth nightly as needed., Disp: , Rfl: 1    ezetimibe (ZETIA) 10 mg tablet, TAKE 1 TABLET(10 MG) BY MOUTH EVERY DAY (Patient not taking: Reported on 12/20/2019), Disp: 90 tablet, Rfl: 3    FLUZONE HIGH-DOSE 2019-20, PF, 180 mcg/0.5 mL Syrg, ADM 0.5ML IM UTD, Disp: , Rfl: 0    LORazepam (ATIVAN) 1 MG tablet, Take 2 mg by mouth every evening. , Disp: , Rfl:     methylphenidate HCl (RITALIN) 10 MG tablet, Take 10 mg by mouth 2 (two) times daily., Disp: , Rfl: 0    mirtazapine (REMERON) 15 MG tablet, , Disp: , Rfl: 2    sildenafil (VIAGRA) 100 MG tablet, TAKE 1 TABLET(100 MG) BY MOUTH DAILY AS NEEDED FOR ERECTILE DYSFUNCTION, Disp: 10 tablet, Rfl: 2    tamsulosin (FLOMAX) 0.4 mg Cap, Take 2 capsules (0.8 mg total) by mouth once daily., Disp: 180 capsule, Rfl: 1  Does patient have record of home blood pressure readings yes. Readings have been averaging 124/84,142/88.   Last dose of blood pressure  medication was taken at 1 week ago stopped taking Hydrochlorothiazide .  Patient is symptomatic.   Complains of sometime having his head hurt with dizziness.    Vitals:    12/27/19 1314   BP: 108/68   BP Location: Left arm   Patient Position: Sitting   BP Method: Medium (Manual)   Pulse: (!) 57   SpO2: 97%         Dr. Navin Charlton notified.

## 2019-12-29 NOTE — PROGRESS NOTES
Labs nl on zetia  a1c good  Results to pt via [facility]. And will need to send copy of psa to onc in [facility]. F/u 6 months

## 2020-01-17 ENCOUNTER — TELEPHONE (OUTPATIENT)
Dept: SMOKING CESSATION | Facility: CLINIC | Age: 70
End: 2020-01-17

## 2020-01-30 ENCOUNTER — TELEPHONE (OUTPATIENT)
Dept: SMOKING CESSATION | Facility: CLINIC | Age: 70
End: 2020-01-30

## 2020-02-04 DIAGNOSIS — R91.1 LUNG NODULE: Primary | ICD-10-CM

## 2020-02-05 DIAGNOSIS — R91.1 PULMONARY NODULE: Primary | ICD-10-CM

## 2020-02-05 DIAGNOSIS — Z87.891 PERSONAL HISTORY OF NICOTINE DEPENDENCE: ICD-10-CM

## 2020-02-05 DIAGNOSIS — Z87.891 HISTORY OF NICOTINE USE: Primary | ICD-10-CM

## 2020-02-11 ENCOUNTER — HOSPITAL ENCOUNTER (OUTPATIENT)
Dept: RADIOLOGY | Facility: HOSPITAL | Age: 70
Discharge: HOME | End: 2020-02-11
Attending: INTERNAL MEDICINE
Payer: MEDICARE

## 2020-02-11 DIAGNOSIS — Z87.891 HISTORY OF NICOTINE USE: ICD-10-CM

## 2020-02-11 PROCEDURE — G0297 LDCT FOR LUNG CA SCREEN: HCPCS | Mod: TC

## 2020-02-11 PROCEDURE — G0297 CT CHEST LUNG SCREENING LOW DOSE: ICD-10-PCS | Mod: 26,,, | Performed by: RADIOLOGY

## 2020-02-11 PROCEDURE — G0297 LDCT FOR LUNG CA SCREEN: HCPCS | Mod: 26,,, | Performed by: RADIOLOGY

## 2020-02-13 ENCOUNTER — CLINICAL SUPPORT (OUTPATIENT)
Dept: SMOKING CESSATION | Facility: CLINIC | Age: 70
End: 2020-02-13
Payer: COMMERCIAL

## 2020-02-13 DIAGNOSIS — F17.200 NICOTINE DEPENDENCE: Primary | ICD-10-CM

## 2020-02-13 PROCEDURE — 99407 PR TOBACCO USE CESSATION INTENSIVE >10 MINUTES: ICD-10-PCS | Mod: S$GLB,,,

## 2020-02-13 PROCEDURE — 99407 BEHAV CHNG SMOKING > 10 MIN: CPT | Mod: S$GLB,,,

## 2020-02-13 NOTE — PROGRESS NOTES
Called pt to f/u on his 12 month smoking cessation quit status. Pt stated he is still smoking. Informed him he has benefits available and is able to rejoin. Pt scheduled appointment for quit #3 on 2/20/20. Informed him of benefit period, phone follow ups, and contact information. Will complete smart form and resolve quit #2 episode. Will continue to follow up with quit #3 episode.

## 2020-02-20 ENCOUNTER — CLINICAL SUPPORT (OUTPATIENT)
Dept: SMOKING CESSATION | Facility: CLINIC | Age: 70
End: 2020-02-20
Payer: COMMERCIAL

## 2020-02-20 DIAGNOSIS — F17.200 NICOTINE DEPENDENCE: Primary | ICD-10-CM

## 2020-02-20 PROCEDURE — 99999 PR PBB SHADOW E&M-EST. PATIENT-LVL I: CPT | Mod: PBBFAC,,,

## 2020-02-20 PROCEDURE — 99999 PR PBB SHADOW E&M-EST. PATIENT-LVL I: ICD-10-PCS | Mod: PBBFAC,,,

## 2020-02-20 PROCEDURE — 99404 PREV MED CNSL INDIV APPRX 60: CPT | Mod: S$GLB,,,

## 2020-02-20 PROCEDURE — 99404 PR PREVENT COUNSEL,INDIV,60 MIN: ICD-10-PCS | Mod: S$GLB,,,

## 2020-02-20 RX ORDER — DM/P-EPHED/ACETAMINOPH/DOXYLAM 30-7.5/3
2 LIQUID (ML) ORAL
Qty: 100 LOZENGE | Refills: 0 | Status: SHIPPED | OUTPATIENT
Start: 2020-02-20 | End: 2020-03-20

## 2020-02-20 NOTE — Clinical Note
Pt presents for intake smoking 6 cigarettes per day, after assessment and discussion recommend the 2mg nicotine lozenge to use in place of the cigarette and an abrupt quit.  intake note: discussed strategies to help cut back and to help break the routine and habit associated with smoking, intake handout provided to the patient and discussed, all prescribed NRT discussed in depth as well as tobacco cessation

## 2020-03-05 ENCOUNTER — CLINICAL SUPPORT (OUTPATIENT)
Dept: SMOKING CESSATION | Facility: CLINIC | Age: 70
End: 2020-03-05
Payer: COMMERCIAL

## 2020-03-05 DIAGNOSIS — F17.200 NICOTINE DEPENDENCE: Primary | ICD-10-CM

## 2020-03-05 PROCEDURE — 99999 PR PBB SHADOW E&M-EST. PATIENT-LVL I: ICD-10-PCS | Mod: PBBFAC,,,

## 2020-03-05 PROCEDURE — 99999 PR PBB SHADOW E&M-EST. PATIENT-LVL I: CPT | Mod: PBBFAC,,,

## 2020-03-05 PROCEDURE — 99402 PR PREVENT COUNSEL,INDIV,30 MIN: ICD-10-PCS | Mod: S$GLB,,,

## 2020-03-05 PROCEDURE — 99402 PREV MED CNSL INDIV APPRX 30: CPT | Mod: S$GLB,,,

## 2020-03-05 NOTE — Clinical Note
Patient presents to clinic and reports being tobacco free for a few days, but then went to [facility] on Saturday and bought a pack and smoked the last one today. Patient remains on prescribed tobacco cessation medication regimen of 2 mg lozenges PRN, without any negative side effects at this time. Discussed his relapse and patient agreed on not buying anymore and making today his quit date. Pt's goal for the week is to remain tobacco free, use the lozenges and cinnamon toothpicks more when he gets strong urges and cravings. Session handout provided and reviewed. Patient is to follow up in 2 weeks.

## 2020-03-18 ENCOUNTER — TELEPHONE (OUTPATIENT)
Dept: SMOKING CESSATION | Facility: CLINIC | Age: 70
End: 2020-03-18

## 2020-03-18 NOTE — TELEPHONE ENCOUNTER
Called patient in regards to smoking cessation appointment on Thursday 3/19/2020. Informed him not to present to clinic, we will follow up tomorrow same time via telephone call, patient agreed.

## 2020-03-19 ENCOUNTER — CLINICAL SUPPORT (OUTPATIENT)
Dept: SMOKING CESSATION | Facility: CLINIC | Age: 70
End: 2020-03-19
Payer: COMMERCIAL

## 2020-03-19 DIAGNOSIS — F17.200 NICOTINE DEPENDENCE: Primary | ICD-10-CM

## 2020-03-19 PROCEDURE — 99999 PR PBB SHADOW E&M-EST. PATIENT-LVL I: ICD-10-PCS | Mod: PBBFAC,,,

## 2020-03-19 PROCEDURE — 99411 PR PREVENT COUNSEL,GROUP,30 MIN: ICD-10-PCS | Mod: S$GLB,,,

## 2020-03-19 PROCEDURE — 99999 PR PBB SHADOW E&M-EST. PATIENT-LVL I: CPT | Mod: PBBFAC,,,

## 2020-03-19 PROCEDURE — 99411 PREVENTIVE COUNSELING GROUP: CPT | Mod: S$GLB,,,

## 2020-03-19 NOTE — Clinical Note
Called patient via telephone call to follow up on smoking cessation due to pandemic situation. Patient reports being tobacco for 3-4 days at the most, but then relapses and is back to smoking 3 cpd. Patient remains on prescribed tobacco cessation medication regimen of 2 mg lozenges PRN, without any negative side effects at this time.  Pt is using more of the lozenges and cinnamon toothpicks when he gets strong urges and cravings, but is having a hard time putting down the few after his meals. Discussed nicotine addiction vs habit dependence. Patient aware it's out of habit. He reports going for walks and waiting it out 15 min prior to smoking when he gets those strong urges. Patient is to follow up in 2 weeks.

## 2020-03-19 NOTE — PROGRESS NOTES
Individual Follow-Up Form    3/19/2020    Quit Date: TBD    Clinical Status of Patient: Outpatient    Length of Service: 30 minutes    Continuing Medication: yes  Nicotine Lozenges    Other Medications: none     Target Symptoms: Withdrawal and medication side effects. The following were  rated moderate (3) to severe (4) on TCRS:  · Moderate (3): none  · Severe (4): none    Comments:  Called patient via telephone call to follow up on smoking cessation due to pandemic situation. Patient reports being tobacco for 3-4 days at the most, but then relapses and is back to smoking 3 cpd. Patient remains on prescribed tobacco cessation medication regimen of 2 mg lozenges PRN, without any negative side effects at this time.  Pt is using more of the lozenges and cinnamon toothpicks when he gets strong urges and cravings, but is having a hard time putting down the few after his meals. Discussed nicotine addiction vs habit dependence. Patient aware it's out of habit. He reports going for walks and waiting it out 15 min prior to smoking when he gets those strong urges. Patient is to follow up in 2 weeks.          Diagnosis: F17.200    Next Visit: 2 weeks

## 2020-04-02 ENCOUNTER — CLINICAL SUPPORT (OUTPATIENT)
Dept: SMOKING CESSATION | Facility: CLINIC | Age: 70
End: 2020-04-02
Payer: COMMERCIAL

## 2020-04-02 DIAGNOSIS — F17.200 NICOTINE DEPENDENCE: Primary | ICD-10-CM

## 2020-04-02 PROCEDURE — 99401 PR PREVENT COUNSEL,INDIV,15 MIN: ICD-10-PCS | Mod: S$GLB,,,

## 2020-04-02 PROCEDURE — 99999 PR PBB SHADOW E&M-EST. PATIENT-LVL I: CPT | Mod: PBBFAC,,,

## 2020-04-02 PROCEDURE — 99401 PREV MED CNSL INDIV APPRX 15: CPT | Mod: S$GLB,,,

## 2020-04-02 PROCEDURE — 99999 PR PBB SHADOW E&M-EST. PATIENT-LVL I: ICD-10-PCS | Mod: PBBFAC,,,

## 2020-04-02 NOTE — PROGRESS NOTES
Individual Follow-Up Form    4/2/2020    Quit Date: TBD    Clinical Status of Patient: Outpatient    Length of Service: 15 minutes    Continuing Medication: yes  Nicotine Lozenges    Other Medications: none     Target Symptoms: Withdrawal and medication side effects. The following were  rated moderate (3) to severe (4) on TCRS:  · Moderate (3): none  · Severe (4): none    Comments:  Called patient via telephone call to follow up on smoking cessation due to pandemic situation. Patient reports smoking 1-3 cpd. Patient remains on prescribed tobacco cessation medication regimen of 2 mg lozenges PRN, without any negative side effects at this time.  Pt is using more of the lozenges and cinnamon toothpicks when he gets strong urges and cravings, but is having a hard time putting down the few after his meals. Discussed nicotine addiction vs habit dependence. Patient aware it's out of habit. Reinforced the importance of becoming tobacco free with this pandemic, pt stated he agrees and will try to completely put them down. Patient is to follow up in 2 weeks.       Diagnosis: F17.200    Next Visit: 2 weeks

## 2020-04-02 NOTE — Clinical Note
Called patient via telephone call to follow up on smoking cessation due to pandemic situation. Patient reports smoking 1-3 cpd. Patient remains on prescribed tobacco cessation medication regimen of 2 mg lozenges PRN, without any negative side effects at this time.  Pt is using more of the lozenges and cinnamon toothpicks when he gets strong urges and cravings, but is having a hard time putting down the few after his meals. Discussed nicotine addiction vs habit dependence. Patient aware it's out of habit. Reinforced the importance of becoming tobacco free with this pandemic, pt stated he agrees and will try to completely put them down. Patient is to follow up in 2 weeks.

## 2020-04-03 ENCOUNTER — PATIENT MESSAGE (OUTPATIENT)
Dept: FAMILY MEDICINE | Facility: CLINIC | Age: 70
End: 2020-04-03

## 2020-04-03 DIAGNOSIS — I10 ESSENTIAL HYPERTENSION: Chronic | ICD-10-CM

## 2020-04-03 RX ORDER — HYDROCHLOROTHIAZIDE 12.5 MG/1
CAPSULE ORAL
Qty: 90 CAPSULE | Refills: 1 | OUTPATIENT
Start: 2020-04-03

## 2020-04-12 DIAGNOSIS — R39.15 URINARY URGENCY: ICD-10-CM

## 2020-04-13 RX ORDER — TAMSULOSIN HYDROCHLORIDE 0.4 MG/1
CAPSULE ORAL
Qty: 180 CAPSULE | Refills: 1 | Status: SHIPPED | OUTPATIENT
Start: 2020-04-13 | End: 2020-08-24 | Stop reason: SDUPTHER

## 2020-04-16 ENCOUNTER — CLINICAL SUPPORT (OUTPATIENT)
Dept: SMOKING CESSATION | Facility: CLINIC | Age: 70
End: 2020-04-16
Payer: COMMERCIAL

## 2020-04-16 DIAGNOSIS — F17.200 NICOTINE DEPENDENCE: Primary | ICD-10-CM

## 2020-04-16 PROCEDURE — 99402 PR PREVENT COUNSEL,INDIV,30 MIN: ICD-10-PCS | Mod: S$GLB,,,

## 2020-04-16 PROCEDURE — 99999 PR PBB SHADOW E&M-EST. PATIENT-LVL I: ICD-10-PCS | Mod: PBBFAC,,,

## 2020-04-16 PROCEDURE — 99999 PR PBB SHADOW E&M-EST. PATIENT-LVL I: CPT | Mod: PBBFAC,,,

## 2020-04-16 PROCEDURE — 99402 PREV MED CNSL INDIV APPRX 30: CPT | Mod: S$GLB,,,

## 2020-04-16 RX ORDER — DM/P-EPHED/ACETAMINOPH/DOXYLAM 30-7.5/3
2 LIQUID (ML) ORAL
Qty: 100 LOZENGE | Refills: 0 | Status: SHIPPED | OUTPATIENT
Start: 2020-04-16 | End: 2020-05-13 | Stop reason: SDUPTHER

## 2020-04-16 NOTE — Clinical Note
Called patient via telephone call to follow up on smoking cessation due to pandemic situation. Patient reports smoking 3-4 cpd. He reports attempting a quit, and was able to last 3 days without smoking, but then relapsed out of habit. Patient remains on prescribed tobacco cessation medication regimen of 2 mg lozenges PRN, without any negative side effects at this time.  Pt is using more of the lozenges and cinnamon toothpicks when he gets strong urges and cravings, but is still having a hard time putting down the few after his meals. Discussed nicotine addiction vs habit dependence. Patient aware it's out of habit. Reinforced the importance of becoming tobacco free with this pandemic, pt stated he agrees and will attempt another quit tomorrow. Patient is to follow up in 2 weeks.

## 2020-04-16 NOTE — PROGRESS NOTES
Individual Follow-Up Form    4/16/2020    Quit Date: TBD    Clinical Status of Patient: Outpatient    Length of Service: 30 minutes    Continuing Medication: yes  Nicotine Lozenges    Other Medications: none     Target Symptoms: Withdrawal and medication side effects. The following were  rated moderate (3) to severe (4) on TCRS:  · Moderate (3): none  · Severe (4): none    Comments: Called patient via telephone call to follow up on smoking cessation due to pandemic situation. Patient reports smoking 3-4 cpd. He reports attempting a quit, and was able to last 3 days without smoking, but then relapsed out of habit. Patient remains on prescribed tobacco cessation medication regimen of 2 mg lozenges PRN, without any negative side effects at this time.  Pt is using more of the lozenges and cinnamon toothpicks when he gets strong urges and cravings, but is still having a hard time putting down the few after his meals. Discussed nicotine addiction vs habit dependence. Patient aware it's out of habit. Reinforced the importance of becoming tobacco free with this pandemic, pt stated he agrees and will attempt another quit tomorrow. Patient is to follow up in 2 weeks.       Diagnosis: F17.200    Next Visit: 2 weeks

## 2020-04-29 ENCOUNTER — CLINICAL SUPPORT (OUTPATIENT)
Dept: SMOKING CESSATION | Facility: CLINIC | Age: 70
End: 2020-04-29
Payer: COMMERCIAL

## 2020-04-29 DIAGNOSIS — F17.200 NICOTINE DEPENDENCE: Primary | ICD-10-CM

## 2020-04-29 PROCEDURE — 99402 PREV MED CNSL INDIV APPRX 30: CPT | Mod: S$GLB,,,

## 2020-04-29 PROCEDURE — 99999 PR PBB SHADOW E&M-EST. PATIENT-LVL I: CPT | Mod: PBBFAC,,,

## 2020-04-29 PROCEDURE — 99402 PR PREVENT COUNSEL,INDIV,30 MIN: ICD-10-PCS | Mod: S$GLB,,,

## 2020-04-29 PROCEDURE — 99999 PR PBB SHADOW E&M-EST. PATIENT-LVL I: ICD-10-PCS | Mod: PBBFAC,,,

## 2020-04-29 NOTE — Clinical Note
Called patient via telephone call to follow up on smoking cessation due to pandemic situation. Patient reports smoking 3-4 cpd. He reports smoking 2 cigarettes yesterday morning, he no longer has any and doesn't plan on buying anymore. Patient remains on prescribed tobacco cessation medication regimen of 2 mg lozenges PRN, without any negative side effects at this time.  Pt is using about 4 lozenges/day. Encouraged him to use more if he was to need to. Discussed nicotine addiction vs habit dependence. Patient aware it's out of habit. Reinforced the importance of becoming tobacco free with this pandemic, pt stated he agrees and will attempt to remain tobacco free. Patient is to follow up in 2 weeks.

## 2020-05-13 ENCOUNTER — CLINICAL SUPPORT (OUTPATIENT)
Dept: SMOKING CESSATION | Facility: CLINIC | Age: 70
End: 2020-05-13
Payer: COMMERCIAL

## 2020-05-13 DIAGNOSIS — F17.200 NICOTINE DEPENDENCE: ICD-10-CM

## 2020-05-13 PROCEDURE — 99999 PR PBB SHADOW E&M-EST. PATIENT-LVL I: ICD-10-PCS | Mod: PBBFAC,,,

## 2020-05-13 PROCEDURE — 99999 PR PBB SHADOW E&M-EST. PATIENT-LVL I: CPT | Mod: PBBFAC,,,

## 2020-05-13 PROCEDURE — 99402 PREV MED CNSL INDIV APPRX 30: CPT | Mod: S$GLB,,,

## 2020-05-13 PROCEDURE — 99402 PR PREVENT COUNSEL,INDIV,30 MIN: ICD-10-PCS | Mod: S$GLB,,,

## 2020-05-13 RX ORDER — DM/P-EPHED/ACETAMINOPH/DOXYLAM 30-7.5/3
2 LIQUID (ML) ORAL
Qty: 100 LOZENGE | Refills: 0 | Status: SHIPPED | OUTPATIENT
Start: 2020-05-13 | End: 2020-06-11 | Stop reason: SDUPTHER

## 2020-05-13 NOTE — PROGRESS NOTES
Individual Follow-Up Form    5/13/2020    Quit Date: n/a    Clinical Status of Patient: Outpatient    Length of Service: 30 minutes    Continuing Medication: yes  Nicotine Lozenges    Other Medications: n/a     Target Symptoms: Withdrawal and medication side effects. The following were  rated moderate (3) to severe (4) on TCRS:  · Moderate (3): 0  · Severe (4): 0    Comments: pt was contacted via telephone to discuss his progress with smoking cessation, he is currently smoking 6 cigarettes per day and struggling to quit, he finds it difficult right now during this pandemic, reminded him to focus on his reasons for quitting and continue to cut back using the nicotine lozenge in place of a cigarettes, strategies discussed, session handout discussed, will continue to monitor     Diagnosis: F17.210    Next Visit: 2 weeks

## 2020-05-13 NOTE — Clinical Note
Comments: pt was contacted via telephone to discuss his progress with smoking cessation, he is currently smoking 6 cigarettes per day and struggling to quit, he finds it difficult right now during this pandemic, reminded him to focus on his reasons for quitting and continue to cut back using the nicotine lozenge in place of a cigarettes, strategies discussed, session handout discussed, will continue to monitor

## 2020-05-14 DIAGNOSIS — N52.9 MALE ERECTILE DISORDER: ICD-10-CM

## 2020-05-14 RX ORDER — SILDENAFIL 100 MG/1
TABLET, FILM COATED ORAL
Qty: 10 TABLET | Refills: 2 | Status: SHIPPED | OUTPATIENT
Start: 2020-05-14 | End: 2021-03-02 | Stop reason: SDUPTHER

## 2020-05-27 ENCOUNTER — TELEPHONE (OUTPATIENT)
Dept: SMOKING CESSATION | Facility: CLINIC | Age: 70
End: 2020-05-27

## 2020-06-11 ENCOUNTER — CLINICAL SUPPORT (OUTPATIENT)
Dept: SMOKING CESSATION | Facility: CLINIC | Age: 70
End: 2020-06-11
Payer: COMMERCIAL

## 2020-06-11 DIAGNOSIS — F17.200 NICOTINE DEPENDENCE: ICD-10-CM

## 2020-06-11 PROCEDURE — 99999 PR PBB SHADOW E&M-EST. PATIENT-LVL I: CPT | Mod: PBBFAC,,,

## 2020-06-11 PROCEDURE — 99402 PREV MED CNSL INDIV APPRX 30: CPT | Mod: S$GLB,,,

## 2020-06-11 PROCEDURE — 99999 PR PBB SHADOW E&M-EST. PATIENT-LVL I: ICD-10-PCS | Mod: PBBFAC,,,

## 2020-06-11 PROCEDURE — 99402 PR PREVENT COUNSEL,INDIV,30 MIN: ICD-10-PCS | Mod: S$GLB,,,

## 2020-06-11 RX ORDER — DM/P-EPHED/ACETAMINOPH/DOXYLAM 30-7.5/3
2 LIQUID (ML) ORAL
Qty: 100 LOZENGE | Refills: 0 | Status: SHIPPED | OUTPATIENT
Start: 2020-06-11 | End: 2020-12-21 | Stop reason: SDUPTHER

## 2020-06-11 NOTE — PROGRESS NOTES
Individual Follow-Up Form    6/11/2020    Quit Date: n/a    Clinical Status of Patient: Outpatient    Length of Service: 30 minutes    Continuing Medication: yes  Nicotine Lozenges    Other Medications: n/a     Target Symptoms: Withdrawal and medication side effects. The following were  rated moderate (3) to severe (4) on TCRS:  · Moderate (3): 0  · Severe (4): 0    Comments: pt presents for follow up smoking about 4 cigarettes per day, down from about 6-7 cigarettes per day, he is using the nicotine lozenge as needed throughout the day and has found them helpful during rate reduction but due to the worlds situation with the health pandemic this has caused him stress and anxiety limiting his success for quitting smoking, benefits end with the program today therefore instructed the patient to  his refill on lozenge asap some time today. Session handout discussed will follow     Diagnosis: F17.210    Next Visit: 2 weeks

## 2020-06-11 NOTE — Clinical Note
Comments: pt presents for follow up smoking about 4 cigarettes per day, down from about 6-7 cigarettes per day, he is using the nicotine lozenge as needed throughout the day and has found them helpful during rate reduction but due to the worlds situation with the health pandemic this has caused him stress and anxiety limiting his success for quitting smoking, benefits end with the program today therefore instructed the patient to  his refill on lozenge asap some time today. Session handout discussed will follow

## 2020-07-15 ENCOUNTER — PATIENT MESSAGE (OUTPATIENT)
Dept: OTHER | Facility: OTHER | Age: 70
End: 2020-07-15
Payer: MEDICARE

## 2020-07-15 DIAGNOSIS — Z71.89 COMPLEX CARE COORDINATION: ICD-10-CM

## 2020-08-05 ENCOUNTER — TELEPHONE (OUTPATIENT)
Dept: FAMILY MEDICINE | Facility: CLINIC | Age: 70
End: 2020-08-05

## 2020-08-05 DIAGNOSIS — E78.5 DYSLIPIDEMIA: ICD-10-CM

## 2020-08-05 DIAGNOSIS — Z00.00 NORMAL PHYSICAL EXAM: Primary | ICD-10-CM

## 2020-08-05 DIAGNOSIS — I10 ESSENTIAL HYPERTENSION: ICD-10-CM

## 2020-08-05 DIAGNOSIS — C61 PROSTATE CANCER: ICD-10-CM

## 2020-08-05 NOTE — TELEPHONE ENCOUNTER
Advise labs?        ----- Message from Joanmaddi Heard sent at 8/5/2020  9:10 AM CDT -----  Regarding: Lab Orders  Contact: Sharee  Type: Patient Call Back    Who called:Sharee    What is the request in detail:Patient called to request lab orders for annual physical. Please call to schedule    Can the clinic reply by [facility]?    Would the patient rather a call back or a response via [facility]? Call    Best call back number:[phone]    Additional Information:Please include orders for PSA

## 2020-08-14 ENCOUNTER — LAB VISIT (OUTPATIENT)
Dept: LAB | Facility: HOSPITAL | Age: 70
End: 2020-08-14
Attending: INTERNAL MEDICINE
Payer: MEDICARE

## 2020-08-14 DIAGNOSIS — Z00.00 NORMAL PHYSICAL EXAM: ICD-10-CM

## 2020-08-14 DIAGNOSIS — C61 PROSTATE CANCER: ICD-10-CM

## 2020-08-14 DIAGNOSIS — I10 ESSENTIAL HYPERTENSION: ICD-10-CM

## 2020-08-14 DIAGNOSIS — E78.5 DYSLIPIDEMIA: ICD-10-CM

## 2020-08-14 LAB
ALBUMIN SERPL BCP-MCNC: 3.6 G/DL (ref 3.5–5.2)
ALP SERPL-CCNC: 51 U/L (ref 55–135)
ALT SERPL W/O P-5'-P-CCNC: 19 U/L (ref 10–44)
ANION GAP SERPL CALC-SCNC: 4 MMOL/L (ref 8–16)
AST SERPL-CCNC: 17 U/L (ref 10–40)
BASOPHILS # BLD AUTO: 0.02 K/UL (ref 0–0.2)
BASOPHILS NFR BLD: 0.3 % (ref 0–1.9)
BILIRUB SERPL-MCNC: 0.8 MG/DL (ref 0.1–1)
BUN SERPL-MCNC: 9 MG/DL (ref 8–23)
CALCIUM SERPL-MCNC: 9.3 MG/DL (ref 8.7–10.5)
CHLORIDE SERPL-SCNC: 108 MMOL/L (ref 95–110)
CHOLEST SERPL-MCNC: 203 MG/DL (ref 120–199)
CHOLEST/HDLC SERPL: 6 {RATIO} (ref 2–5)
CO2 SERPL-SCNC: 29 MMOL/L (ref 23–29)
COMPLEXED PSA SERPL-MCNC: 0.25 NG/ML (ref 0–4)
CREAT SERPL-MCNC: 1 MG/DL (ref 0.5–1.4)
DIFFERENTIAL METHOD: ABNORMAL
EOSINOPHIL # BLD AUTO: 0.1 K/UL (ref 0–0.5)
EOSINOPHIL NFR BLD: 1 % (ref 0–8)
ERYTHROCYTE [DISTWIDTH] IN BLOOD BY AUTOMATED COUNT: 14.3 % (ref 11.5–14.5)
EST. GFR  (AFRICAN AMERICAN): >60 ML/MIN/1.73 M^2
EST. GFR  (NON AFRICAN AMERICAN): >60 ML/MIN/1.73 M^2
GLUCOSE SERPL-MCNC: 81 MG/DL (ref 70–110)
HCT VFR BLD AUTO: 44.8 % (ref 40–54)
HDLC SERPL-MCNC: 34 MG/DL (ref 40–75)
HDLC SERPL: 16.7 % (ref 20–50)
HGB BLD-MCNC: 13.5 G/DL (ref 14–18)
IMM GRANULOCYTES # BLD AUTO: 0.01 K/UL (ref 0–0.04)
IMM GRANULOCYTES NFR BLD AUTO: 0.2 % (ref 0–0.5)
LDLC SERPL CALC-MCNC: 141.8 MG/DL (ref 63–159)
LYMPHOCYTES # BLD AUTO: 1.8 K/UL (ref 1–4.8)
LYMPHOCYTES NFR BLD: 28.9 % (ref 18–48)
MCH RBC QN AUTO: 24.5 PG (ref 27–31)
MCHC RBC AUTO-ENTMCNC: 30.1 G/DL (ref 32–36)
MCV RBC AUTO: 81 FL (ref 82–98)
MONOCYTES # BLD AUTO: 0.7 K/UL (ref 0.3–1)
MONOCYTES NFR BLD: 11.8 % (ref 4–15)
NEUTROPHILS # BLD AUTO: 3.5 K/UL (ref 1.8–7.7)
NEUTROPHILS NFR BLD: 57.8 % (ref 38–73)
NONHDLC SERPL-MCNC: 169 MG/DL
NRBC BLD-RTO: 0 /100 WBC
PLATELET # BLD AUTO: 262 K/UL (ref 150–350)
PMV BLD AUTO: 9.4 FL (ref 9.2–12.9)
POTASSIUM SERPL-SCNC: 4.1 MMOL/L (ref 3.5–5.1)
PROT SERPL-MCNC: 7 G/DL (ref 6–8.4)
RBC # BLD AUTO: 5.51 M/UL (ref 4.6–6.2)
SODIUM SERPL-SCNC: 141 MMOL/L (ref 136–145)
TRIGL SERPL-MCNC: 136 MG/DL (ref 30–150)
TSH SERPL DL<=0.005 MIU/L-ACNC: 1.66 UIU/ML (ref 0.4–4)
WBC # BLD AUTO: 6.1 K/UL (ref 3.9–12.7)

## 2020-08-14 PROCEDURE — 80053 COMPREHEN METABOLIC PANEL: CPT

## 2020-08-14 PROCEDURE — 36415 COLL VENOUS BLD VENIPUNCTURE: CPT | Mod: PO

## 2020-08-14 PROCEDURE — 85025 COMPLETE CBC W/AUTO DIFF WBC: CPT

## 2020-08-14 PROCEDURE — 84443 ASSAY THYROID STIM HORMONE: CPT

## 2020-08-14 PROCEDURE — 80061 LIPID PANEL: CPT

## 2020-08-14 PROCEDURE — 84153 ASSAY OF PSA TOTAL: CPT

## 2020-08-24 ENCOUNTER — OFFICE VISIT (OUTPATIENT)
Dept: FAMILY MEDICINE | Facility: CLINIC | Age: 70
End: 2020-08-24
Payer: MEDICARE

## 2020-08-24 VITALS
TEMPERATURE: 98 F | SYSTOLIC BLOOD PRESSURE: 130 MMHG | WEIGHT: 219.81 LBS | HEART RATE: 61 BPM | HEIGHT: 71 IN | BODY MASS INDEX: 30.77 KG/M2 | OXYGEN SATURATION: 96 % | DIASTOLIC BLOOD PRESSURE: 80 MMHG

## 2020-08-24 DIAGNOSIS — R39.15 URINARY URGENCY: ICD-10-CM

## 2020-08-24 DIAGNOSIS — F32.2 CURRENT SEVERE EPISODE OF MAJOR DEPRESSIVE DISORDER WITHOUT PSYCHOTIC FEATURES WITHOUT PRIOR EPISODE: ICD-10-CM

## 2020-08-24 DIAGNOSIS — D56.3 BETA THALASSEMIA MINOR: Chronic | ICD-10-CM

## 2020-08-24 DIAGNOSIS — I73.9 PERIPHERAL ARTERY DISEASE: ICD-10-CM

## 2020-08-24 DIAGNOSIS — G72.0 STATIN MYOPATHY: ICD-10-CM

## 2020-08-24 DIAGNOSIS — D12.6 TUBULOVILLOUS ADENOMA OF COLON: ICD-10-CM

## 2020-08-24 DIAGNOSIS — Z00.00 NORMAL PHYSICAL EXAM: Primary | ICD-10-CM

## 2020-08-24 DIAGNOSIS — E78.5 DYSLIPIDEMIA: ICD-10-CM

## 2020-08-24 DIAGNOSIS — E78.00 PURE HYPERCHOLESTEROLEMIA: ICD-10-CM

## 2020-08-24 DIAGNOSIS — C61 PROSTATE CANCER: ICD-10-CM

## 2020-08-24 DIAGNOSIS — T46.6X5A STATIN MYOPATHY: ICD-10-CM

## 2020-08-24 DIAGNOSIS — I10 ESSENTIAL HYPERTENSION: ICD-10-CM

## 2020-08-24 PROCEDURE — 99213 OFFICE O/P EST LOW 20 MIN: CPT | Mod: PBBFAC,PO | Performed by: INTERNAL MEDICINE

## 2020-08-24 PROCEDURE — 99999 PR PBB SHADOW E&M-EST. PATIENT-LVL III: ICD-10-PCS | Mod: PBBFAC,,, | Performed by: INTERNAL MEDICINE

## 2020-08-24 PROCEDURE — 99214 PR OFFICE/OUTPT VISIT, EST, LEVL IV, 30-39 MIN: ICD-10-PCS | Mod: S$PBB,,, | Performed by: INTERNAL MEDICINE

## 2020-08-24 PROCEDURE — 99214 OFFICE O/P EST MOD 30 MIN: CPT | Mod: S$PBB,,, | Performed by: INTERNAL MEDICINE

## 2020-08-24 PROCEDURE — 99999 PR PBB SHADOW E&M-EST. PATIENT-LVL III: CPT | Mod: PBBFAC,,, | Performed by: INTERNAL MEDICINE

## 2020-08-24 RX ORDER — DULOXETIN HYDROCHLORIDE 60 MG/1
120 CAPSULE, DELAYED RELEASE ORAL DAILY
COMMUNITY
End: 2021-11-10

## 2020-08-24 RX ORDER — TAMSULOSIN HYDROCHLORIDE 0.4 MG/1
CAPSULE ORAL
Qty: 180 CAPSULE | Refills: 3 | Status: SHIPPED | OUTPATIENT
Start: 2020-08-24 | End: 2021-03-02 | Stop reason: SDUPTHER

## 2020-08-24 RX ORDER — BUPROPION HYDROCHLORIDE 150 MG/1
2 TABLET, EXTENDED RELEASE ORAL DAILY
COMMUNITY
Start: 2020-08-04 | End: 2021-11-10

## 2020-08-24 RX ORDER — EZETIMIBE 10 MG/1
10 TABLET ORAL DAILY
Qty: 90 TABLET | Refills: 3 | Status: SHIPPED | OUTPATIENT
Start: 2020-08-24 | End: 2021-03-02 | Stop reason: SDUPTHER

## 2020-08-24 NOTE — PROGRESS NOTES
This note was created by combination of typed  and M-Modal dictation.  Transcription errors may be present.  If there are any questions, please contact me.    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 Not on meds.  bp ok on intake today.    Dyslipidemia statin intolerance. 8/2018 zetia  Statin myopathy  Peripheral artery disease mild on doppler  -lipid not as good as previous.  Eating less restricted, less physical activity with pandemic. No changes.  Encouraged for more physical activity.  -     Comprehensive metabolic panel; Future; Expected date: 02/20/2021  -     Lipid Panel; Future; Expected date: 02/20/2021  -     ezetimibe (ZETIA) 10 mg tablet; Take 1 tablet (10 mg total) by mouth once daily.  Dispense: 90 tablet; Refill: 3    Prostate cancer s/p XRT currently hormone deprivation  Urinary urgency  -followed by urology in [place].  Pt would like testing with me as well as urology.  Last psa detectable but stable.  Refilled flomax 2 pills daily.  -     tamsulosin (FLOMAX) 0.4 mg Cap; TAKE 2 CAPSULES(0.8 MG) BY MOUTH EVERY DAY  Dispense: 180 capsule; Refill: 3    Current severe episode of major depressive disorder without psychotic features without prior episode  -followed by psychiatry. On cymbalta high dose and bupropion 300 mg. Ativan and lunesta.  Off of mirtazepine      Medications Discontinued During This Encounter   Medication Reason    mirtazapine (REMERON) 15 MG tablet Alternate therapy    DULoxetine (CYMBALTA) 60 MG capsule Dose adjustment    methylphenidate HCl (RITALIN) 10 MG tablet Therapy completed    FLUZONE HIGH-DOSE 2019-20, PF, 180 mcg/0.5 mL Syrg Therapy completed    tamsulosin (FLOMAX) 0.4 mg Cap Reorder    ezetimibe (ZETIA) 10 mg tablet Reorder       meds sent this  encounter:  Medications Ordered This Encounter   Medications    ezetimibe (ZETIA) 10 mg tablet     Sig: Take 1 tablet (10 mg total) by mouth once daily.     Dispense:  90 tablet     Refill:  3    tamsulosin (FLOMAX) 0.4 mg Cap     Sig: TAKE 2 CAPSULES(0.8 MG) BY MOUTH EVERY DAY     Dispense:  180 capsule     Refill:  3       Follow Up: No follow-ups on file. follow up 6 months, previsit labs ordered, recall entered.    Subjective:     Chief Complaint   Patient presents with    Annual Exam       CHERRY Tolliver is a 70 y.o. male, last appointment with this clinic was Visit date not found.    Dyslipidemia and mild PAD on doppler; statin intolerance.  HTN, challenged last time off hctz and K  Abn glc    Enrolled in smoking cessation  Depression, psychiatry Dr. Coleman  Hx of prostate CA s/p XRT on hormone deprivation, Dr. Plasencia with [facility] [place]    previsit labs   CC mild anemia stable; known beta thal  CMP WNL  Lipid higher compared to previous  TSH WNL  PSA detectable but stable. 0.25    q6 month follow up with urology in [place].   Last visit with smoking cessation classes in June. Has lozenges, toothpicks, etc. Still smoking, still trying to quit. Smoking 1/4 PPD.     Has a home cuff, does periodically monitor.  Can be high sometimes 190 systolic. Sometimes comes down if he walks and relaxes and repeats.     lunesta and ativan for sleep; no longer taking the mirtazepine  cymbalta 60 mg 2 pills daily.     Physical activity - walking. But needs more by his own assessment.  Walking 2-3 miles a week.   Diet not as restricted as previous    He would like for me to check serial PSA tests in addition to his urologist in [place].     Colonoscopy 2018, to the list adenoma.  Due next year.  No blood in stool.  Intermittent constipation which is his baseline.    Patient Care Team:  Navin Charlton MD as PCP - General (Internal Medicine)  Alfredo Kearns MD as Consulting Physician (Urology)  Adeel Coleman MD as  Consulting Physician (Psychiatry)  Adeel Vicente MD (Hematology and Oncology)  Juventino Meeks MA as Care Coordinator    Patient Active Problem List    Diagnosis Date Noted    Peripheral artery disease mild on doppler 05/20/2019     4/10/2018 LE dopplers: 1. Right lower extremity pressures and waveforms indicate no hemodynamically significant arterial occlusive disease.  2. Left lower extremity pressures and waveforms indicate mild arterial occlusive disease.      Numbness or tingling workup with neuro for demyelinating 05/20/2019 12/19/18 MRI brain: 1. Numerous tiny focal areas of remote gliosis posterior frontal parietal and occipital and upper basal ganglia areas, differential diagnosis includes microvascular ischemic change and inactive demyelinating process.  2. Intraventricular artifact opacity body left lateral ventricle discussed above.  12/2018 MRI C spine: 1. Multilevel spondylotic changes as discussed above.  2. Multilevel foraminal stenotic changes more pronounced on the left as compared to the right as discussed above.  See further details above.  1/24/19 EMG: borderline normal EMG/NCS. There was an absent   left sural response of unclear clinical significance. There were   no myopathic units seen to suggest a myopathy.      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 The patient slept supine 64.2% of the night based  on valid recording time of 6.43 hours. The apneas/hypopneas are accompanied by severe oxygen desaturation (percent time  below 90% SpO2: 15.9%, Min SpO2: 69.6%). The average desaturation across all sleep disordered breathing events is 5.7%.  Snoring occurs for 45.4% (30 dB) of the study, 17.3% is extremely loud. The mean pulse rate is 63 BPM,  with frequent pulse  rate variability (52 events with >= 6 BPM increase/decrease per hour).  TREATMENT CONSIDERATIONS: Consider nasal continuous positive airway pressure (CPAP) as the initial treatment  choice for severe obstructive sleep apnea. Consider an attended in-lab CPAP titration study, given the possibility of co-morbid  sleep related hypoventilation.  DISEASE MANAGEMENT CONSIDERATIONS: Insomnia is a symptom that can be associated with untreated DAKOTA.  Sleeping pills may increase the severity of untreated DAKOTA and their use should be reevaluated once the DAKOTA is treated.      Major depressive disorder 10/30/2017    Pelvic floor muscle wasting in male 09/28/2017    Prostate cancer s/p XRT currently hormone deprivation 08/30/2017     2 cores positive kary 3+4=7 on biopsy 8/15/2017      Smoker 08/30/2017    Vitamin D deficiency 08/30/2017    Beta thalassemia minor 04/11/2017    Benign non-nodular prostatic hyperplasia without lower urinary tract symptoms 03/29/2017     Hx of negative prostate Bx and hx of MRI prostate      Restless leg syndrome Requip ineffective 08/02/2016    Dyslipidemia statin intolerance. 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  Rozerem didn't help 11/20/2013       PAST MEDICAL HISTORY:  Past Medical History:   Diagnosis Date    Hyperlipidemia     Hypertension     Obstructive sleep apnea severe with AHI 64 on study 7/2018 7/27/2018    Very severe sleep disordered breathing (AHI=64) is noted based on a 4% hypopnea desaturation criteria. The patient slept supine 64.2% of the night based on valid recording time of 6.43 hours. The apneas/hypopneas are accompanied by severe oxygen desaturation (percent time below 90% SpO2: 15.9%, Min SpO2: 69.6%). The average  desaturation across all sleep disordered breathing events is 5.7%. 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     Employer: [employer]   Social Needs    Financial resource strain: Not on file    Food insecurity     Worry: Not on file     Inability: Not on file    Transportation needs     Medical: Not on file     Non-medical: Not on file   Tobacco Use    Smoking status: Current Every Day Smoker     Types: Cigarettes    Smokeless tobacco: Never Used   Substance and Sexual Activity    Alcohol use: No    Drug use: No    Sexual activity: Yes   Lifestyle    Physical activity     Days per week: Not on file     Minutes per session: Not on file    Stress: Not on file   Relationships    Social connections     Talks on phone: Not on file     Gets together: Not on file     Attends Yazidism service: Not on file     Active member of club or organization: Not on file     Attends meetings of clubs or organizations: Not on file     Relationship status: Not on file   Other Topics Concern    Not on file   Social History Narrative    Not on file       ALLERGIES AND MEDICATIONS: updated and reviewed.  Review of patient's allergies indicates:   Allergen Reactions    Crestor  [rosuvastatin]      Other reaction(s): Muscle pain    Statins-hmg-coa reductase inhibitors      myalgias    Sulfamethoxazole-trimethoprim Other (See Comments)     Muscle pain    Atorvastatin Other (See  "Comments)     Muscle cramps  Other reaction(s): Muscle pain       Medication List with Changes/Refills   Current Medications    DULOXETINE (CYMBALTA) 60 MG CAPSULE        ESZOPICLONE (LUNESTA) 2 MG TAB    Take 2 mg by mouth nightly as needed.    EZETIMIBE (ZETIA) 10 MG TABLET    TAKE 1 TABLET(10 MG) BY MOUTH EVERY DAY    FLUZONE HIGH-DOSE 2019-20, PF, 180 MCG/0.5 ML SYRG    ADM 0.5ML IM UTD    LORAZEPAM (ATIVAN) 1 MG TABLET    Take 2 mg by mouth every evening.     METHYLPHENIDATE HCL (RITALIN) 10 MG TABLET    Take 10 mg by mouth 2 (two) times daily.    MIRTAZAPINE (REMERON) 15 MG TABLET        NICOTINE POLACRILEX 2 MG LOZG    Take 1 lozenge (2 mg total) by mouth as needed. Maximum 15 pieces/day.    SILDENAFIL (VIAGRA) 100 MG TABLET    TAKE 1 TABLET(100 MG) BY MOUTH DAILY AS NEEDED FOR ERECTILE DYSFUNCTION    TAMSULOSIN (FLOMAX) 0.4 MG CAP    TAKE 2 CAPSULES(0.8 MG) BY MOUTH EVERY DAY        Review of Systems   Constitutional: Negative for fever, malaise/fatigue and weight loss.   HENT: Negative for congestion.    Eyes: Negative for blurred vision and pain.   Respiratory: Negative for shortness of breath and wheezing.    Cardiovascular: Negative for chest pain, palpitations and leg swelling.   Gastrointestinal: Positive for constipation. Negative for abdominal pain, blood in stool, diarrhea and heartburn.   Genitourinary: Negative for dysuria, hematuria and urgency.   Neurological: Positive for dizziness. Negative for tingling, focal weakness, weakness and headaches.        Still gets episodic lightheadedness/dizziness       Objective:   Physical Exam  Vitals:    08/24/20 1331   BP: 130/80   Pulse: 61   Temp: 98 °F (36.7 °C)   TempSrc: Oral   SpO2: 96%   Weight: 99.7 kg (219 lb 12.8 oz)   Height: 5' 11" (1.803 m)    Body mass index is 30.66 kg/m².  Weight: 99.7 kg (219 lb 12.8 oz)   Height: 5' 11" (180.3 cm)     Physical Exam  Constitutional:       Appearance: Normal appearance. He is well-developed.   HENT:      " Right Ear: Tympanic membrane and external ear normal.      Left Ear: Tympanic membrane and external ear normal.      Nose: Nose normal.   Eyes:      General: No scleral icterus.     Conjunctiva/sclera: Conjunctivae normal.   Neck:      Thyroid: No thyroid mass or thyromegaly.      Trachea: Trachea normal.   Cardiovascular:      Rate and Rhythm: Normal rate and regular rhythm.      Heart sounds: Normal heart sounds, S1 normal and S2 normal. No murmur.   Pulmonary:      Effort: Pulmonary effort is normal.      Breath sounds: Normal breath sounds.   Abdominal:      General: There is no distension.      Palpations: Abdomen is soft. There is no hepatomegaly, splenomegaly or mass.      Tenderness: There is no abdominal tenderness.   Musculoskeletal:         General: No deformity.   Lymphadenopathy:      Cervical: No cervical adenopathy.   Skin:     General: Skin is warm and dry.      Findings: No rash (on exposed skin).      Comments: On exposed skin   Neurological:      Mental Status: He is alert and oriented to person, place, and time.      Cranial Nerves: No cranial nerve deficit.      Sensory: No sensory deficit.      Deep Tendon Reflexes: Reflexes are normal and symmetric.   Psychiatric:         Speech: Speech normal.         Behavior: Behavior normal.         Thought Content: Thought content normal.         Judgment: Judgment normal.         Component      Latest Ref Rng & Units 8/14/2020 12/27/2019   WBC      3.90 - 12.70 K/uL 6.10 5.20   RBC      4.60 - 6.20 M/uL 5.51 5.29   Hemoglobin      14.0 - 18.0 g/dL 13.5 (L) 13.1 (L)   Hematocrit      40.0 - 54.0 % 44.8 42.9   MCV      82 - 98 fL 81 (L) 81 (L)   MCH      27.0 - 31.0 pg 24.5 (L) 24.8 (L)   MCHC      32.0 - 36.0 g/dL 30.1 (L) 30.5 (L)   RDW      11.5 - 14.5 % 14.3 14.1   Platelets      150 - 350 K/uL 262 257   MPV      9.2 - 12.9 fL 9.4 9.5   Immature Granulocytes      0.0 - 0.5 % 0.2 0.4   Gran # (ANC)      1.8 - 7.7 K/uL 3.5 3.0   Immature Grans (Abs)       0.00 - 0.04 K/uL 0.01 0.02   Lymph #      1.0 - 4.8 K/uL 1.8 1.6   Mono #      0.3 - 1.0 K/uL 0.7 0.5   Eos #      0.0 - 0.5 K/uL 0.1 0.0   Baso #      0.00 - 0.20 K/uL 0.02 0.02   nRBC      0 /100 WBC 0 0   Gran%      38.0 - 73.0 % 57.8 58.2   Lymph%      18.0 - 48.0 % 28.9 30.4   Mono%      4.0 - 15.0 % 11.8 10.2   Eosinophil%      0.0 - 8.0 % 1.0 0.4   Basophil%      0.0 - 1.9 % 0.3 0.4   Differential Method       Automated Automated   Sodium      136 - 145 mmol/L 141 140   Potassium      3.5 - 5.1 mmol/L 4.1 3.8   Chloride      95 - 110 mmol/L 108 108   CO2      23 - 29 mmol/L 29 25   Glucose      70 - 110 mg/dL 81 89   BUN, Bld      8 - 23 mg/dL 9 10   Creatinine      0.5 - 1.4 mg/dL 1.0 0.9   Calcium      8.7 - 10.5 mg/dL 9.3 9.1   PROTEIN TOTAL      6.0 - 8.4 g/dL 7.0 7.4   Albumin      3.5 - 5.2 g/dL 3.6 3.8   BILIRUBIN TOTAL      0.1 - 1.0 mg/dL 0.8 0.6   Alkaline Phosphatase      55 - 135 U/L 51 (L) 48 (L)   AST      10 - 40 U/L 17 20   ALT      10 - 44 U/L 19 17   Anion Gap      8 - 16 mmol/L 4 (L) 7 (L)   eGFR if African American      >60 mL/min/1.73 m:2 >60.0 >60.0   eGFR if non African American      >60 mL/min/1.73 m:2 >60.0 >60.0   Cholesterol      120 - 199 mg/dL 203 (H) 183   Triglycerides      30 - 150 mg/dL 136 75   HDL      40 - 75 mg/dL 34 (L) 42   LDL Cholesterol External      63.0 - 159.0 mg/dL 141.8 126.0   Hdl/Cholesterol Ratio      20.0 - 50.0 % 16.7 (L) 23.0   Total Cholesterol/HDL Ratio      2.0 - 5.0 6.0 (H) 4.4   Non-HDL Cholesterol      mg/dL 169 141   Hemoglobin A1C External      4.0 - 5.6 %  4.8   Estimated Avg Glucose      68 - 131 mg/dL  91   PSA DIAGNOSTIC      0.00 - 4.00 ng/mL 0.25 0.25   TSH      0.400 - 4.000 uIU/mL 1.658

## 2020-08-24 NOTE — PATIENT INSTRUCTIONS
SEE IF YOU EVER HAD THE SHINGRIX SHOTS FOR SHINGLES PREVENTION.  2 SHOT SERIES.    THIS IS NOT THE SAME AS ZOSTAVAX.

## 2020-10-01 ENCOUNTER — PATIENT MESSAGE (OUTPATIENT)
Dept: OTHER | Facility: OTHER | Age: 70
End: 2020-10-01

## 2020-11-03 ENCOUNTER — OFFICE VISIT (OUTPATIENT)
Dept: FAMILY MEDICINE | Facility: CLINIC | Age: 70
End: 2020-11-03
Payer: MEDICARE

## 2020-11-03 ENCOUNTER — PATIENT MESSAGE (OUTPATIENT)
Dept: ADMINISTRATIVE | Facility: OTHER | Age: 70
End: 2020-11-03

## 2020-11-03 VITALS
BODY MASS INDEX: 30.52 KG/M2 | DIASTOLIC BLOOD PRESSURE: 80 MMHG | TEMPERATURE: 98 F | SYSTOLIC BLOOD PRESSURE: 116 MMHG | OXYGEN SATURATION: 96 % | HEIGHT: 71 IN | HEART RATE: 69 BPM | WEIGHT: 218 LBS

## 2020-11-03 DIAGNOSIS — I10 ESSENTIAL HYPERTENSION: Primary | ICD-10-CM

## 2020-11-03 DIAGNOSIS — E78.5 DYSLIPIDEMIA: ICD-10-CM

## 2020-11-03 DIAGNOSIS — C61 PROSTATE CANCER: ICD-10-CM

## 2020-11-03 DIAGNOSIS — F32.2 CURRENT SEVERE EPISODE OF MAJOR DEPRESSIVE DISORDER WITHOUT PSYCHOTIC FEATURES WITHOUT PRIOR EPISODE: ICD-10-CM

## 2020-11-03 DIAGNOSIS — G47.33 OBSTRUCTIVE SLEEP APNEA: ICD-10-CM

## 2020-11-03 DIAGNOSIS — F17.200 SMOKER: ICD-10-CM

## 2020-11-03 DIAGNOSIS — I73.9 PERIPHERAL ARTERY DISEASE: ICD-10-CM

## 2020-11-03 PROCEDURE — 99214 OFFICE O/P EST MOD 30 MIN: CPT | Mod: S$PBB,,, | Performed by: INTERNAL MEDICINE

## 2020-11-03 PROCEDURE — 99999 PR PBB SHADOW E&M-EST. PATIENT-LVL IV: ICD-10-PCS | Mod: PBBFAC,,, | Performed by: INTERNAL MEDICINE

## 2020-11-03 PROCEDURE — 99214 PR OFFICE/OUTPT VISIT, EST, LEVL IV, 30-39 MIN: ICD-10-PCS | Mod: S$PBB,,, | Performed by: INTERNAL MEDICINE

## 2020-11-03 PROCEDURE — 99214 OFFICE O/P EST MOD 30 MIN: CPT | Mod: PBBFAC,PO | Performed by: INTERNAL MEDICINE

## 2020-11-03 PROCEDURE — 99999 PR PBB SHADOW E&M-EST. PATIENT-LVL IV: CPT | Mod: PBBFAC,,, | Performed by: INTERNAL MEDICINE

## 2020-11-03 RX ORDER — A/SINGAPORE/GP1908/2015 IVR-180 (AN A/MICHIGAN/45/2015 (H1N1)PDM09-LIKE VIRUS, A/HONG KONG/4801/2014, NYMC X-263B (H3N2) (AN A/HONG KONG/4801/2014-LIKE VIRUS), AND B/BRISBANE/60/2008, WILD TYPE (A B/BRISBANE/60/2008-LIKE VIRUS) 15; 15; 15 UG/.5ML; UG/.5ML; UG/.5ML
INJECTION, SUSPENSION INTRAMUSCULAR
COMMUNITY
Start: 2020-10-16 | End: 2021-11-10

## 2020-11-03 RX ORDER — ESZOPICLONE 3 MG/1
TABLET, FILM COATED ORAL
COMMUNITY
Start: 2020-09-29 | End: 2022-11-10

## 2020-11-03 NOTE — PROGRESS NOTES
This note was created by combination of typed  and M-Modal dictation.  Transcription errors may be present.  If there are any questions, please contact me.    Assessment and Plan:   Essential hypertension 2016 nu med stress test neg 2016 TTE WNL  -history of lisinopril not taking on regular basis.  Blood pressure today was actually pretty good.  He notes that sometimes at home it may be elevated.  After discussion enroll in digital hypertension monitoring  He has been less active of late and I have recommended that he start more physical activity.  I think this will help out with his blood pressure, his mood, and his sleep.  -     Hypertension Digital Medicine (HDMP) Enrollment Order    Peripheral artery disease mild on doppler  Dyslipidemia statin intolerance. 8/2018 zetia  Lipid not ideal.  On Zetia.  No changes.  Work on therapeutic lifestyle modification.  Need for more physical activity discussed, as above  -     CBC Auto Differential; Future; Expected date: 03/03/2021    Prostate cancer s/p XRT currently hormone deprivation  -followed by cancer center in [facility].  He would like to establish with a local urologist for any acute issues.  Referral to Urology.  -     Ambulatory referral/consult to Urology; Future; Expected date: 11/10/2020    Smoker  -smoking cessation.    Obstructive sleep apnea severe with AHI 64 on study 7/2018    Major depressive disorder  Followed by outside Psychiatry.  Notes issues with insomnia, issues with sleep maintenance.  Despite Lunesta high dose and Ativan and high-dose Cymbalta and bupropion.  Discussed need for increased physical activity.  I will also give him some sleep apps for his phone.  He is also enquiring about medical marijuana as possible treatment for this.  I discussed with him that I am not well-versed in this and I would recommend that he find a provider who is licensed to recommend medical marijuana.    Medications Discontinued During This  Encounter   Medication Reason    eszopiclone (LUNESTA) 2 MG Tab        meds sent this encounter:       Follow Up: No follow-ups on file.    Subjective:     Chief Complaint   Patient presents with    Hypertension    Insomnia    Dizziness    Constipation       CHERRY Tolliver is a 70 y.o. male, last appointment with this clinic was 8/24/2020.    Last visit with me in August.  Hypertension stable at that time.  Not on medications at that time.  Hyperlipidemia with statin myopathy.  Zetia.  Labs at that time not quite as good as his baseline.  Prostate cancer followed by Urology in [place].  Depression followed by Psychiatry.  Smoker.  Enrolled in smoking cessation.    Going to [place] this coming Friday for his 6 month follow up. Typically one day - blood testing.     Taking both the lunesta and ativan for sleep.  Notes still insomnia.  Takes lunesta and then sleeps an hour and then wakes up. Takes ativan before bedtime. Frequent waking. Is ok to fall back asleep if he does not get up.   cymbalta and bupropion in the mornings  Is not active. Used to go to the gym and walk, but with hot weather he stopped.  Plans to restart.   No alcohol/rare  Smoking cut down. 2 daily.    Constipation.  Thinks adequate fiber and vegetable intake. Takes stool softener. Prn. May need more water.     Checking BP at home. Reports high readings sometimes. 160/80s. 190s. He'll check when he gets dizzy. So may take/check 3-4 times that day.  Otherwise he does not check his blood pressure.  Has some lisinopril left and will take that when he gets a high reading.  Last time was a week ago.   Today BP is good on intake and again good on repeat.    Is inquiring about medical marijuana.  For his anxiety.    Patient Care Team:  Navin Charlton MD as PCP - General (Internal Medicine)  Alfredo Kearns MD as Consulting Physician (Urology)  Adeel Coleman MD as Consulting Physician (Psychiatry)  Adeel Vicente MD (Hematology and Oncology)  Juventino VIGIL  DAVID Meeks as Care Coordinator    Patient Active Problem List    Diagnosis Date Noted    Peripheral artery disease mild on doppler 05/20/2019     4/10/2018 LE dopplers: 1. Right lower extremity pressures and waveforms indicate no hemodynamically significant arterial occlusive disease.  2. Left lower extremity pressures and waveforms indicate mild arterial occlusive disease.      Numbness or tingling workup with neuro for demyelinating 05/20/2019 12/19/18 MRI brain: 1. Numerous tiny focal areas of remote gliosis posterior frontal parietal and occipital and upper basal ganglia areas, differential diagnosis includes microvascular ischemic change and inactive demyelinating process.  2. Intraventricular artifact opacity body left lateral ventricle discussed above.  12/2018 MRI C spine: 1. Multilevel spondylotic changes as discussed above.  2. Multilevel foraminal stenotic changes more pronounced on the left as compared to the right as discussed above.  See further details above.  1/24/19 EMG: borderline normal EMG/NCS. There was an absent   left sural response of unclear clinical significance. There were   no myopathic units seen to suggest a myopathy.      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 The patient slept supine 64.2% of the night based  on valid recording time of 6.43 hours. The apneas/hypopneas are accompanied by severe oxygen desaturation (percent time  below 90% SpO2: 15.9%, Min SpO2: 69.6%). The average desaturation across all sleep disordered breathing events is 5.7%.  Snoring occurs for 45.4% (30 dB) of the study, 17.3% is extremely loud. The mean pulse rate is 63 BPM, with frequent pulse  rate variability (52 events with >= 6 BPM increase/decrease per  hour).  TREATMENT CONSIDERATIONS: Consider nasal continuous positive airway pressure (CPAP) as the initial treatment  choice for severe obstructive sleep apnea. Consider an attended in-lab CPAP titration study, given the possibility of co-morbid  sleep related hypoventilation.  DISEASE MANAGEMENT CONSIDERATIONS: Insomnia is a symptom that can be associated with untreated DAKOTA.  Sleeping pills may increase the severity of untreated DAKOTA and their use should be reevaluated once the DAKOTA is treated.      Major depressive disorder 10/30/2017    Pelvic floor muscle wasting in male 09/28/2017    Prostate cancer s/p XRT currently hormone deprivation 08/30/2017     2 cores positive kary 3+4=7 on biopsy 8/15/2017      Smoker 08/30/2017    Vitamin D deficiency 08/30/2017    Beta thalassemia minor 04/11/2017    Benign non-nodular prostatic hyperplasia without lower urinary tract symptoms 03/29/2017     Hx of negative prostate Bx and hx of MRI prostate      Restless leg syndrome Requip ineffective 08/02/2016    Dyslipidemia statin intolerance. 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  Rozerem didn't help 11/20/2013       PAST MEDICAL HISTORY:  Past Medical History:   Diagnosis Date    Hyperlipidemia     Hypertension     Obstructive sleep apnea severe with AHI 64 on study 7/2018 7/27/2018    Very severe sleep disordered breathing (AHI=64) is noted based on a 4% hypopnea desaturation criteria. The patient slept supine 64.2% of the night based on valid recording time of 6.43 hours. The apneas/hypopneas are accompanied by severe oxygen desaturation (percent time below 90% SpO2: 15.9%, Min SpO2: 69.6%). The average desaturation across all sleep disordered breathing events is 5.7%. 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     Employer: [employer]   Social Needs    Financial resource strain: Not on file    Food insecurity     Worry: Not on file     Inability: Not on file    Transportation needs     Medical: Not on file     Non-medical: Not on file   Tobacco Use    Smoking status: Current Every Day Smoker     Types: Cigarettes    Smokeless tobacco: Never Used   Substance and Sexual Activity    Alcohol use: No    Drug use: No    Sexual activity: Yes   Lifestyle    Physical activity     Days per week: Not on file     Minutes per session: Not on file    Stress: Not on file   Relationships    Social connections     Talks on phone: Not on file     Gets together: Not on file     Attends Adventism service: Not on file     Active member of club or organization: Not on file     Attends meetings of clubs or organizations: Not on file     Relationship status: Not on file   Other Topics Concern    Not on file   Social History Narrative    Not on file       ALLERGIES AND MEDICATIONS: updated and reviewed.  Review of patient's allergies indicates:   Allergen Reactions    Crestor  [rosuvastatin]      Other reaction(s): Muscle pain    Statins-hmg-coa reductase inhibitors      myalgias    Sulfamethoxazole-trimethoprim Other (See Comments)     Muscle pain    Atorvastatin Other (See Comments)     Muscle cramps  Other reaction(s): Muscle pain       Medication List with  "Changes/Refills   Current Medications    BUPROPION (WELLBUTRIN SR) 150 MG TBSR 12 HR TABLET    Take 2 tablets by mouth once daily.    DULOXETINE (CYMBALTA) 60 MG CAPSULE    Take 120 mg by mouth once daily.    ESZOPICLONE (LUNESTA) 3 MG TAB    TK 1 T PO QD HS    EZETIMIBE (ZETIA) 10 MG TABLET    Take 1 tablet (10 mg total) by mouth once daily.    FLUAD QUAD 2020-21,65Y UP,,PF, 60 MCG (15 MCG X 4)/0.5 ML SYRG    ADM 0.5ML IM UTD    LORAZEPAM (ATIVAN) 1 MG TABLET    Take 2 mg by mouth every evening.     NICOTINE POLACRILEX 2 MG LOZG    Take 1 lozenge (2 mg total) by mouth as needed. Maximum 15 pieces/day.    SILDENAFIL (VIAGRA) 100 MG TABLET    TAKE 1 TABLET(100 MG) BY MOUTH DAILY AS NEEDED FOR ERECTILE DYSFUNCTION    TAMSULOSIN (FLOMAX) 0.4 MG CAP    TAKE 2 CAPSULES(0.8 MG) BY MOUTH EVERY DAY   Discontinued Medications    ESZOPICLONE (LUNESTA) 2 MG TAB    Take 2 mg by mouth nightly as needed.        Review of Systems   Constitutional: Negative for chills and fever.   Respiratory: Negative for shortness of breath.    Cardiovascular: Negative for chest pain.   Psychiatric/Behavioral: The patient is nervous/anxious and has insomnia.        Objective:   Physical Exam   Vitals:    11/03/20 1509   BP: 116/80   BP Location: Right arm   Patient Position: Sitting   BP Method: Medium (Manual)   Pulse: 69   Temp: 97.7 °F (36.5 °C)   TempSrc: Temporal   SpO2: 96%   Weight: 98.9 kg (218 lb)   Height: 5' 11" (1.803 m)    Body mass index is 30.4 kg/m².  Weight: 98.9 kg (218 lb)   Height: 5' 11" (180.3 cm)     Physical Exam  Constitutional:       General: He is not in acute distress.     Appearance: He is well-developed.   Cardiovascular:      Rate and Rhythm: Normal rate and regular rhythm.      Heart sounds: Normal heart sounds. No murmur.   Pulmonary:      Effort: Pulmonary effort is normal.      Breath sounds: Normal breath sounds.   Abdominal:      General: There is no distension.      Palpations: There is no mass.      " Tenderness: There is no abdominal tenderness.   Musculoskeletal: Normal range of motion.      Right lower leg: No edema.      Left lower leg: No edema.   Skin:     General: Skin is warm and dry.   Neurological:      Mental Status: He is alert and oriented to person, place, and time.      Coordination: Coordination normal.   Psychiatric:         Behavior: Behavior normal.         Thought Content: Thought content normal.

## 2020-11-03 NOTE — PATIENT INSTRUCTIONS
Below is a list of free Apps that you may find helpful (some of them may not apply to you since this is a general list of helpful Apps):    Android & Apple users:  Headspace - Guides your through meditation.  The first 10 programs are free.  Mindshift - Information that is helpful for anxiety. Geared towards young adults but may be helpful for all ages  Stop, Breathe and Think - Has suggestions for meditation and mindfulness exercises depending on your daily mood  CBT-I - information about insomnia, and tips to help you sleep better without medication.

## 2020-11-04 ENCOUNTER — OFFICE VISIT (OUTPATIENT)
Dept: UROLOGY | Facility: CLINIC | Age: 70
End: 2020-11-04
Payer: MEDICARE

## 2020-11-04 ENCOUNTER — PATIENT OUTREACH (OUTPATIENT)
Dept: OTHER | Facility: OTHER | Age: 70
End: 2020-11-04

## 2020-11-04 VITALS — BODY MASS INDEX: 30.07 KG/M2 | WEIGHT: 214.81 LBS | HEIGHT: 71 IN

## 2020-11-04 DIAGNOSIS — Z85.46 HISTORY OF PROSTATE CANCER: ICD-10-CM

## 2020-11-04 DIAGNOSIS — N52.9 ERECTILE DYSFUNCTION, UNSPECIFIED ERECTILE DYSFUNCTION TYPE: ICD-10-CM

## 2020-11-04 DIAGNOSIS — R39.9 LOWER URINARY TRACT SYMPTOMS (LUTS): Primary | ICD-10-CM

## 2020-11-04 LAB
BILIRUB UR QL STRIP: NEGATIVE
CLARITY UR: CLEAR
COLOR UR: YELLOW
GLUCOSE UR QL STRIP: NEGATIVE
HGB UR QL STRIP: NEGATIVE
KETONES UR QL STRIP: NEGATIVE
LEUKOCYTE ESTERASE UR QL STRIP: NEGATIVE
MICROSCOPIC COMMENT: NORMAL
NITRITE UR QL STRIP: NEGATIVE
PH UR STRIP: 5 [PH] (ref 5–8)
PROT UR QL STRIP: NEGATIVE
SP GR UR STRIP: 1.01 (ref 1–1.03)
URN SPEC COLLECT METH UR: NORMAL
UROBILINOGEN UR STRIP-ACNC: NEGATIVE EU/DL

## 2020-11-04 PROCEDURE — 81000 URINALYSIS NONAUTO W/SCOPE: CPT

## 2020-11-04 PROCEDURE — 99213 OFFICE O/P EST LOW 20 MIN: CPT | Mod: PBBFAC | Performed by: STUDENT IN AN ORGANIZED HEALTH CARE EDUCATION/TRAINING PROGRAM

## 2020-11-04 PROCEDURE — 99204 PR OFFICE/OUTPT VISIT, NEW, LEVL IV, 45-59 MIN: ICD-10-PCS | Mod: S$PBB,,, | Performed by: STUDENT IN AN ORGANIZED HEALTH CARE EDUCATION/TRAINING PROGRAM

## 2020-11-04 PROCEDURE — 99204 OFFICE O/P NEW MOD 45 MIN: CPT | Mod: S$PBB,,, | Performed by: STUDENT IN AN ORGANIZED HEALTH CARE EDUCATION/TRAINING PROGRAM

## 2020-11-04 PROCEDURE — 99999 PR PBB SHADOW E&M-EST. PATIENT-LVL III: ICD-10-PCS | Mod: PBBFAC,,, | Performed by: STUDENT IN AN ORGANIZED HEALTH CARE EDUCATION/TRAINING PROGRAM

## 2020-11-04 PROCEDURE — 99999 PR PBB SHADOW E&M-EST. PATIENT-LVL III: CPT | Mod: PBBFAC,,, | Performed by: STUDENT IN AN ORGANIZED HEALTH CARE EDUCATION/TRAINING PROGRAM

## 2020-11-04 PROCEDURE — 81002 URINALYSIS NONAUTO W/O SCOPE: CPT | Mod: PBBFAC | Performed by: STUDENT IN AN ORGANIZED HEALTH CARE EDUCATION/TRAINING PROGRAM

## 2020-11-04 NOTE — LETTER
November 4, 2020     Sharee Day .  [address] [place] [id number]       Dear Sharee,    Welcome to [facility]! Our goal is to make care effective, proactive and convenient by using data you send us from home to better treat your chronic conditions.                My name is Ynes Rucker, and I am your dedicated Digital Medicine clinician. As an expert in medication management, I will help ensure that the medications you are taking continue to provide the intended benefits and help you reach your goals. You can reach me directly at [phone] or by sending me a message directly through your [facility] account.      I am Mandi Medina and I will be your health . My job is to help you identify lifestyle changes to improve your disease control. We will talk about nutrition, exercise, and other ways you may be able to adjust your current habits to better your health. Additionally, we will help ensure you are completing the tests and screenings that are necessary to help manage your conditions. You can reach me directly at [phone] or by sending me a message directly through your [facility] account.    Most importantly, YOU are at the center of this team. Together, we will work to improve your overall health and encourage you to meet your goals for a healthier lifestyle.     What we expect from YOU:  · Please take frequent home blood pressure measurements. We ask that you take at least 1 blood pressure reading per week, but more information will better help us get you know you. Be sure you rest for a few minutes before taking the reading in a quiet, comfortable place.     Be available to receive phone calls or [employer] messages, when appropriate, from your care team. Please let us know if there are any specific days or times that work best for us to reach you via phone.     Complete routine tests and screenings. Dont worry, we will help keep you on track!           What  you should expect from your Digital Medicine Care Team:   We will work with you to create a personalized plan of care and provide you with encouragement and education, including regarding lifestyle changes, that could help you manage your disease states.     We will adjust your current medications, if needed, and continue to monitor your long-term progress.     We will provide you and your physician with monthly progress reports after you have been in the program for more than 30 days.     We will send you reminders through [facility] and text messages to help ensure you do not miss any testing deadlines to help manage your disease states.    You will be able to reach us by phone or through your [facility] account by clicking our names under Care Team on the right side of the home screen.    We look forward to working with you to help manage your health,    Sincerely,    Your Digital Medicine Team    Please visit our websites to learn more:   · Hypertension: [email or web address]      Remember, we are not available for emergencies. If you have an emergency, please contact your doctors office directly or call [facility] on-call ([phone] or [phone]) or 911.

## 2020-11-04 NOTE — PROGRESS NOTES
"Digital Medicine: Health  Introduction    Introduced Sharee Day to Digital Medicine. Discussed health  role and recommended lifestyle modifications.    The history is provided by the patient.           Additional Enrollment Details: Patient states that his blood pressure tends to spike and he is unsure why. He reports feeling well today despite elevated BP. He denies symptoms of HTN but states that sometimes he feels "a little dizzy" when BP is elevated.     He does not currently take any antihypertensives, but previously took lisinopril and has some leftover, so he takes one when his BP is elevated. He did take 1 pill today. I encouraged him to check his BP later today, he is agreeable.     He asked what to do if someone else wants to use his BP cuff. I instructed him to have the other person download [employer] to their phone so readings do not come in to his record.    Reviewed details of digital coaching and explained automated text messages.    HYPERTENSION  Explained hypertension digital medicine goals including BP goal less than or equal to 130/80mmHg, improved convenience of BP management and reduced risk of heart attack, kidney failure, stroke, eye disease, dementia, and death.      Explained non-pharmacologic therapies like low salt diet and physical activity can reduce blood pressure. .      Explained that we expect patient to submit several blood pressure readings per week at random times of the day, but at least 30 minutes after taking blood pressure medications. Instructed patient not to allow anyone else to use their blood pressure monitor and phone as data submitted is directly entered into medical record. Reviewed and confirmed appropriate blood pressure monitoring technique.         Patient's BP goal is less than or equal to 130/80.Patient's BP average is 140/91 mmHg, which is at goal, per 2017 ACC/AHA Hypertension Guidelines.    Explained to the patient that the Digital Medicine team is " not available for emergencies. Advised patient call [facility] On Call ([phone] or [phone]) or [phone] if needed.                 Diet-Not assessed          Physical Activity-Assessed      Additional physical activity details: He plans to start walking on walking trail near his house. He walked 2-2.5 miles/day in the past.       Medication Adherence-Medication Adherence not addressed.      Substance, Sleep, Stress-Not assessed      Additional monitoring needed.  Continue current diet/physical activity routine.  Provided patient education.  Reviewed Device Techniques.     Addressed patient questions and patient has my contact information if needed prior to next outreach. Patient verbalizes understanding.      Explained the importance of self-monitoring and medication adherence. Encouraged the patient to communicate with their health  for lifestyle modifications to help improve or maintain a healthy lifestyle.        Sent link to [facility] webpages and my contact information via Linkpass for future questions.        Explained to the patient that the Digital Medicine team is not available for emergencies. Advised patient call [facility] On Call ([phone] or [phone]) or [phone] if needed.            There are no preventive care reminders to display for this patient.      Last 5 Patient Entered Readings                                      Current 30 Day Average: 140/91     Recent Readings 11/4/2020 11/4/2020 11/4/2020 11/3/2020 11/3/2020    SBP (mmHg) 145 155 163 135 130    DBP (mmHg) 92 99 103 85 77    Pulse 55 56 57 65 66

## 2020-11-04 NOTE — PROGRESS NOTES
Patient ID: Sharee Day Jr. is a 70 y.o. male.    Chief Complaint: Prostate Cancer (new pt/ history of prostate cancer, would like to establish care ) and Testicle Pain (pain in left testicle for a week, mostly hurt when having bowel movement)    Referral :Navin Charlton MD     HPI  71 yo man with hx of intermediate risk prostate cancer  Stage T1C (8/15/2017 Prostate biopsy with Dr. Kearns), 4+3=7, PSA 12.7 at time of diagnosis per Dr. Caputo's note from 9/28/2017    Patient s/p XRT and ADT with Dr. Adeel Vicente    Last PSA 0.25 (8/14/2020)  0.25 (12/2019)  0.08 (2/18/2019)  12.7 (8/30/2017) at time of diagnosis    Patient reports PSA noah was 0.18/0.19    Family history of breast cancer    Patient voids well when taking flomax, occasionally he has trouble getting his stream started, denies use of decongestants or antihistamines. Denies constipation. Denies UTIs, gross hematuria. Reports hx of urolithiasis 40 years ago.     Patient without ED post radiation therapy, has not had intercourse since March 2020 and due to pandemic he has paused on such interpersonal relations. Patient has noticed with morning erections the caliber of his erections are not as rigid as they have been in the past. Patient has used sildenafil in the past.     Patient with hx of R sciatic nerve pain which has migrated to the R hemiscrotum, not associated with swelling.    ROS  Review of Systems   Constitutional: Negative for activity change, appetite change, chills, diaphoresis, fatigue and fever.   HENT: Negative for congestion, rhinorrhea and sore throat.    Eyes: Negative for discharge and visual disturbance.   Respiratory: Negative for cough, chest tightness, shortness of breath and wheezing.    Cardiovascular: Negative for chest pain and leg swelling.   Gastrointestinal: Negative for abdominal distention, abdominal pain, blood in stool, constipation, diarrhea, nausea and vomiting.   Endocrine: Negative for polyuria.    Genitourinary: Positive for testicular pain. Negative for difficulty urinating, dysuria, hematuria, penile pain and scrotal swelling.   Musculoskeletal: Negative for back pain and gait problem.   Skin: Negative for color change, rash and wound.   Allergic/Immunologic: Negative for immunocompromised state.   Neurological: Negative for light-headedness and headaches.   Psychiatric/Behavioral: Negative for confusion. The patient is not nervous/anxious.          Past Medical History  Active Ambulatory Problems     Diagnosis Date Noted    Shift work sleep disorder Hydroxyzine with SE. Trazodone ineffective.  Rozerem didn't help 11/20/2013    Dyslipidemia statin intolerance. 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 8/2018 08/30/2018    Peripheral artery disease mild on doppler 05/20/2019    Numbness or tingling workup with neuro for demyelinating 05/20/2019     Resolved Ambulatory Problems     Diagnosis Date Noted    Constipation 11/20/2013    Microcytic anemia 08/01/2016    Myalgia due to statin 06/06/2018    Snoring     History of colonic polyps; 2011 colonoscopy normal 08/02/2018    Screen for colon cancer 08/28/2018     Past Medical History:   Diagnosis Date    Hyperlipidemia     Hypertension          Past Surgical History  Past Surgical History:   Procedure Laterality Date    COLONOSCOPY N/A 8/28/2018     Procedure: COLONOSCOPY;  Surgeon: Davis Hernandez MD;  Location: [facility];  Service: Endoscopy;  Laterality: N/A;    MOUTH SURGERY  01/2016       Social History  Relationships   Social connections    Talks on phone: Not on file    Gets together: Not on file    Attends Muslim service: Not on file    Active member of club or organization: Not on file    Attends meetings of clubs or organizations: Not on file    Relationship status: Not on file       Medications    Current Outpatient Medications:     buPROPion (WELLBUTRIN SR) 150 MG TBSR 12 hr tablet, Take 2 tablets by mouth once daily., Disp: , Rfl:     DULoxetine (CYMBALTA) 60 MG capsule, Take 120 mg by mouth once daily., Disp: , Rfl:     eszopiclone (LUNESTA) 3 mg Tab, TK 1 T PO QD HS, Disp: , Rfl:     ezetimibe (ZETIA) 10 mg tablet, Take 1 tablet (10 mg total) by mouth once daily., Disp: 90 tablet, Rfl: 3    FLUAD QUAD 2020-21,65Y UP,,PF, 60 mcg (15 mcg x 4)/0.5 mL Syrg, ADM 0.5ML IM UTD, Disp: , Rfl:     LORazepam (ATIVAN) 1 MG tablet, Take 2 mg by mouth every evening. , Disp: , Rfl:     nicotine polacrilex 2 MG Lozg, Take 1 lozenge (2 mg total) by mouth as needed. Maximum 15 pieces/day., Disp: 100 lozenge, Rfl: 0    sildenafiL (VIAGRA) 100 MG tablet, TAKE 1 TABLET(100 MG) BY MOUTH DAILY AS NEEDED FOR ERECTILE DYSFUNCTION, Disp: 10 tablet, Rfl: 2    tamsulosin (FLOMAX) 0.4 mg Cap, TAKE 2 CAPSULES(0.8 MG) BY MOUTH EVERY DAY, Disp: 180 capsule, Rfl: 3    Allergies  Review of patient's allergies indicates:   Allergen Reactions    Crestor  [rosuvastatin]      Other reaction(s): Muscle pain    Statins-hmg-coa reductase inhibitors      myalgias    Sulfamethoxazole-trimethoprim Other (See Comments)     Muscle pain    Atorvastatin Other (See Comments)     Muscle cramps  Other reaction(s): Muscle pain       Patient's PMH, FH, Social hx, Medications, allergies reviewed and updated as pertinent to today's visit    Objective:      Physical  Exam  Constitutional:       General: He is not in acute distress.     Appearance: He is well-developed. He is not ill-appearing, toxic-appearing or diaphoretic.   HENT:      Head: Normocephalic and atraumatic.      Mouth/Throat:      Mouth: Mucous membranes are moist.   Eyes:      Conjunctiva/sclera: Conjunctivae normal.   Neck:      Musculoskeletal: Normal range of motion and neck supple.   Cardiovascular:      Rate and Rhythm: Normal rate.   Pulmonary:      Effort: Pulmonary effort is normal. No respiratory distress.   Abdominal:      General: Abdomen is flat. There is no distension.      Palpations: Abdomen is soft. There is no mass.      Tenderness: There is no abdominal tenderness. There is no right CVA tenderness, left CVA tenderness or guarding.   Musculoskeletal:         General: No swelling or deformity.      Right lower leg: No edema.      Left lower leg: No edema.   Skin:     General: Skin is warm and dry.      Capillary Refill: Capillary refill takes less than 2 seconds.      Findings: No rash.   Neurological:      General: No focal deficit present.      Mental Status: He is alert and oriented to person, place, and time.      Gait: Gait normal.   Psychiatric:         Mood and Affect: Mood normal.         Thought Content: Thought content normal.         Judgment: Judgment normal.             Lab Results   Component Value Date    PSADIAG 0.25 08/14/2020    PSADIAG 0.25 12/27/2019        Assessment:       1. History of prostate cancer        Plan:           Recommend patient bring prostate cancer treatment records to his next appointment    Follow up in 3 months with PSA, last obtained 8/2020 discussed the importance of trending PSA    Once patient is sexually active will plan on penile rehabilitation as morning erections may not be of same caliber when compared to erections generated during intimacy.     Continue flomax for BPH symptoms

## 2020-11-04 NOTE — LETTER
November 4, 2020      Navin Charlton MD  [address]  [place] [id number]           [facility]  [address]  [place] [address]  Phone: [phone]  Fax: [phone]          Patient: Sharee Day Jr.   MR Number: [id number]   YOB: 1950   Date of Visit: 11/4/2020       Dear Dr. Navin Charlton:    Thank you for referring Sharee Day to me for evaluation. Attached you will find relevant portions of my assessment and plan of care.    If you have questions, please do not hesitate to call me. I look forward to following Sharee Day along with you.    Sincerely,    Alex Lake MD    Enclosure  CC:  No Recipients    If you would like to receive this communication electronically, please contact [email or web address] or [phone] to request more information on [employer] access.    For providers and/or their staff who would like to refer a patient to [facility], please contact us through our one-stop-shop provider referral line, [facility], at [phone].    If you feel you have received this communication in error or would no longer like to receive these types of communications, please e-mail [email or web address]

## 2020-11-04 NOTE — PATIENT INSTRUCTIONS
Please bring treatment records from Dr. Vicente's office or have his office fax records    Please call if you would like to be reevaluated sooner

## 2020-11-18 ENCOUNTER — PATIENT OUTREACH (OUTPATIENT)
Dept: OTHER | Facility: OTHER | Age: 70
End: 2020-11-18

## 2020-11-18 DIAGNOSIS — I10 ESSENTIAL HYPERTENSION: Primary | ICD-10-CM

## 2020-11-18 NOTE — PROGRESS NOTES
HTN Enrollment  - LVM to call back.   - No BP meds.  - BP av/91 mmHg  - Last office visit BP: 116/80 mmHg

## 2020-11-19 LAB
BILIRUB SERPL-MCNC: NORMAL MG/DL
BLOOD URINE, POC: NORMAL
CLARITY, POC UA: NORMAL
COLOR, POC UA: YELLOW
GLUCOSE UR QL STRIP: NORMAL
KETONES UR QL STRIP: NORMAL
LEUKOCYTE ESTERASE URINE, POC: NORMAL
NITRITE, POC UA: NORMAL
PH, POC UA: 5
PROTEIN, POC: NORMAL
SPECIFIC GRAVITY, POC UA: 1015
UROBILINOGEN, POC UA: NORMAL

## 2020-12-15 RX ORDER — LISINOPRIL AND HYDROCHLOROTHIAZIDE 12.5; 2 MG/1; MG/1
1 TABLET ORAL DAILY
Qty: 30 TABLET | Refills: 5 | Status: SHIPPED | OUTPATIENT
Start: 2020-12-15 | End: 2021-05-30

## 2020-12-15 NOTE — PROGRESS NOTES
"Digital Medicine: Clinician Introduction    Sharee Day Jr. is a 70 y.o. male who is newly enrolled in the Digital Medicine Clinic.    Patient indicates reinitiating lisinopril 20 mg and hctz 12.5 mg one week ago. Indicates, "pills are probably old." Stopped as his BP was wnl.    The history is provided by the patient.      Review of patient's allergies indicates:   -- Crestor  (rosuvastatin)     --  Other reaction(s): Muscle pain   -- Statins-hmg-coa reductase inhibitors     --  myalgias   -- Sulfamethoxazole-trimethoprim -- Other (See Comments)    --  Muscle pain   -- Atorvastatin -- Other (See Comments)    --  Muscle cramps             Other reaction(s): Muscle pain  Completed Medication Reconciliation  Verified pharmacy information.    HYPERTENSION  Explained hypertension digital medicine goals including BP goal less than or equal to 130/80mmHg, improved convenience of BP management and reduced risk of heart attack, kidney failure, stroke, eye disease, dementia, and death.     Explained that we expect patient to submit several blood pressure readings per week at random times of the day, but at least 30 minutes after taking blood pressure medications. Instructed patient not to allow anyone else to use their blood pressure monitor and phone as data submitted is directly entered into medical record. Reviewed and confirmed appropriate blood pressure monitoring technique.         Reviewed signs/symptoms of hypertension (headache, changes in vision, chest pain, shortness of breath)   Reviewed signs/symptoms of hypotension (lightheaded, dizziness, weakness)             Last 5 Patient Entered Readings                                      Current 30 Day Average: 134/88     Recent Readings 12/15/2020 12/14/2020 12/13/2020 12/12/2020 12/12/2020    SBP (mmHg) 135 137 143 124 144    DBP (mmHg) 90 88 93 73 95    Pulse 64 62 52 57 59                Depression Screening  Sharee Day Jr. did not answer the depression " screening.   Mood is declined due to stress. Lost his son, was dx with prostate cancer two years ago, and heart was broken.     Sleep Apnea Screening  Patient previously diagnosed with DAKOTA and is not interested in a referral at this time. He reports he is not currently using CPAP. He is not using CPAP because: unable to tolerate machine.   Does not choke or gasp in the night for air. Someone has reported he has stopped breathing in sleep and then gasp for air. Reports previously had a sleep study and has a cpap machine that doesn't use. .     Medication Affordability Screening  Patient did not answer the medication affordability questionnaires. Patient is currently not having problems affording medications    Medication Adherence-Medication adherence was assessed.  Patient continue taking medication as prescribed.            ASSESSMENT(S)  Patients BP average is 134/88 mmHg, which is above goal. Patient's BP goal is less than or equal to 130/80.     Hypertension Plan  Hypertension Medication Change. Reinitiate lisinopril-hctz 20-12.5 mg tab daily.       Addressed patient questions and patient has my contact information if needed prior to next outreach. Patient verbalizes understanding.             There are no preventive care reminders to display for this patient.       Current Medication Regimen:  Hypertension Medications             lisinopriL-hydrochlorothiazide (PRINZIDE,ZESTORETIC) 20-12.5 mg per tablet Take 1 tablet by mouth once daily.

## 2020-12-16 ENCOUNTER — CLINICAL SUPPORT (OUTPATIENT)
Dept: SMOKING CESSATION | Facility: CLINIC | Age: 70
End: 2020-12-16
Payer: COMMERCIAL

## 2020-12-16 DIAGNOSIS — F17.200 NICOTINE DEPENDENCE: Primary | ICD-10-CM

## 2020-12-16 PROCEDURE — 99407 PR TOBACCO USE CESSATION INTENSIVE >10 MINUTES: ICD-10-PCS | Mod: S$GLB,,,

## 2020-12-16 PROCEDURE — 99407 BEHAV CHNG SMOKING > 10 MIN: CPT | Mod: S$GLB,,,

## 2020-12-16 NOTE — PROGRESS NOTES
Called pt to f/u on his 3 and 6 month smoking cessation quit status. Pt stated he is still smoking, but was able to cut back. Informed him he has benefits available and is able to rejoin. Pt scheduled appt for quit # 4 on 12/21/2020. Informed him of benefit period, phone follow ups, and contact information. Will complete smart form and will follow up on episodes.

## 2020-12-21 ENCOUNTER — CLINICAL SUPPORT (OUTPATIENT)
Dept: SMOKING CESSATION | Facility: CLINIC | Age: 70
End: 2020-12-21
Payer: COMMERCIAL

## 2020-12-21 DIAGNOSIS — F17.200 NICOTINE DEPENDENCE: ICD-10-CM

## 2020-12-21 PROCEDURE — 99404 PREV MED CNSL INDIV APPRX 60: CPT | Mod: S$GLB,,,

## 2020-12-21 PROCEDURE — 99999 PR PBB SHADOW E&M-EST. PATIENT-LVL I: ICD-10-PCS | Mod: PBBFAC,,,

## 2020-12-21 PROCEDURE — 99999 PR PBB SHADOW E&M-EST. PATIENT-LVL I: CPT | Mod: PBBFAC,,,

## 2020-12-21 PROCEDURE — 99404 PR PREVENT COUNSEL,INDIV,60 MIN: ICD-10-PCS | Mod: S$GLB,,,

## 2020-12-21 RX ORDER — DM/P-EPHED/ACETAMINOPH/DOXYLAM 30-7.5/3
2 LIQUID (ML) ORAL
Qty: 100 LOZENGE | Refills: 0 | Status: SHIPPED | OUTPATIENT
Start: 2020-12-21 | End: 2021-01-21

## 2020-12-21 NOTE — Clinical Note
Pt presents for intake as a nonsmoker, he quit smoking on Thursday of last week and had some nicotine lozenge 2mg left over from last quit attempt that he was using, recommend he continue this regimen, he is doing well with this. Session handout discussed, how to use the nicotine lozenge discussed will follow

## 2020-12-29 ENCOUNTER — PATIENT OUTREACH (OUTPATIENT)
Dept: OTHER | Facility: OTHER | Age: 70
End: 2020-12-29

## 2020-12-29 NOTE — PROGRESS NOTES
Digital Medicine: Clinician Follow-Up    Pt believing [employer] BP cuff is providing incorrect BP readings. [employer] machine is charged. Reports his other home BP arm cuff has provided the following average of his last three readings: 122/77mmHg. Is going to assess accuracy of both machines at local pharmacy. Requests tech support.     The history is provided by the patient.         Last 5 Patient Entered Readings                                      Current 30 Day Average: 136/91     Recent Readings 12/28/2020 12/28/2020 12/27/2020 12/27/2020 12/27/2020    SBP (mmHg) 138 141 144 148 144    DBP (mmHg) 92 96 93 101 96    Pulse 57 59 56 58 65                 Depression Screening  Did not address depression screening.    Sleep Apnea Screening    Did not address sleep apnea screening.     Medication Affordability Screening  Did not address medication affordability screening.     Medication Adherence-Medication adherence was assessed.          ASSESSMENT(S)  Patients BP average is 136/91 mmHg, which is above goal. Patient's BP goal is less than or equal to 130/80.     As pt's personal BP cuff is providing an average of 122/77 mmHg for last three BP readings and i[employer] device is providing an average of 141/94 mmHg for last three readings, recommend pt go to community pharmacy for pharmacist to check accuracy of both machines as pt does not have an in office appt with [facility] until February.      Hypertension Plan  Continue current therapy.  Tech support needed. Troubleshooting assistance needed as i[employer] and home machine are providing vastly different readings.       Addressed patient questions and patient has my contact information if needed prior to next outreach. Patient verbalizes understanding.             There are no preventive care reminders to display for this patient.  There are no preventive care reminders to display for this patient.      Hypertension Medications             lisinopriL-hydrochlorothiazide  (PRINZIDE,ZESTORETIC) 20-12.5 mg per tablet Take 1 tablet by mouth once daily.

## 2021-01-11 ENCOUNTER — CLINICAL SUPPORT (OUTPATIENT)
Dept: SMOKING CESSATION | Facility: CLINIC | Age: 71
End: 2021-01-11
Payer: COMMERCIAL

## 2021-01-11 DIAGNOSIS — F17.200 NICOTINE DEPENDENCE: Primary | ICD-10-CM

## 2021-01-11 PROCEDURE — 99402 PREV MED CNSL INDIV APPRX 30: CPT | Mod: S$GLB,,,

## 2021-01-11 PROCEDURE — 99402 PR PREVENT COUNSEL,INDIV,30 MIN: ICD-10-PCS | Mod: S$GLB,,,

## 2021-01-11 PROCEDURE — 99999 PR PBB SHADOW E&M-EST. PATIENT-LVL I: ICD-10-PCS | Mod: PBBFAC,,,

## 2021-01-11 PROCEDURE — 99999 PR PBB SHADOW E&M-EST. PATIENT-LVL I: CPT | Mod: PBBFAC,,,

## 2021-01-25 ENCOUNTER — CLINICAL SUPPORT (OUTPATIENT)
Dept: SMOKING CESSATION | Facility: CLINIC | Age: 71
End: 2021-01-25
Payer: COMMERCIAL

## 2021-01-25 ENCOUNTER — TELEPHONE (OUTPATIENT)
Dept: FAMILY MEDICINE | Facility: CLINIC | Age: 71
End: 2021-01-25

## 2021-01-25 DIAGNOSIS — F17.200 NICOTINE DEPENDENCE: Primary | ICD-10-CM

## 2021-01-25 PROCEDURE — 99402 PR PREVENT COUNSEL,INDIV,30 MIN: ICD-10-PCS | Mod: S$GLB,,,

## 2021-01-25 PROCEDURE — 99999 PR PBB SHADOW E&M-EST. PATIENT-LVL I: CPT | Mod: PBBFAC,,,

## 2021-01-25 PROCEDURE — 99402 PREV MED CNSL INDIV APPRX 30: CPT | Mod: S$GLB,,,

## 2021-01-25 PROCEDURE — 99999 PR PBB SHADOW E&M-EST. PATIENT-LVL I: ICD-10-PCS | Mod: PBBFAC,,,

## 2021-01-25 RX ORDER — MICONAZOLE NITRATE 2 %
2 CREAM (GRAM) TOPICAL
Qty: 100 EACH | Refills: 0 | Status: SHIPPED | OUTPATIENT
Start: 2021-01-25 | End: 2021-02-23 | Stop reason: SDUPTHER

## 2021-02-16 ENCOUNTER — HOSPITAL ENCOUNTER (EMERGENCY)
Facility: HOSPITAL | Age: 71
Discharge: HOME | End: 2021-02-16
Attending: EMERGENCY MEDICINE
Payer: MEDICARE

## 2021-02-16 VITALS
DIASTOLIC BLOOD PRESSURE: 78 MMHG | OXYGEN SATURATION: 98 % | HEIGHT: 71 IN | WEIGHT: 215 LBS | SYSTOLIC BLOOD PRESSURE: 120 MMHG | HEART RATE: 71 BPM | TEMPERATURE: 98 F | BODY MASS INDEX: 30.1 KG/M2 | RESPIRATION RATE: 18 BRPM

## 2021-02-16 DIAGNOSIS — R42 DIZZINESS: ICD-10-CM

## 2021-02-16 DIAGNOSIS — I49.1 PAC (PREMATURE ATRIAL CONTRACTION): Primary | ICD-10-CM

## 2021-02-16 LAB
ALBUMIN SERPL BCP-MCNC: 3.7 G/DL (ref 3.5–5.2)
ALP SERPL-CCNC: 46 U/L (ref 55–135)
ALT SERPL W/O P-5'-P-CCNC: 20 U/L (ref 10–44)
ANION GAP SERPL CALC-SCNC: 8 MMOL/L (ref 8–16)
AST SERPL-CCNC: 20 U/L (ref 10–40)
BASOPHILS # BLD AUTO: 0.03 K/UL (ref 0–0.2)
BASOPHILS NFR BLD: 0.4 % (ref 0–1.9)
BILIRUB SERPL-MCNC: 0.4 MG/DL (ref 0.1–1)
BUN SERPL-MCNC: 9 MG/DL (ref 8–23)
CALCIUM SERPL-MCNC: 9.2 MG/DL (ref 8.7–10.5)
CHLORIDE SERPL-SCNC: 103 MMOL/L (ref 95–110)
CO2 SERPL-SCNC: 30 MMOL/L (ref 23–29)
CREAT SERPL-MCNC: 1.1 MG/DL (ref 0.5–1.4)
DIFFERENTIAL METHOD: ABNORMAL
EOSINOPHIL # BLD AUTO: 0 K/UL (ref 0–0.5)
EOSINOPHIL NFR BLD: 0.4 % (ref 0–8)
ERYTHROCYTE [DISTWIDTH] IN BLOOD BY AUTOMATED COUNT: 13.4 % (ref 11.5–14.5)
EST. GFR  (AFRICAN AMERICAN): >60 ML/MIN/1.73 M^2
EST. GFR  (NON AFRICAN AMERICAN): >60 ML/MIN/1.73 M^2
GLUCOSE SERPL-MCNC: 129 MG/DL (ref 70–110)
HCT VFR BLD AUTO: 43.4 % (ref 40–54)
HGB BLD-MCNC: 13.7 G/DL (ref 14–18)
IMM GRANULOCYTES # BLD AUTO: 0.03 K/UL (ref 0–0.04)
IMM GRANULOCYTES NFR BLD AUTO: 0.4 % (ref 0–0.5)
LYMPHOCYTES # BLD AUTO: 1.3 K/UL (ref 1–4.8)
LYMPHOCYTES NFR BLD: 19 % (ref 18–48)
MAGNESIUM SERPL-MCNC: 2.5 MG/DL (ref 1.6–2.6)
MCH RBC QN AUTO: 24.8 PG (ref 27–31)
MCHC RBC AUTO-ENTMCNC: 31.6 G/DL (ref 32–36)
MCV RBC AUTO: 79 FL (ref 82–98)
MONOCYTES # BLD AUTO: 0.7 K/UL (ref 0.3–1)
MONOCYTES NFR BLD: 10.8 % (ref 4–15)
NEUTROPHILS # BLD AUTO: 4.7 K/UL (ref 1.8–7.7)
NEUTROPHILS NFR BLD: 69 % (ref 38–73)
NRBC BLD-RTO: 0 /100 WBC
PLATELET # BLD AUTO: 229 K/UL (ref 150–350)
PMV BLD AUTO: 8.8 FL (ref 9.2–12.9)
POTASSIUM SERPL-SCNC: 4 MMOL/L (ref 3.5–5.1)
PROT SERPL-MCNC: 7.2 G/DL (ref 6–8.4)
RBC # BLD AUTO: 5.52 M/UL (ref 4.6–6.2)
SODIUM SERPL-SCNC: 141 MMOL/L (ref 136–145)
TROPONIN I SERPL DL<=0.01 NG/ML-MCNC: <0.006 NG/ML (ref 0–0.03)
TSH SERPL DL<=0.005 MIU/L-ACNC: 1.44 UIU/ML (ref 0.4–4)
WBC # BLD AUTO: 6.84 K/UL (ref 3.9–12.7)

## 2021-02-16 PROCEDURE — 93005 ELECTROCARDIOGRAM TRACING: CPT

## 2021-02-16 PROCEDURE — 80053 COMPREHEN METABOLIC PANEL: CPT

## 2021-02-16 PROCEDURE — 93010 EKG 12-LEAD: ICD-10-PCS | Mod: ,,, | Performed by: INTERNAL MEDICINE

## 2021-02-16 PROCEDURE — 84443 ASSAY THYROID STIM HORMONE: CPT

## 2021-02-16 PROCEDURE — 85025 COMPLETE CBC W/AUTO DIFF WBC: CPT

## 2021-02-16 PROCEDURE — 83735 ASSAY OF MAGNESIUM: CPT

## 2021-02-16 PROCEDURE — 84484 ASSAY OF TROPONIN QUANT: CPT

## 2021-02-16 PROCEDURE — 93010 ELECTROCARDIOGRAM REPORT: CPT | Mod: ,,, | Performed by: INTERNAL MEDICINE

## 2021-02-16 PROCEDURE — 99284 EMERGENCY DEPT VISIT MOD MDM: CPT | Mod: 25

## 2021-02-22 ENCOUNTER — CLINICAL SUPPORT (OUTPATIENT)
Dept: SMOKING CESSATION | Facility: CLINIC | Age: 71
End: 2021-02-22
Payer: COMMERCIAL

## 2021-02-22 DIAGNOSIS — F17.200 NICOTINE DEPENDENCE: Primary | ICD-10-CM

## 2021-02-22 PROCEDURE — 99407 BEHAV CHNG SMOKING > 10 MIN: CPT | Mod: S$GLB,,,

## 2021-02-22 PROCEDURE — 99407 PR TOBACCO USE CESSATION INTENSIVE >10 MINUTES: ICD-10-PCS | Mod: S$GLB,,,

## 2021-02-23 ENCOUNTER — CLINICAL SUPPORT (OUTPATIENT)
Dept: SMOKING CESSATION | Facility: CLINIC | Age: 71
End: 2021-02-23
Payer: COMMERCIAL

## 2021-02-23 DIAGNOSIS — F17.200 NICOTINE DEPENDENCE: ICD-10-CM

## 2021-02-23 PROCEDURE — 99402 PR PREVENT COUNSEL,INDIV,30 MIN: ICD-10-PCS | Mod: S$GLB,,,

## 2021-02-23 PROCEDURE — 99402 PREV MED CNSL INDIV APPRX 30: CPT | Mod: S$GLB,,,

## 2021-02-23 PROCEDURE — 99999 PR PBB SHADOW E&M-EST. PATIENT-LVL I: ICD-10-PCS | Mod: PBBFAC,,,

## 2021-02-23 PROCEDURE — 99999 PR PBB SHADOW E&M-EST. PATIENT-LVL I: CPT | Mod: PBBFAC,,,

## 2021-02-23 RX ORDER — DM/P-EPHED/ACETAMINOPH/DOXYLAM 30-7.5/3
2 LIQUID (ML) ORAL
Qty: 100 LOZENGE | Refills: 0 | Status: SHIPPED | OUTPATIENT
Start: 2021-02-23 | End: 2021-03-16 | Stop reason: SDUPTHER

## 2021-02-23 RX ORDER — MICONAZOLE NITRATE 2 %
2 CREAM (GRAM) TOPICAL
Qty: 100 EACH | Refills: 0 | Status: SHIPPED | OUTPATIENT
Start: 2021-02-23 | End: 2021-03-16 | Stop reason: SDUPTHER

## 2021-03-02 DIAGNOSIS — E78.5 DYSLIPIDEMIA: ICD-10-CM

## 2021-03-02 DIAGNOSIS — T46.6X5A STATIN MYOPATHY: ICD-10-CM

## 2021-03-02 DIAGNOSIS — G72.0 STATIN MYOPATHY: ICD-10-CM

## 2021-03-02 DIAGNOSIS — R39.15 URINARY URGENCY: ICD-10-CM

## 2021-03-02 DIAGNOSIS — N52.9 MALE ERECTILE DISORDER: ICD-10-CM

## 2021-03-02 RX ORDER — TAMSULOSIN HYDROCHLORIDE 0.4 MG/1
CAPSULE ORAL
Qty: 180 CAPSULE | Refills: 3 | Status: SHIPPED | OUTPATIENT
Start: 2021-03-02 | End: 2021-07-29

## 2021-03-02 RX ORDER — SILDENAFIL 100 MG/1
TABLET, FILM COATED ORAL
Qty: 10 TABLET | Refills: 2 | Status: SHIPPED | OUTPATIENT
Start: 2021-03-02 | End: 2022-05-10

## 2021-03-02 RX ORDER — EZETIMIBE 10 MG/1
10 TABLET ORAL DAILY
Qty: 90 TABLET | Refills: 3 | Status: SHIPPED | OUTPATIENT
Start: 2021-03-02 | End: 2021-06-23

## 2021-03-09 ENCOUNTER — CLINICAL SUPPORT (OUTPATIENT)
Dept: SMOKING CESSATION | Facility: CLINIC | Age: 71
End: 2021-03-09
Payer: COMMERCIAL

## 2021-03-09 DIAGNOSIS — F17.200 NICOTINE DEPENDENCE: Primary | ICD-10-CM

## 2021-03-09 PROCEDURE — 99407 PR TOBACCO USE CESSATION INTENSIVE >10 MINUTES: ICD-10-PCS | Mod: S$GLB,,,

## 2021-03-09 PROCEDURE — 99407 BEHAV CHNG SMOKING > 10 MIN: CPT | Mod: S$GLB,,,

## 2021-03-16 ENCOUNTER — CLINICAL SUPPORT (OUTPATIENT)
Dept: SMOKING CESSATION | Facility: CLINIC | Age: 71
End: 2021-03-16
Payer: COMMERCIAL

## 2021-03-16 DIAGNOSIS — F17.200 NICOTINE DEPENDENCE: ICD-10-CM

## 2021-03-16 PROCEDURE — 99999 PR PBB SHADOW E&M-EST. PATIENT-LVL I: ICD-10-PCS | Mod: PBBFAC,,,

## 2021-03-16 PROCEDURE — 99402 PREV MED CNSL INDIV APPRX 30: CPT | Mod: S$GLB,,,

## 2021-03-16 PROCEDURE — 99402 PR PREVENT COUNSEL,INDIV,30 MIN: ICD-10-PCS | Mod: S$GLB,,,

## 2021-03-16 PROCEDURE — 99999 PR PBB SHADOW E&M-EST. PATIENT-LVL I: CPT | Mod: PBBFAC,,,

## 2021-03-16 RX ORDER — MICONAZOLE NITRATE 2 %
2 CREAM (GRAM) TOPICAL
Qty: 100 EACH | Refills: 0 | Status: SHIPPED | OUTPATIENT
Start: 2021-03-16 | End: 2021-03-30 | Stop reason: SDUPTHER

## 2021-03-16 RX ORDER — DM/P-EPHED/ACETAMINOPH/DOXYLAM 30-7.5/3
2 LIQUID (ML) ORAL
Qty: 100 LOZENGE | Refills: 0 | Status: SHIPPED | OUTPATIENT
Start: 2021-03-16 | End: 2021-03-30 | Stop reason: SDUPTHER

## 2021-03-26 ENCOUNTER — NURSE TRIAGE (OUTPATIENT)
Dept: ADMINISTRATIVE | Facility: CLINIC | Age: 71
End: 2021-03-26

## 2021-03-26 ENCOUNTER — PES CALL (OUTPATIENT)
Dept: ADMINISTRATIVE | Facility: CLINIC | Age: 71
End: 2021-03-26

## 2021-03-26 DIAGNOSIS — R00.2 PALPITATION: Primary | ICD-10-CM

## 2021-03-26 DIAGNOSIS — I49.1 PAC (PREMATURE ATRIAL CONTRACTION): ICD-10-CM

## 2021-03-29 ENCOUNTER — HOSPITAL ENCOUNTER (OUTPATIENT)
Dept: RADIOLOGY | Facility: HOSPITAL | Age: 71
Discharge: HOME | End: 2021-03-29
Attending: INTERNAL MEDICINE
Payer: MEDICARE

## 2021-03-29 ENCOUNTER — OFFICE VISIT (OUTPATIENT)
Dept: CARDIOLOGY | Facility: CLINIC | Age: 71
End: 2021-03-29
Payer: MEDICARE

## 2021-03-29 VITALS
OXYGEN SATURATION: 98 % | RESPIRATION RATE: 18 BRPM | DIASTOLIC BLOOD PRESSURE: 83 MMHG | HEART RATE: 75 BPM | WEIGHT: 220.38 LBS | BODY MASS INDEX: 30.85 KG/M2 | HEIGHT: 71 IN | SYSTOLIC BLOOD PRESSURE: 117 MMHG

## 2021-03-29 DIAGNOSIS — I49.9 IRREGULAR HEART BEATS: Primary | ICD-10-CM

## 2021-03-29 DIAGNOSIS — R00.2 PALPITATION: ICD-10-CM

## 2021-03-29 DIAGNOSIS — M54.9 DORSALGIA, UNSPECIFIED: ICD-10-CM

## 2021-03-29 DIAGNOSIS — M54.50 ACUTE BILATERAL LOW BACK PAIN WITHOUT SCIATICA: ICD-10-CM

## 2021-03-29 DIAGNOSIS — C61 PROSTATE CANCER: ICD-10-CM

## 2021-03-29 DIAGNOSIS — I49.1 PAC (PREMATURE ATRIAL CONTRACTION): ICD-10-CM

## 2021-03-29 DIAGNOSIS — I10 ESSENTIAL HYPERTENSION: ICD-10-CM

## 2021-03-29 DIAGNOSIS — I73.9 PERIPHERAL ARTERY DISEASE: ICD-10-CM

## 2021-03-29 DIAGNOSIS — I70.0 AORTIC ATHEROSCLEROSIS: ICD-10-CM

## 2021-03-29 PROCEDURE — 99999 PR PBB SHADOW E&M-EST. PATIENT-LVL V: ICD-10-PCS | Mod: PBBFAC,,, | Performed by: INTERNAL MEDICINE

## 2021-03-29 PROCEDURE — 72100 X-RAY EXAM L-S SPINE 2/3 VWS: CPT | Mod: TC,FY

## 2021-03-29 PROCEDURE — 99204 PR OFFICE/OUTPT VISIT, NEW, LEVL IV, 45-59 MIN: ICD-10-PCS | Mod: S$PBB,,, | Performed by: INTERNAL MEDICINE

## 2021-03-29 PROCEDURE — 93010 ELECTROCARDIOGRAM REPORT: CPT | Mod: S$PBB,,, | Performed by: INTERNAL MEDICINE

## 2021-03-29 PROCEDURE — 93010 EKG 12-LEAD: ICD-10-PCS | Mod: S$PBB,,, | Performed by: INTERNAL MEDICINE

## 2021-03-29 PROCEDURE — 99204 OFFICE O/P NEW MOD 45 MIN: CPT | Mod: S$PBB,,, | Performed by: INTERNAL MEDICINE

## 2021-03-29 PROCEDURE — 99215 OFFICE O/P EST HI 40 MIN: CPT | Mod: PBBFAC,PO,25 | Performed by: INTERNAL MEDICINE

## 2021-03-29 PROCEDURE — 72100 XR LUMBAR SPINE AP AND LATERAL: ICD-10-PCS | Mod: 26,,, | Performed by: RADIOLOGY

## 2021-03-29 PROCEDURE — 93005 ELECTROCARDIOGRAM TRACING: CPT | Mod: PBBFAC,PO | Performed by: INTERNAL MEDICINE

## 2021-03-29 PROCEDURE — 72100 X-RAY EXAM L-S SPINE 2/3 VWS: CPT | Mod: 26,,, | Performed by: RADIOLOGY

## 2021-03-29 PROCEDURE — 99999 PR PBB SHADOW E&M-EST. PATIENT-LVL V: CPT | Mod: PBBFAC,,, | Performed by: INTERNAL MEDICINE

## 2021-03-30 ENCOUNTER — CLINICAL SUPPORT (OUTPATIENT)
Dept: SMOKING CESSATION | Facility: CLINIC | Age: 71
End: 2021-03-30
Payer: COMMERCIAL

## 2021-03-30 DIAGNOSIS — F17.200 NICOTINE DEPENDENCE: Primary | ICD-10-CM

## 2021-03-30 PROCEDURE — 99402 PREV MED CNSL INDIV APPRX 30: CPT | Mod: S$GLB,,,

## 2021-03-30 PROCEDURE — 99402 PR PREVENT COUNSEL,INDIV,30 MIN: ICD-10-PCS | Mod: S$GLB,,,

## 2021-03-30 PROCEDURE — 99999 PR PBB SHADOW E&M-EST. PATIENT-LVL I: ICD-10-PCS | Mod: PBBFAC,,,

## 2021-03-30 PROCEDURE — 99999 PR PBB SHADOW E&M-EST. PATIENT-LVL I: CPT | Mod: PBBFAC,,,

## 2021-03-30 RX ORDER — DM/P-EPHED/ACETAMINOPH/DOXYLAM 30-7.5/3
2 LIQUID (ML) ORAL
Qty: 100 LOZENGE | Refills: 0 | Status: SHIPPED | OUTPATIENT
Start: 2021-03-30 | End: 2021-04-13 | Stop reason: SDUPTHER

## 2021-03-30 RX ORDER — MICONAZOLE NITRATE 2 %
2 CREAM (GRAM) TOPICAL
Qty: 100 EACH | Refills: 0 | Status: SHIPPED | OUTPATIENT
Start: 2021-03-30 | End: 2021-04-13 | Stop reason: SDUPTHER

## 2021-04-08 ENCOUNTER — HOSPITAL ENCOUNTER (OUTPATIENT)
Dept: CARDIOLOGY | Facility: HOSPITAL | Age: 71
Discharge: HOME | End: 2021-04-08
Attending: INTERNAL MEDICINE
Payer: MEDICARE

## 2021-04-08 DIAGNOSIS — I10 ESSENTIAL HYPERTENSION: ICD-10-CM

## 2021-04-08 DIAGNOSIS — I49.1 PAC (PREMATURE ATRIAL CONTRACTION): ICD-10-CM

## 2021-04-08 DIAGNOSIS — I49.9 IRREGULAR HEART BEATS: ICD-10-CM

## 2021-04-08 LAB
ASCENDING AORTA: 3.6 CM
AV INDEX (PROSTH): 0.5
AV MEAN GRADIENT: 5 MMHG
AV PEAK GRADIENT: 10 MMHG
AV VALVE AREA: 2.57 CM2
AV VELOCITY RATIO: 0.41
CV ECHO LV RWT: 0.36 CM
DOP CALC AO PEAK VEL: 1.55 M/S
DOP CALC AO VTI: 29.78 CM
DOP CALC LVOT AREA: 5.2 CM2
DOP CALC LVOT DIAMETER: 2.57 CM
DOP CALC LVOT PEAK VEL: 0.63 M/S
DOP CALC LVOT STROKE VOLUME: 76.58 CM3
DOP CALCLVOT PEAK VEL VTI: 14.77 CM
E WAVE DECELERATION TIME: 200.48 MSEC
E/A RATIO: 1.15
E/E' RATIO: 7.75 M/S
ECHO LV POSTERIOR WALL: 0.92 CM (ref 0.6–1.1)
EJECTION FRACTION: 55 %
FRACTIONAL SHORTENING: 29 % (ref 28–44)
INTERVENTRICULAR SEPTUM: 1.27 CM (ref 0.6–1.1)
IVRT: 114.18 MSEC
LA MAJOR: 6.15 CM
LA MINOR: 6.22 CM
LA WIDTH: 4.44 CM
LEFT ATRIUM SIZE: 4.08 CM
LEFT ATRIUM VOLUME: 95.23 CM3
LEFT INTERNAL DIMENSION IN SYSTOLE: 3.65 CM (ref 2.1–4)
LEFT VENTRICLE DIASTOLIC VOLUME: 126.45 ML
LEFT VENTRICLE SYSTOLIC VOLUME: 56.11 ML
LEFT VENTRICULAR INTERNAL DIMENSION IN DIASTOLE: 5.15 CM (ref 3.5–6)
LEFT VENTRICULAR MASS: 215.97 G
LV LATERAL E/E' RATIO: 6.2 M/S
LV SEPTAL E/E' RATIO: 10.33 M/S
MV PEAK A VEL: 0.54 M/S
MV PEAK E VEL: 0.62 M/S
MV STENOSIS PRESSURE HALF TIME: 125.34 MS
MV VALVE AREA P 1/2 METHOD: 1.76 CM2
PISA TR MAX VEL: 1.94 M/S
PULM VEIN S/D RATIO: 0.94
PV PEAK D VEL: 0.52 M/S
PV PEAK S VEL: 0.49 M/S
PV PEAK VELOCITY: 1.46 CM/S
RA MAJOR: 5.54 CM
RA PRESSURE: 8 MMHG
RA WIDTH: 4.75 CM
RIGHT VENTRICULAR END-DIASTOLIC DIMENSION: 4.18 CM
RV TISSUE DOPPLER FREE WALL SYSTOLIC VELOCITY 1 (APICAL 4 CHAMBER VIEW): 10.9 CM/S
SINUS: 3.6 CM
STJ: 3.32 CM
TDI LATERAL: 0.1 M/S
TDI SEPTAL: 0.06 M/S
TDI: 0.08 M/S
TR MAX PG: 15 MMHG
TRICUSPID ANNULAR PLANE SYSTOLIC EXCURSION: 2.19 CM
TV REST PULMONARY ARTERY PRESSURE: 23 MMHG

## 2021-04-08 PROCEDURE — 93306 ECHO (CUPID ONLY): ICD-10-PCS | Mod: 26,,, | Performed by: INTERNAL MEDICINE

## 2021-04-08 PROCEDURE — 93227 HOLTER MONITOR - 48 HOUR (CUPID ONLY): ICD-10-PCS | Mod: ,,, | Performed by: INTERNAL MEDICINE

## 2021-04-08 PROCEDURE — 93306 TTE W/DOPPLER COMPLETE: CPT

## 2021-04-08 PROCEDURE — 93226 XTRNL ECG REC<48 HR SCAN A/R: CPT

## 2021-04-08 PROCEDURE — 93306 TTE W/DOPPLER COMPLETE: CPT | Mod: 26,,, | Performed by: INTERNAL MEDICINE

## 2021-04-08 PROCEDURE — 93227 XTRNL ECG REC<48 HR R&I: CPT | Mod: ,,, | Performed by: INTERNAL MEDICINE

## 2021-04-13 ENCOUNTER — CLINICAL SUPPORT (OUTPATIENT)
Dept: SMOKING CESSATION | Facility: CLINIC | Age: 71
End: 2021-04-13
Payer: COMMERCIAL

## 2021-04-13 DIAGNOSIS — F17.200 NICOTINE DEPENDENCE: Primary | ICD-10-CM

## 2021-04-13 PROCEDURE — 99402 PR PREVENT COUNSEL,INDIV,30 MIN: ICD-10-PCS | Mod: S$GLB,,,

## 2021-04-13 PROCEDURE — 99402 PREV MED CNSL INDIV APPRX 30: CPT | Mod: S$GLB,,,

## 2021-04-13 RX ORDER — MICONAZOLE NITRATE 2 %
2 CREAM (GRAM) TOPICAL
Qty: 100 EACH | Refills: 0 | Status: SHIPPED | OUTPATIENT
Start: 2021-04-13 | End: 2021-05-14

## 2021-04-13 RX ORDER — DM/P-EPHED/ACETAMINOPH/DOXYLAM 30-7.5/3
2 LIQUID (ML) ORAL
Qty: 100 LOZENGE | Refills: 0 | Status: SHIPPED | OUTPATIENT
Start: 2021-04-13 | End: 2021-05-11

## 2021-04-14 LAB
OHS CV EVENT MONITOR DAY: 0
OHS CV HOLTER LENGTH DECIMAL HOURS: 47.98
OHS CV HOLTER LENGTH HOURS: 47
OHS CV HOLTER LENGTH MINUTES: 59

## 2021-04-23 ENCOUNTER — OFFICE VISIT (OUTPATIENT)
Dept: CARDIOLOGY | Facility: CLINIC | Age: 71
End: 2021-04-23
Payer: MEDICARE

## 2021-04-23 ENCOUNTER — TELEPHONE (OUTPATIENT)
Dept: NEUROLOGY | Facility: CLINIC | Age: 71
End: 2021-04-23

## 2021-04-23 VITALS
WEIGHT: 225.06 LBS | HEART RATE: 71 BPM | SYSTOLIC BLOOD PRESSURE: 116 MMHG | OXYGEN SATURATION: 97 % | BODY MASS INDEX: 31.51 KG/M2 | DIASTOLIC BLOOD PRESSURE: 78 MMHG | HEIGHT: 71 IN

## 2021-04-23 DIAGNOSIS — R00.2 PALPITATION: Primary | ICD-10-CM

## 2021-04-23 DIAGNOSIS — R20.2 NUMBNESS OR TINGLING: ICD-10-CM

## 2021-04-23 DIAGNOSIS — M54.9 DORSALGIA, UNSPECIFIED: ICD-10-CM

## 2021-04-23 DIAGNOSIS — I49.9 IRREGULAR HEART BEATS: ICD-10-CM

## 2021-04-23 DIAGNOSIS — I10 ESSENTIAL HYPERTENSION: ICD-10-CM

## 2021-04-23 DIAGNOSIS — I49.3 FREQUENT PVCS: ICD-10-CM

## 2021-04-23 DIAGNOSIS — R20.0 NUMBNESS OR TINGLING: ICD-10-CM

## 2021-04-23 DIAGNOSIS — I70.0 AORTIC ATHEROSCLEROSIS: ICD-10-CM

## 2021-04-23 PROCEDURE — 99214 PR OFFICE/OUTPT VISIT, EST, LEVL IV, 30-39 MIN: ICD-10-PCS | Mod: S$PBB,,, | Performed by: INTERNAL MEDICINE

## 2021-04-23 PROCEDURE — 99999 PR PBB SHADOW E&M-EST. PATIENT-LVL V: ICD-10-PCS | Mod: PBBFAC,,, | Performed by: INTERNAL MEDICINE

## 2021-04-23 PROCEDURE — 99215 OFFICE O/P EST HI 40 MIN: CPT | Mod: PBBFAC,PO | Performed by: INTERNAL MEDICINE

## 2021-04-23 PROCEDURE — 99999 PR PBB SHADOW E&M-EST. PATIENT-LVL V: CPT | Mod: PBBFAC,,, | Performed by: INTERNAL MEDICINE

## 2021-04-23 PROCEDURE — 99214 OFFICE O/P EST MOD 30 MIN: CPT | Mod: S$PBB,,, | Performed by: INTERNAL MEDICINE

## 2021-04-23 RX ORDER — METOPROLOL SUCCINATE 25 MG/1
12.5 TABLET, EXTENDED RELEASE ORAL 2 TIMES DAILY
Qty: 30 TABLET | Refills: 11 | Status: SHIPPED | OUTPATIENT
Start: 2021-04-23 | End: 2021-04-26 | Stop reason: SDUPTHER

## 2021-04-26 DIAGNOSIS — I49.9 IRREGULAR HEART BEATS: ICD-10-CM

## 2021-04-26 DIAGNOSIS — R00.2 PALPITATION: ICD-10-CM

## 2021-04-26 DIAGNOSIS — I49.3 FREQUENT PVCS: ICD-10-CM

## 2021-04-26 RX ORDER — METOPROLOL SUCCINATE 25 MG/1
12.5 TABLET, EXTENDED RELEASE ORAL 2 TIMES DAILY
Qty: 30 TABLET | Refills: 11 | Status: SHIPPED | OUTPATIENT
Start: 2021-04-26 | End: 2021-11-10 | Stop reason: SINTOL

## 2021-04-28 ENCOUNTER — OFFICE VISIT (OUTPATIENT)
Dept: NEUROLOGY | Facility: CLINIC | Age: 71
End: 2021-04-28
Payer: MEDICARE

## 2021-04-28 VITALS
HEART RATE: 53 BPM | HEIGHT: 71 IN | BODY MASS INDEX: 31.54 KG/M2 | DIASTOLIC BLOOD PRESSURE: 62 MMHG | WEIGHT: 225.31 LBS | SYSTOLIC BLOOD PRESSURE: 106 MMHG

## 2021-04-28 DIAGNOSIS — R20.0 NUMBNESS AND TINGLING: ICD-10-CM

## 2021-04-28 DIAGNOSIS — R20.2 NUMBNESS AND TINGLING: ICD-10-CM

## 2021-04-28 DIAGNOSIS — M54.17 LUMBOSACRAL RADICULOPATHY: Primary | ICD-10-CM

## 2021-04-28 PROCEDURE — 99213 OFFICE O/P EST LOW 20 MIN: CPT | Mod: PBBFAC | Performed by: NEUROLOGICAL SURGERY

## 2021-04-28 PROCEDURE — 99999 PR PBB SHADOW E&M-EST. PATIENT-LVL III: ICD-10-PCS | Mod: PBBFAC,,, | Performed by: NEUROLOGICAL SURGERY

## 2021-04-28 PROCEDURE — 99214 OFFICE O/P EST MOD 30 MIN: CPT | Mod: S$PBB,,, | Performed by: NEUROLOGICAL SURGERY

## 2021-04-28 PROCEDURE — 99214 PR OFFICE/OUTPT VISIT, EST, LEVL IV, 30-39 MIN: ICD-10-PCS | Mod: S$PBB,,, | Performed by: NEUROLOGICAL SURGERY

## 2021-04-28 PROCEDURE — 99999 PR PBB SHADOW E&M-EST. PATIENT-LVL III: CPT | Mod: PBBFAC,,, | Performed by: NEUROLOGICAL SURGERY

## 2021-05-11 ENCOUNTER — PROCEDURE VISIT (OUTPATIENT)
Dept: NEUROLOGY | Facility: CLINIC | Age: 71
End: 2021-05-11
Payer: MEDICARE

## 2021-05-11 VITALS — HEIGHT: 71 IN | WEIGHT: 225.31 LBS | BODY MASS INDEX: 31.54 KG/M2

## 2021-05-11 DIAGNOSIS — R20.0 NUMBNESS AND TINGLING: ICD-10-CM

## 2021-05-11 DIAGNOSIS — M54.17 LUMBOSACRAL RADICULOPATHY: ICD-10-CM

## 2021-05-11 DIAGNOSIS — R20.2 NUMBNESS AND TINGLING: ICD-10-CM

## 2021-05-11 PROCEDURE — 95911 NRV CNDJ TEST 9-10 STUDIES: CPT | Mod: PBBFAC | Performed by: NEUROLOGICAL SURGERY

## 2021-05-11 PROCEDURE — 95885 PR MUSC TST DONE W/NERV TST LIM: ICD-10-PCS | Mod: 26,S$PBB,, | Performed by: NEUROLOGICAL SURGERY

## 2021-05-11 PROCEDURE — 95885 MUSC TST DONE W/NERV TST LIM: CPT | Mod: 26,S$PBB,, | Performed by: NEUROLOGICAL SURGERY

## 2021-05-11 PROCEDURE — 95911 PR NERVE CONDUCTION STUDY; 9-10 STUDIES: ICD-10-PCS | Mod: 26,S$PBB,, | Performed by: NEUROLOGICAL SURGERY

## 2021-05-11 PROCEDURE — 95885 MUSC TST DONE W/NERV TST LIM: CPT | Mod: PBBFAC | Performed by: NEUROLOGICAL SURGERY

## 2021-05-11 PROCEDURE — 95911 NRV CNDJ TEST 9-10 STUDIES: CPT | Mod: 26,S$PBB,, | Performed by: NEUROLOGICAL SURGERY

## 2021-05-12 ENCOUNTER — PES CALL (OUTPATIENT)
Dept: ADMINISTRATIVE | Facility: CLINIC | Age: 71
End: 2021-05-12

## 2021-05-21 ENCOUNTER — TELEPHONE (OUTPATIENT)
Dept: NEUROLOGY | Facility: CLINIC | Age: 71
End: 2021-05-21

## 2021-05-24 ENCOUNTER — HOSPITAL ENCOUNTER (OUTPATIENT)
Dept: RADIOLOGY | Facility: HOSPITAL | Age: 71
Discharge: HOME | End: 2021-05-24
Attending: NEUROLOGICAL SURGERY
Payer: MEDICARE

## 2021-05-24 DIAGNOSIS — M54.17 LUMBOSACRAL RADICULOPATHY: ICD-10-CM

## 2021-05-24 PROCEDURE — 72148 MRI LUMBAR SPINE W/O DYE: CPT | Mod: 26,,, | Performed by: RADIOLOGY

## 2021-05-24 PROCEDURE — 72148 MRI LUMBAR SPINE W/O DYE: CPT | Mod: TC

## 2021-05-24 PROCEDURE — 72148 MRI LUMBAR SPINE WITHOUT CONTRAST: ICD-10-PCS | Mod: 26,,, | Performed by: RADIOLOGY

## 2021-05-29 DIAGNOSIS — I10 ESSENTIAL HYPERTENSION: ICD-10-CM

## 2021-05-30 RX ORDER — LISINOPRIL AND HYDROCHLOROTHIAZIDE 12.5; 2 MG/1; MG/1
TABLET ORAL
Qty: 90 TABLET | Refills: 3 | Status: SHIPPED | OUTPATIENT
Start: 2021-05-30 | End: 2022-05-10 | Stop reason: ALTCHOICE

## 2021-07-22 ENCOUNTER — CLINICAL SUPPORT (OUTPATIENT)
Dept: SMOKING CESSATION | Facility: CLINIC | Age: 71
End: 2021-07-22
Payer: COMMERCIAL

## 2021-07-22 DIAGNOSIS — F17.200 NICOTINE DEPENDENCE: Primary | ICD-10-CM

## 2021-07-22 PROCEDURE — 99407 BEHAV CHNG SMOKING > 10 MIN: CPT | Mod: S$GLB,,,

## 2021-07-22 PROCEDURE — 99407 PR TOBACCO USE CESSATION INTENSIVE >10 MINUTES: ICD-10-PCS | Mod: S$GLB,,,

## 2021-07-26 ENCOUNTER — CLINICAL SUPPORT (OUTPATIENT)
Dept: SMOKING CESSATION | Facility: CLINIC | Age: 71
End: 2021-07-26
Payer: COMMERCIAL

## 2021-07-26 DIAGNOSIS — F17.200 NICOTINE DEPENDENCE: Primary | ICD-10-CM

## 2021-07-26 PROCEDURE — 99404 PR PREVENT COUNSEL,INDIV,60 MIN: ICD-10-PCS | Mod: S$GLB,,,

## 2021-07-26 PROCEDURE — 99999 PR PBB SHADOW E&M-EST. PATIENT-LVL I: CPT | Mod: PBBFAC,,,

## 2021-07-26 PROCEDURE — 99999 PR PBB SHADOW E&M-EST. PATIENT-LVL I: ICD-10-PCS | Mod: PBBFAC,,,

## 2021-07-26 PROCEDURE — 99404 PREV MED CNSL INDIV APPRX 60: CPT | Mod: S$GLB,,,

## 2021-07-26 RX ORDER — DM/P-EPHED/ACETAMINOPH/DOXYLAM 30-7.5/3
2 LIQUID (ML) ORAL
Qty: 100 LOZENGE | Refills: 0 | Status: SHIPPED | OUTPATIENT
Start: 2021-07-26 | End: 2021-08-26

## 2021-07-26 RX ORDER — MICONAZOLE NITRATE 2 %
2 CREAM (GRAM) TOPICAL
Qty: 100 EACH | Refills: 0 | Status: SHIPPED | OUTPATIENT
Start: 2021-07-26 | End: 2021-08-26

## 2021-08-06 ENCOUNTER — PES CALL (OUTPATIENT)
Dept: ADMINISTRATIVE | Facility: CLINIC | Age: 71
End: 2021-08-06

## 2021-08-09 ENCOUNTER — CLINICAL SUPPORT (OUTPATIENT)
Dept: SMOKING CESSATION | Facility: CLINIC | Age: 71
End: 2021-08-09
Payer: COMMERCIAL

## 2021-08-09 DIAGNOSIS — F17.200 NICOTINE DEPENDENCE: Primary | ICD-10-CM

## 2021-08-09 PROCEDURE — 99402 PREV MED CNSL INDIV APPRX 30: CPT | Mod: S$GLB,,,

## 2021-08-09 PROCEDURE — 99402 PR PREVENT COUNSEL,INDIV,30 MIN: ICD-10-PCS | Mod: S$GLB,,,

## 2021-08-09 PROCEDURE — 99999 PR PBB SHADOW E&M-EST. PATIENT-LVL I: CPT | Mod: PBBFAC,,,

## 2021-08-09 PROCEDURE — 99999 PR PBB SHADOW E&M-EST. PATIENT-LVL I: ICD-10-PCS | Mod: PBBFAC,,,

## 2021-09-21 ENCOUNTER — TELEPHONE (OUTPATIENT)
Dept: SMOKING CESSATION | Facility: CLINIC | Age: 71
End: 2021-09-21

## 2021-10-18 ENCOUNTER — TELEPHONE (OUTPATIENT)
Dept: FAMILY MEDICINE | Facility: CLINIC | Age: 71
End: 2021-10-18

## 2021-10-29 ENCOUNTER — TELEPHONE (OUTPATIENT)
Dept: FAMILY MEDICINE | Facility: CLINIC | Age: 71
End: 2021-10-29
Payer: MEDICARE

## 2021-10-29 DIAGNOSIS — D56.3 BETA THALASSEMIA MINOR: ICD-10-CM

## 2021-10-29 DIAGNOSIS — I10 ESSENTIAL HYPERTENSION: ICD-10-CM

## 2021-10-29 DIAGNOSIS — E55.9 VITAMIN D DEFICIENCY: ICD-10-CM

## 2021-10-29 DIAGNOSIS — Z00.00 NORMAL PHYSICAL EXAM: Primary | ICD-10-CM

## 2021-10-29 DIAGNOSIS — C61 PROSTATE CANCER: ICD-10-CM

## 2021-10-29 DIAGNOSIS — E78.5 DYSLIPIDEMIA: ICD-10-CM

## 2021-11-04 ENCOUNTER — LAB VISIT (OUTPATIENT)
Dept: LAB | Facility: HOSPITAL | Age: 71
End: 2021-11-04
Attending: INTERNAL MEDICINE
Payer: MEDICARE

## 2021-11-04 DIAGNOSIS — Z00.00 NORMAL PHYSICAL EXAM: ICD-10-CM

## 2021-11-04 DIAGNOSIS — E55.9 VITAMIN D DEFICIENCY: ICD-10-CM

## 2021-11-04 DIAGNOSIS — E78.5 DYSLIPIDEMIA: ICD-10-CM

## 2021-11-04 DIAGNOSIS — C61 PROSTATE CANCER: ICD-10-CM

## 2021-11-04 DIAGNOSIS — I10 ESSENTIAL HYPERTENSION: ICD-10-CM

## 2021-11-04 LAB
25(OH)D3+25(OH)D2 SERPL-MCNC: 24 NG/ML (ref 30–96)
ALBUMIN SERPL BCP-MCNC: 3.5 G/DL (ref 3.5–5.2)
ALP SERPL-CCNC: 45 U/L (ref 55–135)
ALT SERPL W/O P-5'-P-CCNC: 17 U/L (ref 10–44)
ANION GAP SERPL CALC-SCNC: 14 MMOL/L (ref 8–16)
AST SERPL-CCNC: 20 U/L (ref 10–40)
BASOPHILS # BLD AUTO: 0.04 K/UL (ref 0–0.2)
BASOPHILS NFR BLD: 0.6 % (ref 0–1.9)
BILIRUB SERPL-MCNC: 0.6 MG/DL (ref 0.1–1)
BUN SERPL-MCNC: 12 MG/DL (ref 8–23)
CALCIUM SERPL-MCNC: 9.5 MG/DL (ref 8.7–10.5)
CHLORIDE SERPL-SCNC: 105 MMOL/L (ref 95–110)
CHOLEST SERPL-MCNC: 184 MG/DL (ref 120–199)
CHOLEST/HDLC SERPL: 6.1 {RATIO} (ref 2–5)
CO2 SERPL-SCNC: 20 MMOL/L (ref 23–29)
COMPLEXED PSA SERPL-MCNC: 0.32 NG/ML (ref 0–4)
CREAT SERPL-MCNC: 0.9 MG/DL (ref 0.5–1.4)
DIFFERENTIAL METHOD: ABNORMAL
EOSINOPHIL # BLD AUTO: 0.1 K/UL (ref 0–0.5)
EOSINOPHIL NFR BLD: 2.2 % (ref 0–8)
ERYTHROCYTE [DISTWIDTH] IN BLOOD BY AUTOMATED COUNT: 14.2 % (ref 11.5–14.5)
EST. GFR  (AFRICAN AMERICAN): >60 ML/MIN/1.73 M^2
EST. GFR  (NON AFRICAN AMERICAN): >60 ML/MIN/1.73 M^2
GLUCOSE SERPL-MCNC: 111 MG/DL (ref 70–110)
HCT VFR BLD AUTO: 44.7 % (ref 40–54)
HDLC SERPL-MCNC: 30 MG/DL (ref 40–75)
HDLC SERPL: 16.3 % (ref 20–50)
HGB BLD-MCNC: 13.5 G/DL (ref 14–18)
IMM GRANULOCYTES # BLD AUTO: 0.01 K/UL (ref 0–0.04)
IMM GRANULOCYTES NFR BLD AUTO: 0.2 % (ref 0–0.5)
LDLC SERPL CALC-MCNC: 126 MG/DL (ref 63–159)
LYMPHOCYTES # BLD AUTO: 2.3 K/UL (ref 1–4.8)
LYMPHOCYTES NFR BLD: 36.5 % (ref 18–48)
MCH RBC QN AUTO: 23.9 PG (ref 27–31)
MCHC RBC AUTO-ENTMCNC: 30.2 G/DL (ref 32–36)
MCV RBC AUTO: 79 FL (ref 82–98)
MONOCYTES # BLD AUTO: 0.7 K/UL (ref 0.3–1)
MONOCYTES NFR BLD: 10.2 % (ref 4–15)
NEUTROPHILS # BLD AUTO: 3.2 K/UL (ref 1.8–7.7)
NEUTROPHILS NFR BLD: 50.3 % (ref 38–73)
NONHDLC SERPL-MCNC: 154 MG/DL
NRBC BLD-RTO: 0 /100 WBC
PLATELET # BLD AUTO: 276 K/UL (ref 150–450)
PMV BLD AUTO: 8.9 FL (ref 9.2–12.9)
POTASSIUM SERPL-SCNC: 4.2 MMOL/L (ref 3.5–5.1)
PROT SERPL-MCNC: 7.2 G/DL (ref 6–8.4)
RBC # BLD AUTO: 5.64 M/UL (ref 4.6–6.2)
SODIUM SERPL-SCNC: 139 MMOL/L (ref 136–145)
TRIGL SERPL-MCNC: 140 MG/DL (ref 30–150)
TSH SERPL DL<=0.005 MIU/L-ACNC: 0.94 UIU/ML (ref 0.4–4)
WBC # BLD AUTO: 6.38 K/UL (ref 3.9–12.7)

## 2021-11-04 PROCEDURE — 84153 ASSAY OF PSA TOTAL: CPT | Performed by: INTERNAL MEDICINE

## 2021-11-04 PROCEDURE — 84443 ASSAY THYROID STIM HORMONE: CPT | Performed by: INTERNAL MEDICINE

## 2021-11-04 PROCEDURE — 80061 LIPID PANEL: CPT | Performed by: INTERNAL MEDICINE

## 2021-11-04 PROCEDURE — 82306 VITAMIN D 25 HYDROXY: CPT | Performed by: INTERNAL MEDICINE

## 2021-11-04 PROCEDURE — 36415 COLL VENOUS BLD VENIPUNCTURE: CPT | Mod: PO | Performed by: INTERNAL MEDICINE

## 2021-11-04 PROCEDURE — 85025 COMPLETE CBC W/AUTO DIFF WBC: CPT | Performed by: INTERNAL MEDICINE

## 2021-11-04 PROCEDURE — 80053 COMPREHEN METABOLIC PANEL: CPT | Performed by: INTERNAL MEDICINE

## 2021-11-09 PROBLEM — M54.16 LUMBAR RADICULOPATHY: Status: ACTIVE | Noted: 2021-11-09

## 2021-11-10 ENCOUNTER — OFFICE VISIT (OUTPATIENT)
Dept: FAMILY MEDICINE | Facility: CLINIC | Age: 71
End: 2021-11-10
Payer: MEDICARE

## 2021-11-10 VITALS
HEART RATE: 66 BPM | OXYGEN SATURATION: 96 % | BODY MASS INDEX: 31.42 KG/M2 | TEMPERATURE: 98 F | DIASTOLIC BLOOD PRESSURE: 60 MMHG | HEIGHT: 71 IN | SYSTOLIC BLOOD PRESSURE: 90 MMHG

## 2021-11-10 DIAGNOSIS — I10 ESSENTIAL HYPERTENSION: ICD-10-CM

## 2021-11-10 DIAGNOSIS — I73.9 PERIPHERAL ARTERY DISEASE: ICD-10-CM

## 2021-11-10 DIAGNOSIS — D56.3 BETA THALASSEMIA MINOR: ICD-10-CM

## 2021-11-10 DIAGNOSIS — M54.16 LUMBAR RADICULOPATHY: ICD-10-CM

## 2021-11-10 DIAGNOSIS — R73.09 ABNORMAL GLUCOSE: ICD-10-CM

## 2021-11-10 DIAGNOSIS — I49.3 PVC (PREMATURE VENTRICULAR CONTRACTION): ICD-10-CM

## 2021-11-10 DIAGNOSIS — C61 PROSTATE CANCER: ICD-10-CM

## 2021-11-10 DIAGNOSIS — G72.0 STATIN MYOPATHY: ICD-10-CM

## 2021-11-10 DIAGNOSIS — E55.9 VITAMIN D DEFICIENCY: ICD-10-CM

## 2021-11-10 DIAGNOSIS — T46.6X5A STATIN MYOPATHY: ICD-10-CM

## 2021-11-10 DIAGNOSIS — E78.5 DYSLIPIDEMIA: ICD-10-CM

## 2021-11-10 DIAGNOSIS — M75.81 TENDINITIS OF RIGHT ROTATOR CUFF: ICD-10-CM

## 2021-11-10 DIAGNOSIS — Z23 NEED FOR SHINGLES VACCINE: ICD-10-CM

## 2021-11-10 DIAGNOSIS — D12.6 TUBULOVILLOUS ADENOMA OF COLON: ICD-10-CM

## 2021-11-10 DIAGNOSIS — F32.2 CURRENT SEVERE EPISODE OF MAJOR DEPRESSIVE DISORDER WITHOUT PSYCHOTIC FEATURES WITHOUT PRIOR EPISODE: ICD-10-CM

## 2021-11-10 DIAGNOSIS — F17.200 SMOKER: ICD-10-CM

## 2021-11-10 DIAGNOSIS — Z00.00 NORMAL PHYSICAL EXAM: Primary | ICD-10-CM

## 2021-11-10 DIAGNOSIS — Z23 NEEDS FLU SHOT: ICD-10-CM

## 2021-11-10 PROCEDURE — 99214 OFFICE O/P EST MOD 30 MIN: CPT | Mod: S$PBB,,, | Performed by: INTERNAL MEDICINE

## 2021-11-10 PROCEDURE — 99213 OFFICE O/P EST LOW 20 MIN: CPT | Mod: PBBFAC,PO,25 | Performed by: INTERNAL MEDICINE

## 2021-11-10 PROCEDURE — 99214 PR OFFICE/OUTPT VISIT, EST, LEVL IV, 30-39 MIN: ICD-10-PCS | Mod: S$PBB,,, | Performed by: INTERNAL MEDICINE

## 2021-11-10 PROCEDURE — 99999 PR PBB SHADOW E&M-EST. PATIENT-LVL III: ICD-10-PCS | Mod: PBBFAC,,, | Performed by: INTERNAL MEDICINE

## 2021-11-10 PROCEDURE — 90694 VACC AIIV4 NO PRSRV 0.5ML IM: CPT | Mod: PBBFAC,PO

## 2021-11-10 PROCEDURE — 99999 PR PBB SHADOW E&M-EST. PATIENT-LVL III: CPT | Mod: PBBFAC,,, | Performed by: INTERNAL MEDICINE

## 2021-11-10 PROCEDURE — G0008 ADMIN INFLUENZA VIRUS VAC: HCPCS | Mod: PBBFAC,PO

## 2021-11-10 RX ORDER — ESZOPICLONE 3 MG/1
TABLET, FILM COATED ORAL
Status: CANCELLED | OUTPATIENT
Start: 2021-11-10

## 2021-11-10 RX ORDER — ZOSTER VACCINE RECOMBINANT, ADJUVANTED 50 MCG/0.5
0.5 KIT INTRAMUSCULAR ONCE
Qty: 1 EACH | Refills: 1 | Status: SHIPPED | OUTPATIENT
Start: 2021-11-10 | End: 2021-11-10

## 2021-12-09 ENCOUNTER — TELEPHONE (OUTPATIENT)
Dept: FAMILY MEDICINE | Facility: CLINIC | Age: 71
End: 2021-12-09
Payer: MEDICARE

## 2021-12-10 ENCOUNTER — TELEPHONE (OUTPATIENT)
Dept: ENDOSCOPY | Facility: HOSPITAL | Age: 71
End: 2021-12-10
Payer: MEDICARE

## 2021-12-10 DIAGNOSIS — Z12.11 SPECIAL SCREENING FOR MALIGNANT NEOPLASMS, COLON: Primary | ICD-10-CM

## 2021-12-10 RX ORDER — SODIUM, POTASSIUM,MAG SULFATES 17.5-3.13G
SOLUTION, RECONSTITUTED, ORAL ORAL
Qty: 1 KIT | Refills: 0 | Status: ON HOLD | OUTPATIENT
Start: 2021-12-10 | End: 2022-01-06 | Stop reason: HOSPADM

## 2021-12-30 DIAGNOSIS — Z01.818 PRE-OP TESTING: ICD-10-CM

## 2022-01-03 ENCOUNTER — LAB VISIT (OUTPATIENT)
Dept: FAMILY MEDICINE | Facility: CLINIC | Age: 72
End: 2022-01-03
Payer: MEDICARE

## 2022-01-03 DIAGNOSIS — Z01.818 PRE-OP TESTING: ICD-10-CM

## 2022-01-03 PROCEDURE — U0005 INFEC AGEN DETEC AMPLI PROBE: HCPCS | Performed by: FAMILY MEDICINE

## 2022-01-03 PROCEDURE — U0003 INFECTIOUS AGENT DETECTION BY NUCLEIC ACID (DNA OR RNA); SEVERE ACUTE RESPIRATORY SYNDROME CORONAVIRUS 2 (SARS-COV-2) (CORONAVIRUS DISEASE [COVID-19]), AMPLIFIED PROBE TECHNIQUE, MAKING USE OF HIGH THROUGHPUT TECHNOLOGIES AS DESCRIBED BY CMS-2020-01-R: HCPCS | Performed by: FAMILY MEDICINE

## 2022-01-04 LAB
SARS-COV-2 RNA RESP QL NAA+PROBE: NOT DETECTED
SARS-COV-2- CYCLE NUMBER: NORMAL

## 2022-01-06 ENCOUNTER — ANESTHESIA EVENT (OUTPATIENT)
Dept: ENDOSCOPY | Facility: HOSPITAL | Age: 72
End: 2022-01-06
Payer: MEDICARE

## 2022-01-06 ENCOUNTER — CLINICAL SUPPORT (OUTPATIENT)
Dept: SMOKING CESSATION | Facility: CLINIC | Age: 72
End: 2022-01-06
Payer: COMMERCIAL

## 2022-01-06 ENCOUNTER — ANESTHESIA (OUTPATIENT)
Dept: ENDOSCOPY | Facility: HOSPITAL | Age: 72
End: 2022-01-06
Payer: MEDICARE

## 2022-01-06 ENCOUNTER — HOSPITAL ENCOUNTER (OUTPATIENT)
Facility: HOSPITAL | Age: 72
Discharge: HOME | End: 2022-01-06
Attending: INTERNAL MEDICINE | Admitting: INTERNAL MEDICINE
Payer: MEDICARE

## 2022-01-06 VITALS
SYSTOLIC BLOOD PRESSURE: 106 MMHG | TEMPERATURE: 98 F | DIASTOLIC BLOOD PRESSURE: 68 MMHG | HEART RATE: 69 BPM | OXYGEN SATURATION: 98 % | RESPIRATION RATE: 19 BRPM

## 2022-01-06 DIAGNOSIS — F17.200 NICOTINE DEPENDENCE: Primary | ICD-10-CM

## 2022-01-06 DIAGNOSIS — Z13.9 SCREENING PROCEDURE: ICD-10-CM

## 2022-01-06 DIAGNOSIS — D12.6 TUBULOVILLOUS ADENOMA OF COLON: Primary | ICD-10-CM

## 2022-01-06 PROCEDURE — 37000008 HC ANESTHESIA 1ST 15 MINUTES: Performed by: INTERNAL MEDICINE

## 2022-01-06 PROCEDURE — D9220A PRA ANESTHESIA: ICD-10-PCS | Mod: PT,CRNA,, | Performed by: NURSE ANESTHETIST, CERTIFIED REGISTERED

## 2022-01-06 PROCEDURE — 45385 COLONOSCOPY W/LESION REMOVAL: CPT | Mod: PT | Performed by: INTERNAL MEDICINE

## 2022-01-06 PROCEDURE — 99407 PR TOBACCO USE CESSATION INTENSIVE >10 MINUTES: ICD-10-PCS | Mod: S$GLB,,,

## 2022-01-06 PROCEDURE — 63600175 PHARM REV CODE 636 W HCPCS: Performed by: NURSE ANESTHETIST, CERTIFIED REGISTERED

## 2022-01-06 PROCEDURE — D9220A PRA ANESTHESIA: ICD-10-PCS | Mod: PT,ANES,, | Performed by: ANESTHESIOLOGY

## 2022-01-06 PROCEDURE — D9220A PRA ANESTHESIA: Mod: PT,CRNA,, | Performed by: NURSE ANESTHETIST, CERTIFIED REGISTERED

## 2022-01-06 PROCEDURE — 25000003 PHARM REV CODE 250: Performed by: ANESTHESIOLOGY

## 2022-01-06 PROCEDURE — D9220A PRA ANESTHESIA: Mod: PT,ANES,, | Performed by: ANESTHESIOLOGY

## 2022-01-06 PROCEDURE — 45385 COLONOSCOPY W/LESION REMOVAL: CPT | Mod: PT,,, | Performed by: INTERNAL MEDICINE

## 2022-01-06 PROCEDURE — 27201089 HC SNARE, DISP (ANY): Performed by: INTERNAL MEDICINE

## 2022-01-06 PROCEDURE — 37000009 HC ANESTHESIA EA ADD 15 MINS: Performed by: INTERNAL MEDICINE

## 2022-01-06 PROCEDURE — 45385 PR COLONOSCOPY,REMV LESN,SNARE: ICD-10-PCS | Mod: PT,,, | Performed by: INTERNAL MEDICINE

## 2022-01-06 PROCEDURE — 99407 BEHAV CHNG SMOKING > 10 MIN: CPT | Mod: S$GLB,,,

## 2022-01-06 PROCEDURE — 88305 TISSUE EXAM BY PATHOLOGIST: CPT | Mod: 26,,, | Performed by: PATHOLOGY

## 2022-01-06 PROCEDURE — 25000003 PHARM REV CODE 250: Performed by: NURSE ANESTHETIST, CERTIFIED REGISTERED

## 2022-01-06 PROCEDURE — 88305 TISSUE EXAM BY PATHOLOGIST: ICD-10-PCS | Mod: 26,,, | Performed by: PATHOLOGY

## 2022-01-06 PROCEDURE — 88305 TISSUE EXAM BY PATHOLOGIST: CPT | Performed by: PATHOLOGY

## 2022-01-06 RX ORDER — PROPOFOL 10 MG/ML
VIAL (ML) INTRAVENOUS
Status: DISCONTINUED | OUTPATIENT
Start: 2022-01-06 | End: 2022-01-06

## 2022-01-06 RX ORDER — LIDOCAINE HYDROCHLORIDE 10 MG/ML
1 INJECTION, SOLUTION EPIDURAL; INFILTRATION; INTRACAUDAL; PERINEURAL ONCE AS NEEDED
Status: DISCONTINUED | OUTPATIENT
Start: 2022-01-06 | End: 2022-01-06 | Stop reason: HOSPADM

## 2022-01-06 RX ORDER — LIDOCAINE HYDROCHLORIDE 10 MG/ML
1 INJECTION, SOLUTION EPIDURAL; INFILTRATION; INTRACAUDAL; PERINEURAL ONCE
Status: DISCONTINUED | OUTPATIENT
Start: 2022-01-06 | End: 2022-01-06 | Stop reason: HOSPADM

## 2022-01-06 RX ORDER — PROPOFOL 10 MG/ML
INJECTION, EMULSION INTRAVENOUS
Status: COMPLETED
Start: 2022-01-06 | End: 2022-01-06

## 2022-01-06 RX ORDER — LIDOCAINE HYDROCHLORIDE 20 MG/ML
INJECTION INTRAVENOUS
Status: DISCONTINUED | OUTPATIENT
Start: 2022-01-06 | End: 2022-01-06

## 2022-01-06 RX ORDER — LIDOCAINE HYDROCHLORIDE 20 MG/ML
INJECTION, SOLUTION EPIDURAL; INFILTRATION; INTRACAUDAL; PERINEURAL
Status: DISCONTINUED
Start: 2022-01-06 | End: 2022-01-06 | Stop reason: HOSPADM

## 2022-01-06 RX ORDER — SODIUM CHLORIDE 9 MG/ML
INJECTION, SOLUTION INTRAVENOUS CONTINUOUS
Status: DISCONTINUED | OUTPATIENT
Start: 2022-01-06 | End: 2022-01-06 | Stop reason: HOSPADM

## 2022-01-06 RX ADMIN — LIDOCAINE HYDROCHLORIDE 100 MG: 20 INJECTION, SOLUTION INTRAVENOUS at 07:01

## 2022-01-06 RX ADMIN — PROPOFOL 20 MG: 10 INJECTION, EMULSION INTRAVENOUS at 08:01

## 2022-01-06 RX ADMIN — PROPOFOL 30 MG: 10 INJECTION, EMULSION INTRAVENOUS at 07:01

## 2022-01-06 RX ADMIN — PROPOFOL 70 MG: 10 INJECTION, EMULSION INTRAVENOUS at 07:01

## 2022-01-06 RX ADMIN — PROPOFOL 30 MG: 10 INJECTION, EMULSION INTRAVENOUS at 08:01

## 2022-01-06 RX ADMIN — SODIUM CHLORIDE: 0.9 INJECTION, SOLUTION INTRAVENOUS at 07:01

## 2022-01-06 NOTE — PROVATION PATIENT INSTRUCTIONS
Discharge Summary/Instructions after an Endoscopic Procedure  Patient Name: Sharee Day  Patient MRN: [id number]  Patient YOB: 1950  Thursday, January 6, 2022  Walter Appiah MD  Dear patient,  As a result of recent federal legislation (The Federal Cures Act), you may   receive lab or pathology results from your procedure in your [facility]   account before your physician is able to contact you. Your physician or   their representative will relay the results to you with their   recommendations at their soonest availability.  Thank you,  RESTRICTIONS:  During your procedure today, you received medications for sedation.  These   medications may affect your judgment, balance and coordination.  Therefore,   for 24 hours, you have the following restrictions:   - DO NOT drive a car, operate machinery, make legal/financial decisions,   sign important papers or drink alcohol.    ACTIVITY:  Today: no heavy lifting, straining or running due to procedural   sedation/anesthesia.  The following day: return to full activity including work.  DIET:  Eat and drink normally unless instructed otherwise.     TREATMENT FOR COMMON SIDE EFFECTS:  - Mild abdominal pain, nausea, belching, bloating or excessive gas:  rest,   eat lightly and use a heating pad.  - Sore Throat: treat with throat lozenges and/or gargle with warm salt   water.  - Because air was used during the procedure, expelling large amounts of air   from your rectum or belching is normal.  - If a bowel prep was taken, you may not have a bowel movement for 1-3 days.    This is normal.  SYMPTOMS TO WATCH FOR AND REPORT TO YOUR PHYSICIAN:  1. Abdominal pain or bloating, other than gas cramps.  2. Chest pain.  3. Back pain.  4. Signs of infection such as: chills or fever occurring within 24 hours   after the procedure.  5. Rectal bleeding, which would show as bright red, maroon, or black stools.   (A tablespoon of blood from the rectum is not serious, especially if    hemorrhoids are present.)  6. Vomiting.  7. Weakness or dizziness.  GO DIRECTLY TO THE NEAREST EMERGENCY ROOM IF YOU HAVE ANY OF THE FOLLOWING:      Difficulty breathing              Chills and/or fever over 101 F   Persistent vomiting and/or vomiting blood   Severe abdominal pain   Severe chest pain   Black, tarry stools   Bleeding- more than one tablespoon   Any other symptom or condition that you feel may need urgent attention  Your doctor recommends these additional instructions:  If any biopsies were taken, your doctors clinic will contact you in 1 to 2   weeks with any results.  - Patient has a contact number available for emergencies.  The signs and   symptoms of potential delayed complications were discussed with the   patient.  Return to normal activities tomorrow.  Written discharge   instructions were provided to the patient.   - Discharge patient to home.   - Await pathology results.   - Repeat colonoscopy in 3 years for surveillance.   - The findings and recommendations were discussed with the designated   responsible adult.  For questions, problems or results please call your physician - Walter Appiah MD at Work:  [phone].  [facility] can be reached at [phone]     IF A COMPLICATION OR EMERGENCY SITUATION ARISES AND YOU ARE UNABLE TO REACH   YOUR PHYSICIAN - GO DIRECTLY TO THE EMERGENCY ROOM.  Walter Appiah MD  1/6/2022 8:23:48 AM  This report has been verified and signed electronically.  Dear patient,  As a result of recent federal legislation (The Federal Cures Act), you may   receive lab or pathology results from your procedure in your [facility]   account before your physician is able to contact you. Your physician or   their representative will relay the results to you with their   recommendations at their soonest availability.  Thank you,  PROVATION

## 2022-01-06 NOTE — H&P
Short Stay Endoscopy History and Physical    PCP - Navin Charlton MD    Procedure - Colonoscopy  ASA - per anesthesia  Mallampati - per anesthesia  History of Anesthesia problems - no  Family history Anesthesia problems - no   Plan of anesthesia - General    HPI:  This is a 71 y.o. male here for evaluation of : personal history of colon polyps      ROS:  Constitutional: No fevers, chills, No weight loss  CV: No chest pain  Pulm: No cough, No shortness of breath  GI: see HPI  Derm: No rash    Medical History:  has a past medical history of Hyperlipidemia, Hypertension, and Obstructive sleep apnea severe with AHI 64 on study 7/2018 (7/27/2018).    Surgical History:  has a past surgical history that includes Mouth surgery (01/2016) and Colonoscopy (N/A, 8/28/2018).    Family History: family history includes Breast cancer in his mother; Hypertension in his brother, brother, sister, and sister; Mental retardation in his son.. Otherwise no colon cancer, inflammatory bowel disease, or GI malignancies.    Social History:  reports that he has been smoking cigarettes. He has never used smokeless tobacco. He reports that he does not drink alcohol and does not use drugs.    Review of patient's allergies indicates:   Allergen Reactions    Crestor  [rosuvastatin]      Other reaction(s): Muscle pain    Statins-hmg-coa reductase inhibitors      myalgias    Sulfamethoxazole-trimethoprim Other (See Comments)     Muscle pain    Atorvastatin Other (See Comments)     Muscle cramps  Other reaction(s): Muscle pain       Medications:   Medications Prior to Admission   Medication Sig Dispense Refill Last Dose    lisinopriL-hydrochlorothiazide (PRINZIDE,ZESTORETIC) 20-12.5 mg per tablet TAKE 1 TABLET EVERY DAY 90 tablet 3 1/5/2022 at Unknown time    sodium,potassium,mag sulfates (SUPREP BOWEL PREP KIT) 17.5-3.13-1.6 gram SolR As Directed 1 kit 0 1/6/2022 at Unknown time    tamsulosin (FLOMAX) 0.4 mg Cap TAKE 2 CAPSULES EVERY DAY  180 capsule 3 1/5/2022 at Unknown time    eszopiclone (LUNESTA) 3 mg Tab TK 1 T PO QD HS       ezetimibe (ZETIA) 10 mg tablet TAKE 1 TABLET EVERY DAY 90 tablet 3     LORazepam (ATIVAN) 1 MG tablet Take 2 mg by mouth every evening.        sildenafiL (VIAGRA) 100 MG tablet TAKE 1 TABLET(100 MG) BY MOUTH DAILY AS NEEDED FOR ERECTILE DYSFUNCTION 10 tablet 2          Physical Exam:    Vital Signs:   Vitals:    01/06/22 0722   BP: 120/81   Pulse: 74   Resp: 18   Temp: 97.5 °F (36.4 °C)       General Appearance: Well appearing in no acute distress  Eyes:    No scleral icterus  ENT: Neck supple, Lips, mucosa, and tongue normal; teeth and gums normal  Abdomen: Soft, non tender, non distended with positive bowel sounds. No hepatosplenomegaly, ascites, or mass.  Extremities: 2+ pulses, no clubbing, cyanosis or edema  Skin: No rash      Labs:  Lab Results   Component Value Date    WBC 6.38 11/04/2021    HGB 13.5 (L) 11/04/2021    HCT 44.7 11/04/2021     11/04/2021    CHOL 184 11/04/2021    TRIG 140 11/04/2021    HDL 30 (L) 11/04/2021    ALT 17 11/04/2021    AST 20 11/04/2021     11/04/2021    K 4.2 11/04/2021     11/04/2021    CREATININE 0.9 11/04/2021    BUN 12 11/04/2021    CO2 20 (L) 11/04/2021    TSH 0.944 11/04/2021    PSA 4.1 (H) 03/29/2017    HGBA1C 4.8 12/27/2019       I have explained the risks and benefits of endoscopy procedures to the patient including but not limited to bleeding, perforation, infection, and death.  The patient was asked if they understand and allowed to ask any further questions to their satisfaction.    Walter Appiah MD

## 2022-01-06 NOTE — DISCHARGE INSTRUCTIONS
Patient Education       Colon Polypectomy Discharge Instructions   About this topic   The colon is also called the large intestine. It is a long, hollow tube at the end of your digestive tract. It absorbs water from solid waste and changes it from liquid to a solid bowel movement.  A colon polyp is a growth of extra tissue that is not normally in your colon. Colon polyps do not often cause any signs. Most colon polyps are not cancer, but some polyps may turn into cancer. The doctor takes the polyps out during a procedure called a colonoscopy and sends them to the lab for a check to make sure there is no cancer.  You may have a colon polyp or multiple polyps removed. The doctor will send them to the lab to see what type they are. If a polyp is very large, it may need to be removed by surgery.     What care is needed at home?   · Ask your doctor what you need to do when you go home. Make sure you ask questions if you do not understand what the doctor says.  · Do not drive for 24 hours after a colonoscopy.  · Take your drugs as ordered by your doctor.  · Go back to your normal diet unless your doctor has told you to make some changes in your diet.  · Rest  What follow-up care is needed?   · Your doctor may ask you to make visits to the office to check on your progress. Be sure to keep these visits.  · Your doctor may suggest you get tested regularly. People with colon polyps need to have a colonoscopy regularly to check for the growth of new polyps.  · Some polyps may not be removed and more surgery may be needed.  · The results of the polyp testing will be given to you at one of these visits.  What drugs may be needed?   The doctor may order drugs to:  · Prevent hard stools  · Help with pain  · Reduce your risk of colon polyps or colon cancer  Will physical activity be limited?   You may feel sleepy after the colonoscopy. Try to get some rest.  What can be done to prevent this health problem?   · Have regular  colonoscopies.  · Eat foods high in fiber.  · Eat foods low in fat.  · Limit your intake of beer, wine, and mixed drinks (alcohol).  · Ask your doctor or dietitian for a diet that is right for you. Include calcium in your diet. Good sources of calcium include milk, cheese, and yogurt.  When do I need to call the doctor?   · Signs of infection. These include a fever of 100.4°F (38°C) or higher, chills, and anal itching or pain.  · Bleeding from rectum that gets worse  · Belly becomes swollen and sore  · Upset stomach and throwing up continues after you return home  · Not being able to move your bowels  · Weight loss without trying  · Blood in your stool  Teach Back: Helping You Understand   The Teach Back Method helps you understand the information we are giving you. After you talk with the staff, tell them in your own words what you learned. This helps to make sure the staff has described each thing clearly. It also helps to explain things that may have been confusing. Before going home, make sure you can do these:  · I can tell you about my condition.  · I can tell you what changes I need to make with my diet.  · I can tell you what I will do if my stomach is swollen, I have belly pain, or there is blood in my stool.  Where can I learn more?   [facility]  https://www.betterhealth.[place]/health/ConditionsAndTreatments/colonoscopy   [facility]  https://www.nhs.uk/conditions/bowel-polyps/   UpToDate  https://www.uptodate.com/contents/colon-polyps-beyond-the-basics   Last Reviewed Date   2021-03-10  Consumer Information Use and Disclaimer   This information is not specific medical advice and does not replace information you receive from your health care provider. This is only a brief summary of general information. It does NOT include all information about conditions, illnesses, injuries, tests, procedures, treatments, therapies, discharge instructions or life-style choices that may apply to you. You must talk with your  health care provider for complete information about your health and treatment options. This information should not be used to decide whether or not to accept your health care providers advice, instructions or recommendations. Only your health care provider has the knowledge and training to provide advice that is right for you.  Copyright   Copyright © 2021 [employer] and its affiliates and/or licensors. All rights reserved.

## 2022-01-06 NOTE — ANESTHESIA PREPROCEDURE EVALUATION
01/06/2022  Sharee Day Jr. is a 71 y.o., male.    Anesthesia Evaluation    I have reviewed the Patient Summary Reports.    I have reviewed the Nursing Notes.       Review of Systems  Anesthesia Hx:  No problems with previous Anesthesia  Denies Family Hx of Anesthesia complications.   Denies Personal Hx of Anesthesia complications.   Social:  Smoker, Non-Smoker    Hematology/Oncology:         -- Cancer in past history: Oncology Comments:  Prostate; s/p XRT     Cardiovascular:   Exercise tolerance: good Hypertension Denies MI.   Denies Dysrhythmias.  PVD hyperlipidemia 4/8/2021 Echo: EF 55%; PAP 23    Holter-frequent PVC   Pulmonary:   Sleep Apnea Does not use CPAP consistently   Renal/:   BPH    Hepatic/GI:  Hepatic/GI Normal    Neurological:   Neuromuscular Disease,    Endocrine:  Endocrine Normal    Psych:   Psychiatric History depression          Physical Exam  General:  Well nourished, Obesity    Airway/Jaw/Neck:  Airway Findings: Mouth Opening: Normal Tongue: Large  Mallampati: II  TM Distance: 4 - 6 cm      Dental:  Dental Findings: In tact   Chest/Lungs:  Chest/Lungs Findings: Clear to auscultation, Normal Respiratory Rate     Heart/Vascular:  Heart Findings: Rate: Normal  Rhythm: Regular Rhythm        Mental Status:  Mental Status Findings:  Cooperative, Alert and Oriented       Wt Readings from Last 3 Encounters:   05/11/21 102.2 kg (225 lb 5 oz)   04/28/21 102.2 kg (225 lb 5 oz)   04/23/21 102.1 kg (225 lb 1.4 oz)     Temp Readings from Last 3 Encounters:   11/10/21 36.7 °C (98 °F) (Oral)   02/16/21 36.8 °C (98.2 °F) (Oral)   11/03/20 36.5 °C (97.7 °F) (Temporal)     BP Readings from Last 3 Encounters:   11/10/21 90/60   04/28/21 106/62   04/23/21 116/78     Pulse Readings from Last 3 Encounters:   11/10/21 66   04/28/21 (!) 53   04/23/21 71     1/3/2022 COVID negative    Anesthesia Plan  Type  of Anesthesia, risks & benefits discussed:  Anesthesia Type:  general, MAC    Patient's Preference:   Plan Factors:          Intra-op Monitoring Plan: standard ASA monitors  Intra-op Monitoring Plan Comments:   Post Op Pain Control Plan: multimodal analgesia and per primary service following discharge from PACU  Post Op Pain Control Plan Comments:     Induction:   IV  Beta Blocker:  Patient is not currently on a Beta-Blocker (No further documentation required).       Informed Consent: Patient understands risks and agrees with Anesthesia plan.  Questions answered. Anesthesia consent signed with patient.  ASA Score: 3     Day of Surgery Review of History & Physical: I have interviewed and examined the patient. I have reviewed the patient's H&P dated:            Ready For Surgery From Anesthesia Perspective.

## 2022-01-06 NOTE — TRANSFER OF CARE
Anesthesia Transfer of Care Note    Patient: Sharee Day Jr.    Procedure(s) Performed: Procedure(s) (LRB):  COLONOSCOPY (N/A)    Patient location: GI    Anesthesia Type: general    Transport from OR: Transported from OR on room air with adequate spontaneous ventilation    Post pain: adequate analgesia    Post assessment: no apparent anesthetic complications and tolerated procedure well    Post vital signs: stable    Level of consciousness: awake, alert and oriented    Nausea/Vomiting: no nausea/vomiting    Complications: none    Transfer of care protocol was followed      Last vitals:   Visit Vitals  BP (!) 82/64 (BP Location: Left arm, Patient Position: Lying)   Pulse 75   Temp 36.5 °C (97.7 °F) (Oral)   Resp 17   SpO2 97%

## 2022-01-06 NOTE — ANESTHESIA POSTPROCEDURE EVALUATION
Anesthesia Post Evaluation    Patient: Sharee Day Jr.    Procedure(s) Performed: Procedure(s) (LRB):  COLONOSCOPY (N/A)    Final Anesthesia Type: general      Patient location during evaluation: GI PACU  Patient participation: Yes- Able to Participate  Level of consciousness: awake and alert  Post-procedure vital signs: reviewed and stable  Pain management: adequate  Airway patency: patent    PONV status at discharge: No PONV  Anesthetic complications: no      Cardiovascular status: hemodynamically stable  Respiratory status: unassisted and spontaneous ventilation  Hydration status: euvolemic  Follow-up not needed.          Vitals Value Taken Time   /68 01/06/22 0900   Temp 36.5 °C (97.7 °F) 01/06/22 0825   Pulse 69 01/06/22 0900   Resp 19 01/06/22 0900   SpO2 98 % 01/06/22 0900         Event Time   Out of Recovery 09:24:00         Pain/Shweta Score: Shweta Score: 10 (1/6/2022  8:52 AM)

## 2022-01-06 NOTE — PROGRESS NOTES
Spoke with patient today in regard to smoking cessation progress for 12 month telephone follow up, he states not tobacco free. Patient states not interested in returning to the program at this time.  Patient states he smokes about a pack every 2 weeks and will quit on his own. Informed patient of benefit period, future follow up, and contact information if any further help or support is needed. Will complete smart form for 12 month follow up and resolve Quit attempt #4.  3 and 6 month f/u completed for Quit 5.

## 2022-01-07 LAB
FINAL PATHOLOGIC DIAGNOSIS: NORMAL
GROSS: NORMAL
Lab: NORMAL

## 2022-02-11 ENCOUNTER — PES CALL (OUTPATIENT)
Dept: ADMINISTRATIVE | Facility: CLINIC | Age: 72
End: 2022-02-11
Payer: MEDICARE

## 2022-02-18 ENCOUNTER — PATIENT OUTREACH (OUTPATIENT)
Dept: ADMINISTRATIVE | Facility: HOSPITAL | Age: 72
End: 2022-02-18
Payer: MEDICARE

## 2022-02-18 NOTE — PROGRESS NOTES
[facility] chart audits-DEPRESSION SCREEN/FOLLOW UP PLAN.    SCREENING:  NO PHQ9 SCREENING FOR MEASUREMENT PERIOD.

## 2022-05-04 ENCOUNTER — LAB VISIT (OUTPATIENT)
Dept: LAB | Facility: HOSPITAL | Age: 72
End: 2022-05-04
Attending: INTERNAL MEDICINE
Payer: MEDICARE

## 2022-05-04 DIAGNOSIS — E55.9 VITAMIN D DEFICIENCY: ICD-10-CM

## 2022-05-04 DIAGNOSIS — D56.3 BETA THALASSEMIA MINOR: ICD-10-CM

## 2022-05-04 DIAGNOSIS — E78.5 DYSLIPIDEMIA: ICD-10-CM

## 2022-05-04 DIAGNOSIS — R73.09 ABNORMAL GLUCOSE: ICD-10-CM

## 2022-05-04 DIAGNOSIS — T46.6X5A STATIN MYOPATHY: ICD-10-CM

## 2022-05-04 DIAGNOSIS — G72.0 STATIN MYOPATHY: ICD-10-CM

## 2022-05-04 LAB
25(OH)D3+25(OH)D2 SERPL-MCNC: 32 NG/ML (ref 30–96)
ALBUMIN SERPL BCP-MCNC: 3.4 G/DL (ref 3.5–5.2)
ALP SERPL-CCNC: 42 U/L (ref 55–135)
ALT SERPL W/O P-5'-P-CCNC: 16 U/L (ref 10–44)
ANION GAP SERPL CALC-SCNC: 7 MMOL/L (ref 8–16)
AST SERPL-CCNC: 18 U/L (ref 10–40)
BASOPHILS # BLD AUTO: 0.03 K/UL (ref 0–0.2)
BASOPHILS NFR BLD: 0.5 % (ref 0–1.9)
BILIRUB SERPL-MCNC: 0.5 MG/DL (ref 0.1–1)
BUN SERPL-MCNC: 13 MG/DL (ref 8–23)
CALCIUM SERPL-MCNC: 9.3 MG/DL (ref 8.7–10.5)
CHLORIDE SERPL-SCNC: 107 MMOL/L (ref 95–110)
CHOLEST SERPL-MCNC: 194 MG/DL (ref 120–199)
CHOLEST/HDLC SERPL: 6.1 {RATIO} (ref 2–5)
CO2 SERPL-SCNC: 28 MMOL/L (ref 23–29)
CREAT SERPL-MCNC: 1 MG/DL (ref 0.5–1.4)
DIFFERENTIAL METHOD: ABNORMAL
EOSINOPHIL # BLD AUTO: 0.1 K/UL (ref 0–0.5)
EOSINOPHIL NFR BLD: 1.8 % (ref 0–8)
ERYTHROCYTE [DISTWIDTH] IN BLOOD BY AUTOMATED COUNT: 14.3 % (ref 11.5–14.5)
EST. GFR  (AFRICAN AMERICAN): >60 ML/MIN/1.73 M^2
EST. GFR  (NON AFRICAN AMERICAN): >60 ML/MIN/1.73 M^2
ESTIMATED AVG GLUCOSE: 103 MG/DL (ref 68–131)
GLUCOSE SERPL-MCNC: 114 MG/DL (ref 70–110)
HBA1C MFR BLD: 5.2 % (ref 4–5.6)
HCT VFR BLD AUTO: 43 % (ref 40–54)
HDLC SERPL-MCNC: 32 MG/DL (ref 40–75)
HDLC SERPL: 16.5 % (ref 20–50)
HGB BLD-MCNC: 13.3 G/DL (ref 14–18)
IMM GRANULOCYTES # BLD AUTO: 0.02 K/UL (ref 0–0.04)
IMM GRANULOCYTES NFR BLD AUTO: 0.4 % (ref 0–0.5)
LDLC SERPL CALC-MCNC: 134.2 MG/DL (ref 63–159)
LYMPHOCYTES # BLD AUTO: 1.9 K/UL (ref 1–4.8)
LYMPHOCYTES NFR BLD: 35.2 % (ref 18–48)
MCH RBC QN AUTO: 24.7 PG (ref 27–31)
MCHC RBC AUTO-ENTMCNC: 30.9 G/DL (ref 32–36)
MCV RBC AUTO: 80 FL (ref 82–98)
MONOCYTES # BLD AUTO: 0.7 K/UL (ref 0.3–1)
MONOCYTES NFR BLD: 12.5 % (ref 4–15)
NEUTROPHILS # BLD AUTO: 2.7 K/UL (ref 1.8–7.7)
NEUTROPHILS NFR BLD: 49.6 % (ref 38–73)
NONHDLC SERPL-MCNC: 162 MG/DL
NRBC BLD-RTO: 0 /100 WBC
PLATELET # BLD AUTO: 263 K/UL (ref 150–450)
PMV BLD AUTO: 9.2 FL (ref 9.2–12.9)
POTASSIUM SERPL-SCNC: 3.8 MMOL/L (ref 3.5–5.1)
PROT SERPL-MCNC: 6.9 G/DL (ref 6–8.4)
RBC # BLD AUTO: 5.39 M/UL (ref 4.6–6.2)
SODIUM SERPL-SCNC: 142 MMOL/L (ref 136–145)
TRIGL SERPL-MCNC: 139 MG/DL (ref 30–150)
WBC # BLD AUTO: 5.51 K/UL (ref 3.9–12.7)

## 2022-05-04 PROCEDURE — 83036 HEMOGLOBIN GLYCOSYLATED A1C: CPT | Performed by: INTERNAL MEDICINE

## 2022-05-04 PROCEDURE — 82306 VITAMIN D 25 HYDROXY: CPT | Performed by: INTERNAL MEDICINE

## 2022-05-04 PROCEDURE — 80053 COMPREHEN METABOLIC PANEL: CPT | Performed by: INTERNAL MEDICINE

## 2022-05-04 PROCEDURE — 36415 COLL VENOUS BLD VENIPUNCTURE: CPT | Mod: PO | Performed by: INTERNAL MEDICINE

## 2022-05-04 PROCEDURE — 85025 COMPLETE CBC W/AUTO DIFF WBC: CPT | Performed by: INTERNAL MEDICINE

## 2022-05-04 PROCEDURE — 80061 LIPID PANEL: CPT | Performed by: INTERNAL MEDICINE

## 2022-05-06 ENCOUNTER — PES CALL (OUTPATIENT)
Dept: ADMINISTRATIVE | Facility: CLINIC | Age: 72
End: 2022-05-06
Payer: MEDICARE

## 2022-05-06 DIAGNOSIS — G72.0 STATIN MYOPATHY: ICD-10-CM

## 2022-05-06 DIAGNOSIS — T46.6X5A STATIN MYOPATHY: ICD-10-CM

## 2022-05-06 DIAGNOSIS — E78.5 DYSLIPIDEMIA: ICD-10-CM

## 2022-05-06 RX ORDER — EZETIMIBE 10 MG/1
TABLET ORAL
Qty: 90 TABLET | Refills: 3 | Status: SHIPPED | OUTPATIENT
Start: 2022-05-06 | End: 2022-05-10 | Stop reason: SDUPTHER

## 2022-05-06 NOTE — TELEPHONE ENCOUNTER
Refill Routing Note   Medication(s) are not appropriate for processing by [facility] for the following reason(s):      - Patient has been seen in the ED/Hospital since the last PCP visit    [facility] action(s):  Defer          Medication reconciliation completed: No     Appointments  past 12m or future 3m with PCP    Date Provider   Last Visit   11/10/2021 Navin Charlton MD   Next Visit   5/10/2022 Navin Charlton MD   ED visits in past 90 days: 0        Note composed:9:49 AM 05/06/2022

## 2022-05-09 NOTE — PROGRESS NOTES
This note was created by combination of typed  and M-Modal dictation.  Transcription errors may be present.  If there are any questions, please contact me.    Assessment and Plan:   Essential hypertension 04/08/2021 TTE LV normal size with mild eccentric LVH and normal systolic function LVEF 55%.  Normal RV size and systolic function.  PA pressure 23.  -BP low. On lisinopril hctz.  Trial reduced dose. Plain hctz.  He notes when he is off BP meds gets leg swelling.   BP check 3 weeks on hctz 12.5  -     hydroCHLOROthiazide (HYDRODIURIL) 12.5 MG Tab; Take 1 tablet (12.5 mg total) by mouth once daily.  Dispense: 30 tablet; Refill: 11  -     Comprehensive Metabolic Panel; Future; Expected date: 11/10/2022  -     CBC Auto Differential; Future; Expected date: 11/10/2022    Dyslipidemia statin intolerance. 8/2018 zetia; 07/13/2016 stress test negative for ischemia  Statin myopathy  Peripheral artery disease mild on doppler  -pre visit labs not ideal.  But no changes. Stay on zetia.  Good PT pulses today.  -     ezetimibe (ZETIA) 10 mg tablet; Take 1 tablet (10 mg total) by mouth once daily.  Dispense: 90 tablet; Refill: 3    Focal hyperhidrosis  -head, finds it embarrassing.  Trial of topical OTC antiperspirant, clinical strength.  Avoid systemic meds if possible.    Prostate cancer s/p XRT currently hormone deprivation  -followed by [facility] in [place].  Reports that his next follow-up with them is in September.    Smoker  -some day smoker    Beta thalassemia minor  -stable.    Vitamin D deficiency  -pre visit labs vitamin-D is okay.  Borderline normal.  Not on supplement.  Okay to stay off    Tubulovillous adenoma of colon on colonoscopy 8/2018; 1/6/22 colonoscopy 5 mm sigmoid HP  -stable    Lumbar radiculopathy  -stable    Urinary urgency  -refilled tamsulosin  -     tamsulosin (FLOMAX) 0.4 mg Cap; TAKE 2 CAPSULES EVERY DAY  Dispense: 180 capsule; Refill: 3    Current severe episode of major  depressive disorder without psychotic features without prior episode  -stable.  No longer following with the outside Psychiatry.  Not taking any medications on a scheduled basis.  P.r.n. use of Lunesta.    Ingrown nail, asymptomatic, monitor.  If he becomes symptomatic I can refer him to Podiatry.  He is inquiring about nail trimming and discussed that this is typically not a covered benefit with his insurance and would be out-of-pocket    Medications Discontinued During This Encounter   Medication Reason    LORazepam (ATIVAN) 1 MG tablet Patient no longer taking    sildenafiL (VIAGRA) 100 MG tablet Patient no longer taking    lisinopriL-hydrochlorothiazide (PRINZIDE,ZESTORETIC) 20-12.5 mg per tablet Alternate therapy    tamsulosin (FLOMAX) 0.4 mg Cap Reorder    ezetimibe (ZETIA) 10 mg tablet Reorder       meds sent this encounter:  Medications Ordered This Encounter   Medications    ezetimibe (ZETIA) 10 mg tablet     Sig: Take 1 tablet (10 mg total) by mouth once daily.     Dispense:  90 tablet     Refill:  3    hydroCHLOROthiazide (HYDRODIURIL) 12.5 MG Tab     Sig: Take 1 tablet (12.5 mg total) by mouth once daily.     Dispense:  30 tablet     Refill:  11     Stop the lisinopril hctz.    tamsulosin (FLOMAX) 0.4 mg Cap     Sig: TAKE 2 CAPSULES EVERY DAY     Dispense:  180 capsule     Refill:  3       Follow Up: No follow-ups on file.  Nurse visit 3 weeks for BP check.  Follow-up 6 months with labs    Subjective:     Chief Complaint   Patient presents with    Hypertension       HPI  Sharee is a 71 y.o. male, last appointment with this clinic was Visit date not found.  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  Subsequent colonoscopy 01/06/2022 5 mm sigmoid HP.  Prostate cancer followed by [facility] in [place].  Plan was follow-up in 6 months.  PSA detectable but low.  Hypertension Toprol XL side effect of bradycardia.  BP stable off of it.  In April he asked to be taken off of the program  PVCs asymptomatic, Toprol XL side effect.  Hyperlipidemia with statin intolerance.  On Zetia.  Smoker working on quitting  MDD followed by outside Psychiatry.  No longer on Cymbalta or Wellbutrin.  P.r.n. use of benzodiazepine.  Lumbar radiculopathy stable  Vitamin-D deficiency on 1000 IU supplement  Abnormal glucose  Rotator tendinitis    Pre visit labs CBC mild anemia stable known history of thalassemia  CMP normal  Lipid profile showed HDL low seen previously non   Vitamin-D good  A1c 5.2    No longer taking any meds for mood.  No longer seeing Dr. Coleman. Feels like he got over the worst of things - between the death of his son and the dx of prostate CA.      BP low on intake.  Has own cuff, low BP on occasion. Took the meds today. And for the past 6 months taking daily. Checks 3-4 times a week.  Had stopped it for about 6 months he estimates and then it started going back up so he restarted it.    Stopped the digital monitoring b/c of phone capacity issues.    When he has stopped the lisinopril/hctz he'll get swelling in the legs.  And wondering if we can drop the lisinopril portion.     Gets occasional lightheadedness. No real pattern to its occurrences.  Thinks when insufficent sleep or constipation gets dizzy.  But even when he was taking BP meds would get dizziness.           Is due to f/u with Cancer center around September.     Notes hyperhidrosis.  Head.  Embarrassing.      Patient Care Team:  Navin Charlton MD as PCP -  General (Internal Medicine)  Alfredo Kearns MD as Consulting Physician (Urology)  Adeel Coleman MD as Consulting Physician (Psychiatry)  Adeel Vicente MD (Hematology and Oncology)  Juventino Meeks MA as Care Coordinator    Patient Active Problem List    Diagnosis Date Noted    Lumbar radiculopathy 11/09/2021 05/12/2021 EMG acute denervation with chronic reinnervation in the L5-S1 myotomes on the left.  5/24/2021 MRI L spine:   Multilevel lumbar spondylosis with resultant mild spinal canal stenosis at L3-L4 in neural foraminal stenosis at L4-L5 and L5-S1.      PVC (premature ventricular contraction); 4/2021 metoprolol 04/23/2021 04/08/2021 Holter frequent PVCs, frequent PACs      Peripheral artery disease mild on doppler 05/20/2019     4/10/2018 LE dopplers: 1. Right lower extremity pressures and waveforms indicate no hemodynamically significant arterial occlusive disease.  2. Left lower extremity pressures and waveforms indicate mild arterial occlusive disease.      Numbness or tingling workup with neuro for demyelinating 05/20/2019 12/19/18 MRI brain: 1. Numerous tiny focal areas of remote gliosis posterior frontal parietal and occipital and upper basal ganglia areas, differential diagnosis includes microvascular ischemic change and inactive demyelinating process.  2. Intraventricular artifact opacity body left lateral ventricle discussed above.  12/2018 MRI C spine: 1. Multilevel spondylotic changes as discussed above.  2. Multilevel foraminal stenotic changes more pronounced on the left as compared to the right as discussed above.  See further details above.  1/24/19 EMG: borderline normal EMG/NCS. There was an absent   left sural response of unclear clinical significance. There were   no myopathic units seen to suggest a myopathy.     05/12/2021 EMG acute denervation with chronic reinnervation in the L5-S1 myotomes on the left.  5/24/2021 MRI L spine:   Multilevel lumbar spondylosis with  resultant mild spinal canal stenosis at L3-L4 in neural foraminal stenosis at L4-L5 and L5-S1.      Tubulovillous adenoma of colon on colonoscopy 8/2018; 1/6/22 colonoscopy 5 mm sigmoid HP 08/30/2018 8/2018 colonoscopy TVA  1/6/22 colonoscopy 5 mm sigmoid HP      Statin myopathy 08/02/2018    Obstructive sleep apnea severe with AHI 64 on study 7/2018 07/27/2018     Very severe sleep disordered  breathing (AHI=64) is noted based on a 4% hypopnea desaturation criteria. The patient slept supine 64.2% of the night based  on valid recording time of 6.43 hours. The apneas/hypopneas are accompanied by severe oxygen desaturation (percent time  below 90% SpO2: 15.9%, Min SpO2: 69.6%). The average desaturation across all sleep disordered breathing events is 5.7%.  Snoring occurs for 45.4% (30 dB) of the study, 17.3% is extremely loud. The mean pulse rate is 63 BPM, with frequent pulse  rate variability (52 events with >= 6 BPM increase/decrease per hour).  TREATMENT CONSIDERATIONS: Consider nasal continuous positive airway pressure (CPAP) as the initial treatment  choice for severe obstructive sleep apnea. Consider an attended in-lab CPAP titration study, given the possibility of co-morbid  sleep related hypoventilation.  DISEASE MANAGEMENT CONSIDERATIONS: Insomnia is a symptom that can be associated with untreated DAKOTA.  Sleeping pills may increase the severity of untreated DAKOTA and their use should be reevaluated once the DAKOTA is treated.      Major depressive disorder 10/30/2017    Pelvic floor muscle wasting in male 09/28/2017    Prostate cancer s/p XRT currently hormone deprivation 08/30/2017     2 cores positive kary 3+4=7 on biopsy 8/15/2017      Smoker 08/30/2017    Vitamin D deficiency 08/30/2017    Beta thalassemia minor 04/11/2017    Benign non-nodular prostatic hyperplasia without lower urinary tract symptoms 03/29/2017     Hx of negative prostate Bx and hx of MRI prostate      Restless leg syndrome  Requip ineffective 08/02/2016    Dyslipidemia statin intolerance. 8/2018 zetia; 07/13/2016 stress test negative for ischemia     Essential hypertension 04/08/2021 TTE LV normal size with mild eccentric LVH and normal systolic function LVEF 55%.  Normal RV size and systolic function.  PA pressure 23.      7/13/2016 [facility] stress test neg for ischemia  7/13/2016 TTE LVEF 55-60; no diastolic dysfunction  04/08/2021 TTE LV normal size with mild eccentric LVH and normal systolic function LVEF 55%.  Normal RV size and systolic function.  PA pressure 23.         Shift work sleep disorder Hydroxyzine with SE. Trazodone ineffective.  Rozerem didn't help 11/20/2013       PAST MEDICAL PROBLEMS, PAST SURGICAL HISTORY: please see relevant portions of the electronic medical record    ALLERGIES AND MEDICATIONS: updated and reviewed.  Review of patient's allergies indicates:   Allergen Reactions    Crestor  [rosuvastatin]      Other reaction(s): Muscle pain    Statins-hmg-coa reductase inhibitors      myalgias    Sulfamethoxazole-trimethoprim Other (See Comments)     Muscle pain    Atorvastatin Other (See Comments)     Muscle cramps  Other reaction(s): Muscle pain       Medication List with Changes/Refills   Current Medications    ESZOPICLONE (LUNESTA) 3 MG TAB    TK 1 T PO QD HS    EZETIMIBE (ZETIA) 10 MG TABLET    TAKE 1 TABLET EVERY DAY    LISINOPRIL-HYDROCHLOROTHIAZIDE (PRINZIDE,ZESTORETIC) 20-12.5 MG PER TABLET    TAKE 1 TABLET EVERY DAY    LORAZEPAM (ATIVAN) 1 MG TABLET    Take 2 mg by mouth every evening.     SILDENAFIL (VIAGRA) 100 MG TABLET    TAKE 1 TABLET(100 MG) BY MOUTH DAILY AS NEEDED FOR ERECTILE DYSFUNCTION    TAMSULOSIN (FLOMAX) 0.4 MG CAP    TAKE 2 CAPSULES EVERY DAY      ROS  Notes sometimes his toes will get dark.  No pain.  Has an ingrown nail on his right great toe, painless, no oozing    Objective:   Physical Exam   Vitals:    05/10/22 1437   BP: 90/62   BP Location: Right arm   Patient Position:  "Sitting   BP Method: Medium (Manual)   Pulse: 72   Temp: 97.9 °F (36.6 °C)   TempSrc: Oral   SpO2: 96%   Weight: 100.3 kg (221 lb 1.9 oz)   Height: 5' 11" (1.803 m)    Body mass index is 30.84 kg/m².            Physical Exam  Constitutional:       General: He is not in acute distress.     Appearance: He is well-developed.   Cardiovascular:      Rate and Rhythm: Normal rate and regular rhythm.      Heart sounds: Normal heart sounds. No murmur heard.  Pulmonary:      Effort: Pulmonary effort is normal.      Breath sounds: Normal breath sounds.   Musculoskeletal:         General: Normal range of motion.      Right lower leg: No edema.      Left lower leg: No edema.      Comments: PT pulses 3+ symmetric   Skin:     General: Skin is warm and dry.      Comments: Right foot great toe medial side ingrown without erythema no tenderness no swelling   Neurological:      Mental Status: He is alert and oriented to person, place, and time.      Coordination: Coordination normal.   Psychiatric:         Behavior: Behavior normal.         Thought Content: Thought content normal.         Component      Latest Ref Rng & Units 5/4/2022 11/4/2021   WBC      3.90 - 12.70 K/uL 5.51 6.38   RBC      4.60 - 6.20 M/uL 5.39 5.64   Hemoglobin      14.0 - 18.0 g/dL 13.3 (L) 13.5 (L)   Hematocrit      40.0 - 54.0 % 43.0 44.7   MCV      82 - 98 fL 80 (L) 79 (L)   MCH      27.0 - 31.0 pg 24.7 (L) 23.9 (L)   MCHC      32.0 - 36.0 g/dL 30.9 (L) 30.2 (L)   RDW      11.5 - 14.5 % 14.3 14.2   Platelets      150 - 450 K/uL 263 276   MPV      9.2 - 12.9 fL 9.2 8.9 (L)   Immature Granulocytes      0.0 - 0.5 % 0.4 0.2   Gran # (ANC)      1.8 - 7.7 K/uL 2.7 3.2   Immature Grans (Abs)      0.00 - 0.04 K/uL 0.02 0.01   Lymph #      1.0 - 4.8 K/uL 1.9 2.3   Mono #      0.3 - 1.0 K/uL 0.7 0.7   Eos #      0.0 - 0.5 K/uL 0.1 0.1   Baso #      0.00 - 0.20 K/uL 0.03 0.04   nRBC      0 /100 WBC 0 0   Gran %      38.0 - 73.0 % 49.6 50.3   Lymph %      18.0 - 48.0 % " 35.2 36.5   Mono %      4.0 - 15.0 % 12.5 10.2   Eosinophil %      0.0 - 8.0 % 1.8 2.2   Basophil %      0.0 - 1.9 % 0.5 0.6   Differential Method       Automated Automated   Sodium      136 - 145 mmol/L 142 139   Potassium      3.5 - 5.1 mmol/L 3.8 4.2   Chloride      95 - 110 mmol/L 107 105   CO2      23 - 29 mmol/L 28 20 (L)   Glucose      70 - 110 mg/dL 114 (H) 111 (H)   BUN      8 - 23 mg/dL 13 12   Creatinine      0.5 - 1.4 mg/dL 1.0 0.9   Calcium      8.7 - 10.5 mg/dL 9.3 9.5   PROTEIN TOTAL      6.0 - 8.4 g/dL 6.9 7.2   Albumin      3.5 - 5.2 g/dL 3.4 (L) 3.5   BILIRUBIN TOTAL      0.1 - 1.0 mg/dL 0.5 0.6   Alkaline Phosphatase      55 - 135 U/L 42 (L) 45 (L)   AST      10 - 40 U/L 18 20   ALT      10 - 44 U/L 16 17   Anion Gap      8 - 16 mmol/L 7 (L) 14   eGFR if African American      >60 mL/min/1.73 m:2 >60.0 >60.0   eGFR if non African American      >60 mL/min/1.73 m:2 >60.0 >60.0   Cholesterol      120 - 199 mg/dL 194 184   Triglycerides      30 - 150 mg/dL 139 140   HDL      40 - 75 mg/dL 32 (L) 30 (L)   LDL Cholesterol External      63.0 - 159.0 mg/dL 134.2 126.0   HDL/Cholesterol Ratio      20.0 - 50.0 % 16.5 (L) 16.3 (L)   Total Cholesterol/HDL Ratio      2.0 - 5.0 6.1 (H) 6.1 (H)   Non-HDL Cholesterol      mg/dL 162 154   Hemoglobin A1C External      4.0 - 5.6 % 5.2    Estimated Avg Glucose      68 - 131 mg/dL 103    PSA Diagnostic      0.00 - 4.00 ng/mL  0.32   Vit D, 25-Hydroxy      30 - 96 ng/mL 32 24 (L)   TSH      0.400 - 4.000 uIU/mL  0.944

## 2022-05-10 ENCOUNTER — OFFICE VISIT (OUTPATIENT)
Dept: FAMILY MEDICINE | Facility: CLINIC | Age: 72
End: 2022-05-10
Payer: MEDICARE

## 2022-05-10 VITALS
WEIGHT: 221.13 LBS | SYSTOLIC BLOOD PRESSURE: 90 MMHG | HEIGHT: 71 IN | TEMPERATURE: 98 F | DIASTOLIC BLOOD PRESSURE: 62 MMHG | HEART RATE: 72 BPM | OXYGEN SATURATION: 96 % | BODY MASS INDEX: 30.96 KG/M2

## 2022-05-10 DIAGNOSIS — T46.6X5A STATIN MYOPATHY: ICD-10-CM

## 2022-05-10 DIAGNOSIS — I73.9 PERIPHERAL ARTERY DISEASE: ICD-10-CM

## 2022-05-10 DIAGNOSIS — R39.15 URINARY URGENCY: ICD-10-CM

## 2022-05-10 DIAGNOSIS — D56.3 BETA THALASSEMIA MINOR: ICD-10-CM

## 2022-05-10 DIAGNOSIS — L74.519 FOCAL HYPERHIDROSIS: ICD-10-CM

## 2022-05-10 DIAGNOSIS — G72.0 STATIN MYOPATHY: ICD-10-CM

## 2022-05-10 DIAGNOSIS — C61 PROSTATE CANCER: ICD-10-CM

## 2022-05-10 DIAGNOSIS — E55.9 VITAMIN D DEFICIENCY: ICD-10-CM

## 2022-05-10 DIAGNOSIS — E78.5 DYSLIPIDEMIA: ICD-10-CM

## 2022-05-10 DIAGNOSIS — F33.42 RECURRENT MAJOR DEPRESSIVE DISORDER, IN FULL REMISSION: ICD-10-CM

## 2022-05-10 DIAGNOSIS — F17.200 SMOKER: ICD-10-CM

## 2022-05-10 DIAGNOSIS — I10 ESSENTIAL HYPERTENSION: Primary | ICD-10-CM

## 2022-05-10 DIAGNOSIS — M54.16 LUMBAR RADICULOPATHY: ICD-10-CM

## 2022-05-10 DIAGNOSIS — D12.6 TUBULOVILLOUS ADENOMA OF COLON: ICD-10-CM

## 2022-05-10 PROCEDURE — 99999 PR PBB SHADOW E&M-EST. PATIENT-LVL IV: CPT | Mod: PBBFAC,,, | Performed by: INTERNAL MEDICINE

## 2022-05-10 PROCEDURE — 99999 PR PBB SHADOW E&M-EST. PATIENT-LVL IV: ICD-10-PCS | Mod: PBBFAC,,, | Performed by: INTERNAL MEDICINE

## 2022-05-10 PROCEDURE — 99214 PR OFFICE/OUTPT VISIT, EST, LEVL IV, 30-39 MIN: ICD-10-PCS | Mod: S$PBB,,, | Performed by: INTERNAL MEDICINE

## 2022-05-10 PROCEDURE — 99214 OFFICE O/P EST MOD 30 MIN: CPT | Mod: S$PBB,,, | Performed by: INTERNAL MEDICINE

## 2022-05-10 PROCEDURE — 99214 OFFICE O/P EST MOD 30 MIN: CPT | Mod: PBBFAC,PO | Performed by: INTERNAL MEDICINE

## 2022-05-10 RX ORDER — HYDROCHLOROTHIAZIDE 12.5 MG/1
12.5 TABLET ORAL DAILY
Qty: 30 TABLET | Refills: 11 | Status: SHIPPED | OUTPATIENT
Start: 2022-05-10 | End: 2023-05-25

## 2022-05-10 RX ORDER — LISINOPRIL AND HYDROCHLOROTHIAZIDE 12.5; 2 MG/1; MG/1
1 TABLET ORAL DAILY
Qty: 90 TABLET | Refills: 3 | Status: CANCELLED | OUTPATIENT
Start: 2022-05-10

## 2022-05-10 RX ORDER — TAMSULOSIN HYDROCHLORIDE 0.4 MG/1
CAPSULE ORAL
Qty: 180 CAPSULE | Refills: 3 | Status: SHIPPED | OUTPATIENT
Start: 2022-05-10 | End: 2023-06-04

## 2022-05-10 RX ORDER — EZETIMIBE 10 MG/1
10 TABLET ORAL DAILY
Qty: 90 TABLET | Refills: 3 | Status: SHIPPED | OUTPATIENT
Start: 2022-05-10 | End: 2023-06-03

## 2022-06-17 RX ORDER — LISINOPRIL AND HYDROCHLOROTHIAZIDE 12.5; 2 MG/1; MG/1
TABLET ORAL
Qty: 90 TABLET | OUTPATIENT
Start: 2022-06-17

## 2022-06-17 NOTE — TELEPHONE ENCOUNTER
Refill Decision Note   Sharee Day  is requesting a refill authorization.  Brief Assessment and Rationale for Refill:  Quick Discontinue     Medication Therapy Plan:       Medication Reconciliation Completed: No   Comments:     No Care Gaps recommended.     Note composed:12:58 PM 06/17/2022

## 2022-06-17 NOTE — TELEPHONE ENCOUNTER
No new care gaps identified.  [facility] Embedded Care Gaps. Reference number: [id number]. 6/17/2022   2:53:08 AM [place]

## 2022-07-06 ENCOUNTER — TELEPHONE (OUTPATIENT)
Dept: FAMILY MEDICINE | Facility: CLINIC | Age: 72
End: 2022-07-06
Payer: MEDICARE

## 2022-07-06 NOTE — TELEPHONE ENCOUNTER
Spoke with the Patient to schedule an EAWV appointment.  Patient requested a call back on tomorrow after 10:00am.

## 2022-07-07 ENCOUNTER — TELEPHONE (OUTPATIENT)
Dept: FAMILY MEDICINE | Facility: CLINIC | Age: 72
End: 2022-07-07
Payer: MEDICARE

## 2022-07-07 NOTE — TELEPHONE ENCOUNTER
Spoke with the Patient and scheduled an EAWV appointment for 07/13/22 @ 10:30am.  Patient verbally understands.

## 2022-07-12 ENCOUNTER — TELEPHONE (OUTPATIENT)
Dept: ADMINISTRATIVE | Facility: CLINIC | Age: 72
End: 2022-07-12
Payer: MEDICARE

## 2022-07-12 NOTE — TELEPHONE ENCOUNTER
Called pt, no answer; left message informing pt I was calling to remind pt of his in office EAWV on 7/13/22 and to return my call if he had any questions; left my name and number

## 2022-07-13 ENCOUNTER — OFFICE VISIT (OUTPATIENT)
Dept: FAMILY MEDICINE | Facility: CLINIC | Age: 72
End: 2022-07-13
Payer: MEDICARE

## 2022-07-13 VITALS
WEIGHT: 227.38 LBS | SYSTOLIC BLOOD PRESSURE: 110 MMHG | HEART RATE: 62 BPM | HEIGHT: 71 IN | DIASTOLIC BLOOD PRESSURE: 70 MMHG | OXYGEN SATURATION: 98 % | BODY MASS INDEX: 31.83 KG/M2

## 2022-07-13 DIAGNOSIS — I73.9 PERIPHERAL ARTERY DISEASE: ICD-10-CM

## 2022-07-13 DIAGNOSIS — C61 PROSTATE CANCER: ICD-10-CM

## 2022-07-13 DIAGNOSIS — D12.6 TUBULOVILLOUS ADENOMA OF COLON: ICD-10-CM

## 2022-07-13 DIAGNOSIS — G25.81 RESTLESS LEG SYNDROME: ICD-10-CM

## 2022-07-13 DIAGNOSIS — G72.0 STATIN MYOPATHY: ICD-10-CM

## 2022-07-13 DIAGNOSIS — Z00.00 ENCOUNTER FOR PREVENTIVE HEALTH EXAMINATION: Primary | ICD-10-CM

## 2022-07-13 DIAGNOSIS — F33.42 RECURRENT MAJOR DEPRESSIVE DISORDER, IN FULL REMISSION: ICD-10-CM

## 2022-07-13 DIAGNOSIS — N40.0 BENIGN NON-NODULAR PROSTATIC HYPERPLASIA WITHOUT LOWER URINARY TRACT SYMPTOMS: Chronic | ICD-10-CM

## 2022-07-13 DIAGNOSIS — I10 ESSENTIAL HYPERTENSION: ICD-10-CM

## 2022-07-13 DIAGNOSIS — G47.26 SHIFT WORK SLEEP DISORDER: Chronic | ICD-10-CM

## 2022-07-13 DIAGNOSIS — E66.09 CLASS 1 OBESITY DUE TO EXCESS CALORIES WITH SERIOUS COMORBIDITY AND BODY MASS INDEX (BMI) OF 31.0 TO 31.9 IN ADULT: ICD-10-CM

## 2022-07-13 DIAGNOSIS — E78.5 DYSLIPIDEMIA: ICD-10-CM

## 2022-07-13 DIAGNOSIS — D56.3 BETA THALASSEMIA MINOR: Chronic | ICD-10-CM

## 2022-07-13 DIAGNOSIS — T46.6X5A STATIN MYOPATHY: ICD-10-CM

## 2022-07-13 PROBLEM — E66.811 CLASS 1 OBESITY DUE TO EXCESS CALORIES WITH SERIOUS COMORBIDITY IN ADULT: Status: ACTIVE | Noted: 2022-07-13

## 2022-07-13 PROCEDURE — 99999 PR PBB SHADOW E&M-EST. PATIENT-LVL IV: CPT | Mod: PBBFAC,,, | Performed by: NURSE PRACTITIONER

## 2022-07-13 PROCEDURE — G0439 PR MEDICARE ANNUAL WELLNESS SUBSEQUENT VISIT: ICD-10-PCS | Mod: ,,, | Performed by: NURSE PRACTITIONER

## 2022-07-13 PROCEDURE — G0439 PPPS, SUBSEQ VISIT: HCPCS | Mod: ,,, | Performed by: NURSE PRACTITIONER

## 2022-07-13 PROCEDURE — 99999 PR PBB SHADOW E&M-EST. PATIENT-LVL IV: ICD-10-PCS | Mod: PBBFAC,,, | Performed by: NURSE PRACTITIONER

## 2022-07-13 PROCEDURE — 99214 OFFICE O/P EST MOD 30 MIN: CPT | Mod: PBBFAC,PO | Performed by: NURSE PRACTITIONER

## 2022-07-13 NOTE — PROGRESS NOTES
"Sharee Day presented for a  Medicare AWV and comprehensive Health Risk Assessment today. The following components were reviewed and updated:    · Medical history  · Family History  · Social history  · Allergies and Current Medications  · Health Risk Assessment  · Health Maintenance  · Care Team       ** See Completed Assessments for Annual Wellness Visit within the encounter summary.**       The following assessments were completed:  · Living Situation  · CAGE  · Depression Screening  · Timed Get Up and Go  · Whisper Test  · Cognitive Function Screening  · Nutrition Screening  · ADL Screening  · PAQ Screening          Vitals:    07/13/22 1037   BP: 110/70   BP Location: Right arm   Patient Position: Sitting   BP Method: Large (Manual)   Pulse: 62   SpO2: 98%   Weight: 103.1 kg (227 lb 6.5 oz)   Height: 5' 11" (1.803 m)     Body mass index is 31.72 kg/m².  Physical Exam  Vitals and nursing note reviewed.   Constitutional:       Appearance: Normal appearance.   Cardiovascular:      Rate and Rhythm: Normal rate.      Pulses: Normal pulses.      Heart sounds: Normal heart sounds.   Pulmonary:      Effort: Pulmonary effort is normal.      Breath sounds: Normal breath sounds.   Abdominal:      General: Bowel sounds are normal.      Palpations: Abdomen is soft.   Musculoskeletal:         General: Normal range of motion.   Neurological:      Mental Status: He is alert and oriented to person, place, and time.   Psychiatric:         Mood and Affect: Mood normal.         Behavior: Behavior normal.             Diagnoses and health risks identified today and associated recommendations/orders:    1. Encounter for preventive health examination  Pt was seen today for an Annual Wellness visit. Healthcare maintenance and screening recommendations were discussed and updated as indicated. Return in one year for AWV.    Review current opioid prescriptions: yes  Screen for potential Substance Use Disorders: yes    2. Prostate " cancer s/p XRT currently hormone deprivation  The current medical regimen is effective;  continue present plan and medications.    3. Recurrent major depressive disorder, in full remission  The current medical regimen is effective;  continue present plan and medications.    4. Peripheral artery disease mild on doppler  The current medical regimen is effective;  continue present plan and medications.    5. Class 1 obesity due to excess calories with serious comoebidity and body mass index (BMI) of 31.0 to 31.9 in adult  The patient is asked to make an attempt to improve diet and exercise patterns to aid in medical management of this problem.    6. Beta thalassemia minor  The current medical regimen is effective;  continue present plan and medications.    7. Dyslipidemia statin intolerance  The current medical regimen is effective;  continue present plan and medications.    8. Essential hypertension 04/08/2021 TTE LV normal size with mild eccentric LVH and normal systolic function LVEF 55%.  Normal RV size and systolic function.  PA pressure 23.  The current medical regimen is effective;  continue present plan and medications.    9. Restless leg syndrome Requip ineffective  The current medical regimen is effective;  continue present plan and medications.    10. 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    11. Benign non-nodular prostatic hyperplasia without lower urinary tract symptoms  The current medical regimen is effective;  continue present plan and medications.    12. Statin myopathy  The current medical regimen is effective;  continue present plan and medications.    13. Shift work sleep disorder   The current medical regimen is effective;  continue present plan and medications.        Provided Sharee with a 5-10 year written screening schedule and personal prevention plan.  Recommendations were developed using the USPSTF age appropriate recommendations. Education, counseling, and referrals were provided as needed. After Visit Summary printed and given to patient which includes a list of additional screenings\tests needed.    Follow up in about 4 months (around 11/10/2022) with provider.    MAGNUS Joseph   I offered to discuss advanced care planning, including how to pick a person who would make decisions for you if you were unable to make them for yourself, called a health care power of , and what kind of decisions you might make such as use of life sustaining treatments such as ventilators and tube feeding when faced with a life limiting illness recorded on a living will that they will need to know. (How you want to be cared for as you near the end of your natural life)     X Patient is interested in learning more about how to make advanced directives.  I provided them paperwork and offered to discuss this with them.

## 2022-07-13 NOTE — PATIENT INSTRUCTIONS
Counseling and Referral of Other Preventative  (Italic type indicates deductible and co-insurance are waived)    Patient Name: Sharee Day  Today's Date: 7/13/2022    Health Maintenance       Date Due Completion Date    Shingles Vaccine (1 of 2) Never done ---    Influenza Vaccine (1) 09/01/2022 11/10/2021    Lipid Panel 05/04/2023 5/4/2022    Colorectal Cancer Screening 01/06/2025 1/6/2022    TETANUS VACCINE 06/16/2026 6/16/2016        No orders of the defined types were placed in this encounter.    The following information is provided to all patients.  This information is to help you find resources for any of the problems found today that may be affecting your health:                Living healthy guide: www.[facility].[place].gov      Understanding Diabetes: www.diabetes.org      Eating healthy: www.cdc.gov/healthyweight      [facility] home safety checklist: www.cdc.gov/steadi/patient.html      Agency on Aging: www.goea.[place].gov      Alcoholics anonymous (AA): www.aa.org      Physical Activity: www.shanda.nih.gov/kb8hlqo      Tobacco use: www.quitwithusla.org

## 2022-07-27 ENCOUNTER — CLINICAL SUPPORT (OUTPATIENT)
Dept: SMOKING CESSATION | Facility: CLINIC | Age: 72
End: 2022-07-27
Payer: COMMERCIAL

## 2022-07-27 DIAGNOSIS — F17.200 NICOTINE DEPENDENCE: Primary | ICD-10-CM

## 2022-07-27 PROCEDURE — 99406 BEHAV CHNG SMOKING 3-10 MIN: CPT | Mod: S$GLB,,,

## 2022-07-27 PROCEDURE — 99406 PR TOBACCO USE CESSATION INTERMEDIATE 3-10 MINUTES: ICD-10-PCS | Mod: S$GLB,,,

## 2022-07-27 NOTE — PROGRESS NOTES
Spoke with patient today in regard to smoking cessation progress for 12 month telephone follow up, he states not tobacco free. Patient states he is on vacation and will contact the program at a later time. Informed patient of benefit period and contact information if any further help or support is needed. Will complete smart form for 12 month follow up and resolve Quit attempt #5.

## 2022-11-04 ENCOUNTER — LAB VISIT (OUTPATIENT)
Dept: LAB | Facility: HOSPITAL | Age: 72
End: 2022-11-04
Attending: INTERNAL MEDICINE
Payer: MEDICARE

## 2022-11-04 DIAGNOSIS — I10 ESSENTIAL HYPERTENSION: ICD-10-CM

## 2022-11-04 LAB
ALBUMIN SERPL BCP-MCNC: 3.4 G/DL (ref 3.5–5.2)
ALP SERPL-CCNC: 44 U/L (ref 55–135)
ALT SERPL W/O P-5'-P-CCNC: 14 U/L (ref 10–44)
ANION GAP SERPL CALC-SCNC: 8 MMOL/L (ref 8–16)
AST SERPL-CCNC: 16 U/L (ref 10–40)
BASOPHILS # BLD AUTO: 0.04 K/UL (ref 0–0.2)
BASOPHILS NFR BLD: 0.6 % (ref 0–1.9)
BILIRUB SERPL-MCNC: 0.5 MG/DL (ref 0.1–1)
BUN SERPL-MCNC: 11 MG/DL (ref 8–23)
CALCIUM SERPL-MCNC: 8.8 MG/DL (ref 8.7–10.5)
CHLORIDE SERPL-SCNC: 109 MMOL/L (ref 95–110)
CO2 SERPL-SCNC: 26 MMOL/L (ref 23–29)
CREAT SERPL-MCNC: 1 MG/DL (ref 0.5–1.4)
DIFFERENTIAL METHOD: ABNORMAL
EOSINOPHIL # BLD AUTO: 0.1 K/UL (ref 0–0.5)
EOSINOPHIL NFR BLD: 2.1 % (ref 0–8)
ERYTHROCYTE [DISTWIDTH] IN BLOOD BY AUTOMATED COUNT: 14.3 % (ref 11.5–14.5)
EST. GFR  (NO RACE VARIABLE): >60 ML/MIN/1.73 M^2
GLUCOSE SERPL-MCNC: 107 MG/DL (ref 70–110)
HCT VFR BLD AUTO: 41.6 % (ref 40–54)
HGB BLD-MCNC: 12.5 G/DL (ref 14–18)
IMM GRANULOCYTES # BLD AUTO: 0.01 K/UL (ref 0–0.04)
IMM GRANULOCYTES NFR BLD AUTO: 0.1 % (ref 0–0.5)
LYMPHOCYTES # BLD AUTO: 2.5 K/UL (ref 1–4.8)
LYMPHOCYTES NFR BLD: 36.2 % (ref 18–48)
MCH RBC QN AUTO: 24.1 PG (ref 27–31)
MCHC RBC AUTO-ENTMCNC: 30 G/DL (ref 32–36)
MCV RBC AUTO: 80 FL (ref 82–98)
MONOCYTES # BLD AUTO: 0.7 K/UL (ref 0.3–1)
MONOCYTES NFR BLD: 10.3 % (ref 4–15)
NEUTROPHILS # BLD AUTO: 3.4 K/UL (ref 1.8–7.7)
NEUTROPHILS NFR BLD: 50.7 % (ref 38–73)
NRBC BLD-RTO: 0 /100 WBC
PLATELET # BLD AUTO: 315 K/UL (ref 150–450)
PMV BLD AUTO: 9.7 FL (ref 9.2–12.9)
POTASSIUM SERPL-SCNC: 3.6 MMOL/L (ref 3.5–5.1)
PROT SERPL-MCNC: 6.8 G/DL (ref 6–8.4)
RBC # BLD AUTO: 5.18 M/UL (ref 4.6–6.2)
SODIUM SERPL-SCNC: 143 MMOL/L (ref 136–145)
WBC # BLD AUTO: 6.77 K/UL (ref 3.9–12.7)

## 2022-11-04 PROCEDURE — 85025 COMPLETE CBC W/AUTO DIFF WBC: CPT | Performed by: INTERNAL MEDICINE

## 2022-11-04 PROCEDURE — 36415 COLL VENOUS BLD VENIPUNCTURE: CPT | Mod: PO | Performed by: INTERNAL MEDICINE

## 2022-11-04 PROCEDURE — 80053 COMPREHEN METABOLIC PANEL: CPT | Performed by: INTERNAL MEDICINE

## 2022-11-10 ENCOUNTER — OFFICE VISIT (OUTPATIENT)
Dept: FAMILY MEDICINE | Facility: CLINIC | Age: 72
End: 2022-11-10
Payer: MEDICARE

## 2022-11-10 VITALS
HEART RATE: 60 BPM | HEIGHT: 71 IN | TEMPERATURE: 98 F | BODY MASS INDEX: 31.81 KG/M2 | OXYGEN SATURATION: 95 % | DIASTOLIC BLOOD PRESSURE: 80 MMHG | SYSTOLIC BLOOD PRESSURE: 122 MMHG | WEIGHT: 227.19 LBS

## 2022-11-10 DIAGNOSIS — R20.2 NUMBNESS AND TINGLING OF BOTH FEET: ICD-10-CM

## 2022-11-10 DIAGNOSIS — F33.42 RECURRENT MAJOR DEPRESSIVE DISORDER, IN FULL REMISSION: ICD-10-CM

## 2022-11-10 DIAGNOSIS — E78.5 DYSLIPIDEMIA: ICD-10-CM

## 2022-11-10 DIAGNOSIS — F17.200 SMOKER: ICD-10-CM

## 2022-11-10 DIAGNOSIS — D12.6 TUBULOVILLOUS ADENOMA OF COLON: ICD-10-CM

## 2022-11-10 DIAGNOSIS — R20.0 NUMBNESS AND TINGLING OF BOTH FEET: ICD-10-CM

## 2022-11-10 DIAGNOSIS — G47.00 INSOMNIA, UNSPECIFIED TYPE: Primary | ICD-10-CM

## 2022-11-10 DIAGNOSIS — E66.09 CLASS 1 OBESITY DUE TO EXCESS CALORIES WITH SERIOUS COMORBIDITY AND BODY MASS INDEX (BMI) OF 31.0 TO 31.9 IN ADULT: ICD-10-CM

## 2022-11-10 DIAGNOSIS — I73.9 PERIPHERAL ARTERY DISEASE: ICD-10-CM

## 2022-11-10 DIAGNOSIS — I10 ESSENTIAL HYPERTENSION: ICD-10-CM

## 2022-11-10 DIAGNOSIS — C61 PROSTATE CANCER: ICD-10-CM

## 2022-11-10 DIAGNOSIS — M54.16 LUMBAR RADICULOPATHY: ICD-10-CM

## 2022-11-10 PROCEDURE — 99214 OFFICE O/P EST MOD 30 MIN: CPT | Mod: S$PBB,,, | Performed by: INTERNAL MEDICINE

## 2022-11-10 PROCEDURE — 99214 PR OFFICE/OUTPT VISIT, EST, LEVL IV, 30-39 MIN: ICD-10-PCS | Mod: S$PBB,,, | Performed by: INTERNAL MEDICINE

## 2022-11-10 PROCEDURE — 99999 PR PBB SHADOW E&M-EST. PATIENT-LVL IV: ICD-10-PCS | Mod: PBBFAC,,, | Performed by: INTERNAL MEDICINE

## 2022-11-10 PROCEDURE — 99999 PR PBB SHADOW E&M-EST. PATIENT-LVL IV: CPT | Mod: PBBFAC,,, | Performed by: INTERNAL MEDICINE

## 2022-11-10 PROCEDURE — 99214 OFFICE O/P EST MOD 30 MIN: CPT | Mod: PBBFAC,PO | Performed by: INTERNAL MEDICINE

## 2022-11-10 RX ORDER — MIRTAZAPINE 7.5 MG/1
TABLET, FILM COATED ORAL
Qty: 60 TABLET | Refills: 5 | Status: SHIPPED | OUTPATIENT
Start: 2022-11-10 | End: 2023-05-12 | Stop reason: SINTOL

## 2022-11-10 NOTE — PROGRESS NOTES
This note was created by combination of typed  and M-Modal dictation.  Transcription errors may be present.  If there are any questions, please contact me.    Assessment and Plan:   Insomnia, unspecified type  Recurrent major depressive disorder, in full remission  -really since stopping his Lunesta.  Had been getting it filled with Psychiatry but no longer seeing psychiatry.    We talked about the risks of sedative hypnotics long-term including increased risk association for dementia.    He is already aware of principles of CBT I.    Recommending compressing sleep hours  Trial of mirtazapine 7.5-15 mg at night   If no improvement next step would be doxepin  -     mirtazapine (REMERON) 7.5 MG Tab; 1-2 pills 1 hour prior to bedtime  Dispense: 60 tablet; Refill: 5    Numbness and tingling of both feet  -intermittent, episodic, good pulses, do not think this is vascular claudication, do not think this is neuro degenerative.  Recent TSH was normal.  A1c was well below 5.6.  Check future B12 though I suspect it will be normal.  Monitor.    Never underwent chemotherapy for his prostate cancer.  Only radiation therapy.  -     Vitamin B12; Future; Expected date: 05/10/2023    Smoker  -working with cessation program.    Essential hypertension 04/08/2021 TTE LV normal size with mild eccentric LVH and normal systolic function LVEF 55%.  Normal RV size and systolic function.  PA pressure 23.  -blood pressure today is good.  Previously had been enrolled with digital monitoring but was limited by cell phone data plan but he has upgraded his plan would be agreeable for re- enrollment.  -     Hypertension [employer] [facility] ([facility]) Enrollment Order  -     Hypertension Digital Medicine ([facility]): Assign Onboarding Questionnaires    Dyslipidemia statin intolerance. 8/2018 zetia; 07/13/2016 stress test negative for ischemia  Peripheral artery disease mild on doppler  -check future labs  -     Comprehensive Metabolic Panel; Future;  Expected date: 05/10/2023  -     CBC Auto Differential; Future; Expected date: 05/10/2023  -     Lipid Panel; Future; Expected date: 05/10/2023    Class 1 obesity due to excess calories with serious comorbidity and body mass index (BMI) of 31.0 to 31.9 in adult    Prostate cancer s/p XRT currently hormone deprivation  -still followed by [facility].  Goes every 6 months.  I believe that they will continue imaging surveillance yearly    Tubulovillous adenoma of colon on colonoscopy 2018; 22 colonoscopy 5 mm sigmoid HP    Lumbar radiculopathy  -stable        Medications Discontinued During This Encounter   Medication Reason    eszopiclone (LUNESTA) 3 mg Tab        meds sent this encounter:  Medications Ordered This Encounter   Medications    mirtazapine (REMERON) 7.5 MG Tab     Si-2 pills 1 hour prior to bedtime     Dispense:  60 tablet     Refill:  5       Follow Up: No follow-ups on file.  Follow-up 6 months with lab    Subjective:     Chief Complaint   Patient presents with    Follow-up     6 months        HPI  Sharee is a 72 y.o. male, last appointment with this clinic was 2022.  Social History     Tobacco Use    Smoking status: Every Day     Types: Cigarettes    Smokeless tobacco: Never    Tobacco comments:     smokes 3 cigarettes daily   Substance Use Topics    Alcohol use: Yes     Comment: Ocassionally      Social History     Occupational History    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; 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; 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     Employer: [employer]      Social History     Social History Narrative    Not on file       Last visit 2022  HTN bp low, stop lisinopril stay on hctz. Hx of pedal edema.  PVCs asymptomatic, Toprol XL side effect kacey  Lipid, statin myopathy. Zetia  Prostate cancer s/p XRT currently hormone deprivation. followed by [facility] in [place].  Reports that his next follow-up with them is in September.  Depression stable no longer seeing outside psych.  PRN lunesta  Smoker working on quitting  Hx of TVA. colonoscopy 01/06/2022 5 mm sigmoid HP.    Pre visit labs  CBC microcytic anemia  CMP albumin mild low    For the past 2 months he has been feeling like his equilibrium is off.  Feels like it is a sensation of dizziness.  He thinks this maybe due to lack of sleep.  He feels like he gets maybe 3 hours of sleep.  Problems with sleep initiation and sleep maintenance.    He attributes this to having stopped Lunesta.  He was getting this filled with his psychiatrist but he stop seeing Psychiatry and so he is no longer taking this medicine.  He has been out of it since about 3 months ago corresponding with his feelings of dizziness.  He was taking it for about 2 years.  Prior to that was Ambien with a side effect of bad dreams.    We talked at length about the risks of sedative hypnotics.  Had previously given him a prescription many years ago for mirtazapine but do not recall if he had side effects of that.  He is already aware of the principles of CBT I.  He goes to bed around 10, and then just lays in bed for couple of hours.  Sleeps and then wakes up.  Estimates that he needs about maybe 6 hours asleep.  We talked about compressing his time in bed to around 6 or 7 hours.  Feels like he gets decent physical activity.  Home maintenance.    With the feelings of unsteadiness, denies unilateral weakness, slurred speech, sudden vision loss.  Does feel like he needs to get his eyes checked    Cancer center every 6 months. Last check was in late September. Not on ADT  Gets imaging once a year.     Agreeable to re enroll in digital monitoring.  Prior was an issue with cell phone datea but he upfraded his plan.      Inermittent numbness of the feet. Bottoms and tops. Both feet  And sometimes edema in the feet.    Still smoking, working with cessation.  NRT.     Patient Care Team:  Navin Charlton MD as PCP - General (Internal Medicine)  Alfredo Kearns MD as Consulting  Physician (Urology)  Adeel Coleman MD as Consulting Physician (Psychiatry)  Adeel Vicente MD (Hematology and Oncology)  Juventino Meeks MA as Care Coordinator    Patient Active Problem List    Diagnosis Date Noted    Class 1 obesity due to excess calories with serious comorbidity in adult 07/13/2022    Lumbar radiculopathy 11/09/2021 05/12/2021 EMG acute denervation with chronic reinnervation in the L5-S1 myotomes on the left.  5/24/2021 MRI L spine:   Multilevel lumbar spondylosis with resultant mild spinal canal stenosis at L3-L4 in neural foraminal stenosis at L4-L5 and L5-S1.      PVC (premature ventricular contraction); 4/2021 metoprolol 04/23/2021 04/08/2021 Holter frequent PVCs, frequent PACs      Peripheral artery disease mild on doppler 05/20/2019     4/10/2018 LE dopplers: 1. Right lower extremity pressures and waveforms indicate no hemodynamically significant arterial occlusive disease.  2. Left lower extremity pressures and waveforms indicate mild arterial occlusive disease.      Numbness or tingling workup with neuro for demyelinating 05/20/2019 12/19/18 MRI brain: 1. Numerous tiny focal areas of remote gliosis posterior frontal parietal and occipital and upper basal ganglia areas, differential diagnosis includes microvascular ischemic change and inactive demyelinating process.  2. Intraventricular artifact opacity body left lateral ventricle discussed above.  12/2018 MRI C spine: 1. Multilevel spondylotic changes as discussed above.  2. Multilevel foraminal stenotic changes more pronounced on the left as compared to the right as discussed above.  See further details above.  1/24/19 EMG: borderline normal EMG/NCS. There was an absent   left sural response of unclear clinical significance. There were   no myopathic units seen to suggest a myopathy.     05/12/2021 EMG acute denervation with chronic reinnervation in the L5-S1 myotomes on the left.  5/24/2021 MRI L spine:   Multilevel  lumbar spondylosis with resultant mild spinal canal stenosis at L3-L4 in neural foraminal stenosis at L4-L5 and L5-S1.      Tubulovillous adenoma of colon on colonoscopy 8/2018; 1/6/22 colonoscopy 5 mm sigmoid HP 08/30/2018 8/2018 colonoscopy TVA  1/6/22 colonoscopy 5 mm sigmoid HP      Statin myopathy 08/02/2018    Obstructive sleep apnea severe with AHI 64 on study 7/2018 07/27/2018     Very severe sleep disordered  breathing (AHI=64) is noted based on a 4% hypopnea desaturation criteria. The patient slept supine 64.2% of the night based  on valid recording time of 6.43 hours. The apneas/hypopneas are accompanied by severe oxygen desaturation (percent time  below 90% SpO2: 15.9%, Min SpO2: 69.6%). The average desaturation across all sleep disordered breathing events is 5.7%.  Snoring occurs for 45.4% (30 dB) of the study, 17.3% is extremely loud. The mean pulse rate is 63 BPM, with frequent pulse  rate variability (52 events with >= 6 BPM increase/decrease per hour).  TREATMENT CONSIDERATIONS: Consider nasal continuous positive airway pressure (CPAP) as the initial treatment  choice for severe obstructive sleep apnea. Consider an attended in-lab CPAP titration study, given the possibility of co-morbid  sleep related hypoventilation.  DISEASE MANAGEMENT CONSIDERATIONS: Insomnia is a symptom that can be associated with untreated DAKOTA.  Sleeping pills may increase the severity of untreated DAKOTA and their use should be reevaluated once the DAKOTA is treated.      Recurrent major depressive disorder, in full remission 10/30/2017    Pelvic floor muscle wasting in male 09/28/2017    Prostate cancer s/p XRT currently hormone deprivation 08/30/2017     2 cores positive kary 3+4=7 on biopsy 8/15/2017      Smoker 08/30/2017    Vitamin D deficiency 08/30/2017    Beta thalassemia minor 04/11/2017    Benign non-nodular prostatic hyperplasia without lower urinary tract symptoms 03/29/2017     Hx of negative prostate Bx and hx  of MRI prostate      Restless leg syndrome Requip ineffective 08/02/2016    Dyslipidemia statin intolerance. 8/2018 zetia; 07/13/2016 stress test negative for ischemia     Essential hypertension 04/08/2021 TTE LV normal size with mild eccentric LVH and normal systolic function LVEF 55%.  Normal RV size and systolic function.  PA pressure 23.      7/13/2016 [facility] stress test neg for ischemia  7/13/2016 TTE LVEF 55-60; no diastolic dysfunction  04/08/2021 TTE LV normal size with mild eccentric LVH and normal systolic function LVEF 55%.  Normal RV size and systolic function.  PA pressure 23.         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 Trazodone ineffective.  Rozerem didn't help 11/20/2013       PAST MEDICAL PROBLEMS, PAST SURGICAL HISTORY: please see relevant portions of the electronic medical record    ALLERGIES AND MEDICATIONS: updated and reviewed.  Review of patient's allergies indicates:   Allergen Reactions    Crestor  [rosuvastatin]      Other reaction(s): Muscle pain    Statins-hmg-coa reductase inhibitors      myalgias    Sulfamethoxazole-trimethoprim Other (See Comments)     Muscle pain    Atorvastatin Other (See Comments)     Muscle cramps  Other reaction(s): Muscle pain       Medication List with Changes/Refills   New Medications    MIRTAZAPINE (REMERON) 7.5 MG TAB    1-2 pills 1 hour prior to bedtime   Current Medications    EZETIMIBE (ZETIA) 10 MG TABLET    Take 1 tablet (10 mg total) by mouth once daily.    HYDROCHLOROTHIAZIDE (HYDRODIURIL) 12.5 MG TAB    Take 1 tablet (12.5 mg total) by mouth once daily.    TAMSULOSIN (FLOMAX) 0.4 MG CAP    TAKE 2 CAPSULES EVERY DAY   Discontinued Medications    ESZOPICLONE (LUNESTA) 3 MG TAB    TK 1 T PO QD HS        Objective:   Physical Exam   Vitals:    11/10/22 1501   BP: 122/80   BP Location: Left arm   Patient Position: Sitting   BP Method: Large (Manual)   Pulse: 60   Temp: 97.9 °F (36.6 °C)   TempSrc: Oral   SpO2: 95%   Weight: 103 kg (227 lb 2.9  "oz)   Height: 5' 11" (1.803 m)    Body mass index is 31.69 kg/m².  Weight: 103 kg (227 lb 2.9 oz)   Height: 5' 11" (180.3 cm)     Physical Exam  Constitutional:       General: He is not in acute distress.     Appearance: He is well-developed.   Eyes:      Extraocular Movements: Extraocular movements intact.   Cardiovascular:      Rate and Rhythm: Normal rate and regular rhythm.      Heart sounds: Normal heart sounds. No murmur heard.  Pulmonary:      Effort: Pulmonary effort is normal.      Breath sounds: Normal breath sounds.   Musculoskeletal:         General: Normal range of motion.      Right lower leg: No edema.      Left lower leg: No edema.      Comments: Posterior tibial pulses 3+ bilaterally   Skin:     General: Skin is warm and dry.   Neurological:      Mental Status: He is alert and oriented to person, place, and time.      Coordination: Coordination normal.   Psychiatric:         Behavior: Behavior normal.         Thought Content: Thought content normal.       Component      Latest Ref Rng & Units 11/4/2022 5/4/2022   WBC      3.90 - 12.70 K/uL 6.77 5.51   RBC      4.60 - 6.20 M/uL 5.18 5.39   Hemoglobin      14.0 - 18.0 g/dL 12.5 (L) 13.3 (L)   Hematocrit      40.0 - 54.0 % 41.6 43.0   MCV      82 - 98 fL 80 (L) 80 (L)   MCH      27.0 - 31.0 pg 24.1 (L) 24.7 (L)   MCHC      32.0 - 36.0 g/dL 30.0 (L) 30.9 (L)   RDW      11.5 - 14.5 % 14.3 14.3   Platelets      150 - 450 K/uL 315 263   MPV      9.2 - 12.9 fL 9.7 9.2   Immature Granulocytes      0.0 - 0.5 % 0.1 0.4   Gran # (ANC)      1.8 - 7.7 K/uL 3.4 2.7   Immature Grans (Abs)      0.00 - 0.04 K/uL 0.01 0.02   Lymph #      1.0 - 4.8 K/uL 2.5 1.9   Mono #      0.3 - 1.0 K/uL 0.7 0.7   Eos #      0.0 - 0.5 K/uL 0.1 0.1   Baso #      0.00 - 0.20 K/uL 0.04 0.03   nRBC      0 /100 WBC 0 0   Gran %      38.0 - 73.0 % 50.7 49.6   Lymph %      18.0 - 48.0 % 36.2 35.2   Mono %      4.0 - 15.0 % 10.3 12.5   Eosinophil %      0.0 - 8.0 % 2.1 1.8   Basophil %      " 0.0 - 1.9 % 0.6 0.5   Differential Method       Automated Automated   Sodium      136 - 145 mmol/L 143 142   Potassium      3.5 - 5.1 mmol/L 3.6 3.8   Chloride      95 - 110 mmol/L 109 107   CO2      23 - 29 mmol/L 26 28   Glucose      70 - 110 mg/dL 107 114 (H)   BUN      8 - 23 mg/dL 11 13   Creatinine      0.5 - 1.4 mg/dL 1.0 1.0   Calcium      8.7 - 10.5 mg/dL 8.8 9.3   PROTEIN TOTAL      6.0 - 8.4 g/dL 6.8 6.9   Albumin      3.5 - 5.2 g/dL 3.4 (L) 3.4 (L)   BILIRUBIN TOTAL      0.1 - 1.0 mg/dL 0.5 0.5   Alkaline Phosphatase      55 - 135 U/L 44 (L) 42 (L)   AST      10 - 40 U/L 16 18   ALT      10 - 44 U/L 14 16   Anion Gap      8 - 16 mmol/L 8 7 (L)   eGFR if African American      >60 mL/min/1.73 m:2  >60.0   eGFR if non African American      >60 mL/min/1.73 m:2  >60.0   eGFR      >60 mL/min/1.73 m:2 >60.0    Cholesterol      120 - 199 mg/dL  194   Triglycerides      30 - 150 mg/dL  139   HDL      40 - 75 mg/dL  32 (L)   LDL Cholesterol External      63.0 - 159.0 mg/dL  134.2   HDL/Cholesterol Ratio      20.0 - 50.0 %  16.5 (L)   Total Cholesterol/HDL Ratio      2.0 - 5.0  6.1 (H)   Non-HDL Cholesterol      mg/dL  162   Hemoglobin A1C External      4.0 - 5.6 %  5.2   Estimated Avg Glucose      68 - 131 mg/dL  103   Vit D, 25-Hydroxy      30 - 96 ng/mL  32

## 2023-04-26 ENCOUNTER — PES CALL (OUTPATIENT)
Dept: ADMINISTRATIVE | Facility: CLINIC | Age: 73
End: 2023-04-26
Payer: MEDICARE

## 2023-05-11 ENCOUNTER — LAB VISIT (OUTPATIENT)
Dept: LAB | Facility: HOSPITAL | Age: 73
End: 2023-05-11
Attending: INTERNAL MEDICINE
Payer: MEDICARE

## 2023-05-11 DIAGNOSIS — R20.0 NUMBNESS AND TINGLING OF BOTH FEET: ICD-10-CM

## 2023-05-11 DIAGNOSIS — R20.2 NUMBNESS AND TINGLING OF BOTH FEET: ICD-10-CM

## 2023-05-11 DIAGNOSIS — E78.5 DYSLIPIDEMIA: ICD-10-CM

## 2023-05-11 PROBLEM — J43.2 CENTRILOBULAR EMPHYSEMA: Status: ACTIVE | Noted: 2023-05-11

## 2023-05-11 LAB
ALBUMIN SERPL BCP-MCNC: 3.5 G/DL (ref 3.5–5.2)
ALP SERPL-CCNC: 60 U/L (ref 55–135)
ALT SERPL W/O P-5'-P-CCNC: 27 U/L (ref 10–44)
ANION GAP SERPL CALC-SCNC: 6 MMOL/L (ref 8–16)
AST SERPL-CCNC: 16 U/L (ref 10–40)
BASOPHILS # BLD AUTO: 0.02 K/UL (ref 0–0.2)
BASOPHILS NFR BLD: 0.3 % (ref 0–1.9)
BILIRUB SERPL-MCNC: 0.5 MG/DL (ref 0.1–1)
BUN SERPL-MCNC: 12 MG/DL (ref 8–23)
CALCIUM SERPL-MCNC: 9.1 MG/DL (ref 8.7–10.5)
CHLORIDE SERPL-SCNC: 102 MMOL/L (ref 95–110)
CHOLEST SERPL-MCNC: 168 MG/DL (ref 120–199)
CHOLEST/HDLC SERPL: 6.2 {RATIO} (ref 2–5)
CO2 SERPL-SCNC: 29 MMOL/L (ref 23–29)
CREAT SERPL-MCNC: 1.1 MG/DL (ref 0.5–1.4)
DIFFERENTIAL METHOD: ABNORMAL
EOSINOPHIL # BLD AUTO: 0.1 K/UL (ref 0–0.5)
EOSINOPHIL NFR BLD: 1.4 % (ref 0–8)
ERYTHROCYTE [DISTWIDTH] IN BLOOD BY AUTOMATED COUNT: 14 % (ref 11.5–14.5)
EST. GFR  (NO RACE VARIABLE): >60 ML/MIN/1.73 M^2
GLUCOSE SERPL-MCNC: 210 MG/DL (ref 70–110)
HCT VFR BLD AUTO: 44.3 % (ref 40–54)
HDLC SERPL-MCNC: 27 MG/DL (ref 40–75)
HDLC SERPL: 16.1 % (ref 20–50)
HGB BLD-MCNC: 13.3 G/DL (ref 14–18)
IMM GRANULOCYTES # BLD AUTO: 0.02 K/UL (ref 0–0.04)
IMM GRANULOCYTES NFR BLD AUTO: 0.3 % (ref 0–0.5)
LDLC SERPL CALC-MCNC: 103.8 MG/DL (ref 63–159)
LYMPHOCYTES # BLD AUTO: 2 K/UL (ref 1–4.8)
LYMPHOCYTES NFR BLD: 32.2 % (ref 18–48)
MCH RBC QN AUTO: 23.6 PG (ref 27–31)
MCHC RBC AUTO-ENTMCNC: 30 G/DL (ref 32–36)
MCV RBC AUTO: 79 FL (ref 82–98)
MONOCYTES # BLD AUTO: 0.6 K/UL (ref 0.3–1)
MONOCYTES NFR BLD: 9.3 % (ref 4–15)
NEUTROPHILS # BLD AUTO: 3.5 K/UL (ref 1.8–7.7)
NEUTROPHILS NFR BLD: 56.5 % (ref 38–73)
NONHDLC SERPL-MCNC: 141 MG/DL
NRBC BLD-RTO: 0 /100 WBC
PLATELET # BLD AUTO: 270 K/UL (ref 150–450)
PMV BLD AUTO: 9.5 FL (ref 9.2–12.9)
POTASSIUM SERPL-SCNC: 3.3 MMOL/L (ref 3.5–5.1)
PROT SERPL-MCNC: 7.1 G/DL (ref 6–8.4)
RBC # BLD AUTO: 5.64 M/UL (ref 4.6–6.2)
SODIUM SERPL-SCNC: 137 MMOL/L (ref 136–145)
TRIGL SERPL-MCNC: 186 MG/DL (ref 30–150)
VIT B12 SERPL-MCNC: 508 PG/ML (ref 210–950)
WBC # BLD AUTO: 6.24 K/UL (ref 3.9–12.7)

## 2023-05-11 PROCEDURE — 85025 COMPLETE CBC W/AUTO DIFF WBC: CPT | Performed by: INTERNAL MEDICINE

## 2023-05-11 PROCEDURE — 82607 VITAMIN B-12: CPT | Performed by: INTERNAL MEDICINE

## 2023-05-11 PROCEDURE — 80053 COMPREHEN METABOLIC PANEL: CPT | Performed by: INTERNAL MEDICINE

## 2023-05-11 PROCEDURE — 80061 LIPID PANEL: CPT | Performed by: INTERNAL MEDICINE

## 2023-05-11 PROCEDURE — 36415 COLL VENOUS BLD VENIPUNCTURE: CPT | Mod: PO | Performed by: INTERNAL MEDICINE

## 2023-05-11 NOTE — PROGRESS NOTES
This note was created by combination of typed  and M-Modal dictation.  Transcription errors may be present.  If there are any questions, please contact me.    Assessment and Plan:   Assessment and Plan   Insomnia, unspecified type  -mirtazepine SE of nightmares. Ambien SE nightmares.  Trying to avoid sedative hypnotics. Trial of doxepin.  -     doxepin (SINEQUAN) 10 mg/mL solution; Take 0.3 mLs (3 mg total) by mouth every evening.  Dispense: 50 mL; Refill: 5    Breast tenderness  -suspect due to overweight/obesity.  Check TSH, prolactin, total testosterone (but it's almost noon); if low repeat in AM fasting.    -     TSH; Future; Expected date: 05/12/2023  -     PROLACTIN; Future; Expected date: 05/12/2023  -     testosterone    Recurrent major depressive disorder, in full remission  -no longer seeing psychiatry. Not on meds. Stable.    Abnormal glucose  -glc on . Check a1c.  Work on therapeutic lifestyle modification (TLC).  -     Hemoglobin A1C; Future; Expected date: 05/12/2023  -     TSH; Future; Expected date: 05/12/2023  -     Hemoglobin A1C; Future; Expected date: 11/08/2023    Essential hypertension 04/08/2021 TTE LV normal size with mild eccentric LVH and normal systolic function LVEF 55%.  Normal RV size and systolic function.  PA pressure 23.  -BP stable. Previously declined digital monitoring. No changes.   -     TSH; Future; Expected date: 05/12/2023    Dyslipidemia statin intolerance. 8/2018 zetia; 07/13/2016 stress test negative for ischemia  Peripheral artery disease  Statin myopathy  -pre visit labs OK on zetia. TG high. No changes.   -     Comprehensive Metabolic Panel; Future; Expected date: 11/08/2023  -     Hemoglobin A1C; Future; Expected date: 11/08/2023  -     CBC Without Differential; Future; Expected date: 11/12/2023    Prostate cancer s/p XRT currently hormone deprivation  -follow[facility]. Reports annual labs and annual imaging.  Due for f/u around  10/2023    Beta thalassemia minor  -stable.    Numbness or tingling workup with neuro for demyelinating  -stable. Previsit B12 normal. Previous TSH WNL, repeat today    Smoker  Centrilobular emphysema  -stable.             Medications Discontinued During This Encounter   Medication Reason    mirtazapine (REMERON) 7.5 MG Tab Side effects       meds sent this encounter:     Medications Ordered This Encounter   Medications    doxepin (SINEQUAN) 10 mg/mL solution     Sig: Take 0.3 mLs (3 mg total) by mouth every evening.     Dispense:  50 mL     Refill:  5        Follow Up: plan for repeat labs a1c in 6 months.   Future Appointments   Date Time Provider Department Center   7/17/2023  8:30 AM [facility]          Subjective:   Subjective   Chief Complaint   Patient presents with    Hypertension     Follow up       HPI  Sharee is a 72 y.o. male.     Social History     Tobacco Use    Smoking status: Every Day     Types: Cigarettes    Smokeless tobacco: Never    Tobacco comments:     smokes 3 cigarettes daily   Substance Use Topics    Alcohol use: Yes     Comment: Ocassionally      Social History     Occupational History    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; 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; 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     Employer: [employer]      Social History     Social History Narrative    Not on file       Last appointment with this clinic was Visit date not found. Last visit with me 11/10/2022   To summarize last visit and events leading up to today:  MDD.  Insomnia.  History of Lunesta.  Was seeing Psychiatry but stop seeing Psychiatry and therefore was no longer prescribed Lunesta.  History of Ambien with side effect of bad dreams.  He was already practicing the principles of CBT I.  Trial of mirtazapine and if no improvement next step would be doxepin  Neuropathy of the feet.  Intermittent and episodic.  Did not think this was vascular.  Recent TSH was normal.  A1c was below 5.6.  Never had chemotherapy for  the prostate cancer.  Only XRT.  Check labs  Smoking working with cessation program  Hypertension BP at the time stable.  Encouraged to re-enroll in digital monitoring.  History of lisinopril and HCTZ, lisinopril stopped due to low BP  Hyperlipidemia check future labs  History of prostate cancer status post XRT, currently on hormone deprivation followed by [facility].  Lumbar radiculopathy stable  Emphysema seen on CT imaging of the chest    Pre visit labs  CBC mild anemia stable  CMP hypokalemia previous potassium borderline  Hyperglycemia 210  Lipid profile triglycerides high otherwise non  from previous 162  B12 normal    Last A1c 05/2022, 5.2   Needs A1c    Today's visit:  Please note: Chronic medical problems that are stable but are being addressed at this visit may not be listed/documented here, but may be addressed in more detail in the Assessment and Plan section.   Below are salient features of chronic medical conditions, or new/acute conditions and their details.    Mirtazepine with SE of nightmares  So not really using.   Bedtime around 10  Tosses and turns  Usually wakes around 7   But bad sleep nights, wakes up around 9. Discussed should keep wake time same regardless of how he slept.   No napping.     Discussed glc on labs  A bt more fried foods  Less walking  Weight is stable.  Got some easter candy.    He notes tenderness breast tissue. No drainage    Seeing [facility] still  Last seen in April had labs  Reports next visit in October and plan for imaging?     Patient Care Team:  Navin Charlton MD as PCP - General (Internal Medicine)  Alfredo Kearns MD as Consulting Physician (Urology)  Adeel Coleman MD as Consulting Physician (Psychiatry)  Adeel Vicente MD (Hematology and Oncology)  Juventino Meeks MA as Care Coordinator    Patient Active Problem List    Diagnosis Date Noted    Centrilobular emphysema 05/11/2023    Class 1 obesity due to excess calories  with serious comorbidity in adult 07/13/2022    Lumbar radiculopathy 11/09/2021 05/12/2021 EMG acute denervation with chronic reinnervation in the L5-S1 myotomes on the left.  5/24/2021 MRI L spine:   Multilevel lumbar spondylosis with resultant mild spinal canal stenosis at L3-L4 in neural foraminal stenosis at L4-L5 and L5-S1.      PVC (premature ventricular contraction); 4/2021 metoprolol 04/23/2021 04/08/2021 Holter frequent PVCs, frequent PACs        Peripheral artery disease mild on doppler 05/20/2019     4/10/2018 LE dopplers: 1. Right lower extremity pressures and waveforms indicate no hemodynamically significant arterial occlusive disease.  2. Left lower extremity pressures and waveforms indicate mild arterial occlusive disease.      Numbness or tingling workup with neuro for demyelinating 05/20/2019 12/19/18 MRI brain: 1. Numerous tiny focal areas of remote gliosis posterior frontal parietal and occipital and upper basal ganglia areas, differential diagnosis includes microvascular ischemic change and inactive demyelinating process.  2. Intraventricular artifact opacity body left lateral ventricle discussed above.  12/2018 MRI C spine: 1. Multilevel spondylotic changes as discussed above.  2. Multilevel foraminal stenotic changes more pronounced on the left as compared to the right as discussed above.  See further details above.  1/24/19 EMG: borderline normal EMG/NCS. There was an absent   left sural response of unclear clinical significance. There were   no myopathic units seen to suggest a myopathy.     05/12/2021 EMG acute denervation with chronic reinnervation in the L5-S1 myotomes on the left.  5/24/2021 MRI L spine:   Multilevel lumbar spondylosis with resultant mild spinal canal stenosis at L3-L4 in neural foraminal stenosis at L4-L5 and L5-S1.      Tubulovillous adenoma of colon on colonoscopy 8/2018; 1/6/22 colonoscopy 5 mm sigmoid HP 08/30/2018 8/2018 colonoscopy TVA  1/6/22  colonoscopy 5 mm sigmoid HP        Statin myopathy 08/02/2018    Obstructive sleep apnea severe with AHI 64 on study 7/2018 07/27/2018     Very severe sleep disordered  breathing (AHI=64) is noted based on a 4% hypopnea desaturation criteria. The patient slept supine 64.2% of the night based  on valid recording time of 6.43 hours. The apneas/hypopneas are accompanied by severe oxygen desaturation (percent time  below 90% SpO2: 15.9%, Min SpO2: 69.6%). The average desaturation across all sleep disordered breathing events is 5.7%.  Snoring occurs for 45.4% (30 dB) of the study, 17.3% is extremely loud. The mean pulse rate is 63 BPM, with frequent pulse  rate variability (52 events with >= 6 BPM increase/decrease per hour).  TREATMENT CONSIDERATIONS: Consider nasal continuous positive airway pressure (CPAP) as the initial treatment  choice for severe obstructive sleep apnea. Consider an attended in-lab CPAP titration study, given the possibility of co-morbid  sleep related hypoventilation.  DISEASE MANAGEMENT CONSIDERATIONS: Insomnia is a symptom that can be associated with untreated DAKOTA.  Sleeping pills may increase the severity of untreated DAKOTA and their use should be reevaluated once the DAKOTA is treated.        Recurrent major depressive disorder, in full remission 10/30/2017    Pelvic floor muscle wasting in male 09/28/2017    Prostate cancer s/p XRT currently hormone deprivation 08/30/2017     2 cores positive kary 3+4=7 on biopsy 8/15/2017        Smoker 08/30/2017    Vitamin D deficiency 08/30/2017    Beta thalassemia minor 04/11/2017    Benign non-nodular prostatic hyperplasia without lower urinary tract symptoms 03/29/2017     Hx of negative prostate Bx and hx of MRI prostate        Restless leg syndrome Requip ineffective 08/02/2016    Dyslipidemia statin intolerance. 8/2018 zetia; 07/13/2016 stress test negative for ischemia     Essential hypertension 04/08/2021 TTE LV normal size with mild eccentric LVH  "and normal systolic function LVEF 55%.  Normal RV size and systolic function.  PA pressure 23.      7/13/2016 nu med stress test neg for ischemia  7/13/2016 TTE LVEF 55-60; no diastolic dysfunction  04/08/2021 TTE LV normal size with mild eccentric LVH and normal systolic function LVEF 55%.  Normal RV size and systolic function.  PA pressure 23.         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 Trazodone ineffective.  Rozerem didn't help 11/20/2013       PAST MEDICAL PROBLEMS, PAST SURGICAL HISTORY: please see relevant portions of the electronic medical record    ALLERGIES AND MEDICATIONS: updated and reviewed.  Medication List with Changes/Refills   Current Medications    EZETIMIBE (ZETIA) 10 MG TABLET    Take 1 tablet (10 mg total) by mouth once daily.    HYDROCHLOROTHIAZIDE (HYDRODIURIL) 12.5 MG TAB    Take 1 tablet (12.5 mg total) by mouth once daily.    MIRTAZAPINE (REMERON) 7.5 MG TAB    1-2 pills 1 hour prior to bedtime    TAMSULOSIN (FLOMAX) 0.4 MG CAP    TAKE 2 CAPSULES EVERY DAY         Objective:   Objective   Physical Exam   Vitals:    05/12/23 1100   BP: 120/74   Pulse: 74   Temp: 98.1 °F (36.7 °C)   TempSrc: Oral   SpO2: 96%   Weight: 101.5 kg (223 lb 14 oz)   Height: 5' 11" (1.803 m)    Body mass index is 31.22 kg/m².  Weight: 101.5 kg (223 lb 14 oz)   Height: 5' 11" (180.3 cm)     Physical Exam  Constitutional:       General: He is not in acute distress.     Appearance: He is well-developed.   Eyes:      Extraocular Movements: Extraocular movements intact.   Cardiovascular:      Rate and Rhythm: Normal rate and regular rhythm.      Heart sounds: Normal heart sounds. No murmur heard.  Pulmonary:      Effort: Pulmonary effort is normal.      Breath sounds: Normal breath sounds.   Musculoskeletal:         General: Normal range of motion.      Right lower leg: No edema.      Left lower leg: No edema.   Skin:     General: Skin is warm and dry.   Neurological:      Mental Status: He is alert and " oriented to person, place, and time.      Coordination: Coordination normal.   Psychiatric:         Behavior: Behavior normal.         Thought Content: Thought content normal.

## 2023-05-12 ENCOUNTER — LAB VISIT (OUTPATIENT)
Dept: LAB | Facility: HOSPITAL | Age: 73
End: 2023-05-12
Attending: INTERNAL MEDICINE
Payer: MEDICARE

## 2023-05-12 ENCOUNTER — OFFICE VISIT (OUTPATIENT)
Dept: FAMILY MEDICINE | Facility: CLINIC | Age: 73
End: 2023-05-12
Payer: MEDICARE

## 2023-05-12 VITALS
WEIGHT: 223.88 LBS | HEIGHT: 71 IN | TEMPERATURE: 98 F | SYSTOLIC BLOOD PRESSURE: 120 MMHG | DIASTOLIC BLOOD PRESSURE: 74 MMHG | BODY MASS INDEX: 31.34 KG/M2 | OXYGEN SATURATION: 96 % | HEART RATE: 74 BPM

## 2023-05-12 DIAGNOSIS — T46.6X5A STATIN MYOPATHY: ICD-10-CM

## 2023-05-12 DIAGNOSIS — F17.200 SMOKER: ICD-10-CM

## 2023-05-12 DIAGNOSIS — R73.09 ABNORMAL GLUCOSE: ICD-10-CM

## 2023-05-12 DIAGNOSIS — F33.42 RECURRENT MAJOR DEPRESSIVE DISORDER, IN FULL REMISSION: ICD-10-CM

## 2023-05-12 DIAGNOSIS — I10 ESSENTIAL HYPERTENSION: ICD-10-CM

## 2023-05-12 DIAGNOSIS — N64.4 BREAST TENDERNESS: ICD-10-CM

## 2023-05-12 DIAGNOSIS — D56.3 BETA THALASSEMIA MINOR: Chronic | ICD-10-CM

## 2023-05-12 DIAGNOSIS — I73.9 PERIPHERAL ARTERY DISEASE: ICD-10-CM

## 2023-05-12 DIAGNOSIS — J43.2 CENTRILOBULAR EMPHYSEMA: ICD-10-CM

## 2023-05-12 DIAGNOSIS — R20.0 NUMBNESS OR TINGLING: ICD-10-CM

## 2023-05-12 DIAGNOSIS — R20.2 NUMBNESS OR TINGLING: ICD-10-CM

## 2023-05-12 DIAGNOSIS — G72.0 STATIN MYOPATHY: ICD-10-CM

## 2023-05-12 DIAGNOSIS — C61 PROSTATE CANCER: ICD-10-CM

## 2023-05-12 DIAGNOSIS — E78.5 DYSLIPIDEMIA: ICD-10-CM

## 2023-05-12 DIAGNOSIS — G47.00 INSOMNIA, UNSPECIFIED TYPE: Primary | ICD-10-CM

## 2023-05-12 LAB
ESTIMATED AVG GLUCOSE: 189 MG/DL (ref 68–131)
HBA1C MFR BLD: 8.2 % (ref 4–5.6)
PROLACTIN SERPL IA-MCNC: 5.7 NG/ML (ref 3.5–19.4)
TESTOST SERPL-MCNC: 285 NG/DL (ref 304–1227)
TSH SERPL DL<=0.005 MIU/L-ACNC: 1.16 UIU/ML (ref 0.4–4)

## 2023-05-12 PROCEDURE — 99214 OFFICE O/P EST MOD 30 MIN: CPT | Mod: S$PBB,,, | Performed by: INTERNAL MEDICINE

## 2023-05-12 PROCEDURE — 99214 PR OFFICE/OUTPT VISIT, EST, LEVL IV, 30-39 MIN: ICD-10-PCS | Mod: S$PBB,,, | Performed by: INTERNAL MEDICINE

## 2023-05-12 PROCEDURE — 99999 PR PBB SHADOW E&M-EST. PATIENT-LVL III: ICD-10-PCS | Mod: PBBFAC,,, | Performed by: INTERNAL MEDICINE

## 2023-05-12 PROCEDURE — 83036 HEMOGLOBIN GLYCOSYLATED A1C: CPT | Performed by: INTERNAL MEDICINE

## 2023-05-12 PROCEDURE — 99213 OFFICE O/P EST LOW 20 MIN: CPT | Mod: PBBFAC,PO | Performed by: INTERNAL MEDICINE

## 2023-05-12 PROCEDURE — 36415 COLL VENOUS BLD VENIPUNCTURE: CPT | Mod: PO | Performed by: INTERNAL MEDICINE

## 2023-05-12 PROCEDURE — 84146 ASSAY OF PROLACTIN: CPT | Performed by: INTERNAL MEDICINE

## 2023-05-12 PROCEDURE — 84403 ASSAY OF TOTAL TESTOSTERONE: CPT | Performed by: INTERNAL MEDICINE

## 2023-05-12 PROCEDURE — 99999 PR PBB SHADOW E&M-EST. PATIENT-LVL III: CPT | Mod: PBBFAC,,, | Performed by: INTERNAL MEDICINE

## 2023-05-12 PROCEDURE — 84443 ASSAY THYROID STIM HORMONE: CPT | Performed by: INTERNAL MEDICINE

## 2023-05-12 RX ORDER — DOXEPIN HYDROCHLORIDE 10 MG/ML
3 SOLUTION ORAL NIGHTLY
Qty: 50 ML | Refills: 5 | Status: SHIPPED | OUTPATIENT
Start: 2023-05-12 | End: 2023-11-14

## 2023-05-15 ENCOUNTER — TELEPHONE (OUTPATIENT)
Dept: FAMILY MEDICINE | Facility: CLINIC | Age: 73
End: 2023-05-15
Payer: MEDICARE

## 2023-05-15 NOTE — TELEPHONE ENCOUNTER
----- Message from Kelly Rivera MA sent at 5/15/2023 10:13 AM CDT -----  Name of Who is Calling: ROYCE GRACE JR. [[id number]]                What is the request in detail: Pt needs a appointment sooner than I can schedule for his high blood sugars. Please assist.                Can the clinic reply by [facility]: No                What Number to Call Back if not [facility]: [phone]

## 2023-05-16 DIAGNOSIS — E11.9 TYPE 2 DIABETES MELLITUS WITHOUT COMPLICATION, WITHOUT LONG-TERM CURRENT USE OF INSULIN: Primary | ICD-10-CM

## 2023-05-16 DIAGNOSIS — R79.89 LOW TESTOSTERONE: ICD-10-CM

## 2023-05-16 RX ORDER — METFORMIN HYDROCHLORIDE 500 MG/1
TABLET ORAL
Qty: 120 TABLET | Refills: 5 | Status: SHIPPED | OUTPATIENT
Start: 2023-05-16 | End: 2023-11-11

## 2023-05-16 RX ORDER — LANCETS
EACH MISCELLANEOUS
Qty: 100 EACH | Refills: 4 | Status: SHIPPED | OUTPATIENT
Start: 2023-05-16 | End: 2023-11-14 | Stop reason: SDUPTHER

## 2023-05-16 RX ORDER — INSULIN PUMP SYRINGE, 3 ML
EACH MISCELLANEOUS
Qty: 1 EACH | Refills: 0 | Status: SHIPPED | OUTPATIENT
Start: 2023-05-16

## 2023-05-16 NOTE — PROGRESS NOTES
I spoke with the patient.  Reviewed the results.  After his visit with me he purchased an over-the-counter glucometer and started checking his blood sugars and they were in the 200 range.  So he started modifying diet and walking more and notes that the past couple of basis blood sugars have been in the 130-140 range.    Discussed the findings of new diagnosis of diabetes.    He would be willing to be aggressive and start metformin.  Instructions given to titrate up.  I will also refer him to diabetes educator.  And submit orders for glucometer and testing supplies.    Reviewed the results of his testosterone.  Labs were drawn around noontime.  Would repeat labs early morning to verify truly low testosterone.    Plan for follow-up in 3 months with pre visit labs

## 2023-05-25 DIAGNOSIS — I10 ESSENTIAL HYPERTENSION: ICD-10-CM

## 2023-05-25 RX ORDER — HYDROCHLOROTHIAZIDE 12.5 MG/1
TABLET ORAL
Qty: 90 TABLET | Refills: 3 | Status: SHIPPED | OUTPATIENT
Start: 2023-05-25 | End: 2023-11-14 | Stop reason: SDUPTHER

## 2023-05-25 NOTE — TELEPHONE ENCOUNTER
Refill Routing Note   Refill Routing Note   Medication(s) are not appropriate for processing by [facility] for the following reason(s):      Required labs abnormal    [facility] action(s):  Defer None identified        Medication reconciliation completed: No     Appointments  past 12m or future 3m with PCP    Date Provider   Last Visit   5/12/2023 Navin Charlton MD   Next Visit   11/14/2023 Navin Charlton MD   ED visits in past 90 days: 0        Note composed:9:47 AM 05/25/2023

## 2023-06-03 DIAGNOSIS — G72.0 STATIN MYOPATHY: ICD-10-CM

## 2023-06-03 DIAGNOSIS — T46.6X5A STATIN MYOPATHY: ICD-10-CM

## 2023-06-03 DIAGNOSIS — E78.5 DYSLIPIDEMIA: ICD-10-CM

## 2023-06-03 DIAGNOSIS — R39.15 URINARY URGENCY: ICD-10-CM

## 2023-06-03 RX ORDER — EZETIMIBE 10 MG/1
TABLET ORAL
Qty: 90 TABLET | Refills: 3 | Status: SHIPPED | OUTPATIENT
Start: 2023-06-03

## 2023-06-03 NOTE — TELEPHONE ENCOUNTER
No care due was identified.  Health [facility] Embedded Care Due Messages. Reference number: [id number].   6/03/2023 1:45:06 AM CDT

## 2023-06-03 NOTE — TELEPHONE ENCOUNTER
Refill Routing Note   Medication(s) are not appropriate for processing by [facility] for the following reason(s):      Drug-disease interaction: Prostate Cancer     [facility] action(s):  Defer  Approve None identified   Medication Therapy Plan: Flomax: Prostate Cancer      Appointments  past 12m or future 3m with PCP    Date Provider   Last Visit   5/12/2023 Navin Charlton MD   Next Visit   11/14/2023 Navin Charlton MD   ED visits in past 90 days: 0        Note composed:3:44 PM 06/03/2023

## 2023-06-04 RX ORDER — TAMSULOSIN HYDROCHLORIDE 0.4 MG/1
CAPSULE ORAL
Qty: 180 CAPSULE | Refills: 3 | Status: SHIPPED | OUTPATIENT
Start: 2023-06-04

## 2023-06-15 ENCOUNTER — CLINICAL SUPPORT (OUTPATIENT)
Dept: DIABETES | Facility: CLINIC | Age: 73
End: 2023-06-15
Payer: MEDICARE

## 2023-06-15 DIAGNOSIS — E11.9 TYPE 2 DIABETES MELLITUS WITHOUT COMPLICATION, WITHOUT LONG-TERM CURRENT USE OF INSULIN: ICD-10-CM

## 2023-06-15 PROCEDURE — 99999 PR PBB SHADOW E&M-EST. PATIENT-LVL II: ICD-10-PCS | Mod: PBBFAC,,,

## 2023-06-15 PROCEDURE — 99999 PR PBB SHADOW E&M-EST. PATIENT-LVL II: CPT | Mod: PBBFAC,,,

## 2023-06-15 PROCEDURE — G0108 DIAB MANAGE TRN  PER INDIV: HCPCS | Mod: PBBFAC

## 2023-06-15 PROCEDURE — 99212 OFFICE O/P EST SF 10 MIN: CPT | Mod: PBBFAC

## 2023-06-16 VITALS — BODY MASS INDEX: 31.19 KG/M2 | WEIGHT: 222.81 LBS | HEIGHT: 71 IN

## 2023-06-16 NOTE — PROGRESS NOTES
Diabetes Care Specialist Progress Note  Author: Yasemin White RN  Date: 6/15/2023    Program Intake  Reason for Diabetes Program Visit:: Initial Diabetes Assessment  Current diabetes risk level:: moderate  In the last 12 months, have you:: none  Permission to speak with others about care:: yes    Lab Results   Component Value Date    HGBA1C 8.2 (H) 05/12/2023       Clinical  Clinical Assessment  Current Diabetes Treatment: Oral Medication (Metformin (see Provider's instructions))  Have you ever experienced hypoglycemia (low blood sugar)?: yes  In the last month, how often have you experienced low blood sugar?: once every other week  Are you able to tell when your blood sugar is low?: No  Have you ever been hospitalized because your blood sugar was too low?: no  How do you treat hypoglycemia (low blood sugar)?: other (ate food)  Have you ever experienced hyperglycemia (high blood sugar)?: no    Medication Information  How do you obtain your medications?: Patient drives  How many days a week do you miss your medications?: Never  Do you sometimes have difficulty refilling your medications?: No  Medication adherence impacting ability to self-manage diabetes?: No    Labs  Do you have regular lab work to monitor your medications?: Yes  Type of Regular Lab Work: A1c  Where do you get your labs drawn?: [facility]  Lab Compliance Barriers: No    Nutritional Status  Diet: Regular  Meal Plan 24 Hour Recall: Breakfast, Lunch, Dinner, Snack  Meal Plan 24 Hour Recall - Breakfast: omlette or cereal and milk  Meal Plan 24 Hour Recall - Lunch: snack on nuts (walnuts and pecans)  Meal Plan 24 Hour Recall - Dinner: brisket, veggies or  fried chicken and fries and diet coke  Meal Plan 24 Hour Recall - Snack: nuts  Change in appetite?: No  Recent Changes in Weight: No Recent Weight Change  Current nutritional status an area of need that is impacting patient's ability to self-manage diabetes?: No    Additional Social  History  Support  Does anyone support you with your diabetes care?: yes  Who takes you to your medical appointments?: self  Does the current support meet the patient's needs?: Yes  Is Support an area impacting ability to self-manage diabetes?: No    Access to Mass Media & Technology  Does the patient have access to any of the following devices or technologies?: Smart phone  Media or technology needs impacting ability to self-manage diabetes?: No    Cognitive/Behavioral Health  Alert and Oriented: Yes  Difficulty Thinking: No  Requires Prompting: No  Requires assistance for routine expression?: No  Cognitive or behavioral barriers impacting ability to self-manage diabetes?: No    Culture/Islam  Culture or Holiness beliefs that may impact ability to access healthcare: No    Communication  Language preference: English  Hearing Problems: No  Communication needs impacting ability to self-manage diabetes?: No    Health Literacy  Preferred Learning Method: Face to Face, Reading Materials  How often do you need to have someone help you read instructions, pamphlets, or written material from your doctor or pharmacy?: Never  Health literacy needs impacting ability to self-manage diabetes?: No      Diabetes Self-Management Skills Assessment  Diabetes Disease Process/Treatment Options  Patient/caregiver able to state what happens when someone has diabetes.: no  Patient/caregiver knows what type of diabetes they have.: yes  Diabetes Type : Type II  Patient/caregiver able to identify at least three signs and symptoms of diabetes.: no  Patient able to identify at least three risk factors for diabetes.: no  Diabetes Disease Process/Treatment Options: Skills Assessment Completed: Yes  Assessment indicates:: Instruction Needed, Knowledge deficit  Area of need?: Yes    Nutrition/Healthy Eating  Challenges to healthy eating:: portion control  Method of carbohydrate measurement:: no method  Patient can identify foods that impact  blood sugar.: no (see comments)  Nutrition/Healthy Eating Skills Assessment Completed:: Yes  Assessment indicates:: Instruction Needed, Knowledge deficit  Area of need?: Yes    Physical Activity/Exercise  Physical Activity/Exercise Skills Assessment Completed: : No  Deffered due to:: Time    Medications  Patient is able to describe current diabetes management routine.: yes  Diabetes management routine:: diet, oral medications  Patient is able to identify current diabetes medications, dosages, and appropriate timing of medications.: yes  Patient understands the purpose of the medications taken for diabetes.: no  Patient reports problems or concerns with current medication regimen.: no  Medication Skills Assessment Completed:: Yes  Assessment indicates:: Instruction Needed, Knowledge deficit  Area of need?: No    Home Blood Glucose Monitoring  Patient states that blood sugar is checked at home daily.: yes  Monitoring Method:: home glucometer  Home glucometer meter type:: Insurance Preferred Meter  How often do you check your blood sugar?: Twice a day  When you check what is your typical blood sugar range? : FBS 73................110-120's- 155s  Blood glucose logs:: no, encouraged to keep logs, encouraged to bring logs to provider visits  Blood glucose logs reviewed today?: no  Home Blood Glucose Monitoring Skills Assessment Completed: : Yes  Assessment indicates:: Instruction Needed, Knowledge deficit  Area of need?: Yes    Acute Complications  Patient is able to identify types of acute complications: No  Acute Complications Skills Assessment Completed: : Yes  Assessment indicates:: Instruction Needed, Knowledge deficit  Area of need?: Yes    Chronic Complications  Chronic Complications Skills Assessment Completed: : No  Deferred due to:: Time    Psychosocial/Coping  Psychosocial/Coping Skills Assessment Completed: : No  Deffered due to:: Time    Assessment Summary and Plan    Based on today's diabetes care  assessment, the following areas of need were identified:      Social 6/15/2023   Support No   Access to Mass Media/Tech No   Cognitive/Behavioral Health No   Culture/[employer] No   Communication No   Health Literacy No        Clinical 6/15/2023   Medication Adherence No   Lab Compliance No   Nutritional Status No        Diabetes Self-Management Skills 6/15/2023   Diabetes Disease Process/Treatment Options Yes- Provided DM management guide and discussed risk factors of diabetes. Also, instructed patient on what is diabetes and the progression of the disease   Nutrition/Healthy Eating Yes-see care planning   Medication No   Home Blood Glucose Monitoring Yes- Discussed BS goals and importance of monitoring and recording BS for review and maintenance of medication.   Acute Complications Yes- Reviewed blood sugar values, prevention, detection, signs and symptoms, and treatment of hypoglycemia following rule of 15. Advised to carry a source of fast acting carbs          Today's interventions were provided through individual discussion, instruction, and written materials were provided.      Patient verbalized understanding of instruction and written materials.  Pt was able to return back demonstration of instructions today. Patient understood key points, needs reinforcement and further instruction.     Diabetes Self-Management Care Plan:    Today's Diabetes Self-Management Care Plan was developed with Sharee's input. Sharee has agreed to work toward the following goal(s) to improve his/her overall diabetes control.      Care Plan: Diabetes Management   Updates made since 5/17/2023 12:00 AM        Problem: Healthy Eating         Goal: Eat 2-3 meals daily with 30-45g/2-3 servings of Carbohydrate per meal. Limit snacking in between meal to 1 serving (15 grams).    Start Date: 6/15/2023   Expected End Date: 7/14/2023   Priority: High   Barriers: No Barriers Identified   Note:    6/16/23 - - We concentrated on portion sizes at  today's session. Overall meal planning and various choices for meals. Discussed carb vs non-carb foods, appropriate amount of carbs to have at meals/snacks and acceptable serving sizes of individual carb items. Pt stopped eating chips and sugary drinks.     - - Instructed on appropriate label reading and serving sizes of specific carb containing foods. Discussed other options of measuring portion sizes by using the hand method and/OR using the plate method for meal planning. Discussed having lean protein at all meals and adding non starchy vegetables at lunch and dinner. Instructed pt to aim for 2-3 evenly spaced meals or a small carb snack in place of a missed meal. Written education information provided to patient for use at home. Pt verbalized understanding of all the above.       Task: Reviewed the sources and role of Carbohydrate, Protein, and Fat and how each nutrient impacts blood sugar. Completed 6/15/2023        Task: Provided visual examples using dry measuring cups, food models, and other familiar objects such as computer mouse, deck or cards, tennis ball etc. to help with visualization of portions. Completed 6/15/2023       Task: Review the importance of balancing carbohydrates with each meal using portion control techniques to count servings of carbohydrate and label reading to identify serving size and amount of total carbs per serving. Completed 6/15/2023        Task: Provided Sample plate method and reviewed the use of the plate to estimate amounts of carbohydrate per meal. Completed 6/15/2023          Follow Up Plan     Follow up in about 29 days (around 7/14/2023) for F/U #1 BS and meal planning.    Today's care plan and follow up schedule was discussed with patient.  Sharee verbalized understanding of the care plan, goals, and agrees to follow up plan.        The patient was encouraged to communicate with his/her health care provider/physician and care team regarding his/her condition(s) and  treatment.  I provided the patient with my contact information today and encouraged to contact me via phone or [facility] Patient Portal as needed.     Length of Visit   Total Time: 60 Minutes

## 2023-07-14 ENCOUNTER — CLINICAL SUPPORT (OUTPATIENT)
Dept: DIABETES | Facility: CLINIC | Age: 73
End: 2023-07-14
Payer: MEDICARE

## 2023-07-14 DIAGNOSIS — E11.9 TYPE 2 DIABETES MELLITUS WITHOUT COMPLICATION, WITHOUT LONG-TERM CURRENT USE OF INSULIN: Primary | ICD-10-CM

## 2023-07-14 PROCEDURE — 99999 PR PBB SHADOW E&M-EST. PATIENT-LVL I: CPT | Mod: PBBFAC,,,

## 2023-07-14 PROCEDURE — G0108 DIAB MANAGE TRN  PER INDIV: HCPCS | Mod: PBBFAC

## 2023-07-14 PROCEDURE — 99211 OFF/OP EST MAY X REQ PHY/QHP: CPT | Mod: PBBFAC

## 2023-07-14 PROCEDURE — 99999 PR PBB SHADOW E&M-EST. PATIENT-LVL I: ICD-10-PCS | Mod: PBBFAC,,,

## 2023-07-17 VITALS — HEIGHT: 71 IN | WEIGHT: 221.19 LBS | BODY MASS INDEX: 30.97 KG/M2

## 2023-07-18 NOTE — PROGRESS NOTES
Diabetes Care Specialist Progress Note  Author: Yasemin White RN  Date: 7/14/2023    Program Intake  Reason for Diabetes Program Visit:: Intervention  Type of Intervention:: Individual  Individual: Education  Education: Self-Management Skill Review, Nutrition and Meal Planning  Current diabetes risk level:: moderate  In the last 12 months, have you:: none  Permission to speak with others about care:: yes    Lab Results   Component Value Date    HGBA1C 8.2 (H) 05/12/2023       Clinical  Clinical Assessment  Current Diabetes Treatment: Oral Medication ((Metformin (see Provider's instructions)))  Have you ever experienced hypoglycemia (low blood sugar)?: no  Are you able to tell when your blood sugar is low?: No  Have you ever been hospitalized because your blood sugar was too low?: no  Have you ever experienced hyperglycemia (high blood sugar)?: no    Medication Information  How do you obtain your medications?: Patient drives  How many days a week do you miss your medications?: Never  Do you sometimes have difficulty refilling your medications?: No  Medication adherence impacting ability to self-manage diabetes?: No    Labs  Do you have regular lab work to monitor your medications?: Yes  Type of Regular Lab Work: A1c  Where do you get your labs drawn?: [facility]  Lab Compliance Barriers: No    Nutritional Status  Diet: Regular  Meal Plan 24 Hour Recall: Breakfast, Snack  Meal Plan 24 Hour Recall - Breakfast: egg omlet, grits, biscuits  Meal Plan 24 Hour Recall - Snack: nuts or walnuts or pecans  Change in appetite?: No  Recent Changes in Weight: No Recent Weight Change  Current nutritional status an area of need that is impacting patient's ability to self-manage diabetes?: No    Additional Social History  Support  Does anyone support you with your diabetes care?: yes  Who supports you?: self  Who takes you to your medical appointments?: self  Does the current support meet the patient's needs?: Yes  Is Support an area  impacting ability to self-manage diabetes?: No    Access to Mass Media & Technology  Does the patient have access to any of the following devices or technologies?: Smart phone  Media or technology needs impacting ability to self-manage diabetes?: No    Cognitive/Behavioral Health  Alert and Oriented: Yes  Difficulty Thinking: No  Requires Prompting: No  Requires assistance for routine expression?: No  Cognitive or behavioral barriers impacting ability to self-manage diabetes?: No    Culture/Samaritan  Culture or Synagogue beliefs that may impact ability to access healthcare: No    Communication  Language preference: English  Hearing Problems: No  Vision Problems: No  Communication needs impacting ability to self-manage diabetes?: No    Health Literacy  Preferred Learning Method: Face to Face, Reading Materials  How often do you need to have someone help you read instructions, pamphlets, or written material from your doctor or pharmacy?: Never  Health literacy needs impacting ability to self-manage diabetes?: No      Diabetes Self-Management Skills Assessment  Diabetes Disease Process/Treatment Options  Patient/caregiver able to state what happens when someone has diabetes.: somewhat  Patient/caregiver knows what type of diabetes they have.: yes  Diabetes Type : Type II  Patient/caregiver able to identify at least three signs and symptoms of diabetes.: no  Patient able to identify at least three risk factors for diabetes.: no  Diabetes Disease Process/Treatment Options: Skills Assessment Completed: Yes  Assessment indicates:: Instruction Needed  Area of need?: No    Nutrition/Healthy Eating  Challenges to healthy eating:: portion control  Method of carbohydrate measurement:: portion plate  Patient can identify foods that impact blood sugar.: yes  Patient-identified foods:: fruit/fruit juice, milk, soda, starches (bread, pasta, rice, cereal), sweets  Nutrition/Healthy Eating Skills Assessment Completed::  Yes  Assessment indicates:: Instruction Needed, Knowledge deficit  Area of need?: Yes    Physical Activity/Exercise  Patient's daily activity level:: sedentary  Patient formally exercises outside of work.: no  Patient can identify forms of physical activity.: yes  Stated forms of physical activity:: any movement performed by muscles that uses energy  Patient can identify reasons why exercise/physical activity is important in diabetes management.: no  Physical Activity/Exercise Skills Assessment Completed: : Yes  Assessment indicates:: Instruction Needed, Knowledge deficit  Area of need?: Yes    Medications  Patient is able to describe current diabetes management routine.: yes  Diabetes management routine:: diet, exercise, oral medications  Patient is able to identify current diabetes medications, dosages, and appropriate timing of medications.: yes  Patient understands the purpose of the medications taken for diabetes.: yes  Patient reports problems or concerns with current medication regimen.: no  Medication Skills Assessment Completed:: Yes  Assessment indicates:: Instruction Needed  Area of need?: No    Home Blood Glucose Monitoring  Patient states that blood sugar is checked at home daily.: yes  Monitoring Method:: home glucometer  Home glucometer meter type:: Insurance Preferred Meter  How often do you check your blood sugar?: Twice a day  When do you check your blood sugar?: Before breakfast, Before dinner  When you check what is your typical blood sugar range? : 108-120's  Blood glucose logs:: encouraged to keep logs, encouraged to bring logs to provider visits  Blood glucose logs reviewed today?: no  Home Blood Glucose Monitoring Skills Assessment Completed: : Yes  Assessment indicates:: Instruction Needed  Area of need?: Yes    Acute Complications  Patient is able to identify types of acute complications: No  Acute Complications Skills Assessment Completed: : Yes  Assessment indicates:: Instruction Needed,  Knowledge deficit  Area of need?: Yes    Chronic Complications  Chronic Complications Skills Assessment Completed: : No  Deferred due to:: Time    Psychosocial/Coping  Patient can identify ways of coping with chronic disease.: yes  Patient-stated ways of coping with chronic disease:: support group, support from loved ones  Psychosocial/Coping Skills Assessment Completed: : Yes  Assessment indicates:: Instruction Needed  Area of need?: No    Assessment Summary and Plan    Based on today's diabetes care assessment, the following areas of need were identified:      Social 7/14/2023   Support No   Access to Mass Media/Tech No   Cognitive/Behavioral Health No   Culture/Pentecostal No   Communication No   Health Literacy No        Clinical 7/14/2023   Medication Adherence No   Lab Compliance No   Nutritional Status No        Diabetes Self-Management Skills 7/14/2023   Diabetes Disease Process/Treatment Options No   Nutrition/Healthy Eating Yes- see care planning   Physical Activity/Exercise Yes- Discussed benefits of exercising as it relates to insulin resistance. Discussed recommended exercise goal. Pt to start exercising and increase as tolerate.   Medication No   Home Blood Glucose Monitoring Yes- Discussed BS goals and importance of monitoring and recording BS for review and maintenance of medication.   Acute Complications Yes- Reviewed blood sugar values, prevention, detection, signs and symptoms, and treatment of hypoglycemia following rule of 15. Advised to carry a source of fast acting carbs   Psychosocial/Coping No          Today's interventions were provided through individual discussion, instruction, and written materials were provided.      Patient verbalized understanding of instruction and written materials.  Pt was able to return back demonstration of instructions today. Patient understood key points, needs reinforcement and further instruction.     Diabetes Self-Management Care Plan:    Today's Diabetes  Self-Management Care Plan was developed with Sharee's input. Sharee has agreed to work toward the following goal(s) to improve his/her overall diabetes control.      Care Plan: Diabetes Management   Updates made since 6/18/2023 12:00 AM        Problem: Healthy Eating         Goal: Eat 2-3 meals daily with 30-45g/2-3 servings of Carbohydrate per meal. Limit snacking in between meal to 1 serving (15 grams).    Start Date: 6/15/2023   Expected End Date: 7/14/2023   This Visit's Progress: On track   Priority: High   Barriers: No Barriers Identified   Note:    6/16/23 - - We concentrated on portion sizes at today's session. Overall meal planning and various choices for meals. Discussed carb vs non-carb foods, appropriate amount of carbs to have at meals/snacks and acceptable serving sizes of individual carb items. Pt stopped eating chips and sugary drinks.     - - Instructed on appropriate label reading and serving sizes of specific carb containing foods. Discussed other options of measuring portion sizes by using the hand method and/OR using the plate method for meal planning. Discussed having lean protein at all meals and adding non starchy vegetables at lunch and dinner. Instructed pt to aim for 2-3 evenly spaced meals or a small carb snack in place of a missed meal. Written education information provided to patient for use at home. Pt verbalized understanding of all the above.    Care Plan Update 7/14/23 - - Pt is using portion control to measure the serving sizes of his food. He is aware of the amount of carb intake for meals and snacks. He has made many dietary changes. Reminded pt to have lean protein at all meals and  to include non starchy vegetables at lunch and dinner. Pt verbalized understanding. Pt to continue improving.          Follow Up Plan     Follow up for Pt to call back to set appointment.    Today's care plan and follow up schedule was discussed with patient.  Sharee verbalized understanding of the care  plan, goals, and agrees to follow up plan.        The patient was encouraged to communicate with his/her health care provider/physician and care team regarding his/her condition(s) and treatment.  I provided the patient with my contact information today and encouraged to contact me via phone or [facility]'s Patient Portal as needed.     Length of Visit   Total Time: 60 Minutes

## 2023-08-02 ENCOUNTER — OFFICE VISIT (OUTPATIENT)
Dept: FAMILY MEDICINE | Facility: CLINIC | Age: 73
End: 2023-08-02
Payer: MEDICARE

## 2023-08-02 VITALS
WEIGHT: 226 LBS | SYSTOLIC BLOOD PRESSURE: 122 MMHG | TEMPERATURE: 98 F | OXYGEN SATURATION: 95 % | BODY MASS INDEX: 31.64 KG/M2 | HEART RATE: 60 BPM | HEIGHT: 71 IN | DIASTOLIC BLOOD PRESSURE: 74 MMHG

## 2023-08-02 DIAGNOSIS — I10 ESSENTIAL HYPERTENSION: ICD-10-CM

## 2023-08-02 DIAGNOSIS — T46.6X5A STATIN MYOPATHY: ICD-10-CM

## 2023-08-02 DIAGNOSIS — D12.6 TUBULOVILLOUS ADENOMA OF COLON: ICD-10-CM

## 2023-08-02 DIAGNOSIS — C61 PROSTATE CANCER: ICD-10-CM

## 2023-08-02 DIAGNOSIS — F33.42 RECURRENT MAJOR DEPRESSIVE DISORDER, IN FULL REMISSION: ICD-10-CM

## 2023-08-02 DIAGNOSIS — I73.9 PERIPHERAL ARTERY DISEASE: ICD-10-CM

## 2023-08-02 DIAGNOSIS — J43.2 CENTRILOBULAR EMPHYSEMA: ICD-10-CM

## 2023-08-02 DIAGNOSIS — D56.3 BETA THALASSEMIA MINOR: Chronic | ICD-10-CM

## 2023-08-02 DIAGNOSIS — G72.0 STATIN MYOPATHY: ICD-10-CM

## 2023-08-02 DIAGNOSIS — E66.09 CLASS 1 OBESITY DUE TO EXCESS CALORIES WITH SERIOUS COMORBIDITY AND BODY MASS INDEX (BMI) OF 31.0 TO 31.9 IN ADULT: ICD-10-CM

## 2023-08-02 DIAGNOSIS — Z00.00 ENCOUNTER FOR PREVENTIVE HEALTH EXAMINATION: Primary | ICD-10-CM

## 2023-08-02 DIAGNOSIS — N40.0 BENIGN NON-NODULAR PROSTATIC HYPERPLASIA WITHOUT LOWER URINARY TRACT SYMPTOMS: Chronic | ICD-10-CM

## 2023-08-02 DIAGNOSIS — E78.5 DYSLIPIDEMIA: ICD-10-CM

## 2023-08-02 PROCEDURE — 99999 PR PBB SHADOW E&M-EST. PATIENT-LVL IV: CPT | Mod: PBBFAC,,, | Performed by: NURSE PRACTITIONER

## 2023-08-02 PROCEDURE — 99214 OFFICE O/P EST MOD 30 MIN: CPT | Mod: PBBFAC,PO | Performed by: NURSE PRACTITIONER

## 2023-08-02 PROCEDURE — G0439 PR MEDICARE ANNUAL WELLNESS SUBSEQUENT VISIT: ICD-10-PCS | Mod: ,,, | Performed by: NURSE PRACTITIONER

## 2023-08-02 PROCEDURE — G0439 PPPS, SUBSEQ VISIT: HCPCS | Mod: ,,, | Performed by: NURSE PRACTITIONER

## 2023-08-02 PROCEDURE — 99999 PR PBB SHADOW E&M-EST. PATIENT-LVL IV: ICD-10-PCS | Mod: PBBFAC,,, | Performed by: NURSE PRACTITIONER

## 2023-08-02 NOTE — PROGRESS NOTES
"  Sharee Dya presented for a  Medicare AWV and comprehensive Health Risk Assessment today. The following components were reviewed and updated:    Medical history  Family History  Social history  Allergies and Current Medications  Health Risk Assessment  Health Maintenance  Care Team       ** See Completed Assessments for Annual Wellness Visit within the encounter summary.**       The following assessments were completed:  Living Situation  CAGE  Depression Screening  Timed Get Up and Go  Whisper Test  Cognitive Function Screening  Nutrition Screening  ADL Screening  PAQ Screening          Vitals:    08/02/23 1422   BP: 122/74   BP Location: Left arm   Patient Position: Sitting   BP Method: Large (Manual)   Pulse: 60   Temp: 97.7 °F (36.5 °C)   TempSrc: Oral   SpO2: 95%   Weight: 102.5 kg (225 lb 15.5 oz)   Height: 5' 11" (1.803 m)     Body mass index is 31.52 kg/m².  Physical Exam  Vitals and nursing note reviewed.   Constitutional:       Appearance: Normal appearance.   Cardiovascular:      Rate and Rhythm: Normal rate.      Pulses: Normal pulses.      Heart sounds: Normal heart sounds.   Pulmonary:      Effort: Pulmonary effort is normal.      Breath sounds: Normal breath sounds.   Musculoskeletal:         General: Normal range of motion.   Neurological:      Mental Status: He is alert and oriented to person, place, and time.   Psychiatric:         Mood and Affect: Mood normal.         Behavior: Behavior normal.           Diagnoses and health risks identified today and associated recommendations/orders:    1. Encounter for preventive health examination  Pt was seen today for an Annual Wellness visit. Healthcare maintenance and screening recommendations were discussed and updated as indicated. Return in one year for AWV.    Review current opioid prescriptions:n/a  Screen for potential Substance Use Disorders:n/a    2. Centrilobular emphysema  The current medical regimen is effective;  continue present plan and " medications.    3. Peripheral artery disease mild on doppler  The current medical regimen is effective;  continue present plan and medications.    4. Recurrent major depressive disorder, in full remission  The current medical regimen is effective;  continue present plan and medications.    5. Prostate cancer s/p XRT currently hormone deprivation  The current medical regimen is effective;  continue present plan and medications.    6. Essential hypertension 04/08/2021 TTE LV normal size with mild eccentric LVH and normal systolic function LVEF 55%.  Normal RV size and systolic function.  PA pressure 23.  The current medical regimen is effective;  continue present plan and medications.    7. Dyslipidemia statin intolerance. 8/2018 zetia; 07/13/2016 stress test negative for ischemia  The current medical regimen is effective;  continue present plan and medications.    8. Benign non-nodular prostatic hyperplasia without lower urinary tract symptoms  The current medical regimen is effective;  continue present plan and medications.    9. Beta thalassemia minor  The current medical regimen is effective;  continue present plan and medications.    10. Class 1 obesity due to excess calories with serious comorbidity and body mass index (BMI) of 31.0 to 31.9 in adult  The patient is asked to make an attempt to improve diet and exercise patterns to aid in medical management of this problem.    11. Statin myopathy  The current medical regimen is effective;  continue present plan and medications.    12. 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        Provided Sharee with a 5-10 year written screening schedule and personal prevention plan. Recommendations were developed using the USPSTF age appropriate recommendations. Education, counseling, and referrals were provided as needed. After Visit Summary printed  and given to patient which includes a list of additional screenings\tests needed.    Follow up in about 1 year (around 8/2/2024).    MAGNUS Joseph  I offered to discuss advanced care planning, including how to pick a person who would make decisions for you if you were unable to make them for yourself, called a health care power of , and what kind of decisions you might make such as use of life sustaining treatments such as ventilators and tube feeding when faced with a life limiting illness recorded on a living will that they will need to know. (How you want to be cared for as you near the end of your natural life)     X Patient is interested in learning more about how to make advanced directives.  I provided them paperwork and offered to discuss this with them.

## 2023-08-02 NOTE — PATIENT INSTRUCTIONS
Counseling and Referral of Other Preventative  (Italic type indicates deductible and co-insurance are waived)    Patient Name: Sharee Day  Today's Date: 8/2/2023    Health Maintenance       Date Due Completion Date    Shingles Vaccine (1 of 2) Never done ---    COVID-19 Vaccine (5 - Moderna series) 05/29/2022 4/3/2022    Influenza Vaccine (1) 09/01/2023 10/20/2022    Lipid Panel 05/11/2024 5/11/2023    Colorectal Cancer Screening 01/06/2025 1/6/2022    TETANUS VACCINE 06/16/2026 6/16/2016        No orders of the defined types were placed in this encounter.      The following information is provided to all patients.  This information is to help you find resources for any of the problems found today that may be affecting your health:                Living healthy guide: www.[facility].[place].gov      Understanding Diabetes: www.diabetes.org      Eating healthy: www.cdc.gov/healthyweight      CDC home safety checklist: www.cdc.gov/steadi/patient.html      Agency on Aging: www.goea.[place].[facility]      Alcoholics anonymous (AA): www.aa.org      Physical Activity: www.shanda.nih.gov/iq9ljby      Tobacco use: www.quitwithusla.org

## 2023-08-14 ENCOUNTER — LAB VISIT (OUTPATIENT)
Dept: LAB | Facility: HOSPITAL | Age: 73
End: 2023-08-14
Attending: INTERNAL MEDICINE
Payer: MEDICARE

## 2023-08-14 DIAGNOSIS — R79.89 LOW TESTOSTERONE: ICD-10-CM

## 2023-08-14 DIAGNOSIS — E11.9 TYPE 2 DIABETES MELLITUS WITHOUT COMPLICATION, WITHOUT LONG-TERM CURRENT USE OF INSULIN: ICD-10-CM

## 2023-08-14 LAB
ALBUMIN SERPL BCP-MCNC: 3.6 G/DL (ref 3.5–5.2)
ALP SERPL-CCNC: 44 U/L (ref 55–135)
ALT SERPL W/O P-5'-P-CCNC: 10 U/L (ref 10–44)
ANION GAP SERPL CALC-SCNC: 9 MMOL/L (ref 8–16)
AST SERPL-CCNC: 15 U/L (ref 10–40)
BILIRUB SERPL-MCNC: 0.6 MG/DL (ref 0.1–1)
BUN SERPL-MCNC: 10 MG/DL (ref 8–23)
CALCIUM SERPL-MCNC: 9 MG/DL (ref 8.7–10.5)
CHLORIDE SERPL-SCNC: 105 MMOL/L (ref 95–110)
CO2 SERPL-SCNC: 28 MMOL/L (ref 23–29)
CREAT SERPL-MCNC: 0.9 MG/DL (ref 0.5–1.4)
ERYTHROCYTE [DISTWIDTH] IN BLOOD BY AUTOMATED COUNT: 14.4 % (ref 11.5–14.5)
EST. GFR  (NO RACE VARIABLE): >60 ML/MIN/1.73 M^2
ESTIMATED AVG GLUCOSE: 100 MG/DL (ref 68–131)
GLUCOSE SERPL-MCNC: 98 MG/DL (ref 70–110)
HBA1C MFR BLD: 5.1 % (ref 4–5.6)
HCT VFR BLD AUTO: 44.2 % (ref 40–54)
HGB BLD-MCNC: 13.6 G/DL (ref 14–18)
MCH RBC QN AUTO: 24 PG (ref 27–31)
MCHC RBC AUTO-ENTMCNC: 30.8 G/DL (ref 32–36)
MCV RBC AUTO: 78 FL (ref 82–98)
PLATELET # BLD AUTO: 277 K/UL (ref 150–450)
PMV BLD AUTO: 9.6 FL (ref 9.2–12.9)
POTASSIUM SERPL-SCNC: 3.5 MMOL/L (ref 3.5–5.1)
PROT SERPL-MCNC: 7.1 G/DL (ref 6–8.4)
RBC # BLD AUTO: 5.67 M/UL (ref 4.6–6.2)
SODIUM SERPL-SCNC: 142 MMOL/L (ref 136–145)
WBC # BLD AUTO: 5.47 K/UL (ref 3.9–12.7)

## 2023-08-14 PROCEDURE — 85027 COMPLETE CBC AUTOMATED: CPT | Performed by: INTERNAL MEDICINE

## 2023-08-14 PROCEDURE — 84270 ASSAY OF SEX HORMONE GLOBUL: CPT | Performed by: INTERNAL MEDICINE

## 2023-08-14 PROCEDURE — 83036 HEMOGLOBIN GLYCOSYLATED A1C: CPT | Performed by: INTERNAL MEDICINE

## 2023-08-14 PROCEDURE — 84403 ASSAY OF TOTAL TESTOSTERONE: CPT | Performed by: INTERNAL MEDICINE

## 2023-08-14 PROCEDURE — 80053 COMPREHEN METABOLIC PANEL: CPT | Performed by: INTERNAL MEDICINE

## 2023-08-14 PROCEDURE — 36415 COLL VENOUS BLD VENIPUNCTURE: CPT | Mod: PO | Performed by: INTERNAL MEDICINE

## 2023-08-17 NOTE — PROGRESS NOTES
CMP WNL  CBC microcytic anemia; beta thal minor  A1c 5.1 from 8.2.   Testosterone panel normal. Previous low but had labs done at that time around noon.  Results to pt  No changes  No indication for testosterone replacement.

## 2023-08-18 DIAGNOSIS — E11.9 TYPE 2 DIABETES MELLITUS WITHOUT COMPLICATION, WITHOUT LONG-TERM CURRENT USE OF INSULIN: ICD-10-CM

## 2023-08-19 LAB
ALBUMIN SERPL-MCNC: 3.9 G/DL (ref 3.6–5.1)
SHBG SERPL-SCNC: 30 NMOL/L (ref 22–77)
TESTOST FREE SERPL-MCNC: 49.8 PG/ML (ref 6–73)
TESTOST SERPL-MCNC: 344 NG/DL (ref 250–1100)
TESTOSTERONE.FREE+WB SERPL-MCNC: 89.4 NG/DL (ref 15–150)

## 2023-08-19 NOTE — TELEPHONE ENCOUNTER
No care due was identified.  Health [facility] Embedded Care Due Messages. Reference number: [id number].   8/18/2023 7:29:18 PM CDT

## 2023-08-20 NOTE — TELEPHONE ENCOUNTER
Refill Decision Note   Sharee Day  is requesting a refill authorization.  Brief Assessment and Rationale for Refill:  Approve     Medication Therapy Plan:         Comments:     Note composed:3:00 AM 08/20/2023

## 2023-11-06 ENCOUNTER — LAB VISIT (OUTPATIENT)
Dept: LAB | Facility: HOSPITAL | Age: 73
End: 2023-11-06
Attending: INTERNAL MEDICINE
Payer: MEDICARE

## 2023-11-06 DIAGNOSIS — R73.09 ABNORMAL GLUCOSE: ICD-10-CM

## 2023-11-06 DIAGNOSIS — E78.5 DYSLIPIDEMIA: ICD-10-CM

## 2023-11-06 LAB
ALBUMIN SERPL BCP-MCNC: 3.6 G/DL (ref 3.5–5.2)
ALP SERPL-CCNC: 45 U/L (ref 55–135)
ALT SERPL W/O P-5'-P-CCNC: 16 U/L (ref 10–44)
ANION GAP SERPL CALC-SCNC: 7 MMOL/L (ref 8–16)
AST SERPL-CCNC: 18 U/L (ref 10–40)
BILIRUB SERPL-MCNC: 0.6 MG/DL (ref 0.1–1)
BUN SERPL-MCNC: 14 MG/DL (ref 8–23)
CALCIUM SERPL-MCNC: 9.3 MG/DL (ref 8.7–10.5)
CHLORIDE SERPL-SCNC: 104 MMOL/L (ref 95–110)
CO2 SERPL-SCNC: 28 MMOL/L (ref 23–29)
CREAT SERPL-MCNC: 0.9 MG/DL (ref 0.5–1.4)
ERYTHROCYTE [DISTWIDTH] IN BLOOD BY AUTOMATED COUNT: 14.6 % (ref 11.5–14.5)
EST. GFR  (NO RACE VARIABLE): >60 ML/MIN/1.73 M^2
ESTIMATED AVG GLUCOSE: 97 MG/DL (ref 68–131)
GLUCOSE SERPL-MCNC: 83 MG/DL (ref 70–110)
HBA1C MFR BLD: 5 % (ref 4–5.6)
HCT VFR BLD AUTO: 43.2 % (ref 40–54)
HGB BLD-MCNC: 13.2 G/DL (ref 14–18)
MCH RBC QN AUTO: 24.4 PG (ref 27–31)
MCHC RBC AUTO-ENTMCNC: 30.6 G/DL (ref 32–36)
MCV RBC AUTO: 80 FL (ref 82–98)
PLATELET # BLD AUTO: 275 K/UL (ref 150–450)
PMV BLD AUTO: 9.8 FL (ref 9.2–12.9)
POTASSIUM SERPL-SCNC: 3.2 MMOL/L (ref 3.5–5.1)
PROT SERPL-MCNC: 7.1 G/DL (ref 6–8.4)
RBC # BLD AUTO: 5.42 M/UL (ref 4.6–6.2)
SODIUM SERPL-SCNC: 139 MMOL/L (ref 136–145)
WBC # BLD AUTO: 5.63 K/UL (ref 3.9–12.7)

## 2023-11-06 PROCEDURE — 80053 COMPREHEN METABOLIC PANEL: CPT | Performed by: INTERNAL MEDICINE

## 2023-11-06 PROCEDURE — 85027 COMPLETE CBC AUTOMATED: CPT | Performed by: INTERNAL MEDICINE

## 2023-11-06 PROCEDURE — 83036 HEMOGLOBIN GLYCOSYLATED A1C: CPT | Performed by: INTERNAL MEDICINE

## 2023-11-06 PROCEDURE — 36415 COLL VENOUS BLD VENIPUNCTURE: CPT | Mod: PO | Performed by: INTERNAL MEDICINE

## 2023-11-11 DIAGNOSIS — E11.9 TYPE 2 DIABETES MELLITUS WITHOUT COMPLICATION, WITHOUT LONG-TERM CURRENT USE OF INSULIN: ICD-10-CM

## 2023-11-11 RX ORDER — METFORMIN HYDROCHLORIDE 500 MG/1
TABLET ORAL
Qty: 360 TABLET | Refills: 0 | Status: SHIPPED | OUTPATIENT
Start: 2023-11-11 | End: 2023-11-14 | Stop reason: SDUPTHER

## 2023-11-11 NOTE — TELEPHONE ENCOUNTER
Refill Decision Note   Sharee Day  is requesting a refill authorization.  Brief Assessment and Rationale for Refill:  Approve     Medication Therapy Plan:       Medication Reconciliation Completed: No   Comments:     No Care Gaps recommended.     Note composed:11:02 AM 11/11/2023

## 2023-11-11 NOTE — TELEPHONE ENCOUNTER
No care due was identified.  Health [facility] Embedded Care Due Messages. Reference number: [id number].   11/11/2023 3:29:34 AM CST

## 2023-11-14 ENCOUNTER — OFFICE VISIT (OUTPATIENT)
Dept: FAMILY MEDICINE | Facility: CLINIC | Age: 73
End: 2023-11-14
Payer: MEDICARE

## 2023-11-14 VITALS
OXYGEN SATURATION: 98 % | WEIGHT: 214.31 LBS | SYSTOLIC BLOOD PRESSURE: 110 MMHG | HEIGHT: 71 IN | BODY MASS INDEX: 30 KG/M2 | HEART RATE: 82 BPM | DIASTOLIC BLOOD PRESSURE: 70 MMHG | TEMPERATURE: 98 F

## 2023-11-14 DIAGNOSIS — E11.9 TYPE 2 DIABETES MELLITUS WITHOUT COMPLICATION, WITHOUT LONG-TERM CURRENT USE OF INSULIN: ICD-10-CM

## 2023-11-14 DIAGNOSIS — T46.6X5A STATIN MYOPATHY: ICD-10-CM

## 2023-11-14 DIAGNOSIS — E78.5 DYSLIPIDEMIA: ICD-10-CM

## 2023-11-14 DIAGNOSIS — D12.6 TUBULOVILLOUS ADENOMA OF COLON: ICD-10-CM

## 2023-11-14 DIAGNOSIS — I10 ESSENTIAL HYPERTENSION: ICD-10-CM

## 2023-11-14 DIAGNOSIS — G47.00 INSOMNIA, UNSPECIFIED TYPE: Primary | ICD-10-CM

## 2023-11-14 DIAGNOSIS — E66.09 CLASS 1 OBESITY DUE TO EXCESS CALORIES WITH SERIOUS COMORBIDITY AND BODY MASS INDEX (BMI) OF 31.0 TO 31.9 IN ADULT: ICD-10-CM

## 2023-11-14 DIAGNOSIS — G72.0 STATIN MYOPATHY: ICD-10-CM

## 2023-11-14 DIAGNOSIS — C61 PROSTATE CANCER: ICD-10-CM

## 2023-11-14 PROCEDURE — 99214 OFFICE O/P EST MOD 30 MIN: CPT | Mod: S$PBB,,, | Performed by: INTERNAL MEDICINE

## 2023-11-14 PROCEDURE — 99214 PR OFFICE/OUTPT VISIT, EST, LEVL IV, 30-39 MIN: ICD-10-PCS | Mod: S$PBB,,, | Performed by: INTERNAL MEDICINE

## 2023-11-14 PROCEDURE — 99999 PR PBB SHADOW E&M-EST. PATIENT-LVL III: ICD-10-PCS | Mod: PBBFAC,,, | Performed by: INTERNAL MEDICINE

## 2023-11-14 PROCEDURE — 99999 PR PBB SHADOW E&M-EST. PATIENT-LVL III: CPT | Mod: PBBFAC,,, | Performed by: INTERNAL MEDICINE

## 2023-11-14 PROCEDURE — 99213 OFFICE O/P EST LOW 20 MIN: CPT | Mod: PBBFAC,PO | Performed by: INTERNAL MEDICINE

## 2023-11-14 RX ORDER — ESZOPICLONE 3 MG/1
3 TABLET, FILM COATED ORAL NIGHTLY PRN
Qty: 30 TABLET | Refills: 0 | Status: SHIPPED | OUTPATIENT
Start: 2023-11-14 | End: 2023-12-14

## 2023-11-14 RX ORDER — LANCETS
EACH MISCELLANEOUS
Qty: 100 EACH | Refills: 4 | Status: SHIPPED | OUTPATIENT
Start: 2023-11-14

## 2023-11-14 RX ORDER — HYDROCHLOROTHIAZIDE 12.5 MG/1
12.5 TABLET ORAL DAILY
Qty: 90 TABLET | Refills: 3 | Status: SHIPPED | OUTPATIENT
Start: 2023-11-14

## 2023-11-14 RX ORDER — METFORMIN HYDROCHLORIDE 500 MG/1
1000 TABLET ORAL 2 TIMES DAILY WITH MEALS
Qty: 360 TABLET | Refills: 3 | Status: SHIPPED | OUTPATIENT
Start: 2023-11-14

## 2023-11-14 NOTE — PROGRESS NOTES
This note was created by combination of typed  and M-Modal dictation.  Transcription errors may be present.  If there are any questions, please contact me.    Assessment and Plan:   Assessment and Plan   Insomnia, unspecified type  -  Has tried a number of different medications for insomnia without relief.  Notes that in the past the only thing that really helped was high-dose Lunesta.  Lengthy discussion with the patient regarding side effect profile, risk profile.  After discussion I have agreed to give him 30 pills but I want him to make it last for 3 months.  Asked him not to commit 12 hours to sleep but to get up at a consistent time.  Avoid bright lights 1-2 hours prior to bedtime.  -     eszopiclone (LUNESTA) 3 mg Tab; Take 1 tablet (3 mg total) by mouth nightly as needed (insomnia). Make this last 3 months  Dispense: 30 tablet; Refill: 0    Type 2 diabetes mellitus without complication, without long-term current use of insulin  -  Pre visit labs good.  On metformin max dose.  I wanted to changes blood pressure agent but he would like to stay on HCTZ.  Sent in testing supplies.  -     Comprehensive Metabolic Panel; Future; Expected date: 05/11/2024  -     Lipid Panel; Future; Expected date: 05/11/2024  -     Hemoglobin A1C; Future; Expected date: 05/11/2024  -     Microalbumin/Creatinine Ratio, Urine; Future; Expected date: 05/11/2024  -     CBC Without Differential; Future; Expected date: 05/13/2024  -     metFORMIN (GLUCOPHAGE) 500 MG tablet; Take 2 tablets (1,000 mg total) by mouth 2 (two) times daily with meals.  Dispense: 360 tablet; Refill: 3  -     blood sugar diagnostic Strp; To check BG 1 times daily, to use with insurance preferred meter  Dispense: 100 strip; Refill: 3  -     lancets Misc; To check BG 1 times daily, to use with insurance preferred meter  Dispense: 100 each; Refill: 4    Essential hypertension 04/08/2021 TTE LV normal size with mild eccentric LVH and normal systolic  function LVEF 55%.  Normal RV size and systolic function.  PA pressure 23.  Class 1 obesity due to excess calories with serious comorbidity and body mass index (BMI) of 31.0 to 31.9 in adult  -  Pre visit labs hypokalemia, potassium has been low normal previously.  A brought up changing HCTZ to alternate agent but he would like to stay on the same medication and eat potassium rich foods.  Will check with future labs and if still low would change over to ARB.  -     hydroCHLOROthiazide (HYDRODIURIL) 12.5 MG Tab; Take 1 tablet (12.5 mg total) by mouth once daily.  Dispense: 90 tablet; Refill: 3    Dyslipidemia statin intolerance. 8/2018 zetia; 07/13/2016 stress test negative for ischemia  Statin myopathy  -  Check future labs on Zetia    Prostate cancer s/p XRT currently hormone deprivation  - followed by Urology will defer PSA testing to Urology    Tubulovillous adenoma of colon on colonoscopy 8/2018; 1/6/22 colonoscopy 5 mm sigmoid HP  - stable             Medications Discontinued During This Encounter   Medication Reason    doxepin (SINEQUAN) 10 mg/mL solution Therapy not effective    lancets Misc Reorder    hydroCHLOROthiazide (HYDRODIURIL) 12.5 MG Tab Reorder    blood sugar diagnostic Strp Reorder    metFORMIN (GLUCOPHAGE) 500 MG tablet Reorder       meds sent this encounter:  Medications Ordered This Encounter   Medications    blood sugar diagnostic Strp     Sig: To check BG 1 times daily, to use with insurance preferred meter     Dispense:  100 strip     Refill:  3    eszopiclone (LUNESTA) 3 mg Tab     Sig: Take 1 tablet (3 mg total) by mouth nightly as needed (insomnia). Make this last 3 months     Dispense:  30 tablet     Refill:  0    hydroCHLOROthiazide (HYDRODIURIL) 12.5 MG Tab     Sig: Take 1 tablet (12.5 mg total) by mouth once daily.     Dispense:  90 tablet     Refill:  3     .    lancets Misc     Sig: To check BG 1 times daily, to use with insurance preferred meter     Dispense:  100 each     Refill:   4    metFORMIN (GLUCOPHAGE) 500 MG tablet     Sig: Take 2 tablets (1,000 mg total) by mouth 2 (two) times daily with meals.     Dispense:  360 tablet     Refill:  3         Follow Up:  follow-up 6 months with labs  Future Appointments   Date Time Provider Department Center   5/14/2024  8:00 AM LAB, [facility]            Subjective:   Subjective   Chief Complaint   Patient presents with    Follow-up       HPI  Sharee is a 73 y.o. male.     Social History     Tobacco Use    Smoking status: Every Day     Types: Cigarettes     Passive exposure: Current    Smokeless tobacco: Never    Tobacco comments:     smokes 3 cigarettes daily   Substance Use Topics    Alcohol use: Yes     Alcohol/week: 3.0 standard drinks of alcohol     Types: 1 Glasses of wine, 1 Cans of beer, 1 Shots of liquor per week     Comment: Ocassionally      Social History     Occupational History    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; 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; 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     Employer: [employer]      Social History     Social History Narrative    Not on file       Last appointment with this clinic was 8/2/2023. Last visit with me 5/12/2023   To summarize last visit and events leading up to today:  MDD.  Insomnia.  History of Lunesta.  Was seeing Psychiatry but stop seeing Psychiatry and therefore was no longer prescribed Lunesta.  History of Ambien with side effect of bad dreams.  He was already practicing the principles of CBT I.  Trial of mirtazapine and if no improvement next step would be doxepin  Neuropathy of the feet.  Intermittent and episodic.  Did not think this was vascular.  Recent TSH was normal.  A1c was below 5.6.  Never had chemotherapy for the prostate cancer.  Only XRT.  Check labs  Smoking working with cessation program  Hypertension BP at the time stable.  Encouraged to re-enroll in digital monitoring.  History of lisinopril and HCTZ, lisinopril stopped due to low BP  Hyperlipidemia check future labs  History of prostate  cancer status post XRT, currently on hormone deprivation followed by [facility].  Lumbar radiculopathy stable  Emphysema seen on CT imaging of the chest    Last visit me in May  Insomnia mirtazapine side effects.  Ambien side effects.  Avoid sedative hypnotics.  Trial of doxepin.  Breast tenderness suspect due to overweight and obesity.  Check TSH prolactin  Depression no longer seeing psychiatry.  Not on medications.  Stable.    Random glucose 200, check A1c  Labs at that time stable on Zetia    A1c 8.2 new diagnosis of diabetes  Testosterone was low but labs were drawn around noon time needs repeat    Subsequent labs 08/14/2023 A1c significantly improved 5.1 from 8.2.    Testosterone panel normal on repeat    Pre visit labs  CBC mild anemia stable  CMP mild hypokalemia  A1c 5.0 from previous 5.1    Today's visit:    Insomnia.  Recalls that lunesta was the only thing that really worked for him.    Bedtime 10:30  Toss and turn  Sometimes gets out of bed  Falls asleep  againaround 4  Wakes around 6:30  And then toss and turn and then sleeps until 10 AM.     Most recently gave him doxepin but did not find it effective.    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        Reviewed his labs.  He is not been eating as much potassium rich foods he notes.  We talked about changing his HCTZ to alternate agent but he would like to stay on the same medication.      Denies chest pain, shortness of breath.    Patient Care Team:  Navin Charlton MD as PCP - General (Internal Medicine)  Alfredo Kearns MD as Consulting Physician (Urology)  Adeel Coleman MD as Consulting Physician (Psychiatry)  Adeel Vicente MD (Hematology and Oncology)  Juventino Meeks MA as Care Coordinator    Patient Active Problem List    Diagnosis Date Noted    Type 2 diabetes mellitus without complication, without long-term current use of insulin 05/16/2023    Low testosterone 05/16/2023    Centrilobular  emphysema 05/11/2023    Class 1 obesity due to excess calories with serious comorbidity in adult 07/13/2022    Lumbar radiculopathy 11/09/2021 05/12/2021 EMG acute denervation with chronic reinnervation in the L5-S1 myotomes on the left.  5/24/2021 MRI L spine:   Multilevel lumbar spondylosis with resultant mild spinal canal stenosis at L3-L4 in neural foraminal stenosis at L4-L5 and L5-S1.      PVC (premature ventricular contraction); 4/2021 metoprolol 04/23/2021 04/08/2021 Holter frequent PVCs, frequent PACs      Peripheral artery disease mild on doppler 05/20/2019     4/10/2018 LE dopplers: 1. Right lower extremity pressures and waveforms indicate no hemodynamically significant arterial occlusive disease.  2. Left lower extremity pressures and waveforms indicate mild arterial occlusive disease.      Numbness or tingling workup with neuro for demyelinating 05/20/2019 12/19/18 MRI brain: 1. Numerous tiny focal areas of remote gliosis posterior frontal parietal and occipital and upper basal ganglia areas, differential diagnosis includes microvascular ischemic change and inactive demyelinating process.  2. Intraventricular artifact opacity body left lateral ventricle discussed above.  12/2018 MRI C spine: 1. Multilevel spondylotic changes as discussed above.  2. Multilevel foraminal stenotic changes more pronounced on the left as compared to the right as discussed above.  See further details above.  1/24/19 EMG: borderline normal EMG/NCS. There was an absent   left sural response of unclear clinical significance. There were   no myopathic units seen to suggest a myopathy.     05/12/2021 EMG acute denervation with chronic reinnervation in the L5-S1 myotomes on the left.  5/24/2021 MRI L spine:   Multilevel lumbar spondylosis with resultant mild spinal canal stenosis at L3-L4 in neural foraminal stenosis at L4-L5 and L5-S1.      Tubulovillous adenoma of colon on colonoscopy 8/2018; 1/6/22 colonoscopy 5 mm  sigmoid HP 08/30/2018 8/2018 colonoscopy TVA  1/6/22 colonoscopy 5 mm sigmoid HP      Statin myopathy 08/02/2018    Obstructive sleep apnea severe with AHI 64 on study 7/2018 07/27/2018     Very severe sleep disordered  breathing (AHI=64) is noted based on a 4% hypopnea desaturation criteria. The patient slept supine 64.2% of the night based  on valid recording time of 6.43 hours. The apneas/hypopneas are accompanied by severe oxygen desaturation (percent time  below 90% SpO2: 15.9%, Min SpO2: 69.6%). The average desaturation across all sleep disordered breathing events is 5.7%.  Snoring occurs for 45.4% (30 dB) of the study, 17.3% is extremely loud. The mean pulse rate is 63 BPM, with frequent pulse  rate variability (52 events with >= 6 BPM increase/decrease per hour).  TREATMENT CONSIDERATIONS: Consider nasal continuous positive airway pressure (CPAP) as the initial treatment  choice for severe obstructive sleep apnea. Consider an attended in-lab CPAP titration study, given the possibility of co-morbid  sleep related hypoventilation.  DISEASE MANAGEMENT CONSIDERATIONS: Insomnia is a symptom that can be associated with untreated DAKOTA.  Sleeping pills may increase the severity of untreated DAKOTA and their use should be reevaluated once the DAKOTA is treated.      Recurrent major depressive disorder, in full remission 10/30/2017    Pelvic floor muscle wasting in male 09/28/2017    Prostate cancer s/p XRT currently hormone deprivation 08/30/2017     2 cores positive kary 3+4=7 on biopsy 8/15/2017      Smoker 08/30/2017    Vitamin D deficiency 08/30/2017    Beta thalassemia minor 04/11/2017    Benign non-nodular prostatic hyperplasia without lower urinary tract symptoms 03/29/2017     Hx of negative prostate Bx and hx of MRI prostate      Restless leg syndrome Requip ineffective 08/02/2016    Dyslipidemia statin intolerance. 8/2018 zetia; 07/13/2016 stress test negative for ischemia     Essential hypertension  "04/08/2021 TTE LV normal size with mild eccentric LVH and normal systolic function LVEF 55%.  Normal RV size and systolic function.  PA pressure 23.      7/13/2016 [facility] stress test neg for ischemia  7/13/2016 TTE LVEF 55-60; no diastolic dysfunction  04/08/2021 TTE LV normal size with mild eccentric LVH and normal systolic function LVEF 55%.  Normal RV size and systolic function.  PA pressure 23.         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 Trazodone ineffective.  Rozerem didn't help 11/20/2013       PAST MEDICAL PROBLEMS, PAST SURGICAL HISTORY: please see relevant portions of the electronic medical record    ALLERGIES AND MEDICATIONS: updated and reviewed.  Medication List with Changes/Refills   Current Medications    BLOOD SUGAR DIAGNOSTIC STRP    To check BG 1 times daily, to use with insurance preferred meter    BLOOD-GLUCOSE METER KIT    To check BG 1 times daily, to use with insurance preferred meter    DOXEPIN (SINEQUAN) 10 MG/ML SOLUTION    Take 0.3 mLs (3 mg total) by mouth every evening.    EZETIMIBE (ZETIA) 10 MG TABLET    TAKE 1 TABLET EVERY DAY    HYDROCHLOROTHIAZIDE (HYDRODIURIL) 12.5 MG TAB    TAKE 1 TABLET(12.5 MG) BY MOUTH EVERY DAY    LANCETS MISC    To check BG 1 times daily, to use with insurance preferred meter    METFORMIN (GLUCOPHAGE) 500 MG TABLET    START WITH 1 TABLET DAILY FOR 7 DAYS; THEN 1 TABLET TWICE DAILY FOR 7 DAYS; THEN 2 IN AM AND 1 IN PM FOR7 DAYS; THEN 2 TWICE DAILY WITH MEALS    TAMSULOSIN (FLOMAX) 0.4 MG CAP    TAKE 2 CAPSULES EVERY DAY         Objective:   Objective   Physical Exam   Vitals:    11/14/23 1428   BP: 110/70   Pulse: 82   Temp: 98.1 °F (36.7 °C)   TempSrc: Oral   SpO2: 98%   Weight: 97.2 kg (214 lb 4.6 oz)   Height: 5' 11" (1.803 m)    Body mass index is 29.89 kg/m².  Weight: 97.2 kg (214 lb 4.6 oz)   Height: 5' 11" (180.3 cm)     Physical Exam  Constitutional:       General: He is not in acute distress.     Appearance: He is well-developed.   Eyes:    "   Extraocular Movements: Extraocular movements intact.   Cardiovascular:      Rate and Rhythm: Normal rate and regular rhythm.      Heart sounds: Normal heart sounds. No murmur heard.  Pulmonary:      Effort: Pulmonary effort is normal.      Breath sounds: Normal breath sounds.   Musculoskeletal:         General: Normal range of motion.      Right lower leg: No edema.      Left lower leg: No edema.   Lymphadenopathy:      Cervical: No cervical adenopathy.   Skin:     General: Skin is warm and dry.   Neurological:      Mental Status: He is alert and oriented to person, place, and time.      Coordination: Coordination normal.   Psychiatric:         Behavior: Behavior normal.         Thought Content: Thought content normal.         Component      Latest Ref Rng 5/11/2023 5/12/2023 8/14/2023 11/6/2023   WBC      3.90 - 12.70 K/uL 6.24   5.47  5.63    RBC      4.60 - 6.20 M/uL 5.64   5.67  5.42    Hemoglobin      14.0 - 18.0 g/dL 13.3 (L)   13.6 (L)  13.2 (L)    Hematocrit      40.0 - 54.0 % 44.3   44.2  43.2    MCV      82 - 98 fL 79 (L)   78 (L)  80 (L)    MCH      27.0 - 31.0 pg 23.6 (L)   24.0 (L)  24.4 (L)    MCHC      32.0 - 36.0 g/dL 30.0 (L)   30.8 (L)  30.6 (L)    RDW      11.5 - 14.5 % 14.0   14.4  14.6 (H)    Platelet Count      150 - 450 K/uL 270   277  275    MPV      9.2 - 12.9 fL 9.5   9.6  9.8    Immature Granulocytes      0.0 - 0.5 % 0.3       Gran # (ANC)      1.8 - 7.7 K/uL 3.5       Immature Grans (Abs)      0.00 - 0.04 K/uL 0.02       Lymph #      1.0 - 4.8 K/uL 2.0       Mono #      0.3 - 1.0 K/uL 0.6       Eos #      0.0 - 0.5 K/uL 0.1       Baso #      0.00 - 0.20 K/uL 0.02       nRBC      0 /100 WBC 0       Gran %      38.0 - 73.0 % 56.5       Lymph %      18.0 - 48.0 % 32.2       Mono %      4.0 - 15.0 % 9.3       Eosinophil %      0.0 - 8.0 % 1.4       Basophil %      0.0 - 1.9 % 0.3       Differential Method Automated       Sodium      136 - 145 mmol/L 137   142  139    Potassium      3.5 -  5.1 mmol/L 3.3 (L)   3.5  3.2 (L)    Chloride      95 - 110 mmol/L 102   105  104    CO2      23 - 29 mmol/L 29   28  28    Glucose      70 - 110 mg/dL 210 (H)   98  83    BUN      8 - 23 mg/dL 12   10  14    Creatinine      0.5 - 1.4 mg/dL 1.1   0.9  0.9    Calcium      8.7 - 10.5 mg/dL 9.1   9.0  9.3    PROTEIN TOTAL      6.0 - 8.4 g/dL 7.1   7.1  7.1    Albumin      3.5 - 5.2 g/dL 3.5   3.6  3.6    BILIRUBIN TOTAL      0.1 - 1.0 mg/dL 0.5   0.6  0.6    ALP      55 - 135 U/L 60   44 (L)  45 (L)    AST      10 - 40 U/L 16   15  18    ALT      10 - 44 U/L 27   10  16    Anion Gap      8 - 16 mmol/L 6 (L)   9  7 (L)    eGFR      >60 mL/min/1.73 m^2 >60.0   >60.0  >60.0    Cholesterol Total      120 - 199 mg/dL 168       Triglycerides      30 - 150 mg/dL 186 (H)       HDL      40 - 75 mg/dL 27 (L)       LDL Cholesterol      63.0 - 159.0 mg/dL 103.8       HDL/Cholesterol Ratio      20.0 - 50.0 % 16.1 (L)       Total Cholesterol/HDL Ratio      2.0 - 5.0  6.2 (H)       Non-HDL Cholesterol      mg/dL 141       Testosterone Total      250 - 1100 ng/dL   344     Testosterone, Free      6.0 - 73.0 pg/mL   49.8     Testosterone, Bioavailable      15.0 - 150.0 ng/dL   89.4     Sex Hormone Binding Globulin      22 - 77 nmol/L   30     Albumin      3.6 - 5.1 g/dL   3.9     Hemoglobin A1C External      4.0 - 5.6 %  8.2 (H)  5.1  5.0    Estimated Avg Glucose      68 - 131 mg/dL  189 (H)  100  97    Vitamin B-12      210 - 950 pg/mL 508       TSH      0.400 - 4.000 uIU/mL  1.156      Prolactin      3.5 - 19.4 ng/mL  5.7      Testosterone, Total      304 - 1227 ng/dL  285 (L)         Legend:  (L) Low  (H) High

## 2023-11-29 ENCOUNTER — TELEPHONE (OUTPATIENT)
Dept: FAMILY MEDICINE | Facility: CLINIC | Age: 73
End: 2023-11-29
Payer: MEDICARE

## 2023-11-29 RX ORDER — LANCETS 33 GAUGE
EACH MISCELLANEOUS
COMMUNITY
Start: 2023-08-18

## 2024-02-07 ENCOUNTER — TELEPHONE (OUTPATIENT)
Dept: FAMILY MEDICINE | Facility: CLINIC | Age: 74
End: 2024-02-07

## 2024-02-07 DIAGNOSIS — I10 ESSENTIAL HYPERTENSION: ICD-10-CM

## 2024-02-07 DIAGNOSIS — E11.9 TYPE 2 DIABETES MELLITUS WITHOUT COMPLICATION, WITHOUT LONG-TERM CURRENT USE OF INSULIN: Primary | ICD-10-CM

## 2024-02-07 NOTE — TELEPHONE ENCOUNTER
----- Message from Danielle Mosqueda sent at 2/7/2024 11:38 AM CST -----  Regarding: Self .[phone]  Type: Patient Call Back     What is the request in detail: Pt would like to be referred to the gym ( [employer]) Pt stated his insurance will take care of the bill if he has a referral .  Pt stated he's trying to get his diabetes under control    Can the clinic reply by [facility]? No     Would the patient rather a call back or a response via [facility]? Call back    Best call back number: .[phone]      Additional Information: Active in fit or silver in fit is the membership that the pt would need     Thank you.

## 2024-02-07 NOTE — TELEPHONE ENCOUNTER
Patient notified of information below, verbalized understanding. States he will  a copy of referral

## 2024-04-28 DIAGNOSIS — E11.9 TYPE 2 DIABETES MELLITUS WITHOUT COMPLICATION, WITHOUT LONG-TERM CURRENT USE OF INSULIN: ICD-10-CM

## 2024-04-28 NOTE — TELEPHONE ENCOUNTER
Care Due:                  Date            Visit Type   Department     Provider  --------------------------------------------------------------------------------                                [facility]                              PRIMARY      MED/ INTERNAL  Last Visit: 11-      CARE ([facility])   MED/ MADDY Charlton                              [facility]                              PRIMARY      MED/ INTERNAL  Next Visit: 05-      CARE ([facility])   MED/ PEDZAYRA Charlton                                                            Last  Test          Frequency    Reason                     Performed    Due Date  --------------------------------------------------------------------------------    HBA1C.......  6 months...  metFORMIN................  11- 05-    Lipid Panel.  12 months..  ezetimibe................  05- 05-    [facility] Embedded Care Due Messages. Reference number: [id number].   4/28/2024 4:47:02 AM CDT

## 2024-04-30 RX ORDER — METFORMIN HYDROCHLORIDE 500 MG/1
1000 TABLET ORAL 2 TIMES DAILY WITH MEALS
Qty: 360 TABLET | Refills: 0 | Status: SHIPPED | OUTPATIENT
Start: 2024-04-30 | End: 2024-05-21 | Stop reason: ALTCHOICE

## 2024-04-30 NOTE — TELEPHONE ENCOUNTER
Refill Routing Note   Medication(s) are not appropriate for processing by [facility] for the following reason(s):        Clarification of medication (Rx) details    [facility] action(s):  Defer     Requires labs : Yes      Medication Therapy Plan: FLOS; Sig has been updated for PCP review.      Appointments  past 12m or future 3m with PCP    Date Provider   Last Visit   11/14/2023 Navin Charlton MD   Next Visit   5/21/2024 Navin Charlton MD   ED visits in past 90 days: 0        Note composed:8:11 PM 04/29/2024

## 2024-05-14 ENCOUNTER — LAB VISIT (OUTPATIENT)
Dept: LAB | Facility: HOSPITAL | Age: 74
End: 2024-05-14
Attending: INTERNAL MEDICINE
Payer: MEDICARE

## 2024-05-14 DIAGNOSIS — E11.9 TYPE 2 DIABETES MELLITUS WITHOUT COMPLICATION, WITHOUT LONG-TERM CURRENT USE OF INSULIN: ICD-10-CM

## 2024-05-14 LAB
ALBUMIN SERPL BCP-MCNC: 3.5 G/DL (ref 3.5–5.2)
ALP SERPL-CCNC: 43 U/L (ref 55–135)
ALT SERPL W/O P-5'-P-CCNC: 18 U/L (ref 10–44)
ANION GAP SERPL CALC-SCNC: 8 MMOL/L (ref 8–16)
AST SERPL-CCNC: 20 U/L (ref 10–40)
BILIRUB SERPL-MCNC: 0.6 MG/DL (ref 0.1–1)
BUN SERPL-MCNC: 15 MG/DL (ref 8–23)
CALCIUM SERPL-MCNC: 9.3 MG/DL (ref 8.7–10.5)
CHLORIDE SERPL-SCNC: 110 MMOL/L (ref 95–110)
CHOLEST SERPL-MCNC: 185 MG/DL (ref 120–199)
CHOLEST/HDLC SERPL: 6.2 {RATIO} (ref 2–5)
CO2 SERPL-SCNC: 24 MMOL/L (ref 23–29)
CREAT SERPL-MCNC: 0.9 MG/DL (ref 0.5–1.4)
ERYTHROCYTE [DISTWIDTH] IN BLOOD BY AUTOMATED COUNT: 15 % (ref 11.5–14.5)
EST. GFR  (NO RACE VARIABLE): >60 ML/MIN/1.73 M^2
ESTIMATED AVG GLUCOSE: 91 MG/DL (ref 68–131)
GLUCOSE SERPL-MCNC: 87 MG/DL (ref 70–110)
HBA1C MFR BLD: 4.8 % (ref 4–5.6)
HCT VFR BLD AUTO: 42.6 % (ref 40–54)
HDLC SERPL-MCNC: 30 MG/DL (ref 40–75)
HDLC SERPL: 16.2 % (ref 20–50)
HGB BLD-MCNC: 13.3 G/DL (ref 14–18)
LDLC SERPL CALC-MCNC: 133.6 MG/DL (ref 63–159)
MCH RBC QN AUTO: 25 PG (ref 27–31)
MCHC RBC AUTO-ENTMCNC: 31.2 G/DL (ref 32–36)
MCV RBC AUTO: 80 FL (ref 82–98)
NONHDLC SERPL-MCNC: 155 MG/DL
PLATELET # BLD AUTO: 230 K/UL (ref 150–450)
PMV BLD AUTO: 10.8 FL (ref 9.2–12.9)
POTASSIUM SERPL-SCNC: 3.6 MMOL/L (ref 3.5–5.1)
PROT SERPL-MCNC: 6.8 G/DL (ref 6–8.4)
RBC # BLD AUTO: 5.32 M/UL (ref 4.6–6.2)
SODIUM SERPL-SCNC: 142 MMOL/L (ref 136–145)
TRIGL SERPL-MCNC: 107 MG/DL (ref 30–150)
WBC # BLD AUTO: 4.66 K/UL (ref 3.9–12.7)

## 2024-05-14 PROCEDURE — 85027 COMPLETE CBC AUTOMATED: CPT | Performed by: INTERNAL MEDICINE

## 2024-05-14 PROCEDURE — 36415 COLL VENOUS BLD VENIPUNCTURE: CPT | Mod: PO | Performed by: INTERNAL MEDICINE

## 2024-05-14 PROCEDURE — 83036 HEMOGLOBIN GLYCOSYLATED A1C: CPT | Performed by: INTERNAL MEDICINE

## 2024-05-14 PROCEDURE — 80053 COMPREHEN METABOLIC PANEL: CPT | Performed by: INTERNAL MEDICINE

## 2024-05-14 PROCEDURE — 80061 LIPID PANEL: CPT | Performed by: INTERNAL MEDICINE

## 2024-05-18 NOTE — PROGRESS NOTES
This note was created by combination of typed  and M-Modal dictation.  Transcription errors may be present.  If there are any questions, please contact me.    Assessment and Plan:   Assessment and Plan   Type 2 diabetes mellitus without complication, without long-term current use of insulin  -significant improvement with diet and physical activity with 15 pound weight loss, congratulated him on his progress.  Self discontinued the metformin.  Check future labs off of metformin  -     Comprehensive Metabolic Panel; Future; Expected date: 11/14/2024  -     Lipid Panel; Future; Expected date: 11/14/2024  -     Hemoglobin A1C; Future; Expected date: 11/14/2024  -     CBC Without Differential; Future; Expected date: 11/18/2024    Essential hypertension 04/08/2021 TTE LV normal size with mild eccentric LVH and normal systolic function LVEF 55%.  Normal RV size and systolic function.  PA pressure 23.  -BP today good not on meds.  Ok to hold.  If BP goes up would restart aCEI    Dyslipidemia statin intolerance. 8/2018 zetia; 07/13/2016 stress test negative for ischemia  Statin myopathy  Peripheral artery disease  -pre visit labs not ideal. Would restart the zetia.  -     ezetimibe (ZETIA) 10 mg tablet; Take 1 tablet (10 mg total) by mouth once daily.  Dispense: 90 tablet; Refill: 3    Prostate cancer  -remission followed by [facility].  Per pt preference, q6 months follow up    Male erectile disorder  -re-challenge on viagra 100 mg 1/2 to 1 pill as needed.  Streem coupon given  -     sildenafiL (VIAGRA) 100 MG tablet; Take 1 tablet (100 mg total) by mouth daily as needed for Erectile Dysfunction.  Dispense: 30 tablet; Refill: 1    Centrilobular emphysema  -continues to smoke, he and neighbor going to try to hold each other accountable to cut down/stop    BPH  -stable off of tamsulosin.  Ok to hold.    Recurrent major depressive disorder, in full remission  -stable not on meds.    Recommended to  update covid shot and shingrix at pharmacy    Medications Discontinued During This Encounter   Medication Reason    hydroCHLOROthiazide (HYDRODIURIL) 12.5 MG Tab Therapy completed    metFORMIN (GLUCOPHAGE) 500 MG tablet Therapy completed    tamsulosin (FLOMAX) 0.4 mg Cap Therapy completed    ezetimibe (ZETIA) 10 mg tablet Reorder       meds sent this encounter:  Medications Ordered This Encounter   Medications    ezetimibe (ZETIA) 10 mg tablet     Sig: Take 1 tablet (10 mg total) by mouth once daily.     Dispense:  90 tablet     Refill:  3    sildenafiL (VIAGRA) 100 MG tablet     Sig: Take 1 tablet (100 mg total) by mouth daily as needed for Erectile Dysfunction.     Dispense:  30 tablet     Refill:  1         Follow Up: OV 6 months, labs off of meds except zetia  Future Appointments   Date Time Provider Department Center   11/19/2024  8:00 AM LAB, [facility]   11/25/2024  1:40 PM Navin Charlton MD [facility]            Subjective:   Subjective   Chief Complaint   Patient presents with    Follow-up       HPI  Sharee is a 73 y.o. male.     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 Last visit with me 11/14/2023   To summarize last visit and events leading up to today:  MDD.  Insomnia.  History of Lunesta.  Was seeing Psychiatry but stop seeing Psychiatry and therefore was no longer  "prescribed Lunesta.  History of Ambien with side effect of bad dreams.  He was already practicing the principles of CBT I.  Trial of mirtazapine and if no improvement next step would be doxepin  Neuropathy of the feet.  Intermittent and episodic.  Did not think this was vascular.  Recent TSH was normal.  A1c was below 5.6.  Never had chemotherapy for the prostate cancer.  Only XRT.  Check labs  Smoking working with cessation program  Hypertension BP at the time stable.  Encouraged to re-enroll in digital monitoring.  History of lisinopril and HCTZ, lisinopril stopped due to low BP  Hyperlipidemia check future labs  History of prostate cancer status post XRT, currently on hormone deprivation followed by [facility].  Lumbar radiculopathy stable  Emphysema seen on CT imaging of the chest  A1c 8.2 new diagnosis of diabetes  Testosterone was low but labs were drawn around noon time needs repeat  Subsequent labs 08/14/2023 A1c significantly improved 5.1 from 8.2.    Testosterone panel normal on repeat      11/2023  Insomnia. History of lunesta. History of shift work. Trial of lunesta 30 pills over 3 months.   DM metformin max dose stable.  I wanted him to change to ARB he wanted to stay on HCTZ. No changes. K low, K rich foods    Saw his oncologist in March  Annual surveillance (pt prefers q6 months)    Pre visit labs  CBC mild anemia stable  CMP WNL  Lipid not ideal   A1c 4.8 from 5.0  Microalb wnl    Today's visit:    Self discontinued his meds completely about 2-3 weeks ago  Prior to that had started reducing his meds  Like if he checked his blood sugar and it was "good" he would take 1/2 pill  And BP checking, in the morning, and BP always good. Except one time with fried foods went up to 135/90  Weight loss.  15 pounds    Typical diet  2 eggs in AM with lettuce and spinach  Veggie smoothie around 2 PM - carrots, kale, cucumbers etc.  Dinner - 3 oz of salmon, green salad  Snacking nuts   No sweet " drinks or cold drinks.     Tamsulosin - stopped taking it.  Off of it - notes that in the morning, with morning erection, hard to urinate.  Better throughout the day.    Sensation of complete void.    He'd like to continue to stay off of it.    Resting HR 50-60.  Dizziness only with lack of sleep otherwise no symptoms.  HR today on intake and on repeat 70-80    Joined gym.      Continues to smoke. Talking with his neighbor - they're going to try to cut down/quit together.    BP remains good on repeat today.    Patient Care Team:  Navin Charlton MD as PCP - General (Internal Medicine)  Alfredo Kearns MD as Consulting Physician (Urology)  Adeel Coleman MD as Consulting Physician (Psychiatry)  Adeel Vicente MD (Hematology and Oncology)  Juventino Meeks MA as Care Coordinator    Patient Active Problem List    Diagnosis Date Noted    Type 2 diabetes mellitus without complication, without long-term current use of insulin 05/16/2023    Low testosterone 05/16/2023    Centrilobular emphysema 05/11/2023    Class 1 obesity due to excess calories with serious comorbidity in adult 07/13/2022    Lumbar radiculopathy 11/09/2021 05/12/2021 EMG acute denervation with chronic reinnervation in the L5-S1 myotomes on the left.  5/24/2021 MRI L spine:   Multilevel lumbar spondylosis with resultant mild spinal canal stenosis at L3-L4 in neural foraminal stenosis at L4-L5 and L5-S1.      PVC (premature ventricular contraction); 4/2021 metoprolol 04/23/2021 04/08/2021 Holter frequent PVCs, frequent PACs      Peripheral artery disease mild on doppler 05/20/2019     4/10/2018 LE dopplers: 1. Right lower extremity pressures and waveforms indicate no hemodynamically significant arterial occlusive disease.  2. Left lower extremity pressures and waveforms indicate mild arterial occlusive disease.      Numbness or tingling workup with neuro for demyelinating 05/20/2019 12/19/18 MRI brain: 1. Numerous tiny focal areas of  remote gliosis posterior frontal parietal and occipital and upper basal ganglia areas, differential diagnosis includes microvascular ischemic change and inactive demyelinating process.  2. Intraventricular artifact opacity body left lateral ventricle discussed above.  12/2018 MRI C spine: 1. Multilevel spondylotic changes as discussed above.  2. Multilevel foraminal stenotic changes more pronounced on the left as compared to the right as discussed above.  See further details above.  1/24/19 EMG: borderline normal EMG/NCS. There was an absent   left sural response of unclear clinical significance. There were   no myopathic units seen to suggest a myopathy.     05/12/2021 EMG acute denervation with chronic reinnervation in the L5-S1 myotomes on the left.  5/24/2021 MRI L spine:   Multilevel lumbar spondylosis with resultant mild spinal canal stenosis at L3-L4 in neural foraminal stenosis at L4-L5 and L5-S1.      Tubulovillous adenoma of colon on colonoscopy 8/2018; 1/6/22 colonoscopy 5 mm sigmoid HP 08/30/2018 8/2018 colonoscopy TVA  1/6/22 colonoscopy 5 mm sigmoid HP      Statin myopathy 08/02/2018    Obstructive sleep apnea severe with AHI 64 on study 7/2018 07/27/2018     Very severe sleep disordered  breathing (AHI=64) is noted based on a 4% hypopnea desaturation criteria. The patient slept supine 64.2% of the night based  on valid recording time of 6.43 hours. The apneas/hypopneas are accompanied by severe oxygen desaturation (percent time  below 90% SpO2: 15.9%, Min SpO2: 69.6%). The average desaturation across all sleep disordered breathing events is 5.7%.  Snoring occurs for 45.4% (30 dB) of the study, 17.3% is extremely loud. The mean pulse rate is 63 BPM, with frequent pulse  rate variability (52 events with >= 6 BPM increase/decrease per hour).  TREATMENT CONSIDERATIONS: Consider nasal continuous positive airway pressure (CPAP) as the initial treatment  choice for severe obstructive sleep apnea.  Consider an attended in-lab CPAP titration study, given the possibility of co-morbid  sleep related hypoventilation.  DISEASE MANAGEMENT CONSIDERATIONS: Insomnia is a symptom that can be associated with untreated DAKOTA.  Sleeping pills may increase the severity of untreated DAKOTA and their use should be reevaluated once the DAKOTA is treated.      Recurrent major depressive disorder, in full remission 10/30/2017    Pelvic floor muscle wasting in male 09/28/2017    Prostate cancer s/p XRT currently hormone deprivation 08/30/2017     2 cores positive kary 3+4=7 on biopsy 8/15/2017      Smoker 08/30/2017    Vitamin D deficiency 08/30/2017    Beta thalassemia minor 04/11/2017    Benign non-nodular prostatic hyperplasia without lower urinary tract symptoms 03/29/2017     Hx of negative prostate Bx and hx of MRI prostate      Restless leg syndrome Requip ineffective 08/02/2016    Dyslipidemia statin intolerance. 8/2018 zetia; 07/13/2016 stress test negative for ischemia     Essential hypertension 04/08/2021 TTE LV normal size with mild eccentric LVH and normal systolic function LVEF 55%.  Normal RV size and systolic function.  PA pressure 23.      7/13/2016 [facility] stress test neg for ischemia  7/13/2016 TTE LVEF 55-60; no diastolic dysfunction  04/08/2021 TTE LV normal size with mild eccentric LVH and normal systolic function LVEF 55%.  Normal RV size and systolic function.  PA pressure 23.         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 Trazodone ineffective.  Rozerem didn't help 11/20/2013       PAST MEDICAL PROBLEMS, PAST SURGICAL HISTORY: please see relevant portions of the electronic medical record    ALLERGIES AND MEDICATIONS: updated and reviewed.  Medication List with Changes/Refills   Current Medications    BLOOD SUGAR DIAGNOSTIC STRP    To check BG 1 times daily, to use with insurance preferred meter    BLOOD-GLUCOSE METER KIT    To check BG 1 times daily, to use with insurance preferred meter    EZETIMIBE  (ZETIA) 10 MG TABLET    TAKE 1 TABLET EVERY DAY    HYDROCHLOROTHIAZIDE (HYDRODIURIL) 12.5 MG TAB    Take 1 tablet (12.5 mg total) by mouth once daily.    LANCETS MISC    To check BG 1 times daily, to use with insurance preferred meter    METFORMIN (GLUCOPHAGE) 500 MG TABLET    Take 2 tablets (1,000 mg total) by mouth 2 (two) times daily with meals.    TAMSULOSIN (FLOMAX) 0.4 MG CAP    TAKE 2 CAPSULES EVERY DAY    TRUEPLUS LANCETS 33 GAUGE MISC             Objective:   Objective   Physical Exam   Vitals:    05/21/24 1401   BP: 110/70   BP Location: Left arm   Patient Position: Sitting   BP Method: Large (Manual)   Pulse: 79   Temp: 98.2 °F (36.8 °C)   TempSrc: Oral   SpO2: 97%   Weight: 94 kg (207 lb 3.7 oz)    Body mass index is 28.9 kg/m².  Weight: 94 kg (207 lb 3.7 oz)         Physical Exam  Constitutional:       General: He is not in acute distress.     Appearance: He is well-developed.   Eyes:      Extraocular Movements: Extraocular movements intact.   Cardiovascular:      Rate and Rhythm: Normal rate and regular rhythm.      Heart sounds: Normal heart sounds. No murmur heard.  Pulmonary:      Effort: Pulmonary effort is normal.      Breath sounds: Normal breath sounds.   Abdominal:      General: There is no distension.      Tenderness: There is no abdominal tenderness. There is no guarding.   Musculoskeletal:         General: Normal range of motion.      Right lower leg: No edema.      Left lower leg: No edema.   Lymphadenopathy:      Cervical: No cervical adenopathy.   Skin:     General: Skin is warm and dry.   Neurological:      Mental Status: He is alert and oriented to person, place, and time.      Coordination: Coordination normal.   Psychiatric:         Behavior: Behavior normal.         Thought Content: Thought content normal.         Component      Latest Ref Rng 11/6/2023 5/14/2024   Sodium      136 - 145 mmol/L 139  142    Potassium      3.5 - 5.1 mmol/L 3.2 (L)  3.6    Chloride      95 - 110 mmol/L  104  110    CO2      23 - 29 mmol/L 28  24    Glucose      70 - 110 mg/dL 83  87    BUN      8 - 23 mg/dL 14  15    Creatinine      0.5 - 1.4 mg/dL 0.9  0.9    Calcium      8.7 - 10.5 mg/dL 9.3  9.3    PROTEIN TOTAL      6.0 - 8.4 g/dL 7.1  6.8    Albumin      3.5 - 5.2 g/dL 3.6  3.5    BILIRUBIN TOTAL      0.1 - 1.0 mg/dL 0.6  0.6    ALP      55 - 135 U/L 45 (L)  43 (L)    AST      10 - 40 U/L 18  20    ALT      10 - 44 U/L 16  18    eGFR      >60 mL/min/1.73 m^2 >60.0  >60.0    Anion Gap      8 - 16 mmol/L 7 (L)  8    WBC      3.90 - 12.70 K/uL 5.63  4.66    RBC      4.60 - 6.20 M/uL 5.42  5.32    Hemoglobin      14.0 - 18.0 g/dL 13.2 (L)  13.3 (L)    Hematocrit      40.0 - 54.0 % 43.2  42.6    MCV      82 - 98 fL 80 (L)  80 (L)    MCH      27.0 - 31.0 pg 24.4 (L)  25.0 (L)    MCHC      32.0 - 36.0 g/dL 30.6 (L)  31.2 (L)    RDW      11.5 - 14.5 % 14.6 (H)  15.0 (H)    Platelet Count      150 - 450 K/uL 275  230    MPV      9.2 - 12.9 fL 9.8  10.8    Cholesterol Total      120 - 199 mg/dL  185    Triglycerides      30 - 150 mg/dL  107    HDL      40 - 75 mg/dL  30 (L)    LDL Cholesterol      63.0 - 159.0 mg/dL  133.6    HDL/Cholesterol Ratio      20.0 - 50.0 %  16.2 (L)    Total Cholesterol/HDL Ratio      2.0 - 5.0   6.2 (H)    Non-HDL Cholesterol      mg/dL  155    Urine Microalbumin      ug/mL  21.0    Urine Creatinine      23.0 - 375.0 mg/dL  193.0    MICROALB/CREAT RATIO      0.0 - 30.0 ug/mg  10.9    Hemoglobin A1C External      4.0 - 5.6 % 5.0  4.8    Estimated Avg Glucose      68 - 131 mg/dL 97  91       Legend:  (L) Low  (H) High

## 2024-05-21 ENCOUNTER — OFFICE VISIT (OUTPATIENT)
Dept: FAMILY MEDICINE | Facility: CLINIC | Age: 74
End: 2024-05-21
Payer: MEDICARE

## 2024-05-21 VITALS
SYSTOLIC BLOOD PRESSURE: 110 MMHG | DIASTOLIC BLOOD PRESSURE: 70 MMHG | OXYGEN SATURATION: 97 % | HEART RATE: 79 BPM | WEIGHT: 207.25 LBS | TEMPERATURE: 98 F | BODY MASS INDEX: 28.9 KG/M2

## 2024-05-21 DIAGNOSIS — E78.5 DYSLIPIDEMIA: ICD-10-CM

## 2024-05-21 DIAGNOSIS — T46.6X5A STATIN MYOPATHY: ICD-10-CM

## 2024-05-21 DIAGNOSIS — J43.2 CENTRILOBULAR EMPHYSEMA: ICD-10-CM

## 2024-05-21 DIAGNOSIS — I73.9 PERIPHERAL ARTERY DISEASE: ICD-10-CM

## 2024-05-21 DIAGNOSIS — C61 PROSTATE CANCER: ICD-10-CM

## 2024-05-21 DIAGNOSIS — F33.42 RECURRENT MAJOR DEPRESSIVE DISORDER, IN FULL REMISSION: ICD-10-CM

## 2024-05-21 DIAGNOSIS — E11.9 TYPE 2 DIABETES MELLITUS WITHOUT COMPLICATION, WITHOUT LONG-TERM CURRENT USE OF INSULIN: Primary | ICD-10-CM

## 2024-05-21 DIAGNOSIS — N52.9 MALE ERECTILE DISORDER: ICD-10-CM

## 2024-05-21 DIAGNOSIS — G72.0 STATIN MYOPATHY: ICD-10-CM

## 2024-05-21 DIAGNOSIS — I10 ESSENTIAL HYPERTENSION: ICD-10-CM

## 2024-05-21 PROCEDURE — 99214 OFFICE O/P EST MOD 30 MIN: CPT | Mod: S$PBB,,, | Performed by: INTERNAL MEDICINE

## 2024-05-21 PROCEDURE — 99999 PR PBB SHADOW E&M-EST. PATIENT-LVL III: CPT | Mod: PBBFAC,,, | Performed by: INTERNAL MEDICINE

## 2024-05-21 PROCEDURE — 99213 OFFICE O/P EST LOW 20 MIN: CPT | Mod: PBBFAC,PO | Performed by: INTERNAL MEDICINE

## 2024-05-21 RX ORDER — SILDENAFIL 100 MG/1
100 TABLET, FILM COATED ORAL DAILY PRN
Qty: 30 TABLET | Refills: 1 | Status: SHIPPED | OUTPATIENT
Start: 2024-05-21 | End: 2025-05-21

## 2024-05-21 RX ORDER — EZETIMIBE 10 MG/1
10 TABLET ORAL DAILY
Qty: 90 TABLET | Refills: 3 | Status: SHIPPED | OUTPATIENT
Start: 2024-05-21

## 2024-06-04 NOTE — PROGRESS NOTES
Individual Follow-Up Form    4/29/2020    Quit Date: TBD    Clinical Status of Patient: Outpatient    Length of Service: 15 minutes    Continuing Medication: yes  Nicotine Lozenges    Other Medications: none     Target Symptoms: Withdrawal and medication side effects. The following were  rated moderate (3) to severe (4) on TCRS:  · Moderate (3): none  · Severe (4): none    Comments: Called patient via telephone call to follow up on smoking cessation due to pandemic situation. Patient reports smoking 3-4 cpd. He reports smoking 2 cigarettes yesterday morning, he no longer has any and doesn't plan on buying anymore. Patient remains on prescribed tobacco cessation medication regimen of 2 mg lozenges PRN, without any negative side effects at this time.  Pt is using about 4 lozenges/day. Encouraged him to use more if he was to need to. Discussed nicotine addiction vs habit dependence. Patient aware it's out of habit. Reinforced the importance of becoming tobacco free with this pandemic, pt stated he agrees and will attempt to remain tobacco free. Patient is to follow up in 2 weeks.    Diagnosis: F17.200    Next Visit: 2 weeks   GEN: Awake, alert, interactive, NAD.  HEAD AND NECK: NC/AT. Airway patent. Neck supple. No c spine tenderness.   EYES:  Clear b/l. EOMI. PERRL.   ENT: Moist mucus membranes.   CARDIAC: Regular rate, regular rhythm. No evident pedal edema.    RESP/CHEST: Normal respiratory effort with no use of accessory muscles or retractions. Clear throughout on auscultation. (+) mild bruising to the left clavicle. NO chest wall tenderness.   ABD: soft, non-distended, non-tender. No rebound, no guarding.   BACK: (+) mild diffuse lumbar tenderness. No CVAT.   EXTREMITIES: LUE: No deformity, (+) bruising and abrasion to the left upper arm. (+) FROM of the shoulder, elbow, and wrist. LLE: (+) abrasion and blister to the left lower leg. (+) FROM of the hip, knee, and ankle, (+) pulses. intact. RLE: (+) abrasion to the right lower leg and mild swelling. (+) mild tenderness to the right knee and lower leg,   SKIN: Warm, dry, intact normal color. No rash.   NEURO: AOx3, CN II-XII grossly intact, no focal deficits.   PSYCH: Appropriate mood and affect.

## 2024-07-19 ENCOUNTER — CLINICAL SUPPORT (OUTPATIENT)
Dept: SMOKING CESSATION | Facility: CLINIC | Age: 74
End: 2024-07-19
Payer: COMMERCIAL

## 2024-07-19 DIAGNOSIS — F17.200 NICOTINE DEPENDENCE: Primary | ICD-10-CM

## 2024-07-19 PROCEDURE — 99999 PR PBB SHADOW E&M-EST. PATIENT-LVL II: CPT | Mod: PBBFAC,,,

## 2024-07-19 PROCEDURE — 99404 PREV MED CNSL INDIV APPRX 60: CPT | Mod: S$GLB,,,

## 2024-07-19 RX ORDER — MICONAZOLE NITRATE 2 %
2 CREAM (GRAM) TOPICAL
Qty: 100 EACH | Refills: 0 | Status: SHIPPED | OUTPATIENT
Start: 2024-07-19 | End: 2024-08-15

## 2024-07-19 NOTE — Clinical Note
Patient presents for intake smoking 10 cigarettes per day, after assessment and discussion he is waiting to get back on track with the 2mg nicotine gum, he used this in the past with [facility] and did well, he was not smoking as much as he is now but not interested in the nicotine patch in conjunction with the gum at this time, he has two cigarettes left and plans to not purchase any more and use the gum only! Encouraged an abrupt quit explaining that this is the most successful way of going about a quit, reminded him how the gum works, how to use and how to avoid s/e, session handout discussed and provided, cinnamon toothpicks provided, will follow

## 2024-08-02 ENCOUNTER — CLINICAL SUPPORT (OUTPATIENT)
Dept: SMOKING CESSATION | Facility: CLINIC | Age: 74
End: 2024-08-02
Payer: COMMERCIAL

## 2024-08-02 DIAGNOSIS — F17.200 NICOTINE DEPENDENCE: Primary | ICD-10-CM

## 2024-08-02 PROCEDURE — 99407 BEHAV CHNG SMOKING > 10 MIN: CPT | Mod: S$GLB,,,

## 2024-08-02 PROCEDURE — 99999 PR PBB SHADOW E&M-EST. PATIENT-LVL I: CPT | Mod: PBBFAC,,,

## 2024-08-07 ENCOUNTER — CLINICAL SUPPORT (OUTPATIENT)
Dept: SMOKING CESSATION | Facility: CLINIC | Age: 74
End: 2024-08-07
Payer: COMMERCIAL

## 2024-08-07 DIAGNOSIS — F17.200 NICOTINE DEPENDENCE: Primary | ICD-10-CM

## 2024-08-07 PROCEDURE — 99403 PREV MED CNSL INDIV APPRX 45: CPT | Mod: S$GLB,,,

## 2024-08-07 PROCEDURE — 99999 PR PBB SHADOW E&M-EST. PATIENT-LVL II: CPT | Mod: PBBFAC,,,

## 2024-08-07 RX ORDER — MICONAZOLE NITRATE 2 %
2 CREAM (GRAM) TOPICAL
Qty: 100 EACH | Refills: 0 | Status: SHIPPED | OUTPATIENT
Start: 2024-08-07 | End: 2024-09-03

## 2024-08-21 ENCOUNTER — CLINICAL SUPPORT (OUTPATIENT)
Dept: SMOKING CESSATION | Facility: CLINIC | Age: 74
End: 2024-08-21
Payer: COMMERCIAL

## 2024-08-21 DIAGNOSIS — F17.200 NICOTINE DEPENDENCE: ICD-10-CM

## 2024-08-21 PROCEDURE — 99999 PR PBB SHADOW E&M-EST. PATIENT-LVL II: CPT | Mod: PBBFAC,,,

## 2024-08-21 PROCEDURE — 99404 PREV MED CNSL INDIV APPRX 60: CPT | Mod: S$GLB,,,

## 2024-08-21 RX ORDER — MICONAZOLE NITRATE 2 %
2 CREAM (GRAM) TOPICAL
Qty: 100 EACH | Refills: 0 | Status: SHIPPED | OUTPATIENT
Start: 2024-08-21 | End: 2024-09-17

## 2024-08-21 NOTE — PROGRESS NOTES
Individual Follow-Up Form    8/21/2024    Quit Date: 7/24    Clinical Status of Patient: Outpatient    Length of Service: 60 minutes    Continuing Medication: yes  Nicotine gum    Other Medications: n/a     Target Symptoms: Withdrawal and medication side effects. The following were  rated moderate (3) to severe (4) on TCRS:  Moderate (3): 0  Severe (4): 0    Comments: patient presents for clinic appt, he is back to being a non smoker, he is committed and has been able to get back on track after a slip that involved a road trip and associations with smoking etc, he quit smoking on 7/24 and since last follow up has not had any slips, he is currently on the nicotine gum and using this a few times per day, he is no loving the flavor and feels that maybe the mint flavor would be better, will change and order, encouragement provided denies negative s/e from the gum. Strategies and session and out discussed, he is going on another trip but with a non smoker this time therefore feels more confident about refraining, encouraged him too and to work on [employer]power in the meantime, will follow up in clinic when he returns from his trip,     Diagnosis: F17.210    Next Visit: 2 weeks

## 2024-08-27 DIAGNOSIS — Z00.00 ENCOUNTER FOR MEDICARE ANNUAL WELLNESS EXAM: ICD-10-CM

## 2024-09-10 DIAGNOSIS — E11.9 TYPE 2 DIABETES MELLITUS WITHOUT COMPLICATION, UNSPECIFIED WHETHER LONG TERM INSULIN USE: ICD-10-CM

## 2024-09-12 ENCOUNTER — CLINICAL SUPPORT (OUTPATIENT)
Dept: SMOKING CESSATION | Facility: CLINIC | Age: 74
End: 2024-09-12
Payer: COMMERCIAL

## 2024-09-12 DIAGNOSIS — F17.200 NICOTINE DEPENDENCE: ICD-10-CM

## 2024-09-12 PROCEDURE — 99404 PREV MED CNSL INDIV APPRX 60: CPT | Mod: S$GLB,,,

## 2024-09-12 PROCEDURE — 99999 PR PBB SHADOW E&M-EST. PATIENT-LVL I: CPT | Mod: PBBFAC,,,

## 2024-09-12 RX ORDER — MICONAZOLE NITRATE 2 %
2 CREAM (GRAM) TOPICAL
Qty: 100 EACH | Refills: 0 | Status: SHIPPED | OUTPATIENT
Start: 2024-09-12 | End: 2024-10-09
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
## 2024-09-26 ENCOUNTER — CLINICAL SUPPORT (OUTPATIENT)
Dept: SMOKING CESSATION | Facility: CLINIC | Age: 74
End: 2024-09-26
Payer: COMMERCIAL

## 2024-09-26 DIAGNOSIS — F17.200 NICOTINE DEPENDENCE: ICD-10-CM

## 2024-09-26 PROCEDURE — 99404 PREV MED CNSL INDIV APPRX 60: CPT | Mod: S$GLB,,,

## 2024-09-26 PROCEDURE — 99999 PR PBB SHADOW E&M-EST. PATIENT-LVL II: CPT | Mod: PBBFAC,,,

## 2024-09-26 RX ORDER — MICONAZOLE NITRATE 2 %
2 CREAM (GRAM) TOPICAL
Qty: 100 EACH | Refills: 0 | Status: SHIPPED | OUTPATIENT
Start: 2024-09-26 | End: 2024-10-23

## 2024-09-26 NOTE — PROGRESS NOTES
Individual Follow-Up Form    9/26/2024    Quit Date: 7/31    Clinical Status of Patient: Outpatient    Length of Service: 60 minutes    Continuing Medication: no    Other Medications: n/a     Target Symptoms: Withdrawal and medication side effects. The following were  rated moderate (3) to severe (4) on TCRS:  Moderate (3): 0  Severe (4): 0    Comments: patient presents for follow up in clinic today, he lapsed and is smoking 3 cigarettes per day, states that [facility] had an issue with approving his nicotine gum and he was unable to get more gum and then purchased a pack of cigarettes, this was unknowing to [facility] therefore recommend that he contact [facility] if this is to ever happen again, recommend sending the scripts to [facility] for a seamless refill. Patient agrees, encouraged to toss the remaining cigs he has and get back on track asap. Strategies and session handout discussed will follow, discussed lapse and slip prevention     Diagnosis: F17.200    Next Visit: 2 weeks

## 2024-10-03 ENCOUNTER — CLINICAL SUPPORT (OUTPATIENT)
Dept: SMOKING CESSATION | Facility: CLINIC | Age: 74
End: 2024-10-03
Payer: COMMERCIAL

## 2024-10-03 DIAGNOSIS — F17.200 NICOTINE DEPENDENCE: Primary | ICD-10-CM

## 2024-10-03 NOTE — PROGRESS NOTES
Called pt to f/u on his 3 month smoking cessation quit status. Pt stated he is still smoking, but continues to work on his quit. Pt is still actively enrolled in program and continues to f/u with [facility]. Informed him of benefit period, phone follow ups, and contact information. Will complete smart form and will continue to follow up on quit #6 episode.

## 2024-10-21 ENCOUNTER — CLINICAL SUPPORT (OUTPATIENT)
Dept: SMOKING CESSATION | Facility: CLINIC | Age: 74
End: 2024-10-21
Payer: COMMERCIAL

## 2024-10-21 DIAGNOSIS — F17.200 NICOTINE DEPENDENCE: ICD-10-CM

## 2024-10-21 PROCEDURE — 99404 PREV MED CNSL INDIV APPRX 60: CPT | Mod: S$GLB,,,

## 2024-10-21 PROCEDURE — 99999 PR PBB SHADOW E&M-EST. PATIENT-LVL II: CPT | Mod: PBBFAC,,,

## 2024-10-21 RX ORDER — MICONAZOLE NITRATE 2 %
2 CREAM (GRAM) TOPICAL
Qty: 100 EACH | Refills: 0 | Status: SHIPPED | OUTPATIENT
Start: 2024-10-21 | End: 2024-11-17

## 2024-10-21 NOTE — PROGRESS NOTES
Individual Follow-Up Form    10/21/2024    Quit Date: n/a    Clinical Status of Patient: Outpatient    Length of Service: 60 minutes    Continuing Medication: yes  Nicotine gum    Other Medications: n/a     Target Symptoms: Withdrawal and medication side effects. The following were  rated moderate (3) to severe (4) on TCRS:  Moderate (3): 0  Severe (4): 0    Comments: patient presents for follow up in clinic, he is smoking about 3-4 cigarettes per day down from 6 per day, he is struggling with getting on track with his smoking and using the nicotine gum again, he is just not motivated to do it, he is doing a project at home and when it does not go well he sits back and smokes and thinks and this project is stressing him out and he feels he needs to complete this project before starting again and getting back on track, strategies and session handout discussed, encouragement provided, informed the patient that this is not a bad thing that sometimes stopping and restarting when ready makes the difference and helps, will continue to follow the patient while he is within benefit period and will renew if needed.     Diagnosis: F17.210    Next Visit: 2 weeks

## 2024-11-04 ENCOUNTER — CLINICAL SUPPORT (OUTPATIENT)
Dept: SMOKING CESSATION | Facility: CLINIC | Age: 74
End: 2024-11-04
Payer: COMMERCIAL

## 2024-11-04 DIAGNOSIS — F17.200 NICOTINE DEPENDENCE: Primary | ICD-10-CM

## 2024-11-04 PROCEDURE — 99999 PR PBB SHADOW E&M-EST. PATIENT-LVL II: CPT | Mod: PBBFAC,,,

## 2024-11-04 PROCEDURE — 99407 BEHAV CHNG SMOKING > 10 MIN: CPT | Mod: S$GLB,,,

## 2024-11-19 ENCOUNTER — PATIENT OUTREACH (OUTPATIENT)
Dept: ADMINISTRATIVE | Facility: HOSPITAL | Age: 74
End: 2024-11-19
Payer: MEDICARE

## 2024-11-19 ENCOUNTER — LAB VISIT (OUTPATIENT)
Dept: LAB | Facility: HOSPITAL | Age: 74
End: 2024-11-19
Attending: INTERNAL MEDICINE
Payer: MEDICARE

## 2024-11-19 DIAGNOSIS — E11.9 TYPE 2 DIABETES MELLITUS WITHOUT COMPLICATION, WITHOUT LONG-TERM CURRENT USE OF INSULIN: ICD-10-CM

## 2024-11-19 LAB
ALBUMIN SERPL BCP-MCNC: 3.6 G/DL (ref 3.5–5.2)
ALP SERPL-CCNC: 63 U/L (ref 40–150)
ALT SERPL W/O P-5'-P-CCNC: 20 U/L (ref 10–44)
ANION GAP SERPL CALC-SCNC: 6 MMOL/L (ref 8–16)
AST SERPL-CCNC: 22 U/L (ref 10–40)
BILIRUB SERPL-MCNC: 0.6 MG/DL (ref 0.1–1)
BUN SERPL-MCNC: 11 MG/DL (ref 8–23)
CALCIUM SERPL-MCNC: 9 MG/DL (ref 8.7–10.5)
CHLORIDE SERPL-SCNC: 109 MMOL/L (ref 95–110)
CHOLEST SERPL-MCNC: 195 MG/DL (ref 120–199)
CHOLEST/HDLC SERPL: 4.4 {RATIO} (ref 2–5)
CO2 SERPL-SCNC: 28 MMOL/L (ref 23–29)
CREAT SERPL-MCNC: 0.9 MG/DL (ref 0.5–1.4)
ERYTHROCYTE [DISTWIDTH] IN BLOOD BY AUTOMATED COUNT: 14.9 % (ref 11.5–14.5)
EST. GFR  (NO RACE VARIABLE): >60 ML/MIN/1.73 M^2
ESTIMATED AVG GLUCOSE: 80 MG/DL (ref 68–131)
GLUCOSE SERPL-MCNC: 80 MG/DL (ref 70–110)
HBA1C MFR BLD: 4.4 % (ref 4–5.6)
HCT VFR BLD AUTO: 43.4 % (ref 40–54)
HDLC SERPL-MCNC: 44 MG/DL (ref 40–75)
HDLC SERPL: 22.6 % (ref 20–50)
HGB BLD-MCNC: 13.5 G/DL (ref 14–18)
LDLC SERPL CALC-MCNC: 136.6 MG/DL (ref 63–159)
MCH RBC QN AUTO: 25 PG (ref 27–31)
MCHC RBC AUTO-ENTMCNC: 31.1 G/DL (ref 32–36)
MCV RBC AUTO: 81 FL (ref 82–98)
NONHDLC SERPL-MCNC: 151 MG/DL
PLATELET # BLD AUTO: 234 K/UL (ref 150–450)
PMV BLD AUTO: 10.5 FL (ref 9.2–12.9)
POTASSIUM SERPL-SCNC: 3.7 MMOL/L (ref 3.5–5.1)
PROT SERPL-MCNC: 7 G/DL (ref 6–8.4)
RBC # BLD AUTO: 5.39 M/UL (ref 4.6–6.2)
SODIUM SERPL-SCNC: 143 MMOL/L (ref 136–145)
TRIGL SERPL-MCNC: 72 MG/DL (ref 30–150)
WBC # BLD AUTO: 5.92 K/UL (ref 3.9–12.7)

## 2024-11-19 PROCEDURE — 85027 COMPLETE CBC AUTOMATED: CPT | Performed by: INTERNAL MEDICINE

## 2024-11-19 PROCEDURE — 80053 COMPREHEN METABOLIC PANEL: CPT | Performed by: INTERNAL MEDICINE

## 2024-11-19 PROCEDURE — 83036 HEMOGLOBIN GLYCOSYLATED A1C: CPT | Performed by: INTERNAL MEDICINE

## 2024-11-19 PROCEDURE — 80061 LIPID PANEL: CPT | Performed by: INTERNAL MEDICINE

## 2024-11-22 NOTE — PROGRESS NOTES
This note was created by combination of typed  and M-Modal dictation.  Transcription errors may be present.   This note was also generated with the assistance of ambient listening technology. Verbal consent was obtained by the patient and accompanying visitor(s) for the recording of patient appointment to facilitate this note. I attest to having reviewed and edited the generated note for accuracy, though some syntax or spelling errors may persist. Please contact the author of this note for any clarification.    Assessment and Plan:   Assessment and Plan   Normal physical exam  Beta thalassemia minor  Tubulovillous adenoma of colon on colonoscopy 8/2018; 1/6/22 colonoscopy 5 mm sigmoid HP  -pre visit labs reviewed   History of tubulovillous adenoma.  Last colonoscopy 2022 with recommendations to repeat in 3 years so he was going to be due.  History of prostate cancer followed by [facility] in [place].  -     Ambulatory referral/consult to Endo Procedure ; Future; Expected date: 11/26/2024      Type 2 diabetes mellitus without complication, without long-term current use of insulin  -diet-controlled.  A1c well within normal.  Discussed that he will be categories with diabetes until he has 5 years of normal A1c so that would be 5/2028  Sees outside Optometry/Ophthalmology, I have asked him to set up for annual with them.  -     Comprehensive Metabolic Panel; Future; Expected date: 05/21/2025  -     Lipid Panel; Future; Expected date: 05/21/2025  -     Hemoglobin A1C; Future; Expected date: 05/21/2025  -     Microalbumin/Creatinine Ratio, Urine; Future; Expected date: 05/21/2025  -     CBC Without Differential; Future; Expected date: 05/22/2025    Essential hypertension 04/08/2021 TTE LV normal size with mild eccentric LVH and normal systolic function LVEF 55%.  Normal RV size and systolic function.  PA pressure 23.  -diet-controlled.  Blood pressure today normal.    Dyslipidemia statin  intolerance. 8/2018 zetia; 07/13/2016 stress test negative for ischemia  Statin myopathy  Peripheral artery disease mild on doppler  -history of intolerance to statins.  Feels like he gets similar achiness to Zetia though not as severe.    We discussed the incidental findings of atherosclerosis on CT imaging as well as coronary arthrosclerosis.  No chest pain no dyspnea but on discussion he would be okay to challenge back on Zetia.    Continue working on TLC.  -     Discontinue: ezetimibe (ZETIA) 10 mg tablet; Take 1 tablet (10 mg total) by mouth once daily.  Dispense: 90 tablet; Refill: 3  -     ezetimibe (ZETIA) 10 mg tablet; Take 1 tablet (10 mg total) by mouth once daily.  Dispense: 90 tablet; Refill: 3    Smoker  -working with smoking cessation.  Plans to quit again come new year's    Ingrown toenail of both feet  -no out right pain or evidence of infection but does get worried that this will progress.  He would like to see Podiatry.  Referral submitted  -     Ambulatory referral/consult to Podiatry; Future; Expected date: 12/02/2024          Medications Discontinued During This Encounter   Medication Reason    ezetimibe (ZETIA) 10 mg tablet Side effects    ezetimibe (ZETIA) 10 mg tablet Reorder       meds sent this encounter:  Medications Ordered This Encounter   Medications    ezetimibe (ZETIA) 10 mg tablet     Sig: Take 1 tablet (10 mg total) by mouth once daily.     Dispense:  90 tablet     Refill:  3         Follow Up:  Follow-up 6 months, labs on restart of Zetia  Future Appointments   Date Time Provider Department Center   12/10/2024  9:00 AM Alisia Adams NP [facility]          Subjective:   Subjective   Chief Complaint   Patient presents with    Follow-up       HPI  Sharee is a 74 y.o. male.     Social History     Tobacco Use    Smoking status: Every Day     Types: Cigarettes     Passive exposure: Current    Smokeless tobacco: Never    Tobacco comments:     smokes 3 cigarettes daily   Substance  Use Topics    Alcohol use: Yes     Alcohol/week: 3.0 standard drinks of alcohol     Types: 1 Glasses of wine, 1 Cans of beer, 1 Shots of liquor per week     Comment: Ocassionally      Social History     Occupational History    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; 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; 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     Employer: [employer]      Social History     Social History Narrative    Not on file       Patient Care Team:  Navin Charlton MD as PCP - General (Internal Medicine)  Alfredo Kearns MD as Consulting Physician (Urology)  Adeel Coleman MD as Consulting Physician (Psychiatry)  Adeel Vicente MD (Hematology and Oncology)  Jon Spence MD as Consulting Physician (Ophthalmology)    Last appointment with this clinic was Visit date not found. Last visit with me 5/21/2024   To summarize last visit and events leading up to today:  MDD.  Insomnia.  History of Lunesta.  Was seeing Psychiatry but stop seeing Psychiatry and therefore was no longer prescribed Lunesta.  History of Ambien with side effect of bad dreams.  He was already practicing the principles of CBT I.  Trial of mirtazapine and if no improvement next step would be doxepin  Neuropathy of the feet.  Intermittent and episodic.  Did not think this was vascular.  Recent TSH was normal.  A1c was below 5.6.  Never had chemotherapy for the prostate cancer.  Only XRT.  Check labs  Smoking working with cessation program  Hypertension BP at the time stable.  Encouraged to re-enroll in digital monitoring.  History of lisinopril and HCTZ, lisinopril stopped due to low BP  Hyperlipidemia check future labs  History of prostate cancer status post XRT, currently on hormone deprivation followed by [facility].  Lumbar radiculopathy stable  Emphysema seen on CT imaging of the chest  A1c 8.2 new diagnosis of diabetes  Testosterone was low but labs were drawn around noon time needs repeat  Subsequent labs 08/14/2023 A1c significantly improved 5.1 from 8.2.     Testosterone panel normal on repeat        11/2023  Insomnia. History of lunesta. History of shift work. Trial of lunesta 30 pills over 3 months.   DM metformin max dose stable.  I wanted him to change to ARB he wanted to stay on HCTZ. No changes. K low, K rich foods     Saw his oncologist in March  Annual surveillance (pt prefers q6 months)     Last visit with me 05/21/2024  Diabetes significant improve with diet physical activity.  50 lb weight loss.  Self discontinued metformin.  Hypertension stable not on medications if BP goes up restart ACE inhibitor  Dyslipidemia with statin intolerance.  Restart Zetia.  Prostate cancer in remission followed by [facility]  ED rechallenge on Viagra  Emphysema continues to smoke trying to cut down  BPH stable off of tamsulosin  MDD in remission stable not on meds    09/03/2024 saw his oncologist in follow-up continue surveillance 6 months    Pre visit labs   CBC mild anemia stable   CMP normal   Lipid not ideal  unchanged from previous  A1c 4.4 from 4.8    Today's visit:    History of Present Illness    SOCIAL HISTORY:  - Smoking: Current smoker, plans to quit as a New Year's resolution    HPI:  Sharee reports overall good health since his last visit, with no significant new health concerns. He has occasional numbness in his feet, intermittent ankle swelling, and transient darker discoloration of his toes. Sharee discontinued his cholesterol medication (Zetia) 7-8 months ago due to muscle cramping, particularly in his thigh area during physical activities. Since discontinuation, he still has some cramping, but less frequently and intensely, except for the thigh area which remains intense when it occurs.    Sharee has ingrown toenails on both great toes, visible but not causing pain. He denies any infections or drainage from the affected toenails.     Sharee has relapsed to smoking after previously using nicotine replacement therapy. He intends to quit smoking as  "a New Year's resolution and is working with a smoking cessation clinic.    Sharee saw his oncologist in September, who reported stable condition and recommended a follow up in 6 months.     He has been managing his diabetes through diet control and some walking, without medication. Sharee denies any current use of nicotine replacement therapy.    MEDICATIONS:  - Chlorhexidine oral rinse solution, used regularly to control plaque buildup  -prescribed Zetia but not taking    ROS:  Musculoskeletal: reports muscle cramps  Neurological: reports numbness         ALLERGIES AND MEDICATIONS: updated and reviewed.  Medication List with Changes/Refills   Current Medications    BLOOD SUGAR DIAGNOSTIC STRP    To check BG 1 times daily, to use with insurance preferred meter    BLOOD-GLUCOSE METER KIT    To check BG 1 times daily, to use with insurance preferred meter    CHLORHEXIDINE (PERIDEX) 0.12 % SOLUTION    RINSE WITH 15 MLS BY MOUTH FOR 30 SECONDS THEN SPIT EVERY DAY    EZETIMIBE (ZETIA) 10 MG TABLET    Take 1 tablet (10 mg total) by mouth once daily.    LANCETS MISC    To check BG 1 times daily, to use with insurance preferred meter    SILDENAFIL (VIAGRA) 100 MG TABLET    Take 1 tablet (100 mg total) by mouth daily as needed for Erectile Dysfunction.    TRUEPLUS LANCETS 33 GAUGE MISC             Objective:   Objective   Physical Exam   Vitals:    11/25/24 1347   BP: 116/64   Pulse: 68   Resp: 18   Temp: 98.4 °F (36.9 °C)   TempSrc: Oral   SpO2: 97%   Weight: 91.4 kg (201 lb 9.8 oz)   Height: 5' 11" (1.803 m)    Body mass index is 28.12 kg/m².  Weight: 91.4 kg (201 lb 9.8 oz)   Height: 5' 11" (180.3 cm)     Physical Exam  Constitutional:       Appearance: Normal appearance. He is well-developed.   HENT:      Nose: Nose normal.   Eyes:      General: No scleral icterus.     Conjunctiva/sclera: Conjunctivae normal.   Neck:      Thyroid: No thyroid mass or thyromegaly.      Trachea: Trachea normal.   Cardiovascular:      Rate and " Rhythm: Normal rate and regular rhythm.      Heart sounds: Normal heart sounds, S1 normal and S2 normal. No murmur heard.  Pulmonary:      Effort: Pulmonary effort is normal.      Breath sounds: Normal breath sounds.   Abdominal:      General: There is no distension.      Palpations: Abdomen is soft. There is no hepatomegaly, splenomegaly or mass.      Tenderness: There is no abdominal tenderness.   Musculoskeletal:         General: No deformity.   Lymphadenopathy:      Cervical: No cervical adenopathy.   Skin:     General: Skin is warm and dry.      Findings: No rash (on exposed skin).      Comments: On exposed skin   Neurological:      Mental Status: He is alert and oriented to person, place, and time.      Cranial Nerves: No cranial nerve deficit.      Sensory: No sensory deficit.      Deep Tendon Reflexes: Reflexes are normal and symmetric.   Psychiatric:         Speech: Speech normal.         Behavior: Behavior normal.         Thought Content: Thought content normal.         Judgment: Judgment normal.         Component      Latest Ref Rng 5/14/2024 11/19/2024   Sodium      136 - 145 mmol/L 142  143    Potassium      3.5 - 5.1 mmol/L 3.6  3.7    Chloride      95 - 110 mmol/L 110  109    CO2      23 - 29 mmol/L 24  28    Glucose      70 - 110 mg/dL 87  80    BUN      8 - 23 mg/dL 15  11    Creatinine      0.5 - 1.4 mg/dL 0.9  0.9    Calcium      8.7 - 10.5 mg/dL 9.3  9.0    PROTEIN TOTAL      6.0 - 8.4 g/dL 6.8  7.0    Albumin      3.5 - 5.2 g/dL 3.5  3.6    BILIRUBIN TOTAL      0.1 - 1.0 mg/dL 0.6  0.6    ALP      40 - 150 U/L 43 (L)  63    AST      10 - 40 U/L 20  22    ALT      10 - 44 U/L 18  20    eGFR      >60 mL/min/1.73 m^2 >60.0  >60.0    Anion Gap      8 - 16 mmol/L 8  6 (L)    WBC      3.90 - 12.70 K/uL 4.66  5.92    RBC      4.60 - 6.20 M/uL 5.32  5.39    Hemoglobin      14.0 - 18.0 g/dL 13.3 (L)  13.5 (L)    Hematocrit      40.0 - 54.0 % 42.6  43.4    MCV      82 - 98 fL 80 (L)  81 (L)    MCH       27.0 - 31.0 pg 25.0 (L)  25.0 (L)    MCHC      32.0 - 36.0 g/dL 31.2 (L)  31.1 (L)    RDW      11.5 - 14.5 % 15.0 (H)  14.9 (H)    Platelet Count      150 - 450 K/uL 230  234    MPV      9.2 - 12.9 fL 10.8  10.5    Cholesterol Total      120 - 199 mg/dL 185  195    Triglycerides      30 - 150 mg/dL 107  72    HDL      40 - 75 mg/dL 30 (L)  44    LDL Cholesterol      63.0 - 159.0 mg/dL 133.6  136.6    HDL/Cholesterol Ratio      20.0 - 50.0 % 16.2 (L)  22.6    Total Cholesterol/HDL Ratio      2.0 - 5.0  6.2 (H)  4.4    Non-HDL Cholesterol      mg/dL 155  151    Urine Microalbumin      ug/mL 21.0     Urine Creatinine      23.0 - 375.0 mg/dL 193.0     MICROALB/CREAT RATIO      0.0 - 30.0 ug/mg 10.9     Hemoglobin A1C External      4.0 - 5.6 % 4.8  4.4    Estimated Avg Glucose      68 - 131 mg/dL 91  80       Legend:  (L) Low  (H) High

## 2024-11-25 ENCOUNTER — PATIENT OUTREACH (OUTPATIENT)
Dept: ADMINISTRATIVE | Facility: HOSPITAL | Age: 74
End: 2024-11-25
Payer: MEDICARE

## 2024-11-25 ENCOUNTER — OFFICE VISIT (OUTPATIENT)
Dept: FAMILY MEDICINE | Facility: CLINIC | Age: 74
End: 2024-11-25
Payer: MEDICARE

## 2024-11-25 VITALS
OXYGEN SATURATION: 97 % | SYSTOLIC BLOOD PRESSURE: 116 MMHG | HEIGHT: 71 IN | RESPIRATION RATE: 18 BRPM | DIASTOLIC BLOOD PRESSURE: 64 MMHG | TEMPERATURE: 98 F | HEART RATE: 68 BPM | BODY MASS INDEX: 28.23 KG/M2 | WEIGHT: 201.63 LBS

## 2024-11-25 DIAGNOSIS — G72.0 STATIN MYOPATHY: ICD-10-CM

## 2024-11-25 DIAGNOSIS — I10 ESSENTIAL HYPERTENSION: ICD-10-CM

## 2024-11-25 DIAGNOSIS — D56.3 BETA THALASSEMIA MINOR: Chronic | ICD-10-CM

## 2024-11-25 DIAGNOSIS — T46.6X5A STATIN MYOPATHY: ICD-10-CM

## 2024-11-25 DIAGNOSIS — E11.9 TYPE 2 DIABETES MELLITUS WITHOUT COMPLICATION, WITHOUT LONG-TERM CURRENT USE OF INSULIN: ICD-10-CM

## 2024-11-25 DIAGNOSIS — Z00.00 NORMAL PHYSICAL EXAM: Primary | ICD-10-CM

## 2024-11-25 DIAGNOSIS — L60.0 INGROWN TOENAIL OF BOTH FEET: ICD-10-CM

## 2024-11-25 DIAGNOSIS — D12.6 TUBULOVILLOUS ADENOMA OF COLON: ICD-10-CM

## 2024-11-25 DIAGNOSIS — E78.5 DYSLIPIDEMIA: ICD-10-CM

## 2024-11-25 DIAGNOSIS — I73.9 PERIPHERAL ARTERY DISEASE: ICD-10-CM

## 2024-11-25 DIAGNOSIS — F17.200 SMOKER: ICD-10-CM

## 2024-11-25 PROCEDURE — 99999 PR PBB SHADOW E&M-EST. PATIENT-LVL IV: CPT | Mod: PBBFAC,,, | Performed by: INTERNAL MEDICINE

## 2024-11-25 PROCEDURE — 99214 OFFICE O/P EST MOD 30 MIN: CPT | Mod: PBBFAC,PO | Performed by: INTERNAL MEDICINE

## 2024-11-25 RX ORDER — EZETIMIBE 10 MG/1
10 TABLET ORAL DAILY
Qty: 90 TABLET | Refills: 3 | Status: SHIPPED | OUTPATIENT
Start: 2024-11-25

## 2024-11-25 RX ORDER — CHLORHEXIDINE GLUCONATE ORAL RINSE 1.2 MG/ML
SOLUTION DENTAL
Status: CANCELLED | OUTPATIENT
Start: 2024-11-25

## 2024-11-25 RX ORDER — CHLORHEXIDINE GLUCONATE ORAL RINSE 1.2 MG/ML
SOLUTION DENTAL
COMMUNITY
Start: 2024-09-27

## 2024-11-25 RX ORDER — EZETIMIBE 10 MG/1
10 TABLET ORAL DAILY
Qty: 90 TABLET | Refills: 3 | Status: SHIPPED | OUTPATIENT
Start: 2024-11-25 | End: 2024-11-25 | Stop reason: SDUPTHER

## 2024-11-26 ENCOUNTER — OFFICE VISIT (OUTPATIENT)
Dept: PODIATRY | Facility: CLINIC | Age: 74
End: 2024-11-26
Payer: MEDICARE

## 2024-11-26 VITALS — HEIGHT: 71 IN | BODY MASS INDEX: 28.21 KG/M2 | WEIGHT: 201.5 LBS

## 2024-11-26 DIAGNOSIS — L60.0 INGROWN TOENAIL OF BOTH FEET: ICD-10-CM

## 2024-11-26 DIAGNOSIS — F17.200 SMOKER: ICD-10-CM

## 2024-11-26 DIAGNOSIS — E11.9 TYPE 2 DIABETES MELLITUS WITHOUT COMPLICATION, WITHOUT LONG-TERM CURRENT USE OF INSULIN: Primary | ICD-10-CM

## 2024-11-26 PROCEDURE — 99203 OFFICE O/P NEW LOW 30 MIN: CPT | Mod: S$PBB,,, | Performed by: PODIATRIST

## 2024-11-26 PROCEDURE — 99213 OFFICE O/P EST LOW 20 MIN: CPT | Mod: PBBFAC,PO | Performed by: PODIATRIST

## 2024-11-26 PROCEDURE — 99999 PR PBB SHADOW E&M-EST. PATIENT-LVL III: CPT | Mod: PBBFAC,,, | Performed by: PODIATRIST

## 2024-11-26 NOTE — PROGRESS NOTES
Subjective:     Patient ID: Sharee Day Jr. is a 74 y.o. male.    Chief Complaint: Ingrown Toenail (B/l great toenails), Diabetes Mellitus, and Diabetic Foot Exam (11/25/24 Dr Charlton)    Sharee is a 74 y.o. male who presents to the clinic upon referral from Dr. Charlton  for evaluation and treatment of diabetic feet. Sharee has a past medical history of Hyperlipidemia, Hypertension, and Obstructive sleep apnea severe with AHI 64 on study 7/2018 (7/27/2018). Presents for diabetic foot risk assessment. Reports toe discoloration intermittent. Current smoker.       PCP: Navin Charlton MD    Date Last Seen by PCP: per above    Current shoe gear: Tennis shoes    Hemoglobin A1C   Date Value Ref Range Status   11/19/2024 4.4 4.0 - 5.6 % Final     Comment:     ADA Screening Guidelines:  5.7-6.4%  Consistent with prediabetes  >or=6.5%  Consistent with diabetes    High levels of fetal hemoglobin interfere with the HbA1C  assay. Heterozygous hemoglobin variants (HbS, HgC, etc)do  not significantly interfere with this assay.   However, presence of multiple variants may affect accuracy.     05/14/2024 4.8 4.0 - 5.6 % Final     Comment:     ADA Screening Guidelines:  5.7-6.4%  Consistent with prediabetes  >or=6.5%  Consistent with diabetes    High levels of fetal hemoglobin interfere with the HbA1C  assay. Heterozygous hemoglobin variants (HbS, HgC, etc)do  not significantly interfere with this assay.   However, presence of multiple variants may affect accuracy.     11/06/2023 5.0 4.0 - 5.6 % Final     Comment:     ADA Screening Guidelines:  5.7-6.4%  Consistent with prediabetes  >or=6.5%  Consistent with diabetes    High levels of fetal hemoglobin interfere with the HbA1C  assay. Heterozygous hemoglobin variants (HbS, HgC, etc)do  not significantly interfere with this assay.   However, presence of multiple variants may affect accuracy.           Review of Systems   Constitutional: Negative for chills.   Cardiovascular:  Negative for  chest pain and claudication.   Respiratory:  Negative for cough.    Skin:  Positive for color change, dry skin and nail changes.   Musculoskeletal:  Positive for joint pain.   Gastrointestinal:  Negative for nausea.   Neurological:  Positive for paresthesias. Negative for numbness.   Psychiatric/Behavioral:  The patient is not nervous/anxious.         Objective:     Physical Exam  Constitutional:       Appearance: He is well-developed.      Comments: Oriented to time, place, and person.   Cardiovascular:      Comments: DP and PT pulses are palpable bilaterally. 3 sec capillary refill time and toes and feet are warm to touch proximally .  There is  hair growth on the feet and toes b/l. There is no edema b/l. No spider veins or varicosities present b/l.     Musculoskeletal:      Comments: Equinus noted b/l ankles with < 10 deg DF noted. MMT 5/5 in DF/PF/Inv/Ev resistance with no reproduction of pain in any direction. Passive range of motion of ankle and pedal joints is painless b/l.     Feet:      Right foot:      Skin integrity: No callus or dry skin.      Left foot:      Skin integrity: No callus or dry skin.   Lymphadenopathy:      Comments: Negative lymphadenopathy bilateral popliteal fossa and tarsal tunnel.   Skin:     Comments: No open lesions, lacerations or wounds noted.Interdigital spaces clean, dry and intact b/l. No erythema noted to b/l foot.  Nails normal color and trophic qualities.     Neurological:      Mental Status: He is alert.      Comments: Light touch, proprioception, and sharp/dull sensation are all intact bilaterally. Protective threshold with the [place] monofilament is intact bilaterally.    Psychiatric:         Behavior: Behavior is cooperative.           Assessment:      Encounter Diagnoses   Name Primary?    Ingrown toenail of both feet     Type 2 diabetes mellitus without complication, without long-term current use of insulin Yes    Smoker      Plan:     Sharee was seen today for  ingrown toenail, diabetes mellitus and diabetic foot exam.    Diagnoses and all orders for this visit:    Type 2 diabetes mellitus without complication, without long-term current use of insulin    Ingrown toenail of both feet  -     Ambulatory referral/consult to Podiatry  -     Ankle Brachial Indices (AILYN); Future    Smoker      I counseled the patient on his conditions, their implications and medical management.        - Shoe inspection. Diabetic Foot Education. Patient reminded of the importance of good nutrition and blood sugar control to help prevent podiatric complications of diabetes. Patient instructed on proper foot hygeine. We discussed wearing proper shoe gear, daily foot inspections, never walking without protective shoe gear, caution putting sharp instruments to feet     - Discussed DM foot care:  Wear comfortable, proper fitting shoes. Wash feet daily. Dry well. After drying, apply moisturizer to feet (no lotion to webspaces). Inspect feet daily for skin breaks, blisters, swelling, or redness. Wear cotton socks (preferably white)  Change socks every day. Do NOT walk barefoot. Do NOT use heating pads or warm/hot water soaks     Counseled patient on the effects of smoking on healing. He  verbalizes understanding that smoking and/or history of smoking has an adverse effect on circulation an can increase the chances of delayed healing.    AILNY ordered    Nails 1-5 trimmed B/L    RTC PRN

## 2024-12-09 ENCOUNTER — TELEPHONE (OUTPATIENT)
Dept: ADMINISTRATIVE | Facility: CLINIC | Age: 74
End: 2024-12-09
Payer: MEDICARE

## 2024-12-09 NOTE — TELEPHONE ENCOUNTER
Called pt, informed pt I was calling to remind pt of his upcoming in office EAWV appt; pt declined to verify his name and date of birth; informed pt without the two patient identifiers I would not be able to confirm the appt date, time, and location; pt verbalized understanding    Your blood pressure will be controlled.   Continue with your blood pressure medications; eat a heart healthy diet with low salt diet; exercise regularly (consult with your physician or cardiologist first); maintain a heart healthy weight; if you smoke - quit (A resource to help you stop smoking is the [facility] – phone number [phone].); include healthy ways to manage stress. Continue to follow with your primary care physician or cardiologist.

## 2024-12-10 ENCOUNTER — OFFICE VISIT (OUTPATIENT)
Dept: FAMILY MEDICINE | Facility: CLINIC | Age: 74
End: 2024-12-10
Payer: MEDICARE

## 2024-12-10 VITALS
HEART RATE: 68 BPM | DIASTOLIC BLOOD PRESSURE: 80 MMHG | SYSTOLIC BLOOD PRESSURE: 122 MMHG | BODY MASS INDEX: 28.69 KG/M2 | WEIGHT: 204.94 LBS | OXYGEN SATURATION: 98 % | HEIGHT: 71 IN | TEMPERATURE: 98 F

## 2024-12-10 DIAGNOSIS — E78.5 DYSLIPIDEMIA: ICD-10-CM

## 2024-12-10 DIAGNOSIS — Z87.891 PERSONAL HISTORY OF NICOTINE DEPENDENCE: ICD-10-CM

## 2024-12-10 DIAGNOSIS — F17.200 SMOKER: ICD-10-CM

## 2024-12-10 DIAGNOSIS — Z85.46 HISTORY OF PROSTATE CANCER: ICD-10-CM

## 2024-12-10 DIAGNOSIS — D22.9 ATYPICAL MOLE: ICD-10-CM

## 2024-12-10 DIAGNOSIS — G72.0 STATIN MYOPATHY: ICD-10-CM

## 2024-12-10 DIAGNOSIS — Z23 NEED FOR SHINGLES VACCINE: ICD-10-CM

## 2024-12-10 DIAGNOSIS — F17.200 TOBACCO DEPENDENCY: ICD-10-CM

## 2024-12-10 DIAGNOSIS — Z00.00 ENCOUNTER FOR PREVENTIVE HEALTH EXAMINATION: ICD-10-CM

## 2024-12-10 DIAGNOSIS — I70.0 AORTIC ATHEROSCLEROSIS: ICD-10-CM

## 2024-12-10 DIAGNOSIS — J43.2 CENTRILOBULAR EMPHYSEMA: ICD-10-CM

## 2024-12-10 DIAGNOSIS — T46.6X5A STATIN MYOPATHY: ICD-10-CM

## 2024-12-10 DIAGNOSIS — I73.9 PERIPHERAL ARTERY DISEASE: ICD-10-CM

## 2024-12-10 DIAGNOSIS — E11.9 TYPE 2 DIABETES MELLITUS WITHOUT COMPLICATION, WITHOUT LONG-TERM CURRENT USE OF INSULIN: ICD-10-CM

## 2024-12-10 DIAGNOSIS — Z71.89 ADVANCED DIRECTIVES, COUNSELING/DISCUSSION: ICD-10-CM

## 2024-12-10 DIAGNOSIS — F33.42 RECURRENT MAJOR DEPRESSIVE DISORDER, IN FULL REMISSION: ICD-10-CM

## 2024-12-10 DIAGNOSIS — Z00.00 ENCOUNTER FOR MEDICARE ANNUAL WELLNESS EXAM: Primary | ICD-10-CM

## 2024-12-10 PROCEDURE — 99999 PR PBB SHADOW E&M-EST. PATIENT-LVL V: CPT | Mod: PBBFAC,,,

## 2024-12-10 PROCEDURE — 99215 OFFICE O/P EST HI 40 MIN: CPT | Mod: PBBFAC,PO

## 2024-12-10 RX ORDER — ZOSTER VACCINE RECOMBINANT, ADJUVANTED 50 MCG/0.5
0.5 KIT INTRAMUSCULAR ONCE
Qty: 1 EACH | Refills: 0 | Status: SHIPPED | OUTPATIENT
Start: 2024-12-10 | End: 2024-12-10

## 2024-12-10 NOTE — ASSESSMENT & PLAN NOTE
Lab Results   Component Value Date    HGBA1C 4.4 11/19/2024     Diet controlled. A1C elevated >8 in recent years. Pt admits weight loss improved.    Referred by Gonzalo Champagne MD      Concerns: Patient is here for Follow up RE: Prostate cancer, ED  Medication not working. +kegels +levitra +psa prior +UA +bph     Patient denies dysuria or hematuria. Patient states that his ED medication stopped working about three months ago and is looking for other options.      PSA, Total (ng/mL)   Date Value   10/17/2014 <0.01 (L)     BPH SX's IN PAST MONTH 8/29/2017   Incomplete Emptying (1-5) 0   Frequency (1-5) 0   Intermittency (1-5) 5   Urgency (1-5) 0   Stream (1-5) 0   Straining (1-5) 0   Nocturia (1-5) 0   Total Score 5   Quality of Life 1=PLEASED       Past medical, surgical, family and social history was reviewed from the last visit (dated 11/11/14), and there are no changes.    Unable to provide urine sample requested by Dr. Champagne for evaluation for presenting urinary issues.     PMD- Gadiel Choi     Medications verified, and updated.  Tobacco history verified.

## 2024-12-10 NOTE — PROGRESS NOTES
"Sharee Day presented for a follow-up Medicare AWV today. The following components were reviewed and updated:    Medical history  Family History  Social history  Allergies and Current Medications  Health Risk Assessment  Health Maintenance  Care Team    **See Completed Assessments for Annual Wellness visit with in the encounter summary    The following assessments were completed:  Depression Screening  Cognitive function Screening  Timed Get Up Test  Whisper Test    Clock:    Opioid documentation:      Patient does not have a current opioid prescription.          Vitals:    12/10/24 0919   BP: 122/80   Pulse: 68   Temp: 98 °F (36.7 °C)   TempSrc: Oral   SpO2: 98%   Weight: 92.9 kg (204 lb 14.7 oz)   Height: 5' 11" (1.803 m)     Body mass index is 28.58 kg/m².       Physical Exam  Constitutional:       General: He is not in acute distress.     Appearance: Normal appearance. He is not toxic-appearing.   Cardiovascular:      Rate and Rhythm: Normal rate and regular rhythm.      Pulses: Normal pulses.   Pulmonary:      Effort: Pulmonary effort is normal. No respiratory distress.      Breath sounds: Normal breath sounds. No wheezing.   Musculoskeletal:      Cervical back: Normal range of motion. No rigidity.   Lymphadenopathy:      Cervical: No cervical adenopathy.   Skin:     General: Skin is warm.      Capillary Refill: Capillary refill takes less than 2 seconds.   Neurological:      General: No focal deficit present.      Mental Status: He is alert and oriented to person, place, and time.   Psychiatric:         Mood and Affect: Mood normal.           Diagnoses and health risks identified today and associated recommendations/orders:  1. Encounter for Medicare annual wellness exam  Pt was seen today for an Annual Wellness visit. Healthcare maintenance and screening recommendations were discussed and updated as indicated. Return in one year for AWV.      -     Ambulatory Referral/Consult to Enhanced Annual Wellness " Visit (eAWV)    2. Encounter for preventive health examination  Counseled on age appropriate medical preventative services including age appropriate cancer screenings, age appropriate eye and dental exams, over all nutritional health, need for a consistent exercise regimen, and an over all push towards maintaining a vigorous and active lifestyle.  Counseled on age appropriate vaccines and discussed upcoming health care needs based on age/gender. Discussed good sleep hygiene and stress management.    -     Ambulatory referral/consult to Optometry; Future; Expected date: 12/17/2024    3. Smoker  Assessment & Plan:  Smoking 1 pack per week. Completing smoking cessation. Has tried gum in the past.     Orders:  -     CT Chest Lung Screening Low Dose; Future; Expected date: 12/10/2024    4. Statin myopathy  Assessment & Plan:  The current medical regimen is effective. Continue zetia as prescribed.       5. Type 2 diabetes mellitus without complication, without long-term current use of insulin  Assessment & Plan:  Lab Results   Component Value Date    HGBA1C 4.4 11/19/2024     Diet controlled. A1C elevated >8 in recent years. Pt admits weight loss improved.       6. Dyslipidemia statin intolerance. 8/2018 zetia; 07/13/2016 stress test negative for ischemia  Assessment & Plan:  The current medical regimen is effective. Continue zetia as prescribed.       7. Centrilobular emphysema  Assessment & Plan:  Continues to smoke. No persistent cough. Agrees to LDCT. Smoking cessation.       8. Peripheral artery disease mild on doppler  Overview:  4/10/2018 LE dopplers: 1. Right lower extremity pressures and waveforms indicate no hemodynamically significant arterial occlusive disease.  2. Left lower extremity pressures and waveforms indicate mild arterial occlusive disease.    Assessment & Plan:  Stable, asymptomatic chronic condition.  Will continue to maximize risk factor reduction and adjust medication as needed      9. History of  prostate cancer  F/b hem/onc (Dr. Plasencia). Completed radiation.   PSA, Screen (ng/mL)   Date Value   03/29/2017 4.1 (H)     PSA Total (ng/mL)   Date Value   02/18/2019 0.08     PSA, Free (ng/mL)   Date Value   02/18/2019 0.01     PSA, Free % (%)   Date Value   02/18/2019 12.50       10. Personal history of nicotine dependence  Continue smoking cessation program. Complete LDCT  -     CT Chest Lung Screening Low Dose; Future; Expected date: 12/10/2024    11. Need for shingles vaccine  -     varicella-zoster [id number], PF, (SHINGRIX, PF,) 50 mcg/0.5 mL injection; Inject 0.5 mLs into the muscle once. for 1 dose  Dispense: 1 each; Refill: 0    12. Atypical mole  Will refer to derm, multiple flat moles noted.   -     Ambulatory referral/consult to Dermatology; Future; Expected date: 12/17/2024    13. Advanced directives, counseling/discussion  I offered to discuss advanced care planning, including how to pick a person who would make decisions for you if you were unable to make them for yourself, called a health care power of , and what kind of decisions you might make such as use of life sustaining treatments such as ventilators and tube feeding when faced with a life limiting illness recorded on a living will that they will need to know. (How you want to be cared for as you near the end of your natural life)     X Patient is interested in learning more about how to make advanced directives.  I provided them paperwork and offered to discuss this with them.      14. Tobacco dependency  See above  15. Aortic atherosclerosis  Overview:  CT 2020:   Aorta and vasculature: Atherosclerosis including coronary arteries.     Assessment & Plan:  Statin intolerant. The current medical regimen is effective. Continue Zetia as prescribed.     16. Recurrent major depressive disorder, in full remission  Assessment & Plan:  No medications. Mood good in clinic.       Provided Sharee with a 5-10 year written screening schedule and  personal prevention plan. Recommendations were developed using the USPSTF age appropriate recommendations. Education, counseling, and referrals were provided as needed.  After Visit Summary printed and given to patient which includes a list of additional screenings\tests needed.    No follow-ups on file.      CARLOS CEVALLOS NP

## 2024-12-10 NOTE — PATIENT INSTRUCTIONS
Call to schedule low-dose CT - quit smoking!    Imaging, Xray, CT, MRI, Ultrasound---[phone]      Counseling and Referral of Other Preventative  (Italic type indicates deductible and co-insurance are waived)    Patient Name: Sharee Day  Today's Date: 12/10/2024    Health Maintenance         Date Due Completion Date    Shingles Vaccine (1 of 2) Never done ---    Eye Exam 08/30/2024 8/30/2023    COVID-19 Vaccine (6 - 2024-25 season) 11/11/2024 9/16/2024    Colorectal Cancer Screening 01/06/2025 1/6/2022    Diabetes Urine Screening 05/14/2025 5/14/2024    Hemoglobin A1c 05/19/2025 11/19/2024    Lipid Panel 11/19/2025 11/19/2024    Foot Exam 11/26/2025 11/26/2024 (Done)    Override on 11/26/2024: Done    TETANUS VACCINE 06/16/2026 6/16/2016          No orders of the defined types were placed in this encounter.      The following information is provided to all patients.  This information is to help you find resources for any of the problems found today that may be affecting your health:                  Living healthy guide: www.[facility].[place].gov      Understanding Diabetes: www.diabetes.org      Eating healthy: www.cdc.gov/healthyweight      CDC home safety checklist: www.cdc.gov/steadi/patient.html      Agency on Aging: www.goea.[place].[facility]      Alcoholics anonymous (AA): www.aa.org      Physical Activity: www.shanda.nih.gov/qc3avqj      Tobacco use: www.quitwithusla.org

## 2024-12-10 NOTE — ASSESSMENT & PLAN NOTE
Stable, asymptomatic chronic condition.  Will continue to maximize risk factor reduction and adjust medication as needed

## 2024-12-16 ENCOUNTER — CLINICAL SUPPORT (OUTPATIENT)
Dept: ENDOSCOPY | Facility: HOSPITAL | Age: 74
End: 2024-12-16
Attending: INTERNAL MEDICINE
Payer: MEDICARE

## 2024-12-16 ENCOUNTER — TELEPHONE (OUTPATIENT)
Dept: ENDOSCOPY | Facility: HOSPITAL | Age: 74
End: 2024-12-16

## 2024-12-16 VITALS — WEIGHT: 200 LBS | HEIGHT: 71 IN | BODY MASS INDEX: 28 KG/M2

## 2024-12-16 DIAGNOSIS — Z12.11 SPECIAL SCREENING FOR MALIGNANT NEOPLASMS, COLON: Primary | ICD-10-CM

## 2024-12-16 DIAGNOSIS — D12.8 TUBULOVILLOUS ADENOMA OF RECTUM: ICD-10-CM

## 2024-12-16 DIAGNOSIS — D12.6 TUBULOVILLOUS ADENOMA OF COLON: ICD-10-CM

## 2024-12-16 NOTE — TELEPHONE ENCOUNTER
Referral for procedure from PAT appointment      Spoke to pt to schedule procedure(s) Colonoscopy       Physician to perform procedure(s) Dr. MARCO Correia  Date of Procedure (s) 02/11/25  Arrival Time 9:30 AM  Time of Procedure(s) 10:30 AM   Location of Procedure(s) [address]   Type of Rx Prep sent to patient: PEG  Instructions provided to patient via Email    Patient was informed on the following information and verbalized understanding. Screening questionnaire reviewed with patient and complete. If procedure requires anesthesia, a responsible adult needs to be present to accompany the patient home, patient cannot drive after receiving anesthesia. Appointment details are tentative, especially check-in time. Patient will receive a prep-op call 7 days prior to confirm check-in time for procedure. If applicable the patient should contact their pharmacy to verify Rx for procedure prep is ready for pick-up. Patient was advised to call the scheduling department at [phone] if pharmacy states no Rx is available. Patient was advised to call the endoscopy scheduling department if any questions or concerns arise.      [facility] Scheduling Department

## 2024-12-23 ENCOUNTER — HOSPITAL ENCOUNTER (OUTPATIENT)
Dept: CARDIOLOGY | Facility: HOSPITAL | Age: 74
Discharge: HOME | End: 2024-12-23
Attending: PODIATRIST
Payer: MEDICARE

## 2024-12-23 DIAGNOSIS — L60.0 INGROWN TOENAIL OF BOTH FEET: ICD-10-CM

## 2024-12-23 LAB
LEFT ABI: 1.4
LEFT ARM BP: 113 MMHG
LEFT DORSALIS PEDIS: 168 MMHG
LEFT POSTERIOR TIBIAL: 167 MMHG
LEFT TBI: 1.03
LEFT TOE PRESSURE: 123 MMHG
RIGHT ABI: 1.38
RIGHT ARM BP: 120 MMHG
RIGHT DORSALIS PEDIS: 165 MMHG
RIGHT POSTERIOR TIBIAL: 156 MMHG
RIGHT TBI: 0.95
RIGHT TOE PRESSURE: 114 MMHG

## 2024-12-23 PROCEDURE — 93922 UPR/L XTREMITY ART 2 LEVELS: CPT

## 2024-12-23 PROCEDURE — 93922 UPR/L XTREMITY ART 2 LEVELS: CPT | Mod: 26,,, | Performed by: INTERNAL MEDICINE

## 2025-01-08 ENCOUNTER — TELEPHONE (OUTPATIENT)
Dept: SMOKING CESSATION | Facility: CLINIC | Age: 75
End: 2025-01-08
Payer: MEDICARE

## 2025-01-29 ENCOUNTER — OFFICE VISIT (OUTPATIENT)
Dept: OPTOMETRY | Facility: CLINIC | Age: 75
End: 2025-01-29
Payer: MEDICARE

## 2025-01-29 DIAGNOSIS — E11.36 TYPE 2 DIABETES MELLITUS WITH DIABETIC CATARACT, WITHOUT LONG-TERM CURRENT USE OF INSULIN: ICD-10-CM

## 2025-01-29 DIAGNOSIS — H52.7 REFRACTIVE ERROR: ICD-10-CM

## 2025-01-29 DIAGNOSIS — H25.13 NUCLEAR SCLEROSIS OF BOTH EYES: Primary | ICD-10-CM

## 2025-01-29 PROCEDURE — 99999 PR PBB SHADOW E&M-EST. PATIENT-LVL III: CPT | Mod: PBBFAC,,, | Performed by: OPTOMETRIST

## 2025-01-29 PROCEDURE — 92004 COMPRE OPH EXAM NEW PT 1/>: CPT | Mod: S$PBB,,, | Performed by: OPTOMETRIST

## 2025-01-29 PROCEDURE — 92015 DETERMINE REFRACTIVE STATE: CPT | Mod: ,,, | Performed by: OPTOMETRIST

## 2025-01-29 PROCEDURE — 99213 OFFICE O/P EST LOW 20 MIN: CPT | Mod: PBBFAC,PO | Performed by: OPTOMETRIST

## 2025-01-29 NOTE — PROGRESS NOTES
Subjective:       Patient ID: Sharee Day Jr. is a 74 y.o. male      Chief Complaint   Patient presents with    Concerns About Ocular Health    Diabetic Eye Exam     History of Present Illness     Dls: 3 yrs     73 y/o male presents today for diabetic eye exam.   Pt c/o blurry vision at distance and near ou .  Pt wears pal's.     No tearing  No itching  No burning  No pain  No ha's  + ou off/on floaters  No flashes    Eye meds  None    Pohx:  None     Fohx:   Cat - father     Hemoglobin A1C       Date                     Value               Ref Range             Status                11/19/2024               4.4                 4.0 - 5.6 %           Final                  05/14/2024               4.8                 4.0 - 5.6 %           Final                   11/06/2023               5.0                 4.0 - 5.6 %           Final                    Assessment/Plan:     1. Nuclear sclerosis of both eyes (Primary)  Educated pt on presence of cataracts and effects on vision. No surgery at this time. Recheck in one year, sooner PRN.    2. Type 2 diabetes mellitus with diabetic cataract, without long-term current use of insulin  No diabetic retinopathy. Discussed with pt the effects of diabetes on vision, importance of good blood sugar control, compliance with meds, and follow up care with PCP. Return in 1 year for dilated eye exam, sooner PRN.      3. Refractive error  Educated patient on refractive error and discussed lens options. Dispensed updated spectacle Rx. Educated about adaptation period to new specs.    Eyeglass Final Rx       Eyeglass Final Rx         Sphere Cylinder Axis Add    Right -0.50 +0.75 140 +2.50    Left -0.25 Sphere  +2.50      Expiration Date: 1/29/2026                    - Ambulatory referral/consult to Optometry    Follow up in about 1 year (around 1/29/2026).

## 2025-02-03 ENCOUNTER — TELEPHONE (OUTPATIENT)
Dept: ENDOSCOPY | Facility: HOSPITAL | Age: 75
End: 2025-02-03
Payer: MEDICARE

## 2025-02-03 DIAGNOSIS — Z12.11 SPECIAL SCREENING FOR MALIGNANT NEOPLASMS, COLON: Primary | ICD-10-CM

## 2025-02-10 ENCOUNTER — ANESTHESIA EVENT (OUTPATIENT)
Dept: ENDOSCOPY | Facility: HOSPITAL | Age: 75
End: 2025-02-10
Payer: MEDICARE

## 2025-02-10 RX ORDER — SODIUM CHLORIDE 9 MG/ML
INJECTION, SOLUTION INTRAVENOUS CONTINUOUS
OUTPATIENT
Start: 2025-02-10

## 2025-02-10 RX ORDER — LIDOCAINE HYDROCHLORIDE 10 MG/ML
1 INJECTION, SOLUTION EPIDURAL; INFILTRATION; INTRACAUDAL; PERINEURAL ONCE
OUTPATIENT
Start: 2025-02-10 | End: 2025-02-10

## 2025-02-10 NOTE — H&P
Short Stay Endoscopy History and Physical    PCP - Navin Charlton MD    Procedure - Colonoscopy  ASA - per anesthesia  Mallampati - per anesthesia  History of Anesthesia problems - no  Family history Anesthesia problems - no   Plan of anesthesia - General    HPI:  This is a 74 y.o. male here for evaluation of : personal history of colon polyps      ROS:  Constitutional: No fevers, chills, No weight loss  CV: No chest pain  Pulm: No cough, No shortness of breath  GI: see HPI  Derm: No rash    Medical History:  has a past medical history of Hyperlipidemia, Hypertension, and Obstructive sleep apnea severe with AHI 64 on study 7/2018 (7/27/2018).    Surgical History:  has a past surgical history that includes Mouth surgery (01/2016); Colonoscopy (N/A, 8/28/2018); and Colonoscopy (N/A, 1/6/2022).    Family History: family history includes Breast cancer in his mother; Cataracts in his father; Hypertension in his brother, brother, sister, and sister; Intellectual disability in his son; No Known Problems in his daughter, maternal aunt, maternal grandfather, maternal grandmother, maternal uncle, paternal aunt, paternal grandfather, paternal grandmother, paternal uncle, son, son, son, son, and another family member.. Otherwise no colon cancer, inflammatory bowel disease, or GI malignancies.    Social History:  reports that he has been smoking cigarettes. He has been exposed to tobacco smoke. He has never used smokeless tobacco. He reports current alcohol use of about 3.0 standard drinks of alcohol per week. He reports that he does not currently use drugs.    Review of patient's allergies indicates:   Allergen Reactions    Crestor  [rosuvastatin]      Other reaction(s): Muscle pain    Statins-hmg-coa reductase inhibitors      myalgias    Sulfamethoxazole-trimethoprim Other (See Comments)     Muscle pain    Atorvastatin Other (See Comments)     Muscle cramps  Other reaction(s): Muscle pain       Medications:   No  medications prior to admission.         Physical Exam:    Vital Signs: There were no vitals filed for this visit.    Gen: NAD, lying comfortably  HENT: NCAT, oropharynx clear  Eyes: anicteric sclerae, EOMI grossly  Neck: supple, no visible masses/goiter  Cardiac: RRR  Lungs: non-labored breathing  Abd: soft, NT/ND, normoactive BS  Ext: no LE edema, warm, well perfused  Skin: skin intact on exposed body parts, no visible rashes, lesions  Neuro: A&Ox4, neuro exam grossly intact, moves all extremities  Psych: appropriate mood, affect        Labs:  Lab Results   Component Value Date    WBC 5.92 11/19/2024    HGB 13.5 (L) 11/19/2024    HCT 43.4 11/19/2024     11/19/2024    CHOL 195 11/19/2024    TRIG 72 11/19/2024    HDL 44 11/19/2024    ALT 20 11/19/2024    AST 22 11/19/2024     11/19/2024    K 3.7 11/19/2024     11/19/2024    CREATININE 0.9 11/19/2024    BUN 11 11/19/2024    CO2 28 11/19/2024    TSH 1.156 05/12/2023    PSA 4.1 (H) 03/29/2017    HGBA1C 4.4 11/19/2024       Plan:  Colonoscopy for a history of colon polyps.    I have explained the risks and benefits of endoscopy procedures to the patient including but not limited to bleeding, perforation, infection, and death.  The patient was asked if they understand and allowed to ask any further questions to their satisfaction.    Jennifer Correia MD

## 2025-02-11 ENCOUNTER — HOSPITAL ENCOUNTER (OUTPATIENT)
Facility: HOSPITAL | Age: 75
Discharge: HOME | End: 2025-02-11
Attending: INTERNAL MEDICINE | Admitting: INTERNAL MEDICINE
Payer: MEDICARE

## 2025-02-11 ENCOUNTER — ANESTHESIA (OUTPATIENT)
Dept: ENDOSCOPY | Facility: HOSPITAL | Age: 75
End: 2025-02-11
Payer: MEDICARE

## 2025-02-11 VITALS
HEART RATE: 54 BPM | RESPIRATION RATE: 12 BRPM | TEMPERATURE: 98 F | OXYGEN SATURATION: 96 % | DIASTOLIC BLOOD PRESSURE: 71 MMHG | SYSTOLIC BLOOD PRESSURE: 114 MMHG

## 2025-02-11 DIAGNOSIS — Z86.0100 HISTORY OF COLON POLYPS: Primary | ICD-10-CM

## 2025-02-11 LAB — POCT GLUCOSE: 90 MG/DL (ref 70–110)

## 2025-02-11 PROCEDURE — 45385 COLONOSCOPY W/LESION REMOVAL: CPT | Mod: PT | Performed by: INTERNAL MEDICINE

## 2025-02-11 PROCEDURE — 88305 TISSUE EXAM BY PATHOLOGIST: CPT | Mod: 59 | Performed by: PATHOLOGY

## 2025-02-11 PROCEDURE — 27201089 HC SNARE, DISP (ANY): Performed by: INTERNAL MEDICINE

## 2025-02-11 PROCEDURE — 45380 COLONOSCOPY AND BIOPSY: CPT | Mod: PT,59 | Performed by: INTERNAL MEDICINE

## 2025-02-11 PROCEDURE — 37000008 HC ANESTHESIA 1ST 15 MINUTES: Performed by: INTERNAL MEDICINE

## 2025-02-11 PROCEDURE — 63600175 PHARM REV CODE 636 W HCPCS

## 2025-02-11 PROCEDURE — 37000009 HC ANESTHESIA EA ADD 15 MINS: Performed by: INTERNAL MEDICINE

## 2025-02-11 PROCEDURE — 27201012 HC FORCEPS, HOT/COLD, DISP: Performed by: INTERNAL MEDICINE

## 2025-02-11 PROCEDURE — 27200997: Performed by: INTERNAL MEDICINE

## 2025-02-11 PROCEDURE — 25000003 PHARM REV CODE 250

## 2025-02-11 PROCEDURE — 45380 COLONOSCOPY AND BIOPSY: CPT | Mod: PT,59,, | Performed by: INTERNAL MEDICINE

## 2025-02-11 PROCEDURE — 45385 COLONOSCOPY W/LESION REMOVAL: CPT | Mod: PT,,, | Performed by: INTERNAL MEDICINE

## 2025-02-11 RX ORDER — LIDOCAINE HYDROCHLORIDE 20 MG/ML
INJECTION INTRAVENOUS
Status: DISCONTINUED | OUTPATIENT
Start: 2025-02-11 | End: 2025-02-11

## 2025-02-11 RX ORDER — PHENYLEPHRINE HYDROCHLORIDE 10 MG/ML
INJECTION INTRAVENOUS
Status: DISCONTINUED | OUTPATIENT
Start: 2025-02-11 | End: 2025-02-11

## 2025-02-11 RX ORDER — PROPOFOL 10 MG/ML
VIAL (ML) INTRAVENOUS
Status: DISCONTINUED
Start: 2025-02-11 | End: 2025-02-11 | Stop reason: HOSPADM

## 2025-02-11 RX ORDER — PROPOFOL 10 MG/ML
VIAL (ML) INTRAVENOUS
Status: DISCONTINUED | OUTPATIENT
Start: 2025-02-11 | End: 2025-02-11

## 2025-02-11 RX ORDER — DEXTROMETHORPHAN/PSEUDOEPHED 2.5-7.5/.8
DROPS ORAL
Status: DISCONTINUED
Start: 2025-02-11 | End: 2025-02-11 | Stop reason: HOSPADM

## 2025-02-11 RX ORDER — PHENYLEPHRINE HCL IN 0.9% NACL 1 MG/10 ML
SYRINGE (ML) INTRAVENOUS
Status: DISCONTINUED
Start: 2025-02-11 | End: 2025-02-11 | Stop reason: HOSPADM

## 2025-02-11 RX ORDER — LIDOCAINE HYDROCHLORIDE 20 MG/ML
INJECTION, SOLUTION EPIDURAL; INFILTRATION; INTRACAUDAL; PERINEURAL
Status: DISCONTINUED
Start: 2025-02-11 | End: 2025-02-11 | Stop reason: HOSPADM

## 2025-02-11 RX ADMIN — PROPOFOL 30 MG: 10 INJECTION, EMULSION INTRAVENOUS at 09:02

## 2025-02-11 RX ADMIN — PROPOFOL 50 MG: 10 INJECTION, EMULSION INTRAVENOUS at 09:02

## 2025-02-11 RX ADMIN — PHENYLEPHRINE HYDROCHLORIDE 50 MCG: 10 INJECTION INTRAVENOUS at 09:02

## 2025-02-11 RX ADMIN — SODIUM CHLORIDE: 0.9 INJECTION, SOLUTION INTRAVENOUS at 09:02

## 2025-02-11 RX ADMIN — LIDOCAINE HYDROCHLORIDE 100 MG: 20 INJECTION, SOLUTION INTRAVENOUS at 09:02

## 2025-02-11 RX ADMIN — PROPOFOL 10 MG: 10 INJECTION, EMULSION INTRAVENOUS at 09:02

## 2025-02-11 RX ADMIN — PROPOFOL 40 MG: 10 INJECTION, EMULSION INTRAVENOUS at 09:02

## 2025-02-11 NOTE — ANESTHESIA POSTPROCEDURE EVALUATION
Anesthesia Post Evaluation    Patient: Sharee Day Jr.    Procedure(s) Performed: Procedure(s) (LRB):  COLONOSCOPY (N/A)    Final Anesthesia Type: general      Patient location during evaluation: GI PACU  Patient participation: Yes- Able to Participate  Level of consciousness: awake and alert and oriented  Post-procedure vital signs: reviewed and stable  Pain management: adequate  Airway patency: patent    PONV status at discharge: No PONV  Anesthetic complications: no      Cardiovascular status: hemodynamically stable and blood pressure returned to baseline  Respiratory status: spontaneous ventilation, room air and unassisted  Hydration status: euvolemic  Follow-up not needed.              Vitals Value Taken Time   /71 02/11/25 1011   Temp 36.7 °C (98 °F) 02/11/25 0941   Pulse 54 02/11/25 1011   Resp 12 02/11/25 1011   SpO2 96 % 02/11/25 1011         Event Time   Out of Recovery 10:16:00         Pain/Shweta Score: Shweta Score: 10 (2/11/2025 10:11 AM)

## 2025-02-11 NOTE — PLAN OF CARE
Procedure and recovery complete. Patient awake and alert. MD at bedside, procedure findings and suggestions discussed. Discharge instructions given, patient verbalized understanding of instructions. Gait steady able to ambulate without assistance. Patient walked out accompanied by son.

## 2025-02-11 NOTE — TRANSFER OF CARE
Anesthesia Transfer of Care Note    Patient: Sharee Day Jr.    Procedure(s) Performed: Procedure(s) (LRB):  COLONOSCOPY (N/A)    Patient location: GI    Anesthesia Type: general    Transport from OR: Transported from OR on room air with adequate spontaneous ventilation    Post pain: adequate analgesia    Post assessment: no apparent anesthetic complications and tolerated procedure well    Post vital signs: stable    Level of consciousness: awake and alert    Nausea/Vomiting: no nausea/vomiting    Complications: none    Transfer of care protocol was followed      Last vitals: Visit Vitals  BP (!) 91/56 (BP Location: Left arm, Patient Position: Lying)   Pulse 65   Temp 36.7 °C (98 °F) (Temporal)   Resp 12   SpO2 96%

## 2025-02-11 NOTE — PROVATION PATIENT INSTRUCTIONS
Discharge Summary/Instructions after an Endoscopic Procedure  Patient Name: Sharee Day  Patient MRN: [id number]  Patient YOB: 1950  Tuesday, February 11, 2025  Jennifer Correia MD  Dear patient,  As a result of recent federal legislation (The Federal Cures Act), you may   receive lab or pathology results from your procedure in your [facility]   account before your physician is able to contact you. Your physician or   their representative will relay the results to you with their   recommendations at their soonest availability.  Thank you,  RESTRICTIONS:  During your procedure today, you received medications for sedation.  These   medications may affect your judgment, balance and coordination.  Therefore,   for 24 hours, you have the following restrictions:   - DO NOT drive a car, operate machinery, make legal/financial decisions,   sign important papers or drink alcohol.    ACTIVITY:  Today: no heavy lifting, straining or running due to procedural   sedation/anesthesia.  The following day: return to full activity including work.  DIET:  Eat and drink normally unless instructed otherwise.     TREATMENT FOR COMMON SIDE EFFECTS:  - Mild abdominal pain, nausea, belching, bloating or excessive gas:  rest,   eat lightly and use a heating pad.  - Sore Throat: treat with throat lozenges and/or gargle with warm salt   water.  - Because air was used during the procedure, expelling large amounts of air   from your rectum or belching is normal.  - If a bowel prep was taken, you may not have a bowel movement for 1-3 days.    This is normal.  SYMPTOMS TO WATCH FOR AND REPORT TO YOUR PHYSICIAN:  1. Abdominal pain or bloating, other than gas cramps.  2. Chest pain.  3. Back pain.  4. Signs of infection such as: chills or fever occurring within 24 hours   after the procedure.  5. Rectal bleeding, which would show as bright red, maroon, or black stools.   (A tablespoon of blood from the rectum is not serious, especially  if   hemorrhoids are present.)  6. Vomiting.  7. Weakness or dizziness.  GO DIRECTLY TO THE NEAREST EMERGENCY ROOM IF YOU HAVE ANY OF THE FOLLOWING:      Difficulty breathing              Chills and/or fever over 101 F   Persistent vomiting and/or vomiting blood   Severe abdominal pain   Severe chest pain   Black, tarry stools   Bleeding- more than one tablespoon   Any other symptom or condition that you feel may need urgent attention  Your doctor recommends these additional instructions:  If any biopsies were taken, your doctors clinic will contact you in 1 to 2   weeks with any results.  - Discharge patient to home.   - Resume previous diet.   - Continue present medications.   - Await pathology results.   - Repeat colonoscopy in 5 years for surveillance.  For questions, problems or results please call your physician - Jennifer Correia MD at Work:  [phone].  [facility] can be reached at [phone]     IF A COMPLICATION OR EMERGENCY SITUATION ARISES AND YOU ARE UNABLE TO REACH   YOUR PHYSICIAN - GO DIRECTLY TO THE EMERGENCY ROOM.  Jennifer Correia MD  2/11/2025 9:42:21 AM  This report has been verified and signed electronically.  Dear patient,  As a result of recent federal legislation (The Federal Cures Act), you may   receive lab or pathology results from your procedure in your [facility]   account before your physician is able to contact you. Your physician or   their representative will relay the results to you with their   recommendations at their soonest availability.  Thank you,  PROVATION

## 2025-02-11 NOTE — ANESTHESIA PREPROCEDURE EVALUATION
02/11/2025  Sharee Day Jr. is a 74 y.o., male.  To undergo Procedure(s) (LRB):  COLONOSCOPY (N/A)     Denies CP/SOB/GERD/MI/CVA/URI symptoms.  METS > 4  NPO > 8    Past Medical History:  Past Medical History:   Diagnosis Date    Hyperlipidemia     Hypertension     Obstructive sleep apnea severe with AHI 64 on study 7/2018 7/27/2018    Very severe sleep disordered breathing (AHI=64) is noted based on a 4% hypopnea desaturation criteria. The patient slept supine 64.2% of the night based on valid recording time of 6.43 hours. The apneas/hypopneas are accompanied by severe oxygen desaturation (percent time below 90% SpO2: 15.9%, Min SpO2: 69.6%). The average desaturation across all sleep disordered breathing events is 5.7%. Snoring       Past Surgical History:  Past Surgical History:   Procedure Laterality Date    COLONOSCOPY N/A 8/28/2018    Procedure: COLONOSCOPY;  Surgeon: Davis Hernandez MD;  Location: [facility] ENDO;  Service: Endoscopy;  Laterality: N/A;    COLONOSCOPY N/A 1/6/2022    Procedure: COLONOSCOPY;  Surgeon: Walter Appiah MD;  Location: [facility] ENDO;  Service: Endoscopy;  Laterality: N/A;  fully vaccinated  inst emailed and mailed-rb  covid test [place] 1/3    MOUTH SURGERY  01/2016       Social History:  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     Employer: [employer]   Tobacco Use    Smoking status: Every Day     Types: Cigarettes     Passive exposure: Current    Smokeless tobacco: Never    Tobacco comments:     smokes 3 cigarettes daily   Substance and Sexual Activity    Alcohol use: Yes     Alcohol/week: 3.0 standard drinks of alcohol     Types: 1 Glasses of wine, 1 Cans of beer, 1 Shots of liquor per week     Comment: Ocassionally    Drug use: Not Currently    Sexual  activity: Not Currently     Partners: Female     Social Drivers of Health     Financial Resource Strain: Low Risk  (12/10/2024)    Overall Financial Resource Strain (CARDIA)     Difficulty of Paying Living Expenses: Not hard at all   Food Insecurity: No Food Insecurity (12/10/2024)    Hunger Vital Sign     Worried About Running Out of Food in the Last Year: Never true     Ran Out of Food in the Last Year: Never true   Recent Concern: Food Insecurity - Food Insecurity Present (11/20/2024)    Hunger Vital Sign     Worried About Running Out of Food in the Last Year: Sometimes true     Ran Out of Food in the Last Year: Never true   Transportation Needs: No Transportation Needs (12/10/2024)    PRAPARE - Transportation     Lack of Transportation (Medical): No     Lack of Transportation (Non-Medical): No   Physical Activity: Insufficiently Active (12/10/2024)    Exercise Vital Sign     Days of Exercise per Week: 2 days     Minutes of Exercise per Session: 30 min   Stress: Stress Concern Present (12/10/2024)    [employer] - Occupational Stress Questionnaire     Feeling of Stress : Very much   Housing Stability: Low Risk  (12/10/2024)    Housing Stability Vital Sign     Unable to Pay for Housing in the Last Year: No     Homeless in the Last Year: No       Medications:  No current facility-administered medications on file prior to encounter.     Current Outpatient Medications on File Prior to Encounter   Medication Sig Dispense Refill    blood sugar diagnostic Strp To check BG 1 times daily, to use with insurance preferred meter 100 strip 3    blood-glucose meter kit To check BG 1 times daily, to use with insurance preferred meter 1 each 0    chlorhexidine (PERIDEX) 0.12 % solution RINSE WITH 15 MLS BY MOUTH FOR 30 SECONDS THEN SPIT EVERY DAY      ezetimibe (ZETIA) 10 mg tablet Take 1 tablet (10 mg total) by mouth once daily. 90 tablet 3    lancets Misc To check BG 1 times daily, to use with  insurance preferred meter 100 each 4    sildenafiL (VIAGRA) 100 MG tablet Take 1 tablet (100 mg total) by mouth daily as needed for Erectile Dysfunction. 30 tablet 1    TRUEPLUS LANCETS 33 gauge Misc          Allergies:  Review of patient's allergies indicates:   Allergen Reactions    Crestor  [rosuvastatin]      Other reaction(s): Muscle pain    Statins-hmg-coa reductase inhibitors      myalgias    Sulfamethoxazole-trimethoprim Other (See Comments)     Muscle pain    Atorvastatin Other (See Comments)     Muscle cramps  Other reaction(s): Muscle pain       Active Problems:  Patient Active Problem List   Diagnosis    Shift work sleep disorder Hydroxyzine with SE. Trazodone ineffective.  Rozerem didn't help    Dyslipidemia statin intolerance. 8/2018 zetia; 07/13/2016 stress test negative for ischemia    Essential hypertension 04/08/2021 TTE LV normal size with mild eccentric LVH and normal systolic function LVEF 55%.  Normal RV size and systolic function.  PA pressure 23.    Restless leg syndrome Requip ineffective    Benign non-nodular prostatic hyperplasia without lower urinary tract symptoms    Beta thalassemia minor    Prostate cancer s/p XRT currently hormone deprivation    Smoker    Vitamin D deficiency    Pelvic floor muscle wasting in male    Recurrent major depressive disorder, in full remission    Obstructive sleep apnea severe with AHI 64 on study 7/2018    Statin myopathy    Tubulovillous adenoma of colon on colonoscopy 8/2018; 1/6/22 colonoscopy 5 mm sigmoid HP    Peripheral artery disease mild on doppler    Numbness or tingling workup with neuro for demyelinating    PVC (premature ventricular contraction); 4/2021 metoprolol    Lumbar radiculopathy    Class 1 obesity due to excess calories with serious comorbidity in adult    Centrilobular emphysema    Type 2 diabetes mellitus without complication, without long-term current use of insulin    Low testosterone    Aortic atherosclerosis    Nuclear sclerosis  of both eyes       Diagnostic Studies:   Latest Reference Range & Units 11/19/24 08:03   WBC 3.90 - 12.70 K/uL 5.92   RBC 4.60 - 6.20 M/uL 5.39   Hemoglobin 14.0 - 18.0 g/dL 13.5 (L)   Hematocrit 40.0 - 54.0 % 43.4   MCV 82 - 98 fL 81 (L)   MCH 27.0 - 31.0 pg 25.0 (L)   MCHC 32.0 - 36.0 g/dL 31.1 (L)   RDW 11.5 - 14.5 % 14.9 (H)   Platelet Count 150 - 450 K/uL 234   MPV 9.2 - 12.9 fL 10.5      Latest Reference Range & Units 11/19/24 08:03   Sodium 136 - 145 mmol/L 143   Potassium 3.5 - 5.1 mmol/L 3.7   Chloride 95 - 110 mmol/L 109   CO2 23 - 29 mmol/L 28   Anion Gap 8 - 16 mmol/L 6 (L)   BUN 8 - 23 mg/dL 11   Creatinine 0.5 - 1.4 mg/dL 0.9   eGFR >60 mL/min/1.73 m^2 >60.0     24 Hour Vitals:      See Nursing Charting For Additional Vitals      Pre-op Assessment    I have reviewed the Patient Summary Reports.     I have reviewed the Nursing Notes.       Review of Systems  Anesthesia Hx:  No problems with previous Anesthesia               Denies Personal Hx of Anesthesia complications.                    Social:  Smoker, Social Alcohol Use       Hematology/Oncology:                      Current/Recent Cancer.         radiation   Oncology Comments: Prostate CA s/p radiation     Cardiovascular:  Exercise tolerance: good   Hypertension          PVD hyperlipidemia                               Pulmonary:   COPD     Sleep Apnea                Renal/:    BPH              Hepatic/GI:  Hepatic/GI Normal                    Neurological:           RLS                            Endocrine:  Diabetes               Physical Exam  General: Well nourished and Cooperative    Airway:  Mallampati: II   Mouth Opening: Normal  TM Distance: Normal    Dental:  Intact    Chest/Lungs:  Clear to auscultation, Normal Respiratory Rate    Heart:  Rate: Normal  Rhythm: Regular Rhythm        Anesthesia Plan  Type of Anesthesia, risks & benefits discussed:    Anesthesia Type: Gen ETT, MAC, Gen Natural Airway  Intra-op Monitoring Plan: Standard  ASA Monitors  Post Op Pain Control Plan: multimodal analgesia  Induction:  IV  Informed Consent: Informed consent signed with the Patient and all parties understand the risks and agree with anesthesia plan.  All questions answered.   ASA Score: 3    Ready For Surgery From Anesthesia Perspective.     .

## 2025-02-12 LAB
FINAL PATHOLOGIC DIAGNOSIS: NORMAL
GROSS: NORMAL
Lab: NORMAL

## 2025-05-20 NOTE — TELEPHONE ENCOUNTER
Caller: Derek Keller    Relationship: Self    Best call back number: [phone]    Requested Prescriptions:   Requested Prescriptions     Pending Prescriptions Disp Refills    oxyCODONE-acetaminophen (PERCOCET)  MG per tablet 60 tablet 0     Sig: Take 1 tablet by mouth Every 6 (Six) Hours As Needed for Moderate Pain.      Pharmacy where request should be sent: [facility], [facility], & [facility], [place] - [address] - [phone] [facility] [phone] FX     Last office visit with prescribing clinician: 4/29/2025   Last telemedicine visit with prescribing clinician: Visit date not found   Next office visit with prescribing clinician: 6/24/2025     Does the patient have less than a 3 day supply:  [x] Yes  [] No    Would you like a call back once the refill request has been completed: [] Yes [x] No    If the office needs to give you a call back, can they leave a voicemail: [] Yes [x] No       No care due was identified.  [facility] Embedded Care Due Messages. Reference number: [id number].   5/25/2023 3:31:01 AM CDT   32M with h/o BPDO, PTSD pw ongoing L sided abdominal pain 32M with h/o BPDO, PTSD pw ongoing L sided abdominal pain, presenting with concern for possible STD after unprotected 32M with h/o BPDO, PTSD pw ongoing L sided abdominal pain, presenting with concern for possible STD after unprotected sex at some point in time.  No evidence of acute trauma.  Slightly agitated but nonviolent. Plan to treat for presumed STI, check labs, tox, CT a/P, psychiatry evaluation, reasses

## 2025-05-23 ENCOUNTER — LAB VISIT (OUTPATIENT)
Dept: LAB | Facility: HOSPITAL | Age: 75
End: 2025-05-23
Attending: INTERNAL MEDICINE
Payer: MEDICARE

## 2025-05-23 DIAGNOSIS — E11.9 TYPE 2 DIABETES MELLITUS WITHOUT COMPLICATION, WITHOUT LONG-TERM CURRENT USE OF INSULIN: ICD-10-CM

## 2025-05-23 LAB
ALBUMIN SERPL BCP-MCNC: 3.6 G/DL (ref 3.5–5.2)
ALBUMIN/CREAT UR: 21.4 UG/MG
ALP SERPL-CCNC: 55 UNIT/L (ref 40–150)
ALT SERPL W/O P-5'-P-CCNC: 27 UNIT/L (ref 10–44)
ANION GAP (OHS): 8 MMOL/L (ref 8–16)
AST SERPL-CCNC: 25 UNIT/L (ref 11–45)
BILIRUB SERPL-MCNC: 0.6 MG/DL (ref 0.1–1)
BUN SERPL-MCNC: 12 MG/DL (ref 8–23)
CALCIUM SERPL-MCNC: 8.5 MG/DL (ref 8.7–10.5)
CHLORIDE SERPL-SCNC: 107 MMOL/L (ref 95–110)
CHOLEST SERPL-MCNC: 232 MG/DL (ref 120–199)
CHOLEST/HDLC SERPL: 5.7 {RATIO} (ref 2–5)
CO2 SERPL-SCNC: 25 MMOL/L (ref 23–29)
CREAT SERPL-MCNC: 0.8 MG/DL (ref 0.5–1.4)
CREAT UR-MCNC: 98 MG/DL (ref 23–375)
EAG (OHS): 88 MG/DL (ref 68–131)
ERYTHROCYTE [DISTWIDTH] IN BLOOD BY AUTOMATED COUNT: 14.6 % (ref 11.5–14.5)
GFR SERPLBLD CREATININE-BSD FMLA CKD-EPI: >60 ML/MIN/1.73/M2
GLUCOSE SERPL-MCNC: 73 MG/DL (ref 70–110)
HBA1C MFR BLD: 4.7 % (ref 4–5.6)
HCT VFR BLD AUTO: 44 % (ref 40–54)
HDLC SERPL-MCNC: 41 MG/DL (ref 40–75)
HDLC SERPL: 17.7 % (ref 20–50)
HGB BLD-MCNC: 13.2 GM/DL (ref 14–18)
LDLC SERPL CALC-MCNC: 168.4 MG/DL (ref 63–159)
MCH RBC QN AUTO: 24.6 PG (ref 27–31)
MCHC RBC AUTO-ENTMCNC: 30 G/DL (ref 32–36)
MCV RBC AUTO: 82 FL (ref 82–98)
MICROALBUMIN UR-MCNC: 21 UG/ML (ref ?–5000)
NONHDLC SERPL-MCNC: 191 MG/DL
PLATELET # BLD AUTO: 226 K/UL (ref 150–450)
PMV BLD AUTO: 10.2 FL (ref 9.2–12.9)
POTASSIUM SERPL-SCNC: 3.7 MMOL/L (ref 3.5–5.1)
PROT SERPL-MCNC: 7.3 GM/DL (ref 6–8.4)
RBC # BLD AUTO: 5.36 M/UL (ref 4.6–6.2)
SODIUM SERPL-SCNC: 140 MMOL/L (ref 136–145)
TRIGL SERPL-MCNC: 113 MG/DL (ref 30–150)
WBC # BLD AUTO: 4.85 K/UL (ref 3.9–12.7)

## 2025-05-23 PROCEDURE — 85027 COMPLETE CBC AUTOMATED: CPT

## 2025-05-23 PROCEDURE — 80061 LIPID PANEL: CPT

## 2025-05-23 PROCEDURE — 36415 COLL VENOUS BLD VENIPUNCTURE: CPT | Mod: PO

## 2025-05-23 PROCEDURE — 82043 UR ALBUMIN QUANTITATIVE: CPT

## 2025-05-23 PROCEDURE — 84450 TRANSFERASE (AST) (SGOT): CPT

## 2025-05-23 PROCEDURE — 83036 HEMOGLOBIN GLYCOSYLATED A1C: CPT

## 2025-05-26 NOTE — ASSESSMENT & PLAN NOTE
Not taking Zetia regularly.  Takes a half tablet maybe twice a week.  Notes when he takes it he gets fatigue.  Intolerant of statins.  We discussed having him see Cardiology to consider PCS K9.  He would be agreeable to see them to discuss.  He is reconsidering whether to rechallenge himself on daily Zetia and I have asked him to do so until his cardiology consult  Orders:    Ambulatory referral/consult to Cardiology; Future    ezetimibe (ZETIA) 10 mg tablet; Take 1 tablet (10 mg total) by mouth once daily.     Received most recent mammogram and pap smear scanned into chart today.

## 2025-05-26 NOTE — ASSESSMENT & PLAN NOTE
Not taking Zetia regularly.  Takes a half tablet maybe twice a week.  Notes when he takes it he gets fatigue.  Intolerant of statins.  We discussed having him see Cardiology to consider PCS K9.  He would be agreeable to see them to discuss.  He is reconsidering whether to rechallenge himself on daily Zetia and I have asked him to do so until his cardiology consult  Orders:    Ambulatory referral/consult to Cardiology; Future    ezetimibe (ZETIA) 10 mg tablet; Take 1 tablet (10 mg total) by mouth once daily.

## 2025-05-26 NOTE — ASSESSMENT & PLAN NOTE
Diet-controlled.  Blood pressure today is good not on medication.  Did have episode of pedal edema during his most recent cruise likely due to dietary indiscretions and recent plane travel.  Requesting refill of HCTZ to have on hand in case of swelling.  Has not really had to use it aside from this episode.  Orders:    hydroCHLOROthiazide 12.5 MG Tab; Take 1 tablet (12.5 mg total) by mouth daily as needed (leg swelling).

## 2025-05-26 NOTE — ASSESSMENT & PLAN NOTE
Diet-controlled, pre visit labs reviewed, in normal range.  Aside from the initial elevated A1c they have all been in normal range without medication.  He will remain on Diabetes registry until August 2028  Orders:    Comprehensive Metabolic Panel; Future    Lipid Panel; Future    Hemoglobin A1C; Future    CBC Without Differential; Future

## 2025-05-26 NOTE — PROGRESS NOTES
This note was cr pre visit labs A1c in eated by combination of typed  and M-Modal dictation.  Transcription errors may be present.   This note was also generated with the assistance of ambient listening technology. Verbal consent was obtained by the patient and accompanying visitor(s) for the recording of patient appointment to facilitate this note. I attest to having reviewed and edited the generated note for accuracy, though some syntax or spelling errors may persist. Please contact the author of this note for any clarification.    Assessment and Plan:   Assessment and Plan   Assessment & Plan  Type 2 diabetes mellitus without complication, without long-term current use of insulin  Diet-controlled, pre visit labs reviewed, in normal range.  Aside from the initial elevated A1c they have all been in normal range without medication.  He will remain on Diabetes registry until August 2028  Orders:    Comprehensive Metabolic Panel; Future    Lipid Panel; Future    Hemoglobin A1C; Future    CBC Without Differential; Future    Essential hypertension 04/08/2021 TTE LV normal size with mild eccentric LVH and normal systolic function LVEF 55%.  Normal RV size and systolic function.  PA pressure 23.  Pedal edema  Diet-controlled.  Blood pressure today is good not on medication.  Did have episode of pedal edema during his most recent cruise likely due to dietary indiscretions and recent plane travel.  Requesting refill of HCTZ to have on hand in case of swelling.  Has not really had to use it aside from this episode.  Orders:    hydroCHLOROthiazide 12.5 MG Tab; Take 1 tablet (12.5 mg total) by mouth daily as needed (leg swelling).    Dyslipidemia statin intolerance. 8/2018 zetia; 07/13/2016 stress test negative for ischemia  Statin myopathy  Aortic atherosclerosis  Not taking Zetia regularly.  Takes a half tablet maybe twice a week.  Notes when he takes it he gets fatigue.  Intolerant of statins.  We discussed having  him see Cardiology to consider PCS K9.  He would be agreeable to see them to discuss.  He is reconsidering whether to rechallenge himself on daily Zetia and I have asked him to do so until his cardiology consult  Orders:    Ambulatory referral/consult to Cardiology; Future    ezetimibe (ZETIA) 10 mg tablet; Take 1 tablet (10 mg total) by mouth once daily.    Smoker  Centrilobular emphysema  Working on quitting.  Using carrot sticks etc. exhausted his visits with smoking cessation       Recurrent major depressive disorder, in full remission  In remission, not on medication       Insomnia, unspecified type  Requesting Lunesta to take on frequent basis.  Takes half tablet at a time.  Orders:    eszopiclone (LUNESTA) 3 mg Tab; Take 0.5 tablets (1.5 mg total) by mouth every evening.    Tubulovillous adenoma of colon on colonoscopy 8/2018; 1/6/22 colonoscopy 5 mm sigmoid HP  Recent colonoscopy 02/11/2025 with tubular adenoma, due for repeat 2030       History of prostate cancer status post XRT, finished ADT 2018  Followed by [facility].  Has been 8 years out.  They recommended annual visit but he prefers to see them every 6 months.       Beta thalassemia minor  Stable         Medications Discontinued During This Encounter   Medication Reason    ezetimibe (ZETIA) 10 mg tablet Reorder    ezetimibe (ZETIA) 10 mg tablet Reorder       meds sent this encounter:  Medications Ordered This Encounter   Medications    eszopiclone (LUNESTA) 3 mg Tab     Sig: Take 0.5 tablets (1.5 mg total) by mouth every evening.     Dispense:  30 tablet     Refill:  0    ezetimibe (ZETIA) 10 mg tablet     Sig: Take 1 tablet (10 mg total) by mouth once daily.     Dispense:  90 tablet     Refill:  3     Fill today    hydroCHLOROthiazide 12.5 MG Tab     Sig: Take 1 tablet (12.5 mg total) by mouth daily as needed (leg swelling).     Dispense:  30 tablet     Refill:  0     .         Follow Up: OV 6 months with labs.  Future Appointments    Date Time Provider Department Center   12/23/2025 10:40 AM Navin Charlton MD [facility]            Subjective:   Subjective   Chief Complaint   Patient presents with    Follow-up       HPI  Sharee is a 74 y.o. male.     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     Employer: [employer]   Tobacco Use    Smoking status: Every Day     Types: Cigarettes     Passive exposure: Current    Smokeless tobacco: Never    Tobacco comments:     smokes 3 cigarettes daily   Substance and Sexual Activity    Alcohol use: Yes     Alcohol/week: 3.0 standard drinks of alcohol     Types: 1 Glasses of wine, 1 Cans of beer, 1 Shots of liquor per week     Comment: Ocassionally    Drug use: Not Currently    Sexual activity: Not Currently     Partners: Female     Social Drivers of Health     Financial Resource Strain: Low Risk  (5/22/2025)    Overall Financial Resource Strain (CARDIA)     Difficulty of Paying Living Expenses: Not very hard   Food Insecurity: Food Insecurity Present (5/22/2025)    Hunger Vital Sign     Worried About Running Out of Food in the Last Year: Sometimes true     Ran Out of Food in the Last Year: Sometimes true   Transportation Needs: No Transportation Needs (5/22/2025)    PRAPARE - Transportation     Lack of Transportation (Medical): No     Lack of Transportation (Non-Medical): No   Physical Activity: Insufficiently Active (5/22/2025)    Exercise Vital Sign     Days of Exercise per Week: 3 days     Minutes of Exercise per Session: 30 min   Stress: Stress Concern Present (5/22/2025)    [employer] - Occupational Stress Questionnaire     Feeling of Stress : Rather much   Housing Stability: Low Risk  (5/22/2025)    Housing Stability Vital Sign     Unable to Pay for Housing in the Last Year: No     Number of Times Moved in the Last Year: 0     Homeless in  the Last Year: No       Last appointment with this clinic was 12/10/2024. Last visit with me 11/25/2024   To summarize last visit and events leading up to today:  Diabetes diet controlled  Hypertension encouraged to re-enroll in digital monitoring.  History of lisinopril and HCTZ, lisinopril stopped due to low BP.  Now diet-controlled  Hyperlipidemia  Smoking working with cessation program  Emphysema seen on CT imaging of the chest  History of prostate cancer status post XRT, currently on hormone deprivation followed by [facility].  Lumbar radiculopathy  Neuropathy of the feet.  Intermittent and episodic.  Did not think this was vascular.  Recent TSH was normal.  A1c was below 5.6.  Never had chemotherapy for the prostate cancer.  Only XRT.   MDD.  Insomnia.  History of Lunesta.  Was seeing Psychiatry but stop seeing Psychiatry and therefore was no longer prescribed Lunesta.  History of Ambien with side effect of bad dreams.  He was already practicing the principles of CBT I.  Trial of mirtazapine and if no improvement next step would be doxepin  Testosterone panel normal on repeat    Last visit with me 11/25/24 for physical exam  Due for colonoscopy with history of TVA, referred  DM diet controlled. Will have diagnosis of DM until 5/2028 if he remains with A1c < 5.6  Hypertension diet-controlled   Hyperlipidemia with peripheral artery disease with a history of statin intolerance.  Some similar achiness to Zetia though not as severe.  Given incidental arthrosclerosis would rechallenge on Zetia.    Smoking working with smoking cessation plans to quit new year's.  Ingrown toenail referred to Podiatry    Saw Podiatry 11/26/2024.  Who ordered AILYN.    12/23/2024 AILYN normal bilaterally    2/11/25 colonoscopy 1 TA. 2 HPs repeat 5 years    Saw Urology at [facility] 03/03/2025.  Eight years post treatment annual visits suggested but he preferred to remain on six-month follow-ups.    Pre visit labs    CBC mild anemia stable   CMP normal   Hyperlipidemia  A1c 4.7 from 4.4  Urine microalbumin normal    Today's visit:    History of Present Illness    SOCIAL HISTORY:  - Smoking: Currently smokes a few cigarettes per day    HPI:  Sharee reports increased cholesterol levels recently, attributing this to poor eating habits during a cruise approximately 1 week before his labs. He admits to not taking his cholesterol medication (Zetia) as prescribed, typically only taking it twice weekly and often cutting the pills in half due to side effects. Sharee has fatigue when taking Zetia, reporting that it causes tiredness but then affects his ability to fall asleep at night.    He mentions swelling in his ankles and legs during the cruise. He managed this by taking hydrochlorothiazide, requiring about 4 doses to reduce the swelling. The swelling has since resolved.    Regarding physical activity, he walks regularly, typically 1 mile in the morning and another mile in the evening. He also does some exercises at home. Sharee expresses concern about potential joint problems from high impact exercise, citing observations of others who jog frequently.    For sleep issues, he occasionally uses a sleeping pill (Lunesta), which he takes sparingly, often cutting the pill in half and only using it after difficulty sleeping for 2 consecutive nights.    He denies chest pain or shortness of breath while walking.    MEDICATIONS:  - Zetia (ezetimibe), 1 pill twice weekly (sometimes cut in half), for cholesterol, not taken as prescribed due to side effect of tiredness  - Hydrochlorothiazide, as needed for leg swelling  - Lunesta (eszopiclone), 1/2 pill as needed for sleep, not taken regularly  - Discontinued regular use of Zetia due to side effect of tiredness  - Discontinued Lisinopril and hydrochlorothiazide combination for blood pressure in the past    ROS:  General: reports fatigue when taking Zetia  Cardiovascular: reports lower extremity  edema during his cruise  Skin: reports lumps/masses  Neurological: reports difficulty falling asleep  Psychiatric: reports sleep difficulty         Patient Care Team:  Navin Charlton MD as PCP - General (Internal Medicine)  Alfredo Kearns MD as Consulting Physician (Urology)  Adeel Coleman MD as Consulting Physician (Psychiatry)  Adeel Vicente MD (Hematology and Oncology)  Jon Spence MD as Consulting Physician (Ophthalmology)    Patient Active Problem List    Diagnosis Date Noted    Nuclear sclerosis of both eyes 01/29/2025    Aortic atherosclerosis 12/10/2024     CT 2020:   Aorta and vasculature: Atherosclerosis including coronary arteries.       Type 2 diabetes mellitus without complication, without long-term current use of insulin 05/16/2023    Low testosterone 05/16/2023    Centrilobular emphysema 05/11/2023    Class 1 obesity due to excess calories with serious comorbidity in adult 07/13/2022    Lumbar radiculopathy 11/09/2021 05/12/2021 EMG acute denervation with chronic reinnervation in the L5-S1 myotomes on the left.  5/24/2021 MRI L spine:   Multilevel lumbar spondylosis with resultant mild spinal canal stenosis at L3-L4 in neural foraminal stenosis at L4-L5 and L5-S1.      PVC (premature ventricular contraction); 4/2021 metoprolol 04/23/2021 04/08/2021 Holter frequent PVCs, frequent PACs      Peripheral artery disease mild on doppler 05/20/2019     4/10/2018 LE dopplers: 1. Right lower extremity pressures and waveforms indicate no hemodynamically significant arterial occlusive disease.  2. Left lower extremity pressures and waveforms indicate mild arterial occlusive disease.      Numbness or tingling workup with neuro for demyelinating 05/20/2019 12/19/18 MRI brain: 1. Numerous tiny focal areas of remote gliosis posterior frontal parietal and occipital and upper basal ganglia areas, differential diagnosis includes microvascular ischemic change and inactive demyelinating  process.  2. Intraventricular artifact opacity body left lateral ventricle discussed above.  12/2018 MRI C spine: 1. Multilevel spondylotic changes as discussed above.  2. Multilevel foraminal stenotic changes more pronounced on the left as compared to the right as discussed above.  See further details above.  1/24/19 EMG: borderline normal EMG/NCS. There was an absent   left sural response of unclear clinical significance. There were   no myopathic units seen to suggest a myopathy.     05/12/2021 EMG acute denervation with chronic reinnervation in the L5-S1 myotomes on the left.  5/24/2021 MRI L spine:   Multilevel lumbar spondylosis with resultant mild spinal canal stenosis at L3-L4 in neural foraminal stenosis at L4-L5 and L5-S1.      Tubulovillous adenoma of colon on colonoscopy 8/2018; 2/11/25 colonoscopy 1 TA. 2 HPs repeat 5 years 08/30/2018 8/2018 colonoscopy TVA  1/6/22 colonoscopy 5 mm sigmoid HP  2/11/25 colonoscopy 1 TA. 2 HPs repeat 5 years      Statin myopathy 08/02/2018    Obstructive sleep apnea severe with AHI 64 on study 7/2018 07/27/2018     Very severe sleep disordered  breathing (AHI=64) is noted based on a 4% hypopnea desaturation criteria. The patient slept supine 64.2% of the night based  on valid recording time of 6.43 hours. The apneas/hypopneas are accompanied by severe oxygen desaturation (percent time  below 90% SpO2: 15.9%, Min SpO2: 69.6%). The average desaturation across all sleep disordered breathing events is 5.7%.  Snoring occurs for 45.4% (30 dB) of the study, 17.3% is extremely loud. The mean pulse rate is 63 BPM, with frequent pulse  rate variability (52 events with >= 6 BPM increase/decrease per hour).  TREATMENT CONSIDERATIONS: Consider nasal continuous positive airway pressure (CPAP) as the initial treatment  choice for severe obstructive sleep apnea. Consider an attended in-lab CPAP titration study, given the possibility of co-morbid  sleep related  hypoventilation.  DISEASE MANAGEMENT CONSIDERATIONS: Insomnia is a symptom that can be associated with untreated DAKOTA.  Sleeping pills may increase the severity of untreated DAKOTA and their use should be reevaluated once the DAKOTA is treated.      Recurrent major depressive disorder, in full remission 10/30/2017    Pelvic floor muscle wasting in male 09/28/2017    History of prostate cancer status post XRT, finished ADT 2018 08/30/2017     2 cores positive kary 3+4=7 on biopsy 8/15/2017      Smoker 08/30/2017    Vitamin D deficiency 08/30/2017    Beta thalassemia minor 04/11/2017    Benign non-nodular prostatic hyperplasia without lower urinary tract symptoms 03/29/2017     Hx of negative prostate Bx and hx of MRI prostate      Restless leg syndrome Requip ineffective 08/02/2016    Dyslipidemia statin intolerance. 8/2018 zetia; 07/13/2016 stress test negative for ischemia     Essential hypertension 04/08/2021 TTE LV normal size with mild eccentric LVH and normal systolic function LVEF 55%.  Normal RV size and systolic function.  PA pressure 23.      7/13/2016 [facility] stress test neg for ischemia  7/13/2016 TTE LVEF 55-60; no diastolic dysfunction  04/08/2021 TTE LV normal size with mild eccentric LVH and normal systolic function LVEF 55%.  Normal RV size and systolic function.  PA pressure 23.         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 Trazodone ineffective.  Rozerem didn't help 11/20/2013       PAST MEDICAL PROBLEMS, PAST SURGICAL HISTORY: please see relevant portions of the electronic medical record    ALLERGIES AND MEDICATIONS: updated and reviewed.  Medication List with Changes/Refills   Current Medications    BLOOD SUGAR DIAGNOSTIC STRP    To check BG 1 times daily, to use with insurance preferred meter    BLOOD-GLUCOSE METER KIT    To check BG 1 times daily, to use with insurance preferred meter    CHLORHEXIDINE (PERIDEX) 0.12 % SOLUTION    RINSE WITH 15 MLS BY MOUTH FOR 30 SECONDS THEN SPIT EVERY DAY  "   EZETIMIBE (ZETIA) 10 MG TABLET    Take 1 tablet (10 mg total) by mouth once daily.    LANCETS MISC    To check BG 1 times daily, to use with insurance preferred meter    SILDENAFIL (VIAGRA) 100 MG TABLET    Take 1 tablet (100 mg total) by mouth daily as needed for Erectile Dysfunction.    TRUEPLUS LANCETS 33 GAUGE MISC             Objective:   Objective   Physical Exam   Vitals:    05/27/25 0824   BP: 112/70   BP Location: Left arm   Patient Position: Sitting   Pulse: 63   Temp: 98.7 °F (37.1 °C)   TempSrc: Oral   SpO2: 99%   Weight: 94.3 kg (207 lb 12.5 oz)   Height: 5' 11" (1.803 m)    Body mass index is 28.98 kg/m².  Weight: 94.3 kg (207 lb 12.5 oz)   Height: 5' 11" (180.3 cm)     Physical Exam  Constitutional:       General: He is not in acute distress.     Appearance: He is well-developed.   Eyes:      Extraocular Movements: Extraocular movements intact.   Cardiovascular:      Rate and Rhythm: Normal rate and regular rhythm.      Heart sounds: Normal heart sounds. No murmur heard.  Pulmonary:      Effort: Pulmonary effort is normal.      Breath sounds: Normal breath sounds.   Musculoskeletal:         General: Normal range of motion.      Right lower leg: No edema.      Left lower leg: No edema.   Skin:     General: Skin is warm and dry.   Neurological:      Mental Status: He is alert and oriented to person, place, and time.      Coordination: Coordination normal.   Psychiatric:         Behavior: Behavior normal.         Thought Content: Thought content normal.         Component      Latest Ref Rng 11/19/2024 5/23/2025   Sodium      136 - 145 mmol/L 143  140    Potassium      3.5 - 5.1 mmol/L 3.7  3.7    Chloride      95 - 110 mmol/L 109  107    CO2      23 - 29 mmol/L 28  25    Glucose      70 - 110 mg/dL 80  73    BUN      8 - 23 mg/dL 11  12    Creatinine      0.5 - 1.4 mg/dL 0.9  0.8    Calcium      8.7 - 10.5 mg/dL 9.0  8.5 (L)    PROTEIN TOTAL      6.0 - 8.4 gm/dL 7.0  7.3    Albumin      3.5 - 5.2 g/dL " 3.6  3.6    BILIRUBIN TOTAL      0.1 - 1.0 mg/dL 0.6  0.6    ALP      40 - 150 unit/L 63  55    AST      11 - 45 unit/L 22  25    ALT      10 - 44 unit/L 20  27    eGFR      >60 mL/min/1.73/m2 >60.0  >60    Anion Gap      8 - 16 mmol/L 6 (L)  8    WBC      3.90 - 12.70 K/uL 5.92  4.85    RBC      4.60 - 6.20 M/uL 5.39  5.36    Hemoglobin      14.0 - 18.0 gm/dL 13.5 (L)  13.2 (L)    Hematocrit      40.0 - 54.0 % 43.4  44.0    MCV      82 - 98 fL 81 (L)  82    MCH      27.0 - 31.0 pg 25.0 (L)  24.6 (L)    MCHC      32.0 - 36.0 g/dL 31.1 (L)  30.0 (L)    RDW      11.5 - 14.5 % 14.9 (H)  14.6 (H)    Platelet Count      150 - 450 K/uL 234  226    MPV      9.2 - 12.9 fL 10.5  10.2    Cholesterol Total      120 - 199 mg/dL 195  232 (H)    Triglycerides      30 - 150 mg/dL 72  113    HDL      40 - 75 mg/dL 44  41    LDL Cholesterol      63.0 - 159.0 mg/dL 136.6  168.4 (H)    HDL/Cholesterol Ratio      20.0 - 50.0 % 22.6  17.7 (L)    Total Cholesterol/HDL Ratio      2.0 - 5.0  4.4  5.7 (H)    Non-HDL Cholesterol      mg/dL 151  191    Urine Microalbumin        ug/mL  21.0    Urine Creatinine      23.0 - 375.0 mg/dL  98.0    MICROALB/CREAT RATIO      <=30.0 ug/mg  21.4    Hemoglobin A1C External      4.0 - 5.6 % 4.4  4.7    Estimated Avg Glucose      68 - 131 mg/dL 80  88       Legend:  (L) Low  (H) High

## 2025-05-27 ENCOUNTER — OFFICE VISIT (OUTPATIENT)
Dept: FAMILY MEDICINE | Facility: CLINIC | Age: 75
End: 2025-05-27
Payer: MEDICARE

## 2025-05-27 VITALS
BODY MASS INDEX: 29.09 KG/M2 | HEIGHT: 71 IN | OXYGEN SATURATION: 99 % | HEART RATE: 63 BPM | SYSTOLIC BLOOD PRESSURE: 112 MMHG | WEIGHT: 207.81 LBS | TEMPERATURE: 99 F | DIASTOLIC BLOOD PRESSURE: 70 MMHG

## 2025-05-27 DIAGNOSIS — T46.6X5A STATIN MYOPATHY: ICD-10-CM

## 2025-05-27 DIAGNOSIS — G72.0 STATIN MYOPATHY: ICD-10-CM

## 2025-05-27 DIAGNOSIS — J43.2 CENTRILOBULAR EMPHYSEMA: ICD-10-CM

## 2025-05-27 DIAGNOSIS — E78.5 DYSLIPIDEMIA: ICD-10-CM

## 2025-05-27 DIAGNOSIS — D56.3 BETA THALASSEMIA MINOR: Chronic | ICD-10-CM

## 2025-05-27 DIAGNOSIS — F33.42 RECURRENT MAJOR DEPRESSIVE DISORDER, IN FULL REMISSION: ICD-10-CM

## 2025-05-27 DIAGNOSIS — G47.00 INSOMNIA, UNSPECIFIED TYPE: ICD-10-CM

## 2025-05-27 DIAGNOSIS — I70.0 AORTIC ATHEROSCLEROSIS: ICD-10-CM

## 2025-05-27 DIAGNOSIS — I10 ESSENTIAL HYPERTENSION: ICD-10-CM

## 2025-05-27 DIAGNOSIS — D12.6 TUBULOVILLOUS ADENOMA OF COLON: ICD-10-CM

## 2025-05-27 DIAGNOSIS — F17.200 SMOKER: ICD-10-CM

## 2025-05-27 DIAGNOSIS — Z85.46 HISTORY OF PROSTATE CANCER: ICD-10-CM

## 2025-05-27 DIAGNOSIS — E11.9 TYPE 2 DIABETES MELLITUS WITHOUT COMPLICATION, WITHOUT LONG-TERM CURRENT USE OF INSULIN: Primary | ICD-10-CM

## 2025-05-27 DIAGNOSIS — R60.0 PEDAL EDEMA: ICD-10-CM

## 2025-05-27 PROCEDURE — G2211 COMPLEX E/M VISIT ADD ON: HCPCS | Mod: S$PBB,,, | Performed by: INTERNAL MEDICINE

## 2025-05-27 PROCEDURE — 99214 OFFICE O/P EST MOD 30 MIN: CPT | Mod: PBBFAC,PO | Performed by: INTERNAL MEDICINE

## 2025-05-27 PROCEDURE — 99999 PR PBB SHADOW E&M-EST. PATIENT-LVL IV: CPT | Mod: PBBFAC,,, | Performed by: INTERNAL MEDICINE

## 2025-05-27 PROCEDURE — 99214 OFFICE O/P EST MOD 30 MIN: CPT | Mod: S$PBB,,, | Performed by: INTERNAL MEDICINE

## 2025-05-27 RX ORDER — ESZOPICLONE 3 MG/1
1.5 TABLET, FILM COATED ORAL NIGHTLY
Qty: 30 TABLET | Refills: 0 | Status: SHIPPED | OUTPATIENT
Start: 2025-05-27

## 2025-05-27 RX ORDER — EZETIMIBE 10 MG/1
10 TABLET ORAL DAILY
Qty: 90 TABLET | Refills: 3 | Status: SHIPPED | OUTPATIENT
Start: 2025-05-27 | End: 2025-05-27 | Stop reason: SDUPTHER

## 2025-05-27 RX ORDER — HYDROCHLOROTHIAZIDE 12.5 MG/1
12.5 TABLET ORAL DAILY PRN
Qty: 30 TABLET | Refills: 0 | Status: SHIPPED | OUTPATIENT
Start: 2025-05-27 | End: 2026-05-27

## 2025-05-27 RX ORDER — EZETIMIBE 10 MG/1
10 TABLET ORAL DAILY
Qty: 90 TABLET | Refills: 3 | Status: SHIPPED | OUTPATIENT
Start: 2025-05-27

## 2025-05-27 NOTE — ASSESSMENT & PLAN NOTE
Followed by [facility].  Has been 8 years out.  They recommended annual visit but he prefers to see them every 6 months.

## 2025-06-04 ENCOUNTER — OFFICE VISIT (OUTPATIENT)
Dept: CARDIOLOGY | Facility: CLINIC | Age: 75
End: 2025-06-04
Payer: MEDICARE

## 2025-06-04 VITALS
DIASTOLIC BLOOD PRESSURE: 88 MMHG | WEIGHT: 207.69 LBS | HEART RATE: 64 BPM | RESPIRATION RATE: 18 BRPM | BODY MASS INDEX: 29.08 KG/M2 | HEIGHT: 71 IN | OXYGEN SATURATION: 96 % | SYSTOLIC BLOOD PRESSURE: 128 MMHG

## 2025-06-04 DIAGNOSIS — I49.3 PVC (PREMATURE VENTRICULAR CONTRACTION): ICD-10-CM

## 2025-06-04 DIAGNOSIS — E78.5 DYSLIPIDEMIA: ICD-10-CM

## 2025-06-04 DIAGNOSIS — G72.0 STATIN MYOPATHY: ICD-10-CM

## 2025-06-04 DIAGNOSIS — I10 ESSENTIAL HYPERTENSION: Primary | ICD-10-CM

## 2025-06-04 DIAGNOSIS — T46.6X5A STATIN MYOPATHY: ICD-10-CM

## 2025-06-04 PROCEDURE — G2211 COMPLEX E/M VISIT ADD ON: HCPCS | Mod: S$PBB,,, | Performed by: INTERNAL MEDICINE

## 2025-06-04 PROCEDURE — 99204 OFFICE O/P NEW MOD 45 MIN: CPT | Mod: S$PBB,,, | Performed by: INTERNAL MEDICINE

## 2025-06-04 PROCEDURE — 99215 OFFICE O/P EST HI 40 MIN: CPT | Mod: PBBFAC | Performed by: INTERNAL MEDICINE

## 2025-06-04 PROCEDURE — 99999 PR PBB SHADOW E&M-EST. PATIENT-LVL V: CPT | Mod: PBBFAC,,, | Performed by: INTERNAL MEDICINE

## 2025-06-04 RX ORDER — EZETIMIBE 10 MG/1
10 TABLET ORAL DAILY
Qty: 90 TABLET | Refills: 3 | Status: SHIPPED | OUTPATIENT
Start: 2025-06-04

## 2025-06-04 RX ORDER — PRAVASTATIN SODIUM 20 MG/1
20 TABLET ORAL DAILY
Qty: 90 TABLET | Refills: 3 | Status: SHIPPED | OUTPATIENT
Start: 2025-06-04 | End: 2026-06-04

## 2025-06-11 ENCOUNTER — OCHSNER VIRTUAL EMERGENCY DEPARTMENT (OUTPATIENT)
Facility: CLINIC | Age: 75
End: 2025-06-11

## 2025-06-11 ENCOUNTER — E-CONSULT (OUTPATIENT)
Facility: CLINIC | Age: 75
End: 2025-06-11
Payer: MEDICARE

## 2025-06-11 ENCOUNTER — NURSE TRIAGE (OUTPATIENT)
Dept: ADMINISTRATIVE | Facility: CLINIC | Age: 75
End: 2025-06-11
Payer: MEDICARE

## 2025-06-11 ENCOUNTER — HOSPITAL ENCOUNTER (EMERGENCY)
Facility: HOSPITAL | Age: 75
Discharge: HOME | End: 2025-06-11
Attending: STUDENT IN AN ORGANIZED HEALTH CARE EDUCATION/TRAINING PROGRAM
Payer: MEDICARE

## 2025-06-11 ENCOUNTER — OFFICE VISIT (OUTPATIENT)
Dept: URGENT CARE | Facility: CLINIC | Age: 75
End: 2025-06-11
Payer: MEDICARE

## 2025-06-11 VITALS
OXYGEN SATURATION: 96 % | WEIGHT: 200 LBS | SYSTOLIC BLOOD PRESSURE: 142 MMHG | RESPIRATION RATE: 13 BRPM | HEART RATE: 67 BPM | TEMPERATURE: 99 F | BODY MASS INDEX: 27.89 KG/M2 | DIASTOLIC BLOOD PRESSURE: 73 MMHG

## 2025-06-11 VITALS
SYSTOLIC BLOOD PRESSURE: 114 MMHG | TEMPERATURE: 98 F | RESPIRATION RATE: 18 BRPM | OXYGEN SATURATION: 96 % | DIASTOLIC BLOOD PRESSURE: 76 MMHG | HEART RATE: 52 BPM | BODY MASS INDEX: 29.04 KG/M2 | HEIGHT: 71 IN | WEIGHT: 207.44 LBS

## 2025-06-11 DIAGNOSIS — R11.2 NAUSEA & VOMITING: ICD-10-CM

## 2025-06-11 DIAGNOSIS — N13.2 URETERAL STONE WITH HYDRONEPHROSIS: Primary | ICD-10-CM

## 2025-06-11 DIAGNOSIS — E11.9 TYPE 2 DIABETES MELLITUS WITHOUT COMPLICATION, WITHOUT LONG-TERM CURRENT USE OF INSULIN: ICD-10-CM

## 2025-06-11 DIAGNOSIS — K59.00 CONSTIPATION, UNSPECIFIED CONSTIPATION TYPE: ICD-10-CM

## 2025-06-11 DIAGNOSIS — T80.1XXA INTRAVENOUS INFILTRATION, INITIAL ENCOUNTER: Primary | ICD-10-CM

## 2025-06-11 DIAGNOSIS — M79.89 ARM SWELLING: Primary | ICD-10-CM

## 2025-06-11 LAB
ABSOLUTE EOSINOPHIL (OHS): 0.01 K/UL
ABSOLUTE MONOCYTE (OHS): 0.93 K/UL (ref 0.3–1)
ABSOLUTE NEUTROPHIL COUNT (OHS): 10.27 K/UL (ref 1.8–7.7)
ALBUMIN SERPL BCP-MCNC: 3.8 G/DL (ref 3.5–5.2)
ALP SERPL-CCNC: 62 UNIT/L (ref 40–150)
ALT SERPL W/O P-5'-P-CCNC: 18 UNIT/L (ref 10–44)
ANION GAP (OHS): 13 MMOL/L (ref 8–16)
AST SERPL-CCNC: 23 UNIT/L (ref 11–45)
BASOPHILS # BLD AUTO: 0.03 K/UL
BASOPHILS NFR BLD AUTO: 0.2 %
BILIRUB SERPL-MCNC: 0.4 MG/DL (ref 0.1–1)
BILIRUB UR QL STRIP.AUTO: NEGATIVE
BUN SERPL-MCNC: 11 MG/DL (ref 8–23)
CALCIUM SERPL-MCNC: 8.9 MG/DL (ref 8.7–10.5)
CHLORIDE SERPL-SCNC: 106 MMOL/L (ref 95–110)
CLARITY UR: CLEAR
CO2 SERPL-SCNC: 23 MMOL/L (ref 23–29)
COLOR UR AUTO: COLORLESS
CREAT SERPL-MCNC: 1.2 MG/DL (ref 0.5–1.4)
ERYTHROCYTE [DISTWIDTH] IN BLOOD BY AUTOMATED COUNT: 14.4 % (ref 11.5–14.5)
ETHANOL SERPL-MCNC: <10 MG/DL
GFR SERPLBLD CREATININE-BSD FMLA CKD-EPI: >60 ML/MIN/1.73/M2
GLUCOSE SERPL-MCNC: 110 MG/DL (ref 70–110)
GLUCOSE SERPL-MCNC: 177 MG/DL (ref 70–110)
GLUCOSE UR QL STRIP: NEGATIVE
HCT VFR BLD AUTO: 45.1 % (ref 40–54)
HGB BLD-MCNC: 13.9 GM/DL (ref 14–18)
HGB UR QL STRIP: NEGATIVE
HOLD SPECIMEN: NORMAL
IMM GRANULOCYTES # BLD AUTO: 0.05 K/UL (ref 0–0.04)
IMM GRANULOCYTES NFR BLD AUTO: 0.4 % (ref 0–0.5)
KETONES UR QL STRIP: NEGATIVE
LEUKOCYTE ESTERASE UR QL STRIP: NEGATIVE
LIPASE SERPL-CCNC: 37 U/L (ref 4–60)
LYMPHOCYTES # BLD AUTO: 1.32 K/UL (ref 1–4.8)
MAGNESIUM SERPL-MCNC: 2.3 MG/DL (ref 1.6–2.6)
MCH RBC QN AUTO: 24.6 PG (ref 27–31)
MCHC RBC AUTO-ENTMCNC: 30.8 G/DL (ref 32–36)
MCV RBC AUTO: 80 FL (ref 82–98)
NITRITE UR QL STRIP: NEGATIVE
NUCLEATED RBC (/100WBC) (OHS): 0 /100 WBC
OHS QRS DURATION: 108 MS
OHS QTC CALCULATION: 447 MS
PH UR STRIP: 7 [PH]
PLATELET # BLD AUTO: 209 K/UL (ref 150–450)
PMV BLD AUTO: 9.4 FL (ref 9.2–12.9)
POTASSIUM SERPL-SCNC: 3.8 MMOL/L (ref 3.5–5.1)
PROT SERPL-MCNC: 8 GM/DL (ref 6–8.4)
PROT UR QL STRIP: NEGATIVE
RBC # BLD AUTO: 5.66 M/UL (ref 4.6–6.2)
RELATIVE EOSINOPHIL (OHS): 0.1 %
RELATIVE LYMPHOCYTE (OHS): 10.5 % (ref 18–48)
RELATIVE MONOCYTE (OHS): 7.4 % (ref 4–15)
RELATIVE NEUTROPHIL (OHS): 81.4 % (ref 38–73)
SODIUM SERPL-SCNC: 142 MMOL/L (ref 136–145)
SP GR UR STRIP: 1.02
UROBILINOGEN UR STRIP-ACNC: NEGATIVE EU/DL
WBC # BLD AUTO: 12.61 K/UL (ref 3.9–12.7)

## 2025-06-11 PROCEDURE — 81003 URINALYSIS AUTO W/O SCOPE: CPT | Performed by: STUDENT IN AN ORGANIZED HEALTH CARE EDUCATION/TRAINING PROGRAM

## 2025-06-11 PROCEDURE — 99214 OFFICE O/P EST MOD 30 MIN: CPT | Mod: S$GLB,,,

## 2025-06-11 PROCEDURE — 82077 ASSAY SPEC XCP UR&BREATH IA: CPT | Performed by: STUDENT IN AN ORGANIZED HEALTH CARE EDUCATION/TRAINING PROGRAM

## 2025-06-11 PROCEDURE — 83735 ASSAY OF MAGNESIUM: CPT | Performed by: STUDENT IN AN ORGANIZED HEALTH CARE EDUCATION/TRAINING PROGRAM

## 2025-06-11 PROCEDURE — 82962 GLUCOSE BLOOD TEST: CPT | Mod: S$GLB,,,

## 2025-06-11 PROCEDURE — 80053 COMPREHEN METABOLIC PANEL: CPT | Performed by: STUDENT IN AN ORGANIZED HEALTH CARE EDUCATION/TRAINING PROGRAM

## 2025-06-11 PROCEDURE — 96374 THER/PROPH/DIAG INJ IV PUSH: CPT

## 2025-06-11 PROCEDURE — 63600175 PHARM REV CODE 636 W HCPCS: Mod: JZ,TB | Performed by: STUDENT IN AN ORGANIZED HEALTH CARE EDUCATION/TRAINING PROGRAM

## 2025-06-11 PROCEDURE — 96375 TX/PRO/DX INJ NEW DRUG ADDON: CPT

## 2025-06-11 PROCEDURE — 96361 HYDRATE IV INFUSION ADD-ON: CPT

## 2025-06-11 PROCEDURE — 25500020 PHARM REV CODE 255: Performed by: STUDENT IN AN ORGANIZED HEALTH CARE EDUCATION/TRAINING PROGRAM

## 2025-06-11 PROCEDURE — 96376 TX/PRO/DX INJ SAME DRUG ADON: CPT

## 2025-06-11 PROCEDURE — 85025 COMPLETE CBC W/AUTO DIFF WBC: CPT | Performed by: STUDENT IN AN ORGANIZED HEALTH CARE EDUCATION/TRAINING PROGRAM

## 2025-06-11 PROCEDURE — 93005 ELECTROCARDIOGRAM TRACING: CPT

## 2025-06-11 PROCEDURE — 25000003 PHARM REV CODE 250: Performed by: STUDENT IN AN ORGANIZED HEALTH CARE EDUCATION/TRAINING PROGRAM

## 2025-06-11 PROCEDURE — 93010 ELECTROCARDIOGRAM REPORT: CPT | Mod: ,,, | Performed by: INTERNAL MEDICINE

## 2025-06-11 PROCEDURE — 82962 GLUCOSE BLOOD TEST: CPT

## 2025-06-11 PROCEDURE — 99285 EMERGENCY DEPT VISIT HI MDM: CPT | Mod: 25

## 2025-06-11 PROCEDURE — 83690 ASSAY OF LIPASE: CPT | Performed by: STUDENT IN AN ORGANIZED HEALTH CARE EDUCATION/TRAINING PROGRAM

## 2025-06-11 RX ORDER — KETOROLAC TROMETHAMINE 30 MG/ML
15 INJECTION, SOLUTION INTRAMUSCULAR; INTRAVENOUS
Status: COMPLETED | OUTPATIENT
Start: 2025-06-11 | End: 2025-06-11

## 2025-06-11 RX ORDER — ONDANSETRON 4 MG/1
4 TABLET, ORALLY DISINTEGRATING ORAL EVERY 6 HOURS PRN
Qty: 12 TABLET | Refills: 0 | Status: SHIPPED | OUTPATIENT
Start: 2025-06-11

## 2025-06-11 RX ORDER — MORPHINE SULFATE 4 MG/ML
6 INJECTION, SOLUTION INTRAMUSCULAR; INTRAVENOUS
Refills: 0 | Status: COMPLETED | OUTPATIENT
Start: 2025-06-11 | End: 2025-06-11

## 2025-06-11 RX ORDER — TAMSULOSIN HYDROCHLORIDE 0.4 MG/1
0.4 CAPSULE ORAL DAILY
Qty: 10 CAPSULE | Refills: 0 | Status: SHIPPED | OUTPATIENT
Start: 2025-06-11 | End: 2025-06-20 | Stop reason: SDUPTHER

## 2025-06-11 RX ORDER — HYDROCODONE BITARTRATE AND ACETAMINOPHEN 5; 325 MG/1; MG/1
1 TABLET ORAL EVERY 6 HOURS PRN
Qty: 5 TABLET | Refills: 0 | Status: SHIPPED | OUTPATIENT
Start: 2025-06-11 | End: 2025-06-12 | Stop reason: SDUPTHER

## 2025-06-11 RX ORDER — HYOSCYAMINE SULFATE 0.12 MG/1
0.12 TABLET SUBLINGUAL
Status: COMPLETED | OUTPATIENT
Start: 2025-06-11 | End: 2025-06-11

## 2025-06-11 RX ORDER — POLYETHYLENE GLYCOL 3350 17 G/17G
17 POWDER, FOR SOLUTION ORAL DAILY
Qty: 14 PACKET | Refills: 0 | Status: SHIPPED | OUTPATIENT
Start: 2025-06-11 | End: 2025-06-25

## 2025-06-11 RX ORDER — KETOROLAC TROMETHAMINE 10 MG/1
10 TABLET, FILM COATED ORAL EVERY 6 HOURS
Qty: 20 TABLET | Refills: 0 | Status: SHIPPED | OUTPATIENT
Start: 2025-06-11 | End: 2025-06-16

## 2025-06-11 RX ORDER — ONDANSETRON HYDROCHLORIDE 2 MG/ML
4 INJECTION, SOLUTION INTRAVENOUS
Status: COMPLETED | OUTPATIENT
Start: 2025-06-11 | End: 2025-06-11

## 2025-06-11 RX ADMIN — IOHEXOL 70 ML: 350 INJECTION, SOLUTION INTRAVENOUS at 03:06

## 2025-06-11 RX ADMIN — MORPHINE SULFATE 6 MG: 4 INJECTION INTRAVENOUS at 02:06

## 2025-06-11 RX ADMIN — KETOROLAC TROMETHAMINE 15 MG: 30 INJECTION, SOLUTION INTRAMUSCULAR; INTRAVENOUS at 04:06

## 2025-06-11 RX ADMIN — HYOSCYAMINE SULFATE 0.12 MG: 0.12 TABLET SUBLINGUAL at 03:06

## 2025-06-11 RX ADMIN — KETOROLAC TROMETHAMINE 15 MG: 30 INJECTION, SOLUTION INTRAMUSCULAR; INTRAVENOUS at 03:06

## 2025-06-11 RX ADMIN — SODIUM CHLORIDE 1000 ML: 9 INJECTION, SOLUTION INTRAVENOUS at 02:06

## 2025-06-11 RX ADMIN — ONDANSETRON 4 MG: 2 INJECTION INTRAMUSCULAR; INTRAVENOUS at 02:06

## 2025-06-11 NOTE — ED NOTES
Pt arrive to ED c/o abd cramping and n/v x 2-3 hrs, reports he's concerned about salmonella outbreak. Pt denies cp, sob, ha, fever or chills. AAOx4.

## 2025-06-11 NOTE — PLAN OF CARE-OVED
[facility] Emergency Department Plan of Care Note  Referral Source: Urgent Care                               Chief Complaint   Patient presents with    Arm Swelling     See e-consult    Arm swelling at the site of prior IV contrast administration. Advised to treat conservatively given no signs/symptoms of compartment syndrome, infection or upper extremity DVT.     Recommendation: Urgent Care                            No diagnosis found.

## 2025-06-11 NOTE — TELEPHONE ENCOUNTER
Patient was seen earlier this morning in the [facility] ED for c/o Abdominal Pain and Vomiting. Patient states he was transported via EMS. Patient states he had a CT w/contrast and the IV sites placed while in the ED are now swollen. Patient states multiple unsuccessful IV attempts were done by ED Staff, but he had two successful IV placements completed on his left arm and right arm. Patient states both arms are now swollen and his left arm is more swollen than his right. Patient denies pain and fever at this time.     Patient advised to See his PCP or Visit an Urgent Care Center today for evaluation. (No Same Day appointment available). Patient also advised to contact the [facility] on Call Service for any worsening symptoms.     Reason for Disposition   Skin swelling at IV site  (Exception: IV recently removed and has small lump or small amount of skin swelling.)    Additional Information   Negative: SEVERE difficulty breathing (e.g., struggling for each breath, speaks in single words)   Negative: Shock suspected (e.g., cold/pale/clammy skin, too weak to stand, low BP, rapid pulse)   Negative: Cracked or broken central line (e.g., Broviac, Brown, Port) or PICC line   Negative: Sounds like a life-threatening emergency to the triager   Negative: IV not running or running slowly   Negative: Chest pain and has central line (e.g., Broviac, Brown, Port) or PICC line   Negative: Moderate bleeding around IV site  (Exception: Mild bleeding that stops quickly with direct pressure.)   Negative: Difficulty breathing and has central line (e.g., Broviac, Brown, Port) or PICC line   Negative: Chest or neck swelling and has a central or PICC line  (Exception: Mild puffiness or swelling just around IV site.)   Negative: Arm or leg swelling and has a central or PICC line  (Exception: Mild puffiness or swelling just around IV site.)   Negative: Fever > 100.4 F (38.0 C)   Negative: Patient sounds very sick or weak to the  triager   Negative: Central line (e.g., Broviac, Brown, Port) or PICC line has moved out of position (or can see cuff slipping out of skin; or more line externally exposed than before)   Negative: Pus or cloudy fluid from IV site   Negative: Red streak at IV site and palpable cord   Negative: Red streak at IV site and longer than 1 inch (2.5 cm)   Negative: Skin redness and extends > 1 inch (2.5 cm) from IV site    Protocols used: IV Site and Other Symptoms-A-OH

## 2025-06-11 NOTE — PROGRESS NOTES
"Subjective:      Patient ID: Sharee Day Jr. is a 74 y.o. male.    Vitals:  height is 5' 11" (1.803 m) and weight is 94.1 kg (207 lb 7.3 oz). His oral temperature is 98.4 °F (36.9 °C). His blood pressure is 114/76 and his pulse is 52 (abnormal). His respiration is 18 and oxygen saturation is 96%.     Chief Complaint: Arm Swelling    Patient is a 74-year-old male who was seen at [facility] ER last night.  Patient did have a CT with contrast as he was having abdominal pain, he states the 1st time attempting the CT, the IV contrast infiltrated into his left forearm and he had to have an IV restarted on the right arm to complete the imaging.  Now he states he has some swelling and stiffness to the left arm.  There is no warmth or redness to the area.  He is also concerned because he feels constipated.  States he had an incomplete bowel movement last night and has not had a bowel movement today despite drinking magnesium citrate and taking 2 Dulcolax.  He has some concerns about what the cause of his abdominal pain was, as the ER told him he had a kidney stone but he did not think that was related to his abdominal pain.  He is also concerned because his blood sugar was elevated, 177, in the ED. patient states that due to changes in his diet and lifestyle, he has been able to control his blood sugar very well over the last few years without medications.  Denies any fever, he has not experience any nausea, vomiting, or abdominal pain today's since being released from the ED.    Other  This is a new problem. The current episode started today. The problem occurs constantly. The problem has been gradually worsening. Associated symptoms include arthralgias (Left forearm) and joint swelling (Left forearm). Pertinent negatives include no abdominal pain (Currently resolved), chest pain, coughing, fever, headaches, neck pain or sore throat. Nothing aggravates the symptoms. He has tried nothing for the symptoms. The " treatment provided no relief.       Constitution: Negative for fever and generalized weakness.   HENT:  Negative for ear pain, sinus pain and sore throat.    Neck: Negative for neck pain.   Cardiovascular:  Negative for chest pain.   Respiratory:  Negative for cough and shortness of breath.    Gastrointestinal:  Positive for constipation. Negative for abdominal pain (Currently resolved), bright red blood in stool, dark colored stools and rectal bleeding.   Genitourinary:  Negative for dysuria, frequency and urgency.   Musculoskeletal:  Positive for joint pain (Left forearm) and joint swelling (Left forearm).   Neurological:  Negative for headaches.      Objective:     Physical Exam   Constitutional: He is oriented to person, place, and time. He appears well-developed.   HENT:   Head: Normocephalic and atraumatic.   Ears:   Right Ear: External ear normal.   Left Ear: External ear normal.   Nose: Nose normal.   Mouth/Throat: Mucous membranes are normal.   Eyes: Conjunctivae and lids are normal.   Neck: Trachea normal. Neck supple.   Cardiovascular: Normal rate, regular rhythm and normal heart sounds.   Pulmonary/Chest: Effort normal and breath sounds normal. No respiratory distress.   Abdominal: Normal appearance and bowel sounds are normal. He exhibits no distension and no mass. Soft. There is no abdominal tenderness. There is no left CVA tenderness and no right CVA tenderness.      Comments: Nontender to palpation   Musculoskeletal: Normal range of motion.         General: Normal range of motion.      Right forearm: Normal.      Left forearm: He exhibits swelling (Mild swelling to left forearm near elbow). He exhibits no tenderness, no bony tenderness and no laceration.   Neurological: He is alert and oriented to person, place, and time. He has normal strength.   Skin: Skin is warm, dry, intact, not diaphoretic and not pale.   Psychiatric: His speech is normal and behavior is normal. Judgment and thought content  normal.   Nursing note and vitals reviewed.      Assessment:     1. Intravenous infiltration, initial encounter    2. Type 2 diabetes mellitus without complication, without long-term current use of insulin    3. Constipation, unspecified constipation type        Plan:   Discussed IV contrast infiltration with overhead on the recommendation of Dr. Green ( collaborating MD).  Gabriella recommended ice, compression, and elevation to help reabsorb the infiltrated IV contrast, I discussed this with patient and he acknowledged understanding.  I Did discuss compartment syndrome return precautions with patient and he acknowledged understanding.  I will send some MiraLax to pharmacy for him to help treat his constipation, discussed doing a mild MiraLax clean out at home.  Applied Ace wrap to arm, and patient was given ice pack prior to leaving.  CBG in clinic was 110, discussed with patient to continue following his diabetic diet.  I did review the results of the patient's CT that he had last night in the ED with patient at length.  Discussed with him ED treatment and plan of care and he acknowledges understanding.    Results for orders placed or performed in visit on 06/11/25   POCT Glucose, Hand-Held Device    Collection Time: 06/11/25  3:54 PM   Result Value Ref Range    POC Glucose 110 70 - 110 MG/DL         Intravenous infiltration, initial encounter  -     E-Consult to [facility]  -     BANDAGE ELASTIC 3IN ACE    Type 2 diabetes mellitus without complication, without long-term current use of insulin  -     POCT Glucose, Hand-Held Device    Constipation, unspecified constipation type    Other orders  -     polyethylene glycol (GLYCOLAX) 17 gram PwPk; Take 17 g by mouth once daily. for 14 days  Dispense: 14 packet; Refill: 0

## 2025-06-11 NOTE — DISCHARGE INSTRUCTIONS
Thank you for coming to our Emergency Department today. It is important to remember that some problems are difficult to diagnose and may not be found during your first visit. Be sure to follow up with your primary care doctor and review any labs/imaging that was performed with them. If you do not have a primary care doctor, you may contact the one listed on your discharge paperwork or you may also call the [facility] Appointment Desk at [phone] to schedule an appointment with one.     All medications may potentially have side effects and it is impossible to predict which medications may give you side effects. If you feel that you are having a negative effect of any medication you should immediately stop taking them and seek medical attention.    Return to the ER with any questions/concerns, new/concerning symptoms, worsening or failure to improve. Do not drive or make any important decisions for 24 hours if you have received any pain medications, sedatives or mood altering drugs during your ER visit.

## 2025-06-11 NOTE — ED PROVIDER NOTES
Encounter Date: 6/11/2025       History     Chief Complaint   Patient presents with    Abdominal Pain     X 3 hours with vomiting, BIB [facility] 12     74 y.o. male who has a past medical history of Hyperlipidemia, Hypertension, and Obstructive sleep apnea severe with AHI 64 on study 7/2018. presents to the emergency department due to N/V and abdominal pain that began 3 hrs ago.  Patient reports having a proximally 8 episodes of nonbloody nonbilious emesis.  He reports having dinner around 3:00 p.m. with baked chicken and canned yams .  He reports he lives alone.  He reports a proximally 2 weeks ago taking antibiotics due to dental infection.  Denies any diarrhea.  Denies any fevers or chills.  Reports abdominal pain is cramping in nature and diffuse.  Denies any urinary symptoms.  No recent sick contacts.      The history is provided by the patient.     Review of patient's allergies indicates:   Allergen Reactions    Crestor  [rosuvastatin]      Other reaction(s): Muscle pain    Statins-hmg-coa reductase inhibitors      myalgias    Sulfamethoxazole-trimethoprim Other (See Comments)     Muscle pain    Atorvastatin Other (See Comments)     Muscle cramps  Other reaction(s): Muscle pain     Past Medical History:   Diagnosis Date    Hyperlipidemia     Hypertension     Obstructive sleep apnea severe with AHI 64 on study 7/2018 7/27/2018    Very severe sleep disordered breathing (AHI=64) is noted based on a 4% hypopnea desaturation criteria. The patient slept supine 64.2% of the night based on valid recording time of 6.43 hours. The apneas/hypopneas are accompanied by severe oxygen desaturation (percent time below 90% SpO2: 15.9%, Min SpO2: 69.6%). The average desaturation across all sleep disordered breathing events is 5.7%. Snoring     Past Surgical History:   Procedure Laterality Date    COLONOSCOPY N/A 8/28/2018    Procedure: COLONOSCOPY;  Surgeon: Davis Hernandez MD;  Location: [facility];  Service: Endoscopy;  Laterality:  N/A;    COLONOSCOPY N/A 1/6/2022    Procedure: COLONOSCOPY;  Surgeon: Walter Appiah MD;  Location: [facility] ENDO;  Service: Endoscopy;  Laterality: N/A;  fully vaccinated  inst emailed and mailed-rb  covid test [place] 1/3    COLONOSCOPY N/A 2/11/2025    Procedure: COLONOSCOPY;  Surgeon: Jennifer Correia MD;  Location: [facility] ENDO;  Service: Endoscopy;  Laterality: N/A;  ref by Navin Charlton MD ,peg,email-ae  2/3-pre call complete-tb    MOUTH SURGERY  01/2016     Family History   Problem Relation Name Age of Onset    Breast cancer Mother      Cataracts Father      Hypertension Sister      Hypertension Sister      Hypertension Brother      Hypertension Brother      No Known Problems Maternal Aunt      No Known Problems Maternal Uncle      No Known Problems Paternal Aunt      No Known Problems Paternal Uncle      No Known Problems Maternal Grandmother      No Known Problems Maternal Grandfather      No Known Problems Paternal Grandmother      No Known Problems Paternal Grandfather      No Known Problems Daughter      Intellectual disability Son      No Known Problems Son      No Known Problems Son      No Known Problems Son      No Known Problems Son      No Known Problems Other      Melanoma Neg Hx      Psoriasis Neg Hx      Lupus Neg Hx       Social History[1]  Review of Systems   Constitutional:  Negative for chills and fever.   HENT:  Negative for congestion and rhinorrhea.    Eyes:  Negative for pain.   Respiratory:  Negative for cough and shortness of breath.    Cardiovascular:  Negative for chest pain and leg swelling.   Gastrointestinal:  Positive for abdominal pain, nausea and vomiting.   Genitourinary:  Negative for dysuria and hematuria.   Musculoskeletal:  Negative for joint swelling and neck pain.   Skin:  Negative for rash.   Neurological:  Negative for weakness and headaches.       Physical Exam     Initial Vitals [06/11/25 0152]   BP Pulse Resp Temp SpO2   (!) 140/90 64 20 98.8 °F (37.1 °C) 98 %      MAP        --         Physical Exam    Constitutional: He is not diaphoretic. No distress.   HENT:   Head: Normocephalic and atraumatic.   Eyes: Conjunctivae and EOM are normal. Pupils are equal, round, and reactive to light.   Neck:   Normal range of motion.  Cardiovascular:  Regular rhythm.           Pulses:       Radial pulses are 2+ on the right side and 2+ on the left side.        Posterior tibial pulses are 2+ on the right side and 2+ on the left side.   Pulmonary/Chest: Breath sounds normal. No respiratory distress.   Abdominal: Abdomen is soft. Bowel sounds are normal. He exhibits no distension. There is generalized abdominal tenderness.   No right CVA tenderness.  There is left CVA tenderness.   Musculoskeletal:         General: No tenderness. Normal range of motion.      Cervical back: Normal range of motion.     Neurological: He is alert and oriented to person, place, and time.   Skin: Skin is warm. Capillary refill takes less than 2 seconds.   Psychiatric: His behavior is normal.         ED Course   Procedures  Labs Reviewed   COMPREHENSIVE METABOLIC PANEL - Abnormal       Result Value    Sodium 142      Potassium 3.8      Chloride 106      CO2 23      Glucose 177 (*)     BUN 11      Creatinine 1.2      Calcium 8.9      Protein Total 8.0      Albumin 3.8      Bilirubin Total 0.4      ALP 62      AST 23      ALT 18      Anion Gap 13      eGFR >60      Narrative:     Specimen slightly hemolyzed.   URINALYSIS, REFLEX TO URINE CULTURE - Abnormal    Color, UA Colorless (*)     Appearance, UA Clear      pH, UA 7.0      Spec Grav UA 1.020      Protein, UA Negative      Glucose, UA Negative      Ketones, UA Negative      Bilirubin, UA Negative      Blood, UA Negative      Nitrites, UA Negative      Urobilinogen, UA Negative      Leukocyte Esterase, UA Negative     CBC WITH DIFFERENTIAL - Abnormal    WBC 12.61      RBC 5.66      HGB 13.9 (*)     HCT 45.1      MCV 80 (*)     MCH 24.6 (*)     MCHC 30.8 (*)     RDW 14.4       Platelet Count 209      MPV 9.4      Nucleated RBC 0      Neut % 81.4 (*)     Lymph % 10.5 (*)     Mono % 7.4      Eos % 0.1      Basophil % 0.2      Imm Grans % 0.4      Neut # 10.27 (*)     Lymph # 1.32      Mono # 0.93      Eos # 0.01      Baso # 0.03      Imm Grans # 0.05 (*)    MAGNESIUM - Normal    Magnesium  2.3      Narrative:     Specimen slightly hemolyzed.   LIPASE - Normal    Lipase Level 37     ALCOHOL,MEDICAL (ETHANOL) - Normal    Alcohol, Serum <10     CBC W/ AUTO DIFFERENTIAL    Narrative:     The following orders were created for panel order CBC Auto Differential.  Procedure                               Abnormality         Status                     ---------                               -----------         ------                     CBC with Differential[[id number]]       Abnormal            Final result                 Please view results for these tests on the individual orders.   GREY TOP URINE HOLD   POCT GLUCOSE MONITORING CONTINUOUS          Imaging Results               CT Abdomen Pelvis With IV Contrast NO Oral Contrast (Final result)  Result time 06/11/25 04:21:14      Final result by Sterling Oakes MD (06/11/25 04:21:14)                   Impression:      There is a distal left ureteral calculus at the level of the left ureterovesicular junction with associated mild hydroureteronephrosis, there is left-sided perinephric stranding/edema, correlation for superimposed UTI/pyelonephritis is needed.    Pulmonary opacity at the right lung base peripherally may relate to an area of confluent infiltrates/airspace disease however follow-up to document resolution to exclude neoplasm is recommended.    Pulmonary nodule at the right lung base measuring 6.9 mm, can be re-evaluated on follow-up.    Small pleural effusion on the right, minimal on the left.    Enlarged appearance of the prostate, there is impression upon the base of the urinary bladder that may relate to enlarged prostate  however clinical correlation is needed to exclude neoplasm.    Additional findings as above.    This report was flagged in Epic as abnormal.      Electronically signed by: Sterling Oakes  Date:    06/11/2025  Time:    04:21               Narrative:    EXAMINATION:  CT ABDOMEN PELVIS WITH IV CONTRAST    CLINICAL HISTORY:  Abdominal pain, acute, nonlocalized;    TECHNIQUE:  Low dose axial images, sagittal and coronal reformations were obtained from the lung bases to the pubic symphysis following the IV administration of 70 mL of Omnipaque 350 oral contrast was not utilized.    COMPARISON:  None    FINDINGS:  The lung bases demonstrate mild motion artifact.  There is a small pleural effusion at the right lung base and minimal pleural fluid on the left.  Atelectatic changes are noted.  Mild ground-glass opacities are noted.  There is superimposed opacity at the peripheral aspect of the right lower lobe as best seen on axial image 1 measuring approximately 4.4 cm in size, this may relate to an area of confluent infiltrates/airspace disease however follow-up to document resolution to exclude underlying mass lesion is recommended.  In addition on axial image 1 there is a pulmonary nodule measuring approximately 6.9 mm.    There is mild fluid of the distal esophagus, nonspecific, may relate to reflux or stasis.  There is a small to moderate hiatal hernia.  The stomach is incompletely distended.    There is no evidence for pericholecystic or peripancreatic inflammatory change there is no abnormal pancreatic or biliary ductal dilatation.  The spleen appears small.  The adrenal glands appear unremarkable.    As best seen on axial image 151 series 4, coronal image 92 series 601 there is a calculus at the level of the distal left ureter, left ureterovesicular junction.  This measures approximately 4.1 mm on axial imaging.  There is associated mild hydroureteronephrosis.  There is moderate to prominent perinephric stranding and  edema, which may relate to the aforementioned however correlation for superimposed UTI/pyelonephritis is needed.    The abdominal aorta appears normal in caliber, demonstrates appropriate opacification.  Atherosclerotic change noted.  The prostate appears enlarged measuring 5.9 x 6 cm in size, and there is impression upon the base of the urinary bladder, this may relate to the prostate however clinical correlation is needed to exclude neoplasm.    Small fat density umbilical hernia noted, without bowel involvement.  There is no evidence for small bowel obstructive process.  There is a structure thought to represent the appendix, it does not appear inflamed.  There is mild prominence of the colon with air and stool without inflammatory or obstructive change.  There is no evidence for free intraperitoneal air.    The visualized osseous structures demonstrate chronic change.                                       Medications   sodium chloride 0.9% bolus 1,000 mL 1,000 mL (0 mLs Intravenous Stopped 6/11/25 0327)   ondansetron injection 4 mg (4 mg Intravenous Given 6/11/25 0226)   morphine injection 6 mg (6 mg Intravenous Given 6/11/25 0240)   ketorolac injection 15 mg (15 mg Intravenous Given 6/11/25 0342)   iohexoL (OMNIPAQUE 350) injection 70 mL (70 mLs Intravenous Given 6/11/25 0337)   hyoscyamine SL tablet 0.125 mg (0.125 mg Sublingual Given 6/11/25 0352)   ketorolac injection 15 mg (15 mg Intravenous Given 6/11/25 0440)     Medical Decision Making:   Initial Assessment:   74 y.o. male who has a past medical history of Hyperlipidemia, Hypertension, and Obstructive sleep apnea severe with AHI 64 on study 7/2018. presents to the emergency department due to N/V and abdominal pain that began 3 hrs ago.     Differential Diagnosis:   Differential Diagnosis includes, but is not limited to:  AAA, aortic dissection, mesenteric ischemia, perforated viscous, MI/ACS, SBO/volvulus, incarcerated/strangulated hernia,  intussusception, ileus, appendicitis, cholecystitis, cholangitis, diverticulitis, esophagitis, hepatitis, nephrolithiasis, pancreatitis, gastroenteritis, colitis, IBD/IBS, biliary colic, GERD, PUD, constipation, UTI/pyelonephritis,  disorder.     Clinical Tests:   Lab Tests: Ordered and Reviewed  Medical Tests: Ordered and Reviewed             ED Course as of 06/11/25 0441   Wed Jun 11, 2025   0206 Independent Interpretation of EKG:  Rhythm: Sinus   Rate: 71  QTC: 447  No STEMI    [AS]   0252 CBC Auto Differential(!)  CBC reviewed and interpreted by me:   No significant leukocytosis, anemia (at baseline), or platelet abnormalities.   [AS]   0303 Lipase [AS]   0303 Comprehensive Metabolic Panel(!)  Chem 14 reviewed and interpreted by me:  - No significant hypo-or hyper natremia,  kalemia , chloridemia, or other electrolyte abnormalities  - BUN and creatinine (at or around baseline),   - ALT and AST were within normal limits indicating normal liver function.   [AS]   0304 Magnesium [AS]   0308 Patient reports having persistent pain despite receiving morphine.  Will obtain a CT abdomen pelvis to ensure he does not have diverticulitis or any acute intra-abdominal pathology.  [AS]   0423 CT Abdomen Pelvis With IV Contrast NO Oral Contrast(!)   distal left ureteral calculus at the level of the left ureterovesicular junction with associated mild hydroureteronephrosis, there is left-sided perinephric stranding/edema.  UA without signs of infection.  Incidental findings discussed with patient [AS]   0425 Urinalysis, Reflex to Urine Culture Urine, Clean Catch(!) [AS]   0439 Pt is currently stable for discharge. I see no indication of an emergent process beyond that addressed during our encounter but have duly counseled the patient/family regarding the need for prompt follow-up as well as the indications that should prompt immediate return to the emergency room should new or worrisome developments occur. I discussed the ED  work up and diagnostic findings with the patient/family. The patient/family has been provided with verbal and printed direction regarding our final diagnosis(es) as well as instructions regarding use of OTC and/or Rx medications intended to manage the patient's aforementioned conditions. The patient/family verbalized an understanding. The patient/family is asked if there are any questions or concerns. We discuss the case, until all issues are addressed to the patient/family's satisfaction. Patient/family understands and is agreeable to the plan.  [AS]      ED Course User Index  [AS] Aliyah Vela MD          Medical Decision Making  Amount and/or Complexity of Data Reviewed  Labs: ordered. Decision-making details documented in ED Course.  Radiology: ordered. Decision-making details documented in ED Course.    Risk  Prescription drug management.           Clinical Impression:   Final diagnoses:  [R11.2] Nausea & vomiting  [N13.2] Ureteral stone with hydronephrosis (Primary)          ED Disposition Condition    Discharge Stable          ED Prescriptions       Medication Sig Dispense Start Date End Date Auth. Provider    ondansetron (ZOFRAN-ODT) 4 MG TbDL Take 1 tablet (4 mg total) by mouth every 6 (six) hours as needed. 12 tablet 6/11/2025 -- Aliyah Vela MD    tamsulosin (FLOMAX) 0.4 mg Cap Take 1 capsule (0.4 mg total) by mouth once daily. for 10 days 10 capsule 6/11/2025 6/21/2025 Aliyah Vela MD    ketorolac (TORADOL) 10 mg tablet Take 1 tablet (10 mg total) by mouth every 6 (six) hours. for 5 days 20 tablet 6/11/2025 6/16/2025 Aliyah Vela MD    HYDROcodone-acetaminophen (NORCO) 5-325 mg per tablet Take 1 tablet by mouth every 6 (six) hours as needed for Pain. 5 tablet 6/11/2025 -- Aliyah Vela MD          Follow-up Information       Follow up With Specialties Details Why Contact Navin Atwood MD Internal Medicine Schedule an appointment as soon as possible for a visit  for  reassesment [address]  [place] [address]  [phone]      [facility] [place] [facility] [place] Dept Emergency Medicine  If symptoms worsen [address] [facility] [place] [address] [phone]    [facility] Urology   [address] [facility] [place] [address] [phone]            DISCLAIMER: This note was prepared with [employer] voice recognition transcription software. Garbled syntax, mangled pronouns, and other bizarre constructions may be attributed to that software system.         [1]   Social History  Tobacco Use    Smoking status: Every Day     Types: Cigarettes     Passive exposure: Current    Smokeless tobacco: Never    Tobacco comments:     occasionally   Substance Use Topics    Alcohol use: Yes     Alcohol/week: 3.0 standard drinks of alcohol     Types: 1 Glasses of wine, 1 Cans of beer, 1 Shots of liquor per week     Comment: Ocassionally    Drug use: Not Currently        Aliyah Vela MD  06/11/25 3854

## 2025-06-11 NOTE — CONSULTS
Virtual Visit - Urgent Care  Response for E-Consult     Patient Name: Sharee Day Jr.  MRN: [id number]  Primary Care Provider: Navin Charlton MD   Requesting Provider: Mariana Armstrong NP  Consults    Recommendation: Use compression to reduce swelling in arm secondary to IV contrast infiltration.    Additional future steps to consider: Watch out for signs and symptoms of compartment syndrome-- pain, change in temperature sensation, paresthesias, decreased pulse, or pallor of the arm    Total time of Consultation: 10 minute    I did not speak to the requesting provider verbally about this. We exchanged direct communication via Epic messages    *This eConsult is based on the clinical data available to me and is furnished without benefit of a physical examination. The eConsult will need to be interpreted in light of any clinical issues or changes in patient status not available to me at the time of filing this eConsults. Significant changes in patient condition or level of acuity should result in immediate formal consultation and reevaluation. Please alert me if you have further questions.    Thank you for this eConsult referral.     Mazin Galicia MD  Virtual Visit - Urgent Care

## 2025-06-11 NOTE — PATIENT INSTRUCTIONS
Please use ice on the area of swelling for 20-30 minutes at a time every couple of hours to help reduce swelling.    You may use a compression wrap to also help reduce swelling.  May take Toradol for pain.    If you develop any increased redness, increased swelling, severe pain, color changes to your hand or fingers, reduced range of motion, or other worsening symptoms please go to the ER.    Please take MiraLax for constipation.  May start with 2-3 doses base 1 hour apart, and then follow up with 1 dose daily for the next week to help regulate bowels.    - You must understand that you have received an Urgent Care treatment only and that you may be released before all of your medical problems are known or treated.   - You, the patient, will arrange for follow up care as instructed.   - If your condition worsens or fails to improve we recommend that you receive another evaluation at the ER immediately or contact your PCP to discuss your concerns or return here.   - Follow up with your PCP or specialty clinic as directed in the next 1-2 weeks if not improved or as needed.  You can call [phone] to schedule an appointment with the appropriate provider.      If your symptoms do not improve or worsen, go to the emergency room immediately.

## 2025-06-12 ENCOUNTER — OFFICE VISIT (OUTPATIENT)
Dept: UROLOGY | Facility: CLINIC | Age: 75
End: 2025-06-12
Payer: MEDICARE

## 2025-06-12 DIAGNOSIS — R33.9 INCOMPLETE EMPTYING OF BLADDER: ICD-10-CM

## 2025-06-12 DIAGNOSIS — N13.2 URETERAL STONE WITH HYDRONEPHROSIS: Primary | ICD-10-CM

## 2025-06-12 DIAGNOSIS — C61 PROSTATE CANCER: ICD-10-CM

## 2025-06-12 PROBLEM — R91.1 NODULE OF RIGHT LUNG: Status: ACTIVE | Noted: 2025-06-12

## 2025-06-12 PROBLEM — N20.0 KIDNEY STONE ON LEFT SIDE: Status: ACTIVE | Noted: 2025-06-12

## 2025-06-12 PROCEDURE — 99999 PR PBB SHADOW E&M-EST. PATIENT-LVL III: CPT | Mod: PBBFAC,,, | Performed by: UROLOGY

## 2025-06-12 PROCEDURE — 99213 OFFICE O/P EST LOW 20 MIN: CPT | Mod: PBBFAC | Performed by: UROLOGY

## 2025-06-12 RX ORDER — HYDROCODONE BITARTRATE AND ACETAMINOPHEN 5; 325 MG/1; MG/1
1 TABLET ORAL EVERY 6 HOURS PRN
Qty: 20 TABLET | Refills: 0 | Status: SHIPPED | OUTPATIENT
Start: 2025-06-12

## 2025-06-12 NOTE — PROGRESS NOTES
This note was created by combination of typed  and M-Modal dictation.  Transcription errors may be present.   This note was also generated with the assistance of ambient listening technology. Verbal consent was obtained by the patient and accompanying visitor(s) for the recording of patient appointment to facilitate this note. I attest to having reviewed and edited the generated note for accuracy, though some syntax or spelling errors may persist. Please contact the author of this note for any clarification.    Assessment and Plan:   Assessment and Plan   Assessment & Plan  IV infiltrate, initial encounter  He is concerned about IV infiltration of CT contrast in his left arm.  He notes that the swelling is markedly improved compared to event.  Benign exam today.  No joint effusion.  Reassurance.       Nodule of right lung  Solitary pulmonary nodule  Centrilobular emphysema  Incidental finding on CT.  7 mm nodule.  Repeat CT in 6 months.  Smoker working smoking cessation.  Orders:    CT Chest Without Contrast; Future    Kidney stone on left side  Recent ED visit for nausea vomiting and abdominal pain with findings of kidney stone.  Started on tamsulosin.  On narcotic.  Saw Urology yesterday.  Needs a urine strainer to catch the stone for sampling.  Notes constipation, tried magnesium citrate, Dulcolax, and started taking MiraLax yesterday.  Would continue MiraLax until results       Dyslipidemia statin intolerance. 8/2018 zetia; 07/13/2016 stress test negative for ischemia  Saw cardiology, who recommended pravastatin in addition to Zetia.  Has not started pravastatin yet.  Encouraged to challenge.         There are no discontinued medications.    meds sent this encounter:         Follow Up:  Has follow up with me scheduled for December  Future Appointments   Date Time Provider Department Center   6/18/2025  4:30 PM [facility] ROOM 3 [facility]   6/20/2025  2:15 PM Bowen Gerard MD [facility]  URO [facility]   7/7/2025  7:45 AM ECHO, [facility] EKG [facility]   7/7/2025  9:00 AM HOLTER [facility] EKG [facility]   12/23/2025 10:40 AM Navin Charlton MD [facility]          Subjective:   Subjective   Chief Complaint   Patient presents with    Left elbow swelling     For 3 days.    Nephrolithiasis       CHERRY Tolliver is a 74 y.o. male.     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     Employer: [employer]   Tobacco Use    Smoking status: Every Day     Types: Cigarettes     Passive exposure: Current    Smokeless tobacco: Never    Tobacco comments:     occasionally   Substance and Sexual Activity    Alcohol use: Yes     Alcohol/week: 3.0 standard drinks of alcohol     Types: 1 Glasses of wine, 1 Cans of beer, 1 Shots of liquor per week     Comment: Ocassionally    Drug use: Not Currently    Sexual activity: Not Currently     Partners: Female     Social Drivers of Health     Financial Resource Strain: Low Risk  (5/22/2025)    Overall Financial Resource Strain (CARDIA)     Difficulty of Paying Living Expenses: Not very hard   Food Insecurity: Food Insecurity Present (5/22/2025)    Hunger Vital Sign     Worried About Running Out of Food in the Last Year: Sometimes true     Ran Out of Food in the Last Year: Sometimes true   Transportation Needs: No Transportation Needs (5/22/2025)    PRAPARE - Transportation     Lack of Transportation (Medical): No     Lack of Transportation (Non-Medical): No   Physical Activity: Insufficiently Active (5/22/2025)    Exercise Vital Sign     Days of Exercise per Week: 3 days     Minutes of Exercise per Session: 30 min   Stress: Stress Concern Present (5/22/2025)    [employer] [employer] - Occupational Stress Questionnaire     Feeling of Stress : Rather much   Housing Stability: Low Risk  (5/22/2025)    Housing Stability  Vital Sign     Unable to Pay for Housing in the Last Year: No     Number of Times Moved in the Last Year: 0     Homeless in the Last Year: No       Last appointment with this clinic was 5/27/2025. Last visit with me 5/27/2025   To summarize last visit and events leading up to today:  Diabetes diet controlled  Hypertension encouraged to re-enroll in digital monitoring.  History of lisinopril and HCTZ, lisinopril stopped due to low BP.  Now diet-controlled  Hyperlipidemia with peripheral vascular disease, statin intolerance.  Zetia also caused achiness.  12/23/2024 AILYN normal bilaterally  Smoking working with cessation program  Emphysema seen on CT imaging of the chest  History of tubulovillous adenoma  2/11/25 colonoscopy 1 TA. 2 HPs repeat 5 years  History of prostate cancer status post XRT, history of hormone deprivation followed by [facility].  Saw Urology at [facility] 03/03/2025.  Eight years post treatment annual visits suggested but he preferred to remain on six-month follow-ups.  Lumbar radiculopathy  Neuropathy of the feet.  Intermittent and episodic.  Did not think this was vascular.  Recent TSH was normal.  A1c was below 5.6.  Never had chemotherapy for the prostate cancer.  Only XRT.   MDD.  Insomnia.  History of Lunesta.  Was seeing Psychiatry but stop seeing Psychiatry and therefore was no longer prescribed Lunesta.  History of Ambien with side effect of bad dreams.  He was already practicing the principles of CBT I.  Trial of mirtazapine and if no improvement next step would be doxepin  Testosterone panel normal on repeat     Last visit 05/27/2025  Diabetes diet-controlled aside from the initial elevated A1c they have all been in normal range without medicines.  Remain on Diabetes registry until August 2028   Hypertension diet-controlled.  HCTZ for p.r.n. use for leg swelling.  Hyperlipidemia statin intolerance not taking Zetia regularly.  When he takes it he seems to get  fatigue.  Referred to Cardiology to discuss alternative such as PCS K9.  In the meantime if he is agreeable would rechallenge on Zetia  Smoker, emphysema working on quitting.    Insomnia Lupeesta p.r.n.   History of prostate cancer followed by [facility]    64/2025 saw cardiology.  Challenge on pravastatin.  Check Holter monitor to check PVC burden.  History of Toprol-XL for PVCs which he self discontinued    ED 06/11/2025 for nausea vomiting abdominal pain.  CT abdomen pelvis  There is a distal left ureteral calculus at the level of the left ureterovesicular junction with associated mild hydroureteronephrosis, there is left-sided perinephric stranding/edema, correlation for superimposed UTI/pyelonephritis is needed.  Pulmonary opacity at the right lung base peripherally may relate to an area of confluent infiltrates/airspace disease however follow-up to document resolution to exclude neoplasm is recommended.  Pulmonary nodule at the right lung base measuring 6.9 mm, can be re-evaluated on follow-up.  Small pleural effusion on the right, minimal on the left.  Enlarged appearance of the prostate, there is impression upon the base of the urinary bladder that may relate to enlarged prostate however clinical correlation is needed to exclude neoplasm.     Discharged on Zofran Flomax [place] and referral to Urology    Subsequent urgent care for IV infiltrate.  Constipation.  Polyethylene glycol.  Compression to reduce swelling.    06/12/2025 saw Urology.  Check future renal ultrasound.  On Flomax may need outlet procedure based on the prostate shape seen on CT.    Renal ultrasound scheduled 6/18    Repeat CT 12/2025    Today's visit:    History of Present Illness    HPI:  Sharee reports onset of severe abdominal pain with diaphoresis and emesis approximately 2-3 days ago. Due to symptom severity, he sought treatment at the emergency room. During the ER visit, he had difficulties with IV placement, requiring  multiple attempts before successful insertion. He underwent a CT W Contrast, but the IV infiltrated, causing arm swelling. The swelling was significant the following morning but has been gradually improving.    The CT revealed a kidney stone as the cause of his symptoms. He was given medications to relax his ureters and Zofran for nausea. He was also prescribed Flomax to aid in passing the kidney stone. He was evaluated by a urologist yesterday and is scheduled for an ultrasound next week to check on the progress of the stone. He believes he has not yet passed the stone.    He recalls a possible previous kidney stone episode from 50-60 years ago, which was diagnosed and treated differently at the time. Currently, he reports ongoing pain and mentions that he has not had a bowel movement for approximately 2-3 days. To address this, he has taken magnesium citrate, 4 Dulcolax pills, and 3 packs of Miralax, but has not yet experienced relief from his constipation.    He denies chest pain, shortness of breath, feeling flushed, or any redness or streaking coming up his arm from the IV infiltration site.    MEDICATIONS:  - Hydrochlorothiazide, as needed, for swelling  - Lunesta, as needed, for sleep  - Zetia (ezetimibe), daily, for cholesterol  - Flomax (tamsulosin), daily, to help relax ureters for kidney stone passage  - Pravastatin, low dose, daily, for cholesterol, has not started    ROS:  Cardiovascular: no chest pain  Respiratory: no shortness of breath  Gastrointestinal: reports abdominal pain, reports vomiting, reports constipation         Patient Care Team:  Navin Charlton MD as PCP - General (Internal Medicine)  Alfredo Kearns MD as Consulting Physician (Urology)  Adeel Coleman MD as Consulting Physician (Psychiatry)  Adeel Vicente MD (Hematology and Oncology)  Jon Spence MD as Consulting Physician (Ophthalmology)    Patient Active Problem List    Diagnosis Date Noted    Nodule of right lung  06/12/2025    Kidney stone on left side 06/12/2025 6/11/25 CT  CT abdomen pelvis  There is a distal left ureteral calculus at the level of the left ureterovesicular junction with associated mild hydroureteronephrosis, there is left-sided perinephric stranding/edema, correlation for superimposed UTI/pyelonephritis is needed.  Pulmonary opacity at the right lung base peripherally may relate to an area of confluent infiltrates/airspace disease however follow-up to document resolution to exclude neoplasm is recommended.  Pulmonary nodule at the right lung base measuring 6.9 mm, can be re-evaluated on follow-up.  Small pleural effusion on the right, minimal on the left.  Enlarged appearance of the prostate, there is impression upon the base of the urinary bladder that may relate to enlarged prostate however clinical correlation is needed to exclude neoplasm.       Nuclear sclerosis of both eyes 01/29/2025    Aortic atherosclerosis 12/10/2024     CT 2020:   Aorta and vasculature: Atherosclerosis including coronary arteries.       Type 2 diabetes mellitus without complication, without long-term current use of insulin 05/16/2023    Low testosterone 05/16/2023    Centrilobular emphysema 05/11/2023    Class 1 obesity due to excess calories with serious comorbidity in adult 07/13/2022    Lumbar radiculopathy 11/09/2021 05/12/2021 EMG acute denervation with chronic reinnervation in the L5-S1 myotomes on the left.  5/24/2021 MRI L spine:   Multilevel lumbar spondylosis with resultant mild spinal canal stenosis at L3-L4 in neural foraminal stenosis at L4-L5 and L5-S1.      PVC (premature ventricular contraction); 4/2021 metoprolol 04/23/2021 04/08/2021 Holter frequent PVCs, frequent PACs      Peripheral artery disease mild on doppler 05/20/2019     4/10/2018 LE dopplers: 1. Right lower extremity pressures and waveforms indicate no hemodynamically significant arterial occlusive disease.  2. Left lower extremity  pressures and waveforms indicate mild arterial occlusive disease.      Numbness or tingling workup with neuro for demyelinating 05/20/2019 12/19/18 MRI brain: 1. Numerous tiny focal areas of remote gliosis posterior frontal parietal and occipital and upper basal ganglia areas, differential diagnosis includes microvascular ischemic change and inactive demyelinating process.  2. Intraventricular artifact opacity body left lateral ventricle discussed above.  12/2018 MRI C spine: 1. Multilevel spondylotic changes as discussed above.  2. Multilevel foraminal stenotic changes more pronounced on the left as compared to the right as discussed above.  See further details above.  1/24/19 EMG: borderline normal EMG/NCS. There was an absent   left sural response of unclear clinical significance. There were   no myopathic units seen to suggest a myopathy.     05/12/2021 EMG acute denervation with chronic reinnervation in the L5-S1 myotomes on the left.  5/24/2021 MRI L spine:   Multilevel lumbar spondylosis with resultant mild spinal canal stenosis at L3-L4 in neural foraminal stenosis at L4-L5 and L5-S1.      Tubulovillous adenoma of colon on colonoscopy 8/2018; 2/11/25 colonoscopy 1 TA. 2 HPs repeat 5 years 08/30/2018 8/2018 colonoscopy TVA  1/6/22 colonoscopy 5 mm sigmoid HP  2/11/25 colonoscopy 1 TA. 2 HPs repeat 5 years      Statin myopathy 08/02/2018    Obstructive sleep apnea severe with AHI 64 on study 7/2018 07/27/2018     Very severe sleep disordered  breathing (AHI=64) is noted based on a 4% hypopnea desaturation criteria. The patient slept supine 64.2% of the night based  on valid recording time of 6.43 hours. The apneas/hypopneas are accompanied by severe oxygen desaturation (percent time  below 90% SpO2: 15.9%, Min SpO2: 69.6%). The average desaturation across all sleep disordered breathing events is 5.7%.  Snoring occurs for 45.4% (30 dB) of the study, 17.3% is extremely loud. The mean pulse rate is 63  BPM, with frequent pulse  rate variability (52 events with >= 6 BPM increase/decrease per hour).  TREATMENT CONSIDERATIONS: Consider nasal continuous positive airway pressure (CPAP) as the initial treatment  choice for severe obstructive sleep apnea. Consider an attended in-lab CPAP titration study, given the possibility of co-morbid  sleep related hypoventilation.  DISEASE MANAGEMENT CONSIDERATIONS: Insomnia is a symptom that can be associated with untreated DAKOTA.  Sleeping pills may increase the severity of untreated DAKOTA and their use should be reevaluated once the DAKOTA is treated.      Recurrent major depressive disorder, in full remission 10/30/2017    Pelvic floor muscle wasting in male 09/28/2017    History of prostate cancer status post XRT, finished ADT 2018 08/30/2017     2 cores positive kary 3+4=7 on biopsy 8/15/2017      Smoker 08/30/2017    Vitamin D deficiency 08/30/2017    Beta thalassemia minor 04/11/2017    Benign non-nodular prostatic hyperplasia without lower urinary tract symptoms 03/29/2017     Hx of negative prostate Bx and hx of MRI prostate      Restless leg syndrome Requip ineffective 08/02/2016    Dyslipidemia statin intolerance. 8/2018 zetia; 07/13/2016 stress test negative for ischemia     Essential hypertension 04/08/2021 TTE LV normal size with mild eccentric LVH and normal systolic function LVEF 55%.  Normal RV size and systolic function.  PA pressure 23.      7/13/2016 nu med stress test neg for ischemia  7/13/2016 TTE LVEF 55-60; no diastolic dysfunction  04/08/2021 TTE LV normal size with mild eccentric LVH and normal systolic function LVEF 55%.  Normal RV size and systolic function.  PA pressure 23.         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 Trazodone ineffective.  Rozerem didn't help 11/20/2013       PAST MEDICAL PROBLEMS, PAST SURGICAL HISTORY: please see relevant portions of the electronic medical record    ALLERGIES AND MEDICATIONS: updated and  "reviewed.  Medication List with Changes/Refills   Current Medications    BLOOD SUGAR DIAGNOSTIC STRP    To check BG 1 times daily, to use with insurance preferred meter    BLOOD-GLUCOSE METER KIT    To check BG 1 times daily, to use with insurance preferred meter    CHLORHEXIDINE (PERIDEX) 0.12 % SOLUTION    RINSE WITH 15 MLS BY MOUTH FOR 30 SECONDS THEN SPIT EVERY DAY    ESZOPICLONE (LUNESTA) 3 MG TAB    Take 0.5 tablets (1.5 mg total) by mouth every evening.    EZETIMIBE (ZETIA) 10 MG TABLET    Take 1 tablet (10 mg total) by mouth once daily.    HYDROCHLOROTHIAZIDE 12.5 MG TAB    Take 1 tablet (12.5 mg total) by mouth daily as needed (leg swelling).    HYDROCODONE-ACETAMINOPHEN (NORCO) 5-325 MG PER TABLET    Take 1 tablet by mouth every 6 (six) hours as needed for Pain.    KETOROLAC (TORADOL) 10 MG TABLET    Take 1 tablet (10 mg total) by mouth every 6 (six) hours. for 5 days    LANCETS MISC    To check BG 1 times daily, to use with insurance preferred meter    ONDANSETRON (ZOFRAN-ODT) 4 MG TBDL    Take 1 tablet (4 mg total) by mouth every 6 (six) hours as needed.    POLYETHYLENE GLYCOL (GLYCOLAX) 17 GRAM PWPK    Take 17 g by mouth once daily. for 14 days    PRAVASTATIN (PRAVACHOL) 20 MG TABLET    Take 1 tablet (20 mg total) by mouth once daily.    SILDENAFIL (VIAGRA) 100 MG TABLET    Take 1 tablet (100 mg total) by mouth daily as needed for Erectile Dysfunction.    TAMSULOSIN (FLOMAX) 0.4 MG CAP    Take 1 capsule (0.4 mg total) by mouth once daily. for 10 days    TRUEPLUS LANCETS 33 GAUGE MISC             Objective:   Objective   Physical Exam   Vitals:    06/13/25 1104   BP: 110/72   Pulse: 66   Temp: 98.2 °F (36.8 °C)   TempSrc: Oral   SpO2: 95%   Weight: 95.5 kg (210 lb 10.4 oz)   Height: 5' 11" (1.803 m)    Body mass index is 29.38 kg/m².  Weight: 95.5 kg (210 lb 10.4 oz)   Height: 5' 11" (180.3 cm)     Physical Exam  Constitutional:       General: He is not in acute distress.     Appearance: He is " well-developed.   Eyes:      Extraocular Movements: Extraocular movements intact.   Cardiovascular:      Rate and Rhythm: Normal rate and regular rhythm.      Heart sounds: Normal heart sounds. No murmur heard.  Pulmonary:      Effort: Pulmonary effort is normal.      Breath sounds: Normal breath sounds.   Musculoskeletal:         General: Normal range of motion.      Comments: The left arm is unremarkable without streaking no induration no tenderness no pustules no induration   Lymphadenopathy:      Cervical: No cervical adenopathy.   Skin:     General: Skin is warm and dry.   Neurological:      Mental Status: He is alert and oriented to person, place, and time.      Coordination: Coordination normal.   Psychiatric:         Behavior: Behavior normal.         Thought Content: Thought content normal.

## 2025-06-12 NOTE — ASSESSMENT & PLAN NOTE
Saw cardiology, who recommended pravastatin in addition to Zetia.  Has not started pravastatin yet.  Encouraged to challenge.

## 2025-06-12 NOTE — ASSESSMENT & PLAN NOTE
Recent ED visit for nausea vomiting and abdominal pain with findings of kidney stone.  Started on tamsulosin.  On narcotic.  Saw Urology yesterday.  Needs a urine strainer to catch the stone for sampling.  Notes constipation, tried magnesium citrate, Dulcolax, and started taking MiraLax yesterday.  Would continue MiraLax until results

## 2025-06-12 NOTE — PROGRESS NOTES
Subjective:       Patient ID: Sharee Day Jr. is a 74 y.o. male who was referred by Aliyah Vela MD    Chief Complaint:   No chief complaint on file.      Urolithiasis  Patient complains of left abdominal pain with radiation to the abdomen. Onset of symptoms was abrupt starting a few days ago with gradually improving course since that time. Patient describes the pain as colicky and cramping, intermittent and rated as moderate. The patient has had nausea and no vomiting and diaphoresis. There has been no fever or chills. The patient is not complaining of dysuria or frequency. Risk factors for urolithiasis: poor fluid intake.    Prostate Cancer  He had XRT for prostate cancer in 2017 in [place].  He has not seen someone locally about this.    ACTIVE MEDICAL ISSUES:  Problem List[1]    PAST MEDICAL HISTORY  Past Medical History:   Diagnosis Date    Hyperlipidemia     Hypertension     Obstructive sleep apnea severe with AHI 64 on study 7/2018 7/27/2018    Very severe sleep disordered breathing (AHI=64) is noted based on a 4% hypopnea desaturation criteria. The patient slept supine 64.2% of the night based on valid recording time of 6.43 hours. The apneas/hypopneas are accompanied by severe oxygen desaturation (percent time below 90% SpO2: 15.9%, Min SpO2: 69.6%). The average desaturation across all sleep disordered breathing events is 5.7%. Snoring       PAST SURGICAL HISTORY:  Past Surgical History:   Procedure Laterality Date    COLONOSCOPY N/A 8/28/2018    Procedure: COLONOSCOPY;  Surgeon: Davis Hernandez MD;  Location: [facility];  Service: Endoscopy;  Laterality: N/A;    COLONOSCOPY N/A 1/6/2022    Procedure: COLONOSCOPY;  Surgeon: Walter Appiah MD;  Location: [facility];  Service: Endoscopy;  Laterality: N/A;  fully vaccinated  inst emailed and mailed-rb  covid test [place] 1/3    COLONOSCOPY N/A 2/11/2025    Procedure: COLONOSCOPY;  Surgeon: Jennifer Correia MD;  Location: [facility];  Service: Endoscopy;   Laterality: N/A;  ref by Navin Charlton MD ,peg,email-ae  2/3-pre call complete-tb    MOUTH SURGERY  01/2016       SOCIAL HISTORY:  Social History[2]    FAMILY HISTORY:  Family History   Problem Relation Name Age of Onset    Breast cancer Mother      Cataracts Father      Hypertension Sister      Hypertension Sister      Hypertension Brother      Hypertension Brother      No Known Problems Maternal Aunt      No Known Problems Maternal Uncle      No Known Problems Paternal Aunt      No Known Problems Paternal Uncle      No Known Problems Maternal Grandmother      No Known Problems Maternal Grandfather      No Known Problems Paternal Grandmother      No Known Problems Paternal Grandfather      No Known Problems Daughter      Intellectual disability Son      No Known Problems Son      No Known Problems Son      No Known Problems Son      No Known Problems Son      No Known Problems Other      Melanoma Neg Hx      Psoriasis Neg Hx      Lupus Neg Hx         ALLERGIES AND MEDICATIONS: updated and reviewed.  Review of patient's allergies indicates:   Allergen Reactions    Crestor  [rosuvastatin]      Other reaction(s): Muscle pain    Statins-hmg-coa reductase inhibitors      myalgias    Sulfamethoxazole-trimethoprim Other (See Comments)     Muscle pain    Atorvastatin Other (See Comments)     Muscle cramps  Other reaction(s): Muscle pain     Current Outpatient Medications   Medication Sig    blood sugar diagnostic Strp To check BG 1 times daily, to use with insurance preferred meter    blood-glucose meter kit To check BG 1 times daily, to use with insurance preferred meter    chlorhexidine (PERIDEX) 0.12 % solution RINSE WITH 15 MLS BY MOUTH FOR 30 SECONDS THEN SPIT EVERY DAY    eszopiclone (LUNESTA) 3 mg Tab Take 0.5 tablets (1.5 mg total) by mouth every evening.    ezetimibe (ZETIA) 10 mg tablet Take 1 tablet (10 mg total) by mouth once daily.    hydroCHLOROthiazide 12.5 MG Tab Take 1 tablet (12.5 mg total) by mouth  daily as needed (leg swelling).    ketorolac (TORADOL) 10 mg tablet Take 1 tablet (10 mg total) by mouth every 6 (six) hours. for 5 days    lancets Misc To check BG 1 times daily, to use with insurance preferred meter    ondansetron (ZOFRAN-ODT) 4 MG TbDL Take 1 tablet (4 mg total) by mouth every 6 (six) hours as needed.    polyethylene glycol (GLYCOLAX) 17 gram PwPk Take 17 g by mouth once daily. for 14 days    pravastatin (PRAVACHOL) 20 MG tablet Take 1 tablet (20 mg total) by mouth once daily.    tamsulosin (FLOMAX) 0.4 mg Cap Take 1 capsule (0.4 mg total) by mouth once daily. for 10 days    TRUEPLUS LANCETS 33 gauge Misc     HYDROcodone-acetaminophen (NORCO) 5-325 mg per tablet Take 1 tablet by mouth every 6 (six) hours as needed for Pain.    sildenafiL (VIAGRA) 100 MG tablet Take 1 tablet (100 mg total) by mouth daily as needed for Erectile Dysfunction.     No current facility-administered medications for this visit.       Review of Systems   Constitutional:  Negative for chills and fever.   HENT:  Negative for congestion.    Respiratory:  Negative for chest tightness and shortness of breath.    Cardiovascular:  Negative for chest pain and palpitations.   Gastrointestinal:  Negative for abdominal pain, constipation, diarrhea, nausea and vomiting.   Genitourinary:  Positive for flank pain. Negative for difficulty urinating, dysuria, hematuria and urgency.   Musculoskeletal:  Negative for arthralgias.   Neurological:  Negative for dizziness.   Psychiatric/Behavioral:  Negative for confusion.        Objective:      There were no vitals filed for this visit.  Physical Exam  Vitals and nursing note reviewed.   Constitutional:       Appearance: He is well-developed.   HENT:      Head: Normocephalic.   Eyes:      Conjunctiva/sclera: Conjunctivae normal.   Neck:      Thyroid: No thyromegaly.      Trachea: No tracheal deviation.   Cardiovascular:      Rate and Rhythm: Normal rate.      Heart sounds: Normal heart sounds.    Pulmonary:      Effort: Pulmonary effort is normal. No respiratory distress.      Breath sounds: Normal breath sounds. No wheezing.   Abdominal:      General: Bowel sounds are normal.      Palpations: Abdomen is soft.      Tenderness: There is no abdominal tenderness. There is no rebound.      Hernia: No hernia is present.   Musculoskeletal:         General: No tenderness. Normal range of motion.      Cervical back: Normal range of motion and neck supple.   Lymphadenopathy:      Cervical: No cervical adenopathy.   Skin:     General: Skin is warm and dry.      Findings: No erythema or rash.   Neurological:      Mental Status: He is alert and oriented to person, place, and time.   Psychiatric:         Behavior: Behavior normal.         Thought Content: Thought content normal.         Judgment: Judgment normal.         Urine dipstick shows negative for all components.  Micro exam: negative for WBC's or RBC's.    CT Abdomen Pelvis With IV Contrast NO Oral Contrast  Order: [id number]   Status: Final result       Next appt: Today at 03:00 PM in Urology (Bowen Gerard MD)    Test Result Released: Yes (not seen)    0 Result Notes  Details    Reading Physician Reading Date Result Priority   Sterling Oakes MD  [phone]  6/11/2025 STAT     Narrative & Impression  EXAMINATION:  CT ABDOMEN PELVIS WITH IV CONTRAST     CLINICAL HISTORY:  Abdominal pain, acute, nonlocalized;     TECHNIQUE:  Low dose axial images, sagittal and coronal reformations were obtained from the lung bases to the pubic symphysis following the IV administration of 70 mL of Omnipaque 350 oral contrast was not utilized.     COMPARISON:  None     FINDINGS:  The lung bases demonstrate mild motion artifact.  There is a small pleural effusion at the right lung base and minimal pleural fluid on the left.  Atelectatic changes are noted.  Mild ground-glass opacities are noted.  There is superimposed opacity at the peripheral aspect of the right  lower lobe as best seen on axial image 1 measuring approximately 4.4 cm in size, this may relate to an area of confluent infiltrates/airspace disease however follow-up to document resolution to exclude underlying mass lesion is recommended.  In addition on axial image 1 there is a pulmonary nodule measuring approximately 6.9 mm.     There is mild fluid of the distal esophagus, nonspecific, may relate to reflux or stasis.  There is a small to moderate hiatal hernia.  The stomach is incompletely distended.     There is no evidence for pericholecystic or peripancreatic inflammatory change there is no abnormal pancreatic or biliary ductal dilatation.  The spleen appears small.  The adrenal glands appear unremarkable.     As best seen on axial image 151 series 4, coronal image 92 series 601 there is a calculus at the level of the distal left ureter, left ureterovesicular junction.  This measures approximately 4.1 mm on axial imaging.  There is associated mild hydroureteronephrosis.  There is moderate to prominent perinephric stranding and edema, which may relate to the aforementioned however correlation for superimposed UTI/pyelonephritis is needed.     The abdominal aorta appears normal in caliber, demonstrates appropriate opacification.  Atherosclerotic change noted.  The prostate appears enlarged measuring 5.9 x 6 cm in size, and there is impression upon the base of the urinary bladder, this may relate to the prostate however clinical correlation is needed to exclude neoplasm.     Small fat density umbilical hernia noted, without bowel involvement.  There is no evidence for small bowel obstructive process.  There is a structure thought to represent the appendix, it does not appear inflamed.  There is mild prominence of the colon with air and stool without inflammatory or obstructive change.  There is no evidence for free intraperitoneal air.     The visualized osseous structures demonstrate chronic change.      Impression:     There is a distal left ureteral calculus at the level of the left ureterovesicular junction with associated mild hydroureteronephrosis, there is left-sided perinephric stranding/edema, correlation for superimposed UTI/pyelonephritis is needed.     Pulmonary opacity at the right lung base peripherally may relate to an area of confluent infiltrates/airspace disease however follow-up to document resolution to exclude neoplasm is recommended.     Pulmonary nodule at the right lung base measuring 6.9 mm, can be re-evaluated on follow-up.     Small pleural effusion on the right, minimal on the left.     Enlarged appearance of the prostate, there is impression upon the base of the urinary bladder that may relate to enlarged prostate however clinical correlation is needed to exclude neoplasm.     Additional findings as above.     This report was flagged in Epic as abnormal.        Electronically signed by:Sterling Oakes  Date:                                            06/11/2025  Time:                                           04:21        Exam Ended: 06/11/25 03:41 CDT Last Resulted: 06/11/25 04:21 CDT       Assessment:       1. Ureteral stone with hydronephrosis    2. Prostate cancer    3. Incomplete emptying of bladder          Plan:       1. Ureteral stone with hydronephrosis    - Ambulatory referral/consult to Urology  - US Retroperitoneal Complete; Future  - HYDROcodone-acetaminophen (NORCO) 5-325 mg per tablet; Take 1 tablet by mouth every 6 (six) hours as needed for Pain.  Dispense: 20 tablet; Refill: 0    2. Prostate cancer  Will need a PSA after his stone episode has resolved    3. Incomplete emptying of bladder  Flomax, may need an outlet procedure based on the prostate shape on CT.            Follow up in about 1 week (around 6/19/2025) for Follow up Established, Review X-ray.         [1]   Patient Active Problem List  Diagnosis    Shift work sleep disorder Hydroxyzine with SE. Trazodone  ineffective.  Rozerem didn't help    Dyslipidemia statin intolerance. 8/2018 zetia; 07/13/2016 stress test negative for ischemia    Essential hypertension 04/08/2021 TTE LV normal size with mild eccentric LVH and normal systolic function LVEF 55%.  Normal RV size and systolic function.  PA pressure 23.    Restless leg syndrome Requip ineffective    Benign non-nodular prostatic hyperplasia without lower urinary tract symptoms    Beta thalassemia minor    History of prostate cancer status post XRT, finished ADT 2018    Smoker    Vitamin D deficiency    Pelvic floor muscle wasting in male    Recurrent major depressive disorder, in full remission    Obstructive sleep apnea severe with AHI 64 on study 7/2018    Statin myopathy    Tubulovillous adenoma of colon on colonoscopy 8/2018; 2/11/25 colonoscopy 1 TA. 2 HPs repeat 5 years    Peripheral artery disease mild on doppler    Numbness or tingling workup with neuro for demyelinating    PVC (premature ventricular contraction); 4/2021 metoprolol    Lumbar radiculopathy    Class 1 obesity due to excess calories with serious comorbidity in adult    Centrilobular emphysema    Type 2 diabetes mellitus without complication, without long-term current use of insulin    Low testosterone    Aortic atherosclerosis    Nuclear sclerosis of both eyes   [2]   Social History  Tobacco Use    Smoking status: Every Day     Types: Cigarettes     Passive exposure: Current    Smokeless tobacco: Never    Tobacco comments:     occasionally   Substance Use Topics    Alcohol use: Yes     Alcohol/week: 3.0 standard drinks of alcohol     Types: 1 Glasses of wine, 1 Cans of beer, 1 Shots of liquor per week     Comment: Ocassionally    Drug use: Not Currently

## 2025-06-12 NOTE — ASSESSMENT & PLAN NOTE
Incidental finding on CT.  7 mm nodule.  Repeat CT in 6 months.  Smoker working smoking cessation.  Orders:    CT Chest Without Contrast; Future

## 2025-06-13 ENCOUNTER — OFFICE VISIT (OUTPATIENT)
Dept: FAMILY MEDICINE | Facility: CLINIC | Age: 75
End: 2025-06-13
Payer: MEDICARE

## 2025-06-13 VITALS
OXYGEN SATURATION: 95 % | DIASTOLIC BLOOD PRESSURE: 72 MMHG | WEIGHT: 210.63 LBS | SYSTOLIC BLOOD PRESSURE: 110 MMHG | TEMPERATURE: 98 F | BODY MASS INDEX: 29.49 KG/M2 | HEIGHT: 71 IN | HEART RATE: 66 BPM

## 2025-06-13 DIAGNOSIS — E78.5 DYSLIPIDEMIA: ICD-10-CM

## 2025-06-13 DIAGNOSIS — T80.1XXA IV INFILTRATE, INITIAL ENCOUNTER: Primary | ICD-10-CM

## 2025-06-13 DIAGNOSIS — R91.1 NODULE OF RIGHT LUNG: ICD-10-CM

## 2025-06-13 DIAGNOSIS — N20.0 KIDNEY STONE ON LEFT SIDE: ICD-10-CM

## 2025-06-13 DIAGNOSIS — J43.2 CENTRILOBULAR EMPHYSEMA: ICD-10-CM

## 2025-06-13 DIAGNOSIS — R91.1 SOLITARY PULMONARY NODULE: ICD-10-CM

## 2025-06-13 LAB — POCT GLUCOSE: 169 MG/DL (ref 70–110)

## 2025-06-13 PROCEDURE — 99999 PR PBB SHADOW E&M-EST. PATIENT-LVL V: CPT | Mod: PBBFAC,,, | Performed by: INTERNAL MEDICINE

## 2025-06-13 PROCEDURE — 99215 OFFICE O/P EST HI 40 MIN: CPT | Mod: PBBFAC,PO | Performed by: INTERNAL MEDICINE

## 2025-06-18 ENCOUNTER — HOSPITAL ENCOUNTER (OUTPATIENT)
Dept: RADIOLOGY | Facility: HOSPITAL | Age: 75
Discharge: HOME | End: 2025-06-18
Attending: UROLOGY
Payer: MEDICARE

## 2025-06-18 DIAGNOSIS — N13.2 URETERAL STONE WITH HYDRONEPHROSIS: ICD-10-CM

## 2025-06-18 PROCEDURE — 76770 US EXAM ABDO BACK WALL COMP: CPT | Mod: TC

## 2025-06-18 PROCEDURE — 76770 US EXAM ABDO BACK WALL COMP: CPT | Mod: 26,,, | Performed by: STUDENT IN AN ORGANIZED HEALTH CARE EDUCATION/TRAINING PROGRAM

## 2025-06-20 ENCOUNTER — OFFICE VISIT (OUTPATIENT)
Dept: UROLOGY | Facility: CLINIC | Age: 75
End: 2025-06-20
Payer: MEDICARE

## 2025-06-20 VITALS — WEIGHT: 210.56 LBS | BODY MASS INDEX: 29.36 KG/M2

## 2025-06-20 DIAGNOSIS — N13.2 URETERAL STONE WITH HYDRONEPHROSIS: Primary | ICD-10-CM

## 2025-06-20 DIAGNOSIS — R33.9 INCOMPLETE EMPTYING OF BLADDER: ICD-10-CM

## 2025-06-20 DIAGNOSIS — C61 PROSTATE CANCER: ICD-10-CM

## 2025-06-20 PROCEDURE — 99213 OFFICE O/P EST LOW 20 MIN: CPT | Mod: PBBFAC | Performed by: UROLOGY

## 2025-06-20 PROCEDURE — 99999 PR PBB SHADOW E&M-EST. PATIENT-LVL III: CPT | Mod: PBBFAC,,, | Performed by: UROLOGY

## 2025-06-20 RX ORDER — TAMSULOSIN HYDROCHLORIDE 0.4 MG/1
0.4 CAPSULE ORAL DAILY
Qty: 30 CAPSULE | Refills: 11 | Status: SHIPPED | OUTPATIENT
Start: 2025-06-20 | End: 2026-06-15

## 2025-06-20 NOTE — PROGRESS NOTES
Subjective:       Patient ID: Sharee Day Jr. is a 74 y.o. male The patient's last visit with me was on 6/12/2025.     Chief Complaint:   Chief Complaint   Patient presents with    Results     US       Urolithiasis  Patient complains of left abdominal pain with radiation to the abdomen. Onset of symptoms was abrupt starting a few days ago with gradually improving course since that time. Patient describes the pain as colicky and cramping, intermittent and rated as moderate. The patient has had nausea and no vomiting and diaphoresis. There has been no fever or chills. The patient is not complaining of dysuria or frequency. Risk factors for urolithiasis: poor fluid intake.    06/20/2025  He is back with an ultrasound.  He passed the stone.  He forgot to bring it.      Prostate Cancer  He had XRT for prostate cancer in 2017 in [place].  He has not seen someone locally about this.  He is going to [facility] in [place].  He goes twice yearly.    ACTIVE MEDICAL ISSUES:  Problem List[1]    PAST MEDICAL HISTORY  Past Medical History:   Diagnosis Date    Hyperlipidemia     Hypertension     Obstructive sleep apnea severe with AHI 64 on study 7/2018 7/27/2018    Very severe sleep disordered breathing (AHI=64) is noted based on a 4% hypopnea desaturation criteria. The patient slept supine 64.2% of the night based on valid recording time of 6.43 hours. The apneas/hypopneas are accompanied by severe oxygen desaturation (percent time below 90% SpO2: 15.9%, Min SpO2: 69.6%). The average desaturation across all sleep disordered breathing events is 5.7%. Snoring       PAST SURGICAL HISTORY:  Past Surgical History:   Procedure Laterality Date    COLONOSCOPY N/A 8/28/2018    Procedure: COLONOSCOPY;  Surgeon: Davis Hernandez MD;  Location: [facility];  Service: Endoscopy;  Laterality: N/A;    COLONOSCOPY N/A 1/6/2022    Procedure: COLONOSCOPY;  Surgeon: Walter Appiah MD;  Location: [facility];  Service: Endoscopy;   Laterality: N/A;  fully vaccinated  inst emailed and mailed-rb  covid test [place] 1/3    COLONOSCOPY N/A 2/11/2025    Procedure: COLONOSCOPY;  Surgeon: Jennifer Correia MD;  Location: [facility];  Service: Endoscopy;  Laterality: N/A;  ref by Navin Charlton MD ,peg,email-ae  2/3-pre call complete-tb    MOUTH SURGERY  01/2016       SOCIAL HISTORY:  Social History[2]    FAMILY HISTORY:  Family History   Problem Relation Name Age of Onset    Breast cancer Mother      Cataracts Father      Hypertension Sister      Hypertension Sister      Hypertension Brother      Hypertension Brother      No Known Problems Maternal Aunt      No Known Problems Maternal Uncle      No Known Problems Paternal Aunt      No Known Problems Paternal Uncle      No Known Problems Maternal Grandmother      No Known Problems Maternal Grandfather      No Known Problems Paternal Grandmother      No Known Problems Paternal Grandfather      No Known Problems Daughter      Intellectual disability Son      No Known Problems Son      No Known Problems Son      No Known Problems Son      No Known Problems Son      No Known Problems Other      Melanoma Neg Hx      Psoriasis Neg Hx      Lupus Neg Hx         ALLERGIES AND MEDICATIONS: updated and reviewed.  Review of patient's allergies indicates:   Allergen Reactions    Crestor  [rosuvastatin]      Other reaction(s): Muscle pain    Statins-hmg-coa reductase inhibitors      myalgias    Sulfamethoxazole-trimethoprim Other (See Comments)     Muscle pain    Atorvastatin Other (See Comments)     Muscle cramps  Other reaction(s): Muscle pain     Current Outpatient Medications   Medication Sig    blood sugar diagnostic Strp To check BG 1 times daily, to use with insurance preferred meter    blood-glucose meter kit To check BG 1 times daily, to use with insurance preferred meter    chlorhexidine (PERIDEX) 0.12 % solution RINSE WITH 15 MLS BY MOUTH FOR 30 SECONDS THEN SPIT EVERY DAY (Patient not taking: Reported  on 6/13/2025)    eszopiclone (LUNESTA) 3 mg Tab Take 0.5 tablets (1.5 mg total) by mouth every evening.    ezetimibe (ZETIA) 10 mg tablet Take 1 tablet (10 mg total) by mouth once daily. (Patient not taking: Reported on 6/13/2025)    hydroCHLOROthiazide 12.5 MG Tab Take 1 tablet (12.5 mg total) by mouth daily as needed (leg swelling).    HYDROcodone-acetaminophen (NORCO) 5-325 mg per tablet Take 1 tablet by mouth every 6 (six) hours as needed for Pain.    lancets Mis To check BG 1 times daily, to use with insurance preferred meter    ondansetron (ZOFRAN-ODT) 4 MG TbDL Take 1 tablet (4 mg total) by mouth every 6 (six) hours as needed.    polyethylene glycol (GLYCOLAX) 17 gram PwPk Take 17 g by mouth once daily. for 14 days    pravastatin (PRAVACHOL) 20 MG tablet Take 1 tablet (20 mg total) by mouth once daily.    sildenafiL (VIAGRA) 100 MG tablet Take 1 tablet (100 mg total) by mouth daily as needed for Erectile Dysfunction.    tamsulosin (FLOMAX) 0.4 mg Cap Take 1 capsule (0.4 mg total) by mouth once daily.    [facility] LANCETS [facility]      No current facility-administered medications for this visit.       Review of Systems   Constitutional:  Negative for chills and fever.   HENT:  Negative for congestion.    Respiratory:  Negative for chest tightness and shortness of breath.    Cardiovascular:  Negative for chest pain and palpitations.   Gastrointestinal:  Negative for abdominal pain, constipation, diarrhea, nausea and vomiting.   Genitourinary:  Positive for flank pain. Negative for difficulty urinating, dysuria, hematuria and urgency.   Musculoskeletal:  Negative for arthralgias.   Neurological:  Negative for dizziness.   Psychiatric/Behavioral:  Negative for confusion.        Objective:      Vitals:    06/20/25 1432   Weight: 95.5 kg (210 lb 8.6 oz)     Physical Exam  Vitals and nursing note reviewed.   Constitutional:       Appearance: He is well-developed.   HENT:      Head: Normocephalic.   Eyes:       Conjunctiva/sclera: Conjunctivae normal.   Neck:      Thyroid: No thyromegaly.      Trachea: No tracheal deviation.   Cardiovascular:      Rate and Rhythm: Normal rate.      Heart sounds: Normal heart sounds.   Pulmonary:      Effort: Pulmonary effort is normal. No respiratory distress.      Breath sounds: Normal breath sounds. No wheezing.   Abdominal:      General: Bowel sounds are normal.      Palpations: Abdomen is soft.      Tenderness: There is no abdominal tenderness. There is no rebound.      Hernia: No hernia is present.   Musculoskeletal:         General: No tenderness. Normal range of motion.      Cervical back: Normal range of motion and neck supple.   Lymphadenopathy:      Cervical: No cervical adenopathy.   Skin:     General: Skin is warm and dry.      Findings: No erythema or rash.   Neurological:      Mental Status: He is alert and oriented to person, place, and time.   Psychiatric:         Behavior: Behavior normal.         Thought Content: Thought content normal.         Judgment: Judgment normal.         Urine dipstick shows negative for all components.  Micro exam: negative for WBC's or RBC's.    CT Abdomen Pelvis With IV Contrast NO Oral Contrast  Order: [id number]   Status: Final result       Next appt: Today at 03:00 PM in Urology (Bowen Gerard MD)    Test Result Released: Yes (not seen)    0 Result Notes  Details    Reading Physician Reading Date Result Priority   Sterling Oakes MD  [phone]  6/11/2025 STAT     Narrative & Impression  EXAMINATION:  CT ABDOMEN PELVIS WITH IV CONTRAST     CLINICAL HISTORY:  Abdominal pain, acute, nonlocalized;     TECHNIQUE:  Low dose axial images, sagittal and coronal reformations were obtained from the lung bases to the pubic symphysis following the IV administration of 70 mL of Omnipaque 350 oral contrast was not utilized.     COMPARISON:  None     FINDINGS:  The lung bases demonstrate mild motion artifact.  There is a small pleural  effusion at the right lung base and minimal pleural fluid on the left.  Atelectatic changes are noted.  Mild ground-glass opacities are noted.  There is superimposed opacity at the peripheral aspect of the right lower lobe as best seen on axial image 1 measuring approximately 4.4 cm in size, this may relate to an area of confluent infiltrates/airspace disease however follow-up to document resolution to exclude underlying mass lesion is recommended.  In addition on axial image 1 there is a pulmonary nodule measuring approximately 6.9 mm.     There is mild fluid of the distal esophagus, nonspecific, may relate to reflux or stasis.  There is a small to moderate hiatal hernia.  The stomach is incompletely distended.     There is no evidence for pericholecystic or peripancreatic inflammatory change there is no abnormal pancreatic or biliary ductal dilatation.  The spleen appears small.  The adrenal glands appear unremarkable.     As best seen on axial image 151 series 4, coronal image 92 series 601 there is a calculus at the level of the distal left ureter, left ureterovesicular junction.  This measures approximately 4.1 mm on axial imaging.  There is associated mild hydroureteronephrosis.  There is moderate to prominent perinephric stranding and edema, which may relate to the aforementioned however correlation for superimposed UTI/pyelonephritis is needed.     The abdominal aorta appears normal in caliber, demonstrates appropriate opacification.  Atherosclerotic change noted.  The prostate appears enlarged measuring 5.9 x 6 cm in size, and there is impression upon the base of the urinary bladder, this may relate to the prostate however clinical correlation is needed to exclude neoplasm.     Small fat density umbilical hernia noted, without bowel involvement.  There is no evidence for small bowel obstructive process.  There is a structure thought to represent the appendix, it does not appear inflamed.  There is mild  prominence of the colon with air and stool without inflammatory or obstructive change.  There is no evidence for free intraperitoneal air.     The visualized osseous structures demonstrate chronic change.     Impression:     There is a distal left ureteral calculus at the level of the left ureterovesicular junction with associated mild hydroureteronephrosis, there is left-sided perinephric stranding/edema, correlation for superimposed UTI/pyelonephritis is needed.     Pulmonary opacity at the right lung base peripherally may relate to an area of confluent infiltrates/airspace disease however follow-up to document resolution to exclude neoplasm is recommended.     Pulmonary nodule at the right lung base measuring 6.9 mm, can be re-evaluated on follow-up.     Small pleural effusion on the right, minimal on the left.     Enlarged appearance of the prostate, there is impression upon the base of the urinary bladder that may relate to enlarged prostate however clinical correlation is needed to exclude neoplasm.     Additional findings as above.     This report was flagged in Epic as abnormal.        Electronically signed by:Sterling Oakes  Date:                                            06/11/2025  Time:                                           04:21        Exam Ended: 06/11/25 03:41 CDT Last Resulted: 06/11/25 04:21 CDT       US Retroperitoneal Complete  Order: [id number]   Status: Final result       Next appt: Today at 02:15 PM in Urology (Bowen Gerard MD)       Dx: Ureteral stone with hydronephrosis    Test Result Released: Yes (not seen)    0 Result Notes  Details    Reading Physician Reading Date Result Priority   Panda Esparza IV, MD  [phone]  6/19/2025 Routine     Narrative & Impression  EXAMINATION:  US RETROPERITONEAL COMPLETE     CLINICAL HISTORY:  Hydronephrosis with renal and ureteral calculous obstruction     TECHNIQUE:  Ultrasound of the kidneys and urinary bladder was performed  including color flow and Doppler evaluation of the kidneys.     COMPARISON:  CT 06/11/2025.     FINDINGS:  Right kidney: The right kidney measures 10.7 x 6.2 x 6.8 cm. No cortical thinning. No loss of corticomedullary distinction. Resistive index measures 0.66.  No mass. No renal stone. No hydronephrosis.     Left kidney: The left kidney measures 11.0 x 5.3 x 5.7 cm. No cortical thinning. No loss of corticomedullary distinction. Resistive index measures 0.68.  No mass. No renal stone. No hydronephrosis.     Bladder is distended and unremarkable.  Prevoid volume measures 128.8 mL.  Postvoid volume measures 3.9 mm.     Prostate measures 5.3 x 4.7 x 5.1 cm.     Impression:     No hydronephrosis.     Pre and postvoid bladder volumes as above.     Prostatomegaly.        Electronically signed by:Panda Esparza  Date:                                            06/19/2025  Time:                                           15:00        Exam Ended: 06/18/25 17:06 CDT Last Resulted: 06/19/25 15:00 CDT       Assessment:       1. Ureteral stone with hydronephrosis    2. Prostate cancer    3. Incomplete emptying of bladder            Plan:       1. Ureteral stone with hydronephrosis (Primary)  resolved  - Urinary Stone Analysis; Future  - US Retroperitoneal Complete; Future    2. Prostate cancer  Per Cancer Center    3. Incomplete emptying of bladder  - tamsulosin (FLOMAX) 0.4 mg Cap; Take 1 capsule (0.4 mg total) by mouth once daily.  Dispense: 30 capsule; Refill: 11             Follow up in about 1 year (around 6/20/2026) for Follow up Established, Review X-ray.           [1]   Patient Active Problem List  Diagnosis    Shift work sleep disorder Hydroxyzine with SE. Trazodone ineffective.  Rozerem didn't help    Dyslipidemia statin intolerance. 8/2018 zetia; 07/13/2016 stress test negative for ischemia    Essential hypertension 04/08/2021 TTE LV normal size with mild eccentric LVH and normal systolic function LVEF 55%.  Normal  RV size and systolic function.  PA pressure 23.    Restless leg syndrome Requip ineffective    Benign non-nodular prostatic hyperplasia without lower urinary tract symptoms    Beta thalassemia minor    History of prostate cancer status post XRT, finished ADT 2018    Smoker    Vitamin D deficiency    Pelvic floor muscle wasting in male    Recurrent major depressive disorder, in full remission    Obstructive sleep apnea severe with AHI 64 on study 7/2018    Statin myopathy    Tubulovillous adenoma of colon on colonoscopy 8/2018; 2/11/25 colonoscopy 1 TA. 2 HPs repeat 5 years    Peripheral artery disease mild on doppler    Numbness or tingling workup with neuro for demyelinating    PVC (premature ventricular contraction); 4/2021 metoprolol    Lumbar radiculopathy    Class 1 obesity due to excess calories with serious comorbidity in adult    Centrilobular emphysema    Type 2 diabetes mellitus without complication, without long-term current use of insulin    Low testosterone    Aortic atherosclerosis    Nuclear sclerosis of both eyes    Nodule of right lung    Kidney stone on left side   [2]   Social History  Tobacco Use    Smoking status: Every Day     Types: Cigarettes     Passive exposure: Current    Smokeless tobacco: Never    Tobacco comments:     occasionally   Substance Use Topics    Alcohol use: Yes     Alcohol/week: 3.0 standard drinks of alcohol     Types: 1 Glasses of wine, 1 Cans of beer, 1 Shots of liquor per week     Comment: Ocassionally    Drug use: Not Currently

## 2025-06-27 DIAGNOSIS — N13.2 URETERAL STONE WITH HYDRONEPHROSIS: ICD-10-CM

## 2025-06-27 DIAGNOSIS — R33.9 INCOMPLETE EMPTYING OF BLADDER: ICD-10-CM

## 2025-06-27 RX ORDER — TAMSULOSIN HYDROCHLORIDE 0.4 MG/1
0.4 CAPSULE ORAL DAILY
Qty: 30 CAPSULE | Refills: 11 | Status: SHIPPED | OUTPATIENT
Start: 2025-06-27 | End: 2026-06-22

## 2025-06-27 RX ORDER — HYDROCODONE BITARTRATE AND ACETAMINOPHEN 5; 325 MG/1; MG/1
1 TABLET ORAL EVERY 6 HOURS PRN
Qty: 20 TABLET | Refills: 0 | OUTPATIENT
Start: 2025-06-27

## 2025-07-02 ENCOUNTER — CLINICAL SUPPORT (OUTPATIENT)
Dept: SMOKING CESSATION | Facility: CLINIC | Age: 75
End: 2025-07-02
Payer: COMMERCIAL

## 2025-07-02 DIAGNOSIS — F17.200 NICOTINE DEPENDENCE: Primary | ICD-10-CM

## 2025-07-02 PROCEDURE — 99407 BEHAV CHNG SMOKING > 10 MIN: CPT | Mod: S$GLB,,, | Performed by: GENERAL PRACTICE

## 2025-07-02 NOTE — PROGRESS NOTES
Spoke with patient today in regard to smoking cessation progress 6/12 month telephone follow up, he states that he is not tobacco free.  Patient states that he trimmed down and on the verge of quitting.  Patient states he goes days without smoking.  Commended patient on the accomplishments thus far.  Informed patient of benefit period, future follow up, and contact information if any further help or support is needed.  Patient not ready to schedule appointment at this time. Will complete smart form for 6/12 month follow up on Quit attempt 6 and resolve episode.

## 2025-07-07 ENCOUNTER — HOSPITAL ENCOUNTER (OUTPATIENT)
Dept: CARDIOLOGY | Facility: HOSPITAL | Age: 75
Discharge: HOME | End: 2025-07-07
Attending: INTERNAL MEDICINE
Payer: MEDICARE

## 2025-07-07 VITALS — HEIGHT: 71 IN | BODY MASS INDEX: 29.4 KG/M2 | WEIGHT: 210 LBS

## 2025-07-07 DIAGNOSIS — I49.3 PVC (PREMATURE VENTRICULAR CONTRACTION): ICD-10-CM

## 2025-07-07 DIAGNOSIS — E78.5 DYSLIPIDEMIA: ICD-10-CM

## 2025-07-07 DIAGNOSIS — I10 ESSENTIAL HYPERTENSION: ICD-10-CM

## 2025-07-07 LAB
AORTIC ROOT ANNULUS: 3.9 CM
AORTIC SIZE INDEX (SOV): 1.6 CM/M2
AORTIC SIZE INDEX: 2 CM/M2
AORTIC VALVE CUSP SEPERATION: 1.99 CM
ASCENDING AORTA: 4.2 CM
AV INDEX (PROSTH): 0.65
AV MEAN GRADIENT: 5 MMHG
AV PEAK GRADIENT: 10 MMHG
AV VALVE AREA BY VELOCITY RATIO: 3 CM²
AV VALVE AREA: 3.5 CM²
AV VELOCITY RATIO: 0.56
BSA FOR ECHO PROCEDURE: 2.18 M2
CV ECHO LV RWT: 0.55 CM
DOP CALC AO PEAK VEL: 1.6 M/S
DOP CALC AO VTI: 33.1 CM
DOP CALC LVOT AREA: 5.3 CM2
DOP CALC LVOT DIAMETER: 2.6 CM
DOP CALC LVOT PEAK VEL: 0.9 M/S
DOP CALC LVOT STROKE VOLUME: 114.6 CM3
DOP CALCLVOT PEAK VEL VTI: 21.6 CM
E WAVE DECELERATION TIME: 236 MSEC
E/A RATIO: 0.92
E/E' RATIO: 7 M/S
ECHO LV POSTERIOR WALL: 1.2 CM (ref 0.6–1.1)
FRACTIONAL SHORTENING: 27.3 % (ref 28–44)
INTERVENTRICULAR SEPTUM: 1.2 CM (ref 0.6–1.1)
IVC DIAMETER: 1.99 CM
IVRT: 148 MSEC
LA MAJOR: 5.3 CM
LA MINOR: 5.6 CM
LA WIDTH: 4 CM
LEFT ATRIUM SIZE: 3.9 CM
LEFT ATRIUM VOLUME INDEX: 34 ML/M2
LEFT ATRIUM VOLUME: 72 CM3
LEFT INTERNAL DIMENSION IN SYSTOLE: 3.2 CM (ref 2.1–4)
LEFT VENTRICLE DIASTOLIC VOLUME INDEX: 41.4 ML/M2
LEFT VENTRICLE DIASTOLIC VOLUME: 89 ML
LEFT VENTRICLE MASS INDEX: 89 G/M2
LEFT VENTRICLE SYSTOLIC VOLUME INDEX: 19.1 ML/M2
LEFT VENTRICLE SYSTOLIC VOLUME: 41 ML
LEFT VENTRICULAR INTERNAL DIMENSION IN DIASTOLE: 4.4 CM (ref 3.5–6)
LEFT VENTRICULAR MASS: 191.3 G
LV LATERAL E/E' RATIO: 5.7 M/S
LV SEPTAL E/E' RATIO: 9.5 M/S
LVED V (TEICH): 89.16 ML
LVES V (TEICH): 40.66 ML
LVOT MG: 1.48 MMHG
LVOT MV: 0.56 CM/S
MV PEAK A VEL: 0.62 M/S
MV PEAK E VEL: 0.57 M/S
MV STENOSIS PRESSURE HALF TIME: 68.54 MS
MV VALVE AREA P 1/2 METHOD: 3.21 CM2
OHS CV RV/LV RATIO: 0.91 CM
PISA TR MAX VEL: 2.2 M/S
PULM VEIN S/D RATIO: 1.2
PV MV: 0.86 M/S
PV PEAK D VEL: 0.44 M/S
PV PEAK GRADIENT: 6 MMHG
PV PEAK S VEL: 0.53 M/S
PV PEAK VELOCITY: 1.27 M/S
RA MAJOR: 5.47 CM
RA PRESSURE ESTIMATED: 3 MMHG
RA WIDTH: 4.4 CM
RIGHT VENTRICLE DIASTOLIC BASEL DIMENSION: 4 CM
RIGHT VENTRICULAR END-DIASTOLIC DIMENSION: 3.99 CM
RV TB RVSP: 5 MMHG
RV TISSUE DOPPLER FREE WALL SYSTOLIC VELOCITY 1 (APICAL 4 CHAMBER VIEW): 12.74 CM/S
SINUS: 3.4 CM
STJ: 3.4 CM
TDI LATERAL: 0.1 M/S
TDI SEPTAL: 0.06 M/S
TDI: 0.08 M/S
TR MAX PG: 20 MMHG
TRICUSPID ANNULAR PLANE SYSTOLIC EXCURSION: 1.9 CM
TV REST PULMONARY ARTERY PRESSURE: 22 MMHG
Z-SCORE OF LEFT VENTRICULAR DIMENSION IN END DIASTOLE: -4.61
Z-SCORE OF LEFT VENTRICULAR DIMENSION IN END SYSTOLE: -2.25

## 2025-07-07 PROCEDURE — 93306 TTE W/DOPPLER COMPLETE: CPT

## 2025-07-07 PROCEDURE — 93306 TTE W/DOPPLER COMPLETE: CPT | Mod: 26,,, | Performed by: INTERNAL MEDICINE

## 2025-07-07 PROCEDURE — 93226 XTRNL ECG REC<48 HR SCAN A/R: CPT

## 2025-07-10 LAB
OHS CV EVENT MONITOR DAY: 2
OHS CV HOLTER LENGTH DECIMAL HOURS: 96
OHS CV HOLTER LENGTH HOURS: 48
OHS CV HOLTER LENGTH MINUTES: 0
OHS CV HOLTER SINUS AVERAGE HR: 61
OHS CV HOLTER SINUS MAX HR: 124
OHS CV HOLTER SINUS MIN HR: 43